# Patient Record
Sex: MALE | Race: WHITE | Employment: OTHER | URBAN - NONMETROPOLITAN AREA
[De-identification: names, ages, dates, MRNs, and addresses within clinical notes are randomized per-mention and may not be internally consistent; named-entity substitution may affect disease eponyms.]

---

## 2017-01-03 ENCOUNTER — OFFICE VISIT (OUTPATIENT)
Dept: OTHER | Age: 57
End: 2017-01-03

## 2017-01-03 VITALS — WEIGHT: 245.6 LBS | HEIGHT: 66 IN | BODY MASS INDEX: 39.47 KG/M2

## 2017-01-03 DIAGNOSIS — E78.2 MIXED HYPERLIPIDEMIA: ICD-10-CM

## 2017-01-03 DIAGNOSIS — E66.9 OBESITY (BMI 30-39.9): Primary | ICD-10-CM

## 2017-01-03 PROCEDURE — 97802 MEDICAL NUTRITION INDIV IN: CPT | Performed by: DIETITIAN, REGISTERED

## 2017-02-15 ENCOUNTER — TELEPHONE (OUTPATIENT)
Dept: OTHER | Age: 57
End: 2017-02-15

## 2017-02-28 ENCOUNTER — OFFICE VISIT (OUTPATIENT)
Dept: OTHER | Age: 57
End: 2017-02-28

## 2017-02-28 VITALS — HEIGHT: 66 IN | BODY MASS INDEX: 38.73 KG/M2 | WEIGHT: 241 LBS

## 2017-02-28 DIAGNOSIS — E66.9 OBESITY (BMI 30-39.9): Primary | ICD-10-CM

## 2017-02-28 DIAGNOSIS — E78.2 MIXED HYPERLIPIDEMIA: ICD-10-CM

## 2017-02-28 PROCEDURE — 97803 MED NUTRITION INDIV SUBSEQ: CPT | Performed by: DIETITIAN, REGISTERED

## 2017-03-07 ENCOUNTER — TELEPHONE (OUTPATIENT)
Dept: INTERNAL MEDICINE | Age: 57
End: 2017-03-07

## 2017-03-28 LAB
ALT SERPL-CCNC: 44 U/L
LDL CHOLESTEROL DIRECT: 121 MG/DL
TRIGL SERPL-MCNC: 202 MG/DL

## 2017-03-30 ENCOUNTER — OFFICE VISIT (OUTPATIENT)
Dept: INTERNAL MEDICINE | Age: 57
End: 2017-03-30

## 2017-03-30 VITALS
SYSTOLIC BLOOD PRESSURE: 156 MMHG | WEIGHT: 239.5 LBS | HEIGHT: 64 IN | BODY MASS INDEX: 40.89 KG/M2 | DIASTOLIC BLOOD PRESSURE: 80 MMHG | HEART RATE: 104 BPM

## 2017-03-30 DIAGNOSIS — E03.9 ACQUIRED HYPOTHYROIDISM: ICD-10-CM

## 2017-03-30 DIAGNOSIS — E78.5 HYPERLIPIDEMIA, UNSPECIFIED HYPERLIPIDEMIA TYPE: ICD-10-CM

## 2017-03-30 DIAGNOSIS — M79.604 RIGHT LEG PAIN: ICD-10-CM

## 2017-03-30 DIAGNOSIS — F32.9 REACTIVE DEPRESSION: ICD-10-CM

## 2017-03-30 DIAGNOSIS — G45.8 OTHER SPECIFIED TRANSIENT CEREBRAL ISCHEMIAS: ICD-10-CM

## 2017-03-30 DIAGNOSIS — I10 ESSENTIAL HYPERTENSION: Primary | ICD-10-CM

## 2017-03-30 PROCEDURE — 99214 OFFICE O/P EST MOD 30 MIN: CPT | Performed by: INTERNAL MEDICINE

## 2017-03-30 RX ORDER — FLUOXETINE HYDROCHLORIDE 20 MG/1
20 CAPSULE ORAL DAILY
Qty: 30 CAPSULE | Refills: 5 | Status: SHIPPED | OUTPATIENT
Start: 2017-03-30 | End: 2017-08-29 | Stop reason: SDUPTHER

## 2017-03-30 RX ORDER — LEVOTHYROXINE SODIUM 0.03 MG/1
TABLET ORAL
Qty: 30 TABLET | Refills: 5 | Status: ON HOLD | OUTPATIENT
Start: 2017-03-30 | End: 2017-07-17

## 2017-03-30 RX ORDER — LOSARTAN POTASSIUM 100 MG/1
100 TABLET ORAL DAILY
Qty: 30 TABLET | Refills: 5 | Status: SHIPPED | OUTPATIENT
Start: 2017-03-30 | End: 2017-08-29 | Stop reason: SDUPTHER

## 2017-03-30 RX ORDER — ASPIRIN 81 MG/1
81 TABLET, COATED ORAL DAILY
Qty: 90 TABLET | Refills: 1 | Status: SHIPPED | OUTPATIENT
Start: 2017-03-30 | End: 2017-08-29 | Stop reason: SDUPTHER

## 2017-03-30 RX ORDER — ATORVASTATIN CALCIUM 80 MG/1
80 TABLET, FILM COATED ORAL DAILY
Qty: 30 TABLET | Refills: 5 | Status: SHIPPED | OUTPATIENT
Start: 2017-03-30 | End: 2017-08-29 | Stop reason: SDUPTHER

## 2017-03-30 RX ORDER — METOPROLOL SUCCINATE 100 MG/1
100 TABLET, EXTENDED RELEASE ORAL DAILY
Qty: 30 TABLET | Refills: 5 | Status: SHIPPED | OUTPATIENT
Start: 2017-03-30 | End: 2017-08-29 | Stop reason: SDUPTHER

## 2017-03-30 RX ORDER — MELOXICAM 15 MG/1
15 TABLET ORAL DAILY
Qty: 30 TABLET | Refills: 5 | Status: ON HOLD | OUTPATIENT
Start: 2017-03-30 | End: 2017-07-17 | Stop reason: HOSPADM

## 2017-03-30 RX ORDER — METOPROLOL TARTRATE 50 MG/1
50 TABLET, FILM COATED ORAL 2 TIMES DAILY
Qty: 60 TABLET | Refills: 5 | Status: CANCELLED | OUTPATIENT
Start: 2017-03-30

## 2017-04-11 ENCOUNTER — OFFICE VISIT (OUTPATIENT)
Dept: ENDOCRINOLOGY | Age: 57
End: 2017-04-11

## 2017-04-11 VITALS
HEIGHT: 64 IN | WEIGHT: 242 LBS | HEART RATE: 74 BPM | SYSTOLIC BLOOD PRESSURE: 140 MMHG | BODY MASS INDEX: 41.32 KG/M2 | DIASTOLIC BLOOD PRESSURE: 78 MMHG | RESPIRATION RATE: 20 BRPM

## 2017-04-11 DIAGNOSIS — R61 DIAPHORESIS: Primary | ICD-10-CM

## 2017-04-11 PROCEDURE — 99213 OFFICE O/P EST LOW 20 MIN: CPT | Performed by: INTERNAL MEDICINE

## 2017-04-13 ENCOUNTER — TELEPHONE (OUTPATIENT)
Dept: ENDOCRINOLOGY | Age: 57
End: 2017-04-13

## 2017-04-27 ENCOUNTER — OFFICE VISIT (OUTPATIENT)
Dept: OTHER | Age: 57
End: 2017-04-27

## 2017-04-27 VITALS — BODY MASS INDEX: 38.83 KG/M2 | HEIGHT: 66 IN | WEIGHT: 241.6 LBS

## 2017-04-27 DIAGNOSIS — E66.9 OBESITY (BMI 30-39.9): Primary | ICD-10-CM

## 2017-04-27 DIAGNOSIS — E78.2 MIXED HYPERLIPIDEMIA: ICD-10-CM

## 2017-07-11 ENCOUNTER — OFFICE VISIT (OUTPATIENT)
Dept: INTERNAL MEDICINE | Age: 57
End: 2017-07-11

## 2017-07-11 VITALS
HEART RATE: 70 BPM | SYSTOLIC BLOOD PRESSURE: 136 MMHG | DIASTOLIC BLOOD PRESSURE: 90 MMHG | WEIGHT: 241 LBS | HEIGHT: 63 IN | BODY MASS INDEX: 42.7 KG/M2

## 2017-07-11 DIAGNOSIS — R14.0 ABDOMINAL BLOATING: ICD-10-CM

## 2017-07-11 DIAGNOSIS — E03.9 ACQUIRED HYPOTHYROIDISM: ICD-10-CM

## 2017-07-11 DIAGNOSIS — R10.11 RIGHT UPPER QUADRANT ABDOMINAL PAIN: ICD-10-CM

## 2017-07-11 DIAGNOSIS — K21.9 GASTROESOPHAGEAL REFLUX DISEASE, ESOPHAGITIS PRESENCE NOT SPECIFIED: ICD-10-CM

## 2017-07-11 DIAGNOSIS — R11.2 NON-INTRACTABLE VOMITING WITH NAUSEA, UNSPECIFIED VOMITING TYPE: Primary | ICD-10-CM

## 2017-07-11 PROCEDURE — 36415 COLL VENOUS BLD VENIPUNCTURE: CPT

## 2017-07-11 PROCEDURE — 80076 HEPATIC FUNCTION PANEL: CPT

## 2017-07-11 PROCEDURE — 84443 ASSAY THYROID STIM HORMONE: CPT

## 2017-07-11 PROCEDURE — 9990 CHARGE CONVERSION

## 2017-07-11 PROCEDURE — 99214 OFFICE O/P EST MOD 30 MIN: CPT | Performed by: INTERNAL MEDICINE

## 2017-07-11 PROCEDURE — 82150 ASSAY OF AMYLASE: CPT

## 2017-07-11 PROCEDURE — 83690 ASSAY OF LIPASE: CPT

## 2017-07-14 ENCOUNTER — HOSPITAL ENCOUNTER (INPATIENT)
Dept: PEDIATRICS UNIT | Age: 57
LOS: 3 days | Discharge: HOME | DRG: 469 | End: 2017-07-17
Attending: INTERNAL MEDICINE | Admitting: INTERNAL MEDICINE
Payer: MEDICAID

## 2017-07-14 DIAGNOSIS — E03.9 ACQUIRED HYPOTHYROIDISM: ICD-10-CM

## 2017-07-14 DIAGNOSIS — R10.9 ABDOMINAL PAIN, UNSPECIFIED LOCATION: Primary | ICD-10-CM

## 2017-07-14 DIAGNOSIS — N17.9 ACUTE RENAL FAILURE, UNSPECIFIED ACUTE RENAL FAILURE TYPE (HCC): ICD-10-CM

## 2017-07-14 DIAGNOSIS — R11.0 NAUSEA: ICD-10-CM

## 2017-07-14 LAB
ALBUMIN SERPL-MCNC: 3.6 G/DL (ref 3.5–5.1)
ALLEN TEST: POSITIVE
ALP BLD-CCNC: 94 U/L (ref 38–126)
ALT SERPL-CCNC: 28 U/L (ref 11–66)
AMMONIA: 26 UMOL/L (ref 11–60)
AMYLASE: 92 U/L (ref 20–104)
ANION GAP SERPL CALCULATED.3IONS-SCNC: 18 MEQ/L (ref 8–16)
AST SERPL-CCNC: 19 U/L (ref 5–40)
BASE EXCESS (CALCULATED): 5.5 MMOL/L (ref -2.5–2.5)
BASOPHILS # BLD: 0.2 %
BASOPHILS ABSOLUTE: 0 THOU/MM3 (ref 0–0.1)
BILIRUB SERPL-MCNC: 0.3 MG/DL (ref 0.3–1.2)
BILIRUBIN DIRECT: < 0.2 MG/DL (ref 0–0.3)
BILIRUBIN URINE: NEGATIVE
BLOOD, URINE: NEGATIVE
BUN BLDV-MCNC: 38 MG/DL (ref 7–22)
CALCIUM SERPL-MCNC: 10 MG/DL (ref 8.5–10.5)
CHARACTER, URINE: CLEAR
CHLORIDE BLD-SCNC: 100 MEQ/L (ref 98–111)
CO2: 25 MEQ/L (ref 23–33)
COLLECTED BY:: ABNORMAL
COLOR: YELLOW
CREAT SERPL-MCNC: 2 MG/DL (ref 0.4–1.2)
DEVICE: ABNORMAL
EKG ATRIAL RATE: 109 BPM
EKG P AXIS: 48 DEGREES
EKG P-R INTERVAL: 136 MS
EKG Q-T INTERVAL: 328 MS
EKG QRS DURATION: 66 MS
EKG QTC CALCULATION (BAZETT): 441 MS
EKG R AXIS: 19 DEGREES
EKG T AXIS: 65 DEGREES
EKG VENTRICULAR RATE: 109 BPM
EOSINOPHIL # BLD: 2.5 %
EOSINOPHILS ABSOLUTE: 0.2 THOU/MM3 (ref 0–0.4)
GFR SERPL CREATININE-BSD FRML MDRD: 35 ML/MIN/1.73M2
GLUCOSE BLD-MCNC: 154 MG/DL (ref 70–108)
GLUCOSE, URINE: NEGATIVE MG/DL
HCO3: 31 MMOL/L (ref 23–28)
HCT VFR BLD CALC: 36.6 % (ref 42–52)
HEMOGLOBIN: 12.6 GM/DL (ref 14–18)
INR BLD: 0.91 (ref 0.85–1.13)
KETONES, URINE: NEGATIVE
LACTIC ACID: 1.1 MMOL/L (ref 0.5–2.2)
LEUKOCYTE EST, POC: NEGATIVE
LIPASE: 31.7 U/L (ref 5.6–51.3)
LYMPHOCYTES # BLD: 20.7 %
LYMPHOCYTES ABSOLUTE: 1.4 THOU/MM3 (ref 1–4.8)
MCH RBC QN AUTO: 30.2 PG (ref 27–31)
MCHC RBC AUTO-ENTMCNC: 34.3 GM/DL (ref 33–37)
MCV RBC AUTO: 88.1 FL (ref 80–94)
MONOCYTES # BLD: 8.1 %
MONOCYTES ABSOLUTE: 0.6 THOU/MM3 (ref 0.4–1.3)
NITRITE, URINE: NEGATIVE
NUCLEATED RED BLOOD CELLS: 0 /100 WBC
O2 SATURATION: 94 %
OSMOLALITY CALCULATION: 297.1 MOSMOL/KG (ref 275–300)
PCO2: 48 MMHG (ref 35–45)
PDW BLD-RTO: 13.9 % (ref 11.5–14.5)
PH BLOOD GAS: 7.42 (ref 7.35–7.45)
PH UA: 5.5
PLATELET # BLD: 203 THOU/MM3 (ref 130–400)
PMV BLD AUTO: 10.5 MCM (ref 7.4–10.4)
PO2: 70 MMHG (ref 71–104)
POTASSIUM SERPL-SCNC: 4.6 MEQ/L (ref 3.5–5.2)
PROTEIN UA: NEGATIVE MG/DL
RBC # BLD: 4.16 MILL/MM3 (ref 4.7–6.1)
RBC # BLD: NORMAL 10*6/UL
SEG NEUTROPHILS: 68.5 %
SEGMENTED NEUTROPHILS ABSOLUTE COUNT: 4.8 THOU/MM3 (ref 1.8–7.7)
SODIUM BLD-SCNC: 143 MEQ/L (ref 135–145)
SOURCE, BLOOD GAS: ABNORMAL
SPECIFIC GRAVITY UA: 1.02 (ref 1–1.03)
TOTAL PROTEIN: 6.7 G/DL (ref 6.1–8)
TROPONIN T: < 0.01 NG/ML
UROBILINOGEN, URINE: 0.2 EU/DL
WBC # BLD: 7 THOU/MM3 (ref 4.8–10.8)

## 2017-07-14 PROCEDURE — 85018 HEMOGLOBIN: CPT

## 2017-07-14 PROCEDURE — 82040 ASSAY OF SERUM ALBUMIN: CPT

## 2017-07-14 PROCEDURE — 84075 ASSAY ALKALINE PHOSPHATASE: CPT

## 2017-07-14 PROCEDURE — 9990 CHARGE CONVERSION

## 2017-07-14 PROCEDURE — 84520 ASSAY OF UREA NITROGEN: CPT

## 2017-07-14 PROCEDURE — 96374 THER/PROPH/DIAG INJ IV PUSH: CPT

## 2017-07-14 PROCEDURE — 84450 TRANSFERASE (AST) (SGOT): CPT

## 2017-07-14 PROCEDURE — 96375 TX/PRO/DX INJ NEW DRUG ADDON: CPT | Performed by: INTERNAL MEDICINE

## 2017-07-14 PROCEDURE — 36600 WITHDRAWAL OF ARTERIAL BLOOD: CPT

## 2017-07-14 PROCEDURE — 82310 ASSAY OF CALCIUM: CPT

## 2017-07-14 PROCEDURE — 93005 ELECTROCARDIOGRAM TRACING: CPT

## 2017-07-14 PROCEDURE — 87086 URINE CULTURE/COLONY COUNT: CPT

## 2017-07-14 PROCEDURE — 83605 ASSAY OF LACTIC ACID: CPT

## 2017-07-14 PROCEDURE — 84484 ASSAY OF TROPONIN QUANT: CPT

## 2017-07-14 PROCEDURE — 82140 ASSAY OF AMMONIA: CPT

## 2017-07-14 PROCEDURE — 96376 TX/PRO/DX INJ SAME DRUG ADON: CPT

## 2017-07-14 PROCEDURE — 99285 EMERGENCY DEPT VISIT HI MDM: CPT

## 2017-07-14 PROCEDURE — 84295 ASSAY OF SERUM SODIUM: CPT

## 2017-07-14 PROCEDURE — 84155 ASSAY OF PROTEIN SERUM: CPT

## 2017-07-14 PROCEDURE — 71020 CHARGE CONVERSION: CPT

## 2017-07-14 PROCEDURE — 85014 HEMATOCRIT: CPT

## 2017-07-14 PROCEDURE — 82565 ASSAY OF CREATININE: CPT

## 2017-07-14 PROCEDURE — 83690 ASSAY OF LIPASE: CPT

## 2017-07-14 PROCEDURE — 82247 BILIRUBIN TOTAL: CPT

## 2017-07-14 PROCEDURE — 96375 TX/PRO/DX INJ NEW DRUG ADDON: CPT

## 2017-07-14 PROCEDURE — 82803 BLOOD GASES ANY COMBINATION: CPT

## 2017-07-14 PROCEDURE — 85049 AUTOMATED PLATELET COUNT: CPT

## 2017-07-14 PROCEDURE — 96374 THER/PROPH/DIAG INJ IV PUSH: CPT | Performed by: INTERNAL MEDICINE

## 2017-07-14 PROCEDURE — 82248 BILIRUBIN DIRECT: CPT

## 2017-07-14 PROCEDURE — 82435 ASSAY OF BLOOD CHLORIDE: CPT

## 2017-07-14 PROCEDURE — 82374 ASSAY BLOOD CARBON DIOXIDE: CPT

## 2017-07-14 PROCEDURE — G0378 HOSPITAL OBSERVATION PER HR: HCPCS | Performed by: INTERNAL MEDICINE

## 2017-07-14 PROCEDURE — 74176 CT ABD & PELVIS W/O CONTRAST: CPT

## 2017-07-14 PROCEDURE — 85048 AUTOMATED LEUKOCYTE COUNT: CPT

## 2017-07-14 PROCEDURE — 82150 ASSAY OF AMYLASE: CPT

## 2017-07-14 PROCEDURE — 96376 TX/PRO/DX INJ SAME DRUG ADON: CPT | Performed by: INTERNAL MEDICINE

## 2017-07-14 PROCEDURE — 84460 ALANINE AMINO (ALT) (SGPT): CPT

## 2017-07-14 PROCEDURE — 85041 AUTOMATED RBC COUNT: CPT

## 2017-07-14 PROCEDURE — 84132 ASSAY OF SERUM POTASSIUM: CPT

## 2017-07-14 PROCEDURE — 82947 ASSAY GLUCOSE BLOOD QUANT: CPT

## 2017-07-14 PROCEDURE — 96361 HYDRATE IV INFUSION ADD-ON: CPT

## 2017-07-14 RX ORDER — HYDROXYZINE HYDROCHLORIDE 10 MG/1
10 TABLET, FILM COATED ORAL EVERY 6 HOURS PRN
Status: DISCONTINUED | OUTPATIENT
Start: 2017-07-14 | End: 2017-07-17 | Stop reason: HOSPADM

## 2017-07-14 RX ORDER — POTASSIUM CHLORIDE 20 MEQ/1
40 TABLET, EXTENDED RELEASE ORAL PRN
Status: DISCONTINUED | OUTPATIENT
Start: 2017-07-14 | End: 2017-07-17 | Stop reason: HOSPADM

## 2017-07-14 RX ORDER — ACETAMINOPHEN 325 MG/1
650 TABLET ORAL EVERY 4 HOURS PRN
Status: DISCONTINUED | OUTPATIENT
Start: 2017-07-14 | End: 2017-07-17 | Stop reason: HOSPADM

## 2017-07-14 RX ORDER — PANTOPRAZOLE SODIUM 40 MG/1
40 TABLET, DELAYED RELEASE ORAL
Status: DISCONTINUED | OUTPATIENT
Start: 2017-07-15 | End: 2017-07-17 | Stop reason: HOSPADM

## 2017-07-14 RX ORDER — ONDANSETRON 2 MG/ML
4 INJECTION INTRAMUSCULAR; INTRAVENOUS EVERY 30 MIN PRN
Status: COMPLETED | OUTPATIENT
Start: 2017-07-14 | End: 2017-07-14

## 2017-07-14 RX ORDER — ONDANSETRON 4 MG/1
4 TABLET, FILM COATED ORAL EVERY 8 HOURS PRN
Qty: 20 TABLET | Refills: 0 | Status: SHIPPED | OUTPATIENT
Start: 2017-07-14 | End: 2017-07-17 | Stop reason: HOSPADM

## 2017-07-14 RX ORDER — MORPHINE SULFATE 4 MG/ML
4 INJECTION, SOLUTION INTRAMUSCULAR; INTRAVENOUS
Status: DISCONTINUED | OUTPATIENT
Start: 2017-07-14 | End: 2017-07-17 | Stop reason: HOSPADM

## 2017-07-14 RX ORDER — LEVOTHYROXINE SODIUM 0.03 MG/1
25 TABLET ORAL DAILY
Status: DISCONTINUED | OUTPATIENT
Start: 2017-07-15 | End: 2017-07-16

## 2017-07-14 RX ORDER — SODIUM CHLORIDE 0.9 % (FLUSH) 0.9 %
10 SYRINGE (ML) INJECTION EVERY 12 HOURS SCHEDULED
Status: DISCONTINUED | OUTPATIENT
Start: 2017-07-14 | End: 2017-07-17 | Stop reason: HOSPADM

## 2017-07-14 RX ORDER — POTASSIUM CHLORIDE 20MEQ/15ML
40 LIQUID (ML) ORAL PRN
Status: DISCONTINUED | OUTPATIENT
Start: 2017-07-14 | End: 2017-07-17 | Stop reason: HOSPADM

## 2017-07-14 RX ORDER — ASPIRIN 81 MG/1
81 TABLET ORAL DAILY
Status: DISCONTINUED | OUTPATIENT
Start: 2017-07-15 | End: 2017-07-15

## 2017-07-14 RX ORDER — MORPHINE SULFATE 2 MG/ML
2 INJECTION, SOLUTION INTRAMUSCULAR; INTRAVENOUS
Status: DISCONTINUED | OUTPATIENT
Start: 2017-07-14 | End: 2017-07-17 | Stop reason: HOSPADM

## 2017-07-14 RX ORDER — ATORVASTATIN CALCIUM 80 MG/1
80 TABLET, FILM COATED ORAL DAILY
Status: DISCONTINUED | OUTPATIENT
Start: 2017-07-15 | End: 2017-07-17 | Stop reason: HOSPADM

## 2017-07-14 RX ORDER — 0.9 % SODIUM CHLORIDE 0.9 %
1000 INTRAVENOUS SOLUTION INTRAVENOUS ONCE
Status: COMPLETED | OUTPATIENT
Start: 2017-07-14 | End: 2017-07-14

## 2017-07-14 RX ORDER — DICYCLOMINE HYDROCHLORIDE 10 MG/1
10 CAPSULE ORAL EVERY 6 HOURS PRN
Qty: 20 CAPSULE | Refills: 0 | Status: SHIPPED | OUTPATIENT
Start: 2017-07-14 | End: 2017-07-17 | Stop reason: HOSPADM

## 2017-07-14 RX ORDER — POTASSIUM CHLORIDE 7.45 MG/ML
10 INJECTION INTRAVENOUS PRN
Status: DISCONTINUED | OUTPATIENT
Start: 2017-07-14 | End: 2017-07-17 | Stop reason: HOSPADM

## 2017-07-14 RX ORDER — SODIUM CHLORIDE 0.9 % (FLUSH) 0.9 %
10 SYRINGE (ML) INJECTION PRN
Status: DISCONTINUED | OUTPATIENT
Start: 2017-07-14 | End: 2017-07-17 | Stop reason: HOSPADM

## 2017-07-14 RX ORDER — ALBUTEROL SULFATE 90 UG/1
1 AEROSOL, METERED RESPIRATORY (INHALATION) EVERY 6 HOURS PRN
Status: DISCONTINUED | OUTPATIENT
Start: 2017-07-14 | End: 2017-07-17 | Stop reason: HOSPADM

## 2017-07-14 RX ORDER — ONDANSETRON 2 MG/ML
4 INJECTION INTRAMUSCULAR; INTRAVENOUS EVERY 6 HOURS PRN
Status: DISCONTINUED | OUTPATIENT
Start: 2017-07-14 | End: 2017-07-17 | Stop reason: HOSPADM

## 2017-07-14 RX ORDER — SODIUM CHLORIDE 9 MG/ML
INJECTION, SOLUTION INTRAVENOUS CONTINUOUS
Status: DISCONTINUED | OUTPATIENT
Start: 2017-07-14 | End: 2017-07-17 | Stop reason: HOSPADM

## 2017-07-14 RX ORDER — ACETAMINOPHEN 325 MG/1
650 TABLET ORAL EVERY 6 HOURS PRN
Status: DISCONTINUED | OUTPATIENT
Start: 2017-07-14 | End: 2017-07-14 | Stop reason: SDUPTHER

## 2017-07-14 RX ORDER — FLUOXETINE HYDROCHLORIDE 20 MG/1
20 CAPSULE ORAL DAILY
Status: DISCONTINUED | OUTPATIENT
Start: 2017-07-15 | End: 2017-07-17 | Stop reason: HOSPADM

## 2017-07-14 RX ORDER — TRAZODONE HYDROCHLORIDE 50 MG/1
50 TABLET ORAL NIGHTLY PRN
Status: DISCONTINUED | OUTPATIENT
Start: 2017-07-15 | End: 2017-07-17 | Stop reason: HOSPADM

## 2017-07-14 RX ORDER — METOPROLOL SUCCINATE 100 MG/1
100 TABLET, EXTENDED RELEASE ORAL DAILY
Status: DISCONTINUED | OUTPATIENT
Start: 2017-07-15 | End: 2017-07-17 | Stop reason: HOSPADM

## 2017-07-14 RX ADMIN — Medication 1 MG: at 14:22

## 2017-07-14 RX ADMIN — MORPHINE SULFATE 4 MG: 4 INJECTION, SOLUTION INTRAMUSCULAR; INTRAVENOUS at 23:51

## 2017-07-14 RX ADMIN — SODIUM CHLORIDE: 9 INJECTION, SOLUTION INTRAVENOUS at 23:50

## 2017-07-14 RX ADMIN — ONDANSETRON 4 MG: 2 INJECTION INTRAMUSCULAR; INTRAVENOUS at 14:21

## 2017-07-14 RX ADMIN — Medication 1000 ML: at 14:22

## 2017-07-14 RX ADMIN — Medication 1 MG: at 20:56

## 2017-07-14 RX ADMIN — ONDANSETRON 4 MG: 2 INJECTION INTRAMUSCULAR; INTRAVENOUS at 20:56

## 2017-07-14 ASSESSMENT — ENCOUNTER SYMPTOMS
ABDOMINAL PAIN: 1
DIARRHEA: 0
CONSTIPATION: 0
RHINORRHEA: 0
NAUSEA: 1
EYE DISCHARGE: 0
WHEEZING: 0
BACK PAIN: 0
VOMITING: 1
COUGH: 0
EYE REDNESS: 0
ABDOMINAL DISTENTION: 1
SHORTNESS OF BREATH: 1
SORE THROAT: 0

## 2017-07-14 ASSESSMENT — PAIN DESCRIPTION - DESCRIPTORS
DESCRIPTORS: CONSTANT;SQUEEZING;TIGHTNESS
DESCRIPTORS: ACHING;CONSTANT;TIGHTNESS
DESCRIPTORS: TIGHTNESS

## 2017-07-14 ASSESSMENT — PAIN DESCRIPTION - PAIN TYPE
TYPE: ACUTE PAIN

## 2017-07-14 ASSESSMENT — PAIN DESCRIPTION - FREQUENCY
FREQUENCY: CONTINUOUS
FREQUENCY: CONTINUOUS
FREQUENCY: INTERMITTENT

## 2017-07-14 ASSESSMENT — PAIN SCALES - GENERAL
PAINLEVEL_OUTOF10: 10
PAINLEVEL_OUTOF10: 10
PAINLEVEL_OUTOF10: 9
PAINLEVEL_OUTOF10: 10
PAINLEVEL_OUTOF10: 10
PAINLEVEL_OUTOF10: 7

## 2017-07-14 ASSESSMENT — PAIN DESCRIPTION - ORIENTATION
ORIENTATION: RIGHT
ORIENTATION: RIGHT;LEFT;MID;LOWER

## 2017-07-14 ASSESSMENT — PAIN DESCRIPTION - LOCATION
LOCATION: ABDOMEN

## 2017-07-14 ASSESSMENT — PAIN DESCRIPTION - ONSET: ONSET: ON-GOING

## 2017-07-14 NOTE — PAYOR INFORMATION
Patient Ian Borne:  [de-identified]  Primary AUTH/CERT:    Primary Insurance Company Name:   [facility] [address]  Primary Insurance Plan Name:  [address] [place] [address]  Primary Insurance Group Number:    Primary Insurance Plan Type:   Primary Insurance Policy Number:  [id number]    Secondary AUTH/CERT:    [address] [place] [address] Name:   Zaida Duffy  Secondary Insurance Plan Name:  Zeyad  Secondary Insurance Group Number:    Secondary Insurance Plan Type:   Secondary Insurance Policy Number:  [id number]

## 2017-07-14 NOTE — ED PROVIDER NOTES
(restlessness)       ALLERGIES     has No Known Allergies. FAMILY HISTORY     indicated that his mother is . He indicated that his father is . He indicated that his sister is alive. He indicated that all of his three brothers are alive. family history includes COPD in his father; Dementia in his mother; High Blood Pressure in his father; High Cholesterol in his father. SOCIAL HISTORY      reports that he has quit smoking. His smoking use included Cigarettes. He smoked 0.00 packs per day for 40.00 years. He has never used smokeless tobacco. He reports that he does not drink alcohol or use illicit drugs. PHYSICAL EXAM     INITIAL VITALS:  oral temperature is 98.5 °F (36.9 °C). His blood pressure is 164/92 (abnormal) and his pulse is 94. His respiration is 22 and oxygen saturation is 94%. Physical Exam   Constitutional: He is oriented to person, place, and time. Patient is morbidly obese. HENT:   Head: Normocephalic and atraumatic. Right Ear: External ear normal.   Left Ear: External ear normal.   Eyes: Conjunctivae are normal. Right eye exhibits no discharge. Left eye exhibits no discharge. No scleral icterus. Neck: Normal range of motion. Neck supple. No JVD present. Cardiovascular: Normal rate, regular rhythm and normal heart sounds. Pulmonary/Chest: Effort normal and breath sounds normal. No stridor. No respiratory distress. Abdominal: Soft. He exhibits distension. Bowel sounds are absent. There is tenderness (diffuse). Musculoskeletal: Normal range of motion. He exhibits no edema. Neurological: He is alert and oriented to person, place, and time. He exhibits normal muscle tone. GCS eye subscore is 4. GCS verbal subscore is 5. GCS motor subscore is 6. Skin: Skin is warm and dry. He is not diaphoretic. No erythema. Psychiatric: He has a normal mood and affect. His behavior is normal.   Nursing note and vitals reviewed.     DIAGNOSTIC RESULTS     EKG: All EKG's are

## 2017-07-14 NOTE — IP AVS SNAPSHOT
Commonly known as:  COMBIVENT RESPIMAT   Inhale 1 puff into the lungs every 6 hours as needed for Wheezing                  Anytime you need it next                       ASPIRIN LOW DOSE 81 MG EC tablet   Generic drug:  aspirin   Take 1 tablet by mouth daily                81 mg on 7/17/2017  9:19 AM            7/18/17 at 9 ;00 am                   atorvastatin 80 MG tablet   Commonly known as:  LIPITOR   Take 1 tablet by mouth daily                80 mg on 7/16/2017  9:33 PM                    8pm 7/17/17           FLUoxetine 20 MG capsule   Commonly known as:  PROZAC   Take 1 capsule by mouth daily                20 mg on 7/17/2017  9:19 AM            9 am 7/17/17                   hydrOXYzine 10 MG tablet   Commonly known as:  ATARAX   Take 1 tablet by mouth every 6 hours as needed for Anxiety (restlessness and sleep)                                         losartan 100 MG tablet   Commonly known as:  COZAAR   Take 1 tablet by mouth daily                         7/18/17 9 am                   metoprolol succinate 100 MG extended release tablet   Commonly known as:  TOPROL XL   Take 1 tablet by mouth daily                100 mg on 7/17/2017  9:19 AM            7/18/17 9am                   nystatin 010454 UNIT/GM powder   Commonly known as:  MYCOSTATIN   Apply to groin bilaterally, apply 3 times daily.                                        8 pm 7/17/17           omeprazole 40 MG delayed release capsule   Commonly known as:  PRILOSEC   TAKE ONE CAPSULE BY MOUTH DAILY                         7/18/17 7am                   traZODone 50 MG tablet   Commonly known as:  DESYREL   Take 1 tablet by mouth nightly as needed for Sleep (restlessness)                                              These are the medications you have told us you were taking at home, STOP taking them after you leave the hospital     meloxicam 15 MG tablet   Commonly known as:  MOBIC            Where to Get Your Medications problems, pain that gets worse, and dizziness. These may be signs of a more serious problem. Your doctor may have recommended a follow-up visit in the next 8 to 12 hours. If you are not getting better, you may need more tests or treatment. The doctor has checked you carefully, but problems can develop later. If you notice any problems or new symptoms, get medical treatment right away. Follow-up care is a key part of your treatment and safety. Be sure to make and go to all appointments, and call your doctor if you are having problems. It's also a good idea to know your test results and keep a list of the medicines you take. How can you care for yourself at home? · Rest until you feel better. · To prevent dehydration, drink plenty of fluids, enough so that your urine is light yellow or clear like water. Choose water and other caffeine-free clear liquids until you feel better. If you have kidney, heart, or liver disease and have to limit fluids, talk with your doctor before you increase the amount of fluids you drink. · If your stomach is upset, eat mild foods, such as rice, dry toast or crackers, bananas, and applesauce. Try eating several small meals instead of two or three large ones. · Wait until 48 hours after all symptoms have gone away before you have spicy foods, alcohol, and drinks that contain caffeine. · Do not eat foods that are high in fat. · Avoid anti-inflammatory medicines such as aspirin, ibuprofen (Advil, Motrin), and naproxen (Aleve). These can cause stomach upset. Talk to your doctor if you take daily aspirin for another health problem. When should you call for help? Call 911 anytime you think you may need emergency care. For example, call if:  · You passed out (lost consciousness). · You pass maroon or very bloody stools. · You vomit blood or what looks like coffee grounds. · You have new, severe belly pain.   Call your doctor now or seek immediate medical care if: · Your pain gets worse, especially if it becomes focused in one area of your belly. · You have a new or higher fever. · Your stools are black and look like tar, or they have streaks of blood. · You have unexpected vaginal bleeding. · You have symptoms of a urinary tract infection. These may include:  ¨ Pain when you urinate. ¨ Urinating more often than usual.  ¨ Blood in your urine. · You are dizzy or lightheaded, or you feel like you may faint. Watch closely for changes in your health, and be sure to contact your doctor if:  · You are not getting better after 1 day (24 hours). Where can you learn more? Go to [email or web address] and sign in to your [employer] account. Enter S755 in the [employer] box to learn more about \"Abdominal Pain: Care Instructions. \"     If you do not have an account, please click on the \"Sign Up Now\" link. Current as of: May 27, 2016  Content Version: 11.2  © 6862-9502 [employer]. Care instructions adapted under license by [facility] ([facility]). If you have questions about a medical condition or this instruction, always ask your healthcare professional. [place] [address] any warranty or liability for your use of this information. Nausea and Vomiting: Care Instructions  Your Care Instructions    When you are nauseated, you may feel weak and sweaty and notice a lot of saliva in your mouth. Nausea often leads to vomiting. Most of the time you do not need to worry about nausea and vomiting, but they can be signs of other illnesses. Two common causes of nausea and vomiting are stomach flu and food poisoning. Nausea and vomiting from viral stomach flu will usually start to improve within 24 hours. Nausea and vomiting from food poisoning may last from 12 to 48 hours. The doctor has checked you carefully, but problems can develop later. If you notice any problems or new symptoms, get medical treatment right away. ? Review your current list of  medications, immunization, and allergies. ? Review your future test results online . ? Review your discharge instructions provided by your caregivers at discharge    Certain functionality such as prescription refills, scheduling appointments or sending messages to your provider are not activated if your provider does not use [facility] in his/her office    For questions regarding your MyChart account call [phone]. If you have a clinical question, please call your doctor's office. The information on all pages of the After Visit Summary has been reviewed with me, the patient and/or responsible adult, by my health care provider(s). I had the opportunity to ask questions regarding this information. I understand I should dispose of my armband safely at home to protect my health information. A complete copy of the After Visit Summary has been given to me, the patient and/or responsible adult. Patient Signature/Responsible Adult:____________________    Clinician Signature:_____________________    Date:_____________________    Time:_____________________        More Information           Learning About Diverticulosis and Diverticulitis  What are diverticulosis and diverticulitis? In diverticulosis and diverticulitis, pouches called diverticula form in the wall of the large intestine, or colon. · In diverticulosis, the pouches do not cause any pain or other symptoms. · In diverticulitis, the pouches get inflamed or infected and cause symptoms. Doctors aren't sure what causes these pouches in the colon. But they think that a low-fiber diet may play a role. Without fiber to add bulk to the stool, the colon has to work harder than normal to push the stool forward. The pressure from this may cause pouches to form in weak spots along the colon. Some people with diverticulosis get diverticulitis. But experts don't know why this happens. What are the symptoms?

## 2017-07-15 ENCOUNTER — APPOINTMENT (OUTPATIENT)
Dept: ULTRASOUND IMAGING | Age: 57
DRG: 469 | End: 2017-07-15
Payer: MEDICAID

## 2017-07-15 LAB
ANION GAP SERPL CALCULATED.3IONS-SCNC: 12 MEQ/L (ref 8–16)
BUN BLDV-MCNC: 36 MG/DL (ref 7–22)
C-REACTIVE PROTEIN: 1.38 MG/DL (ref 0–1)
CALCIUM SERPL-MCNC: 9 MG/DL (ref 8.5–10.5)
CHLORIDE BLD-SCNC: 103 MEQ/L (ref 98–111)
CO2: 28 MEQ/L (ref 23–33)
CREAT SERPL-MCNC: 2 MG/DL (ref 0.4–1.2)
GFR SERPL CREATININE-BSD FRML MDRD: 35 ML/MIN/1.73M2
GLUCOSE BLD-MCNC: 128 MG/DL (ref 70–108)
HCT VFR BLD CALC: 37.4 % (ref 42–52)
HEMOGLOBIN: 12.6 GM/DL (ref 14–18)
MCH RBC QN AUTO: 30.2 PG (ref 27–31)
MCHC RBC AUTO-ENTMCNC: 33.6 GM/DL (ref 33–37)
MCV RBC AUTO: 90.1 FL (ref 80–94)
ORGANISM: ABNORMAL
PDW BLD-RTO: 13.3 % (ref 11.5–14.5)
PLATELET # BLD: 200 THOU/MM3 (ref 130–400)
PMV BLD AUTO: 10.4 MCM (ref 7.4–10.4)
POTASSIUM SERPL-SCNC: 5.2 MEQ/L (ref 3.5–5.2)
RBC # BLD: 4.16 MILL/MM3 (ref 4.7–6.1)
SODIUM BLD-SCNC: 143 MEQ/L (ref 135–145)
URINE CULTURE, ROUTINE: ABNORMAL
WBC # BLD: 7.3 THOU/MM3 (ref 4.8–10.8)

## 2017-07-15 PROCEDURE — 1200000000 HC SEMI PRIVATE

## 2017-07-15 PROCEDURE — 6360000002 HC RX W HCPCS: Performed by: INTERNAL MEDICINE

## 2017-07-15 PROCEDURE — 96376 TX/PRO/DX INJ SAME DRUG ADON: CPT | Performed by: INTERNAL MEDICINE

## 2017-07-15 PROCEDURE — G0378 HOSPITAL OBSERVATION PER HR: HCPCS | Performed by: INTERNAL MEDICINE

## 2017-07-15 PROCEDURE — 2500000003 HC RX 250 WO HCPCS: Performed by: INTERNAL MEDICINE

## 2017-07-15 PROCEDURE — 80048 BASIC METABOLIC PNL TOTAL CA: CPT

## 2017-07-15 PROCEDURE — 76770 US EXAM ABDO BACK WALL COMP: CPT

## 2017-07-15 PROCEDURE — 36415 COLL VENOUS BLD VENIPUNCTURE: CPT

## 2017-07-15 PROCEDURE — 6370000000 HC RX 637 (ALT 250 FOR IP): Performed by: INTERNAL MEDICINE

## 2017-07-15 PROCEDURE — 86140 C-REACTIVE PROTEIN: CPT

## 2017-07-15 PROCEDURE — 85027 COMPLETE CBC AUTOMATED: CPT

## 2017-07-15 PROCEDURE — 94640 AIRWAY INHALATION TREATMENT: CPT

## 2017-07-15 PROCEDURE — 96372 THER/PROPH/DIAG INJ SC/IM: CPT | Performed by: INTERNAL MEDICINE

## 2017-07-15 PROCEDURE — 2580000003 HC RX 258: Performed by: INTERNAL MEDICINE

## 2017-07-15 PROCEDURE — A6257 TRANSPARENT FILM <= 16 SQ IN: HCPCS

## 2017-07-15 RX ORDER — DOCUSATE SODIUM 100 MG/1
100 CAPSULE, LIQUID FILLED ORAL 2 TIMES DAILY
Status: DISCONTINUED | OUTPATIENT
Start: 2017-07-15 | End: 2017-07-17 | Stop reason: HOSPADM

## 2017-07-15 RX ORDER — ASPIRIN 81 MG/1
81 TABLET ORAL DAILY
Status: DISCONTINUED | OUTPATIENT
Start: 2017-07-17 | End: 2017-07-17 | Stop reason: HOSPADM

## 2017-07-15 RX ORDER — POLYETHYLENE GLYCOL 3350 17 G/17G
17 POWDER, FOR SOLUTION ORAL DAILY
Status: DISCONTINUED | OUTPATIENT
Start: 2017-07-15 | End: 2017-07-17 | Stop reason: HOSPADM

## 2017-07-15 RX ORDER — POLYETHYLENE GLYCOL 3350 17 G/17G
17 POWDER, FOR SOLUTION ORAL
Status: DISCONTINUED | OUTPATIENT
Start: 2017-07-15 | End: 2017-07-16

## 2017-07-15 RX ADMIN — LEVOTHYROXINE SODIUM 25 MCG: 25 TABLET ORAL at 06:12

## 2017-07-15 RX ADMIN — ATORVASTATIN CALCIUM 80 MG: 80 TABLET, FILM COATED ORAL at 21:22

## 2017-07-15 RX ADMIN — DOCUSATE SODIUM 100 MG: 100 CAPSULE ORAL at 16:04

## 2017-07-15 RX ADMIN — SODIUM CHLORIDE: 9 INJECTION, SOLUTION INTRAVENOUS at 08:46

## 2017-07-15 RX ADMIN — ENOXAPARIN SODIUM 40 MG: 40 INJECTION SUBCUTANEOUS at 08:55

## 2017-07-15 RX ADMIN — ASPIRIN 81 MG: 81 TABLET, COATED ORAL at 08:51

## 2017-07-15 RX ADMIN — MICONAZOLE NITRATE: 2 POWDER TOPICAL at 21:24

## 2017-07-15 RX ADMIN — POLYETHYLENE GLYCOL 3350 17 G: 17 POWDER, FOR SOLUTION ORAL at 21:22

## 2017-07-15 RX ADMIN — ONDANSETRON 4 MG: 2 INJECTION INTRAMUSCULAR; INTRAVENOUS at 05:36

## 2017-07-15 RX ADMIN — METOPROLOL SUCCINATE 100 MG: 100 TABLET, FILM COATED, EXTENDED RELEASE ORAL at 08:51

## 2017-07-15 RX ADMIN — MORPHINE SULFATE 4 MG: 4 INJECTION, SOLUTION INTRAMUSCULAR; INTRAVENOUS at 05:36

## 2017-07-15 RX ADMIN — MICONAZOLE NITRATE: 2 POWDER TOPICAL at 08:52

## 2017-07-15 RX ADMIN — POLYETHYLENE GLYCOL 3350 17 G: 17 POWDER, FOR SOLUTION ORAL at 22:25

## 2017-07-15 RX ADMIN — SODIUM CHLORIDE: 9 INJECTION, SOLUTION INTRAVENOUS at 17:12

## 2017-07-15 RX ADMIN — PANTOPRAZOLE SODIUM 40 MG: 40 TABLET, DELAYED RELEASE ORAL at 06:12

## 2017-07-15 RX ADMIN — BISACODYL 10 MG: 5 TABLET, DELAYED RELEASE ORAL at 21:22

## 2017-07-15 RX ADMIN — POLYETHYLENE GLYCOL 3350 17 G: 17 POWDER, FOR SOLUTION ORAL at 16:04

## 2017-07-15 RX ADMIN — MORPHINE SULFATE 4 MG: 4 INJECTION, SOLUTION INTRAMUSCULAR; INTRAVENOUS at 01:59

## 2017-07-15 RX ADMIN — MICONAZOLE NITRATE: 2 POWDER TOPICAL at 01:00

## 2017-07-15 RX ADMIN — FLUOXETINE 20 MG: 20 CAPSULE ORAL at 08:52

## 2017-07-15 RX ADMIN — POLYETHYLENE GLYCOL 3350 17 G: 17 POWDER, FOR SOLUTION ORAL at 23:23

## 2017-07-15 RX ADMIN — ALBUTEROL SULFATE 1 PUFF: 90 AEROSOL, METERED RESPIRATORY (INHALATION) at 00:54

## 2017-07-15 RX ADMIN — MORPHINE SULFATE 4 MG: 4 INJECTION, SOLUTION INTRAMUSCULAR; INTRAVENOUS at 17:12

## 2017-07-15 ASSESSMENT — ACTIVITIES OF DAILY LIVING (ADL)
EFFECT OF PAIN ON DAILY ACTIVITIES: SOB
EFFECT OF PAIN ON DAILY ACTIVITIES: SOB

## 2017-07-15 ASSESSMENT — PAIN DESCRIPTION - DIRECTION
RADIATING_TOWARDS: BACK

## 2017-07-15 ASSESSMENT — PAIN DESCRIPTION - DESCRIPTORS
DESCRIPTORS: TIGHTNESS;SQUEEZING
DESCRIPTORS: PRESSURE;TIGHTNESS
DESCRIPTORS: TIGHTNESS

## 2017-07-15 ASSESSMENT — PAIN DESCRIPTION - FREQUENCY
FREQUENCY: CONTINUOUS

## 2017-07-15 ASSESSMENT — PAIN DESCRIPTION - PAIN TYPE
TYPE: ACUTE PAIN;CHRONIC PAIN
TYPE: ACUTE PAIN
TYPE: ACUTE PAIN;CHRONIC PAIN

## 2017-07-15 ASSESSMENT — PAIN SCALES - GENERAL
PAINLEVEL_OUTOF10: 9
PAINLEVEL_OUTOF10: 9
PAINLEVEL_OUTOF10: 10
PAINLEVEL_OUTOF10: 8
PAINLEVEL_OUTOF10: 10
PAINLEVEL_OUTOF10: 8
PAINLEVEL_OUTOF10: 10
PAINLEVEL_OUTOF10: 0
PAINLEVEL_OUTOF10: 0
PAINLEVEL_OUTOF10: 9
PAINLEVEL_OUTOF10: 8
PAINLEVEL_OUTOF10: 9

## 2017-07-15 ASSESSMENT — PAIN DESCRIPTION - ONSET
ONSET: ON-GOING

## 2017-07-15 ASSESSMENT — PAIN DESCRIPTION - ORIENTATION
ORIENTATION: RIGHT;LEFT;LOWER;UPPER;MID
ORIENTATION: RIGHT;LEFT;LOWER
ORIENTATION: RIGHT;LEFT;MID;LOWER
ORIENTATION: RIGHT;LEFT;UPPER;MID;LOWER
ORIENTATION: RIGHT;LEFT;LOWER;MID;UPPER
ORIENTATION: LEFT;RIGHT;LOWER;UPPER;MID

## 2017-07-15 ASSESSMENT — PAIN DESCRIPTION - LOCATION
LOCATION: ABDOMEN

## 2017-07-15 ASSESSMENT — PAIN DESCRIPTION - PROGRESSION
CLINICAL_PROGRESSION: NOT CHANGED

## 2017-07-15 NOTE — ED NOTES
Patient complains of nausea after eating. Medicated as per order. Will monitor.      Avinash Boateng RN  07/14/17 [id number]

## 2017-07-15 NOTE — PROGRESS NOTES
Patient arrived to 6E 56 via wheelchair. INT present in right hand. Admission vitals and assessment completed, as charted. VSS. Oriented to room and unit.    Electronically signed by Nadia Nick RN on 7/14/2017 at 11:40 PM

## 2017-07-15 NOTE — H&P
Internal Medicine  History and Physical    Patient:  Caitlin Tinajero  MRN: [id number]      History Obtained From:  patient  PCP: Dario Jon MD    CHIEF COMPLAINT:  abd pain, nausea, vomiting    HISTORY OF PRESENT ILLNESS:   The patient is a 61 y.o. male who presents with abdominal pain and distension. The patient states he began to experience these symptoms 1 to 2 months ago. The patient rates his pain at a 7/10 in severity. Patient states his stomach feels tight and he reports associated shortness of breath. Patient began with nausea and vomiting 3 days ago. He vomits after each meal. Patient denies constipation or diarrhea. Evaluated in the ED, CT abd done showed no acute process. Past Medical History:        Diagnosis Date    Chronic leg pain     GERD (gastroesophageal reflux disease)     Hyperlipidemia     Hypertension     Hypothyroidism     Sleep apnea     TIA (transient ischemic attack) 2016    Dr. Roula Polanco        Past Surgical History:        Procedure Laterality Date    ANKLE SURGERY Right 2000    KNEE ARTHROSCOPY Left 9-11-15    Torn cartliage    LEG SURGERY Right 2000       Medications Prior to Admission:    Prior to Admission medications    Medication Sig Start Date End Date Taking?  Authorizing Provider   ondansetron (ZOFRAN) 4 MG tablet Take 1 tablet by mouth every 8 hours as needed for Nausea 7/14/17 8/3/17 Yes Ruperto Almeida MD   dicyclomine (BENTYL) 10 MG capsule Take 1 capsule by mouth every 6 hours as needed (cramps) 7/14/17  Yes Ruperto Almeida MD   ASPIRIN LOW DOSE 81 MG EC tablet Take 1 tablet by mouth daily 3/30/17   Dario Jon MD   losartan (COZAAR) 100 MG tablet Take 1 tablet by mouth daily 3/30/17   Dario Jon MD   atorvastatin (LIPITOR) 80 MG tablet Take 1 tablet by mouth daily 3/30/17   Dario Jon MD   meloxicam [facility]) 15 MG tablet Take 1 tablet by mouth daily 3/30/17   Dario Jon MD   levothyroxine (SYNTHROID) 25 MCG tablet TAKE 1 TABLET BY MOUTH for  chest pain, palpitations, orthopnea, PND, edema  GASTROINTESTINAL:  positive for nausea, vomiting, abdominal pain and abdominal distention  negative for reflux, regurgitation, odynophagia and hematemesis  GENITOURINARY:  negative for frequency, dysuria and hematuria  MUSCULOSKELETAL:  negative for  myalgias and arthralgias  NEUROLOGICAL:  positive for weakness  negative for speech problems, visual disturbance, coordination problems, gait problems and dysphagia  BEHAVIOR/PSYCH:  negative for increased agitation and anxiety    Physical Exam:    Vitals: /83  Pulse 86  Temp 97.9 °F (36.6 °C) (Oral)   Resp 18  Ht 5' 3\" (1.6 m)  Wt 244 lb 3.2 oz (110.8 kg)  SpO2 94%  BMI 43.26 kg/m2  CONSTITUTIONAL:  awake, alert, cooperative, no apparent distress, and appears stated age  EYES:  extra-ocular muscles intact  ENT:  normocepalic, without obvious abnormality  NECK:  supple, symmetrical, trachea midline  LUNGS:  clear to auscultation  CARDIOVASCULAR:  normal S1 and S2 and no edema  ABDOMEN:  normal bowel sounds, firm, distended, non-tender and no hepatosplenomegally  MUSCULOSKELETAL:  there is no redness, warmth, or swelling of the joints  NEUROLOGIC:  Mental Status Exam:  Level of Alertness:   awake  Orientation:   person, place, time  Memory:   normal  Cranial Nerves:  cranial nerves II-XII are grossly intact  Motor Exam:  Motor exam is symmetrical 5 out of 5 all extremities bilaterally  SKIN:  normal skin color, texture, turgor      CBC:   Recent Labs      07/14/17   1437  07/15/17   0529   WBC  7.0  7.3   HGB  12.6*  12.6*   PLT  203  200     BMP:    Recent Labs      07/14/17   1401  07/15/17   0529   NA  143  143   K  4.6  5.2   CL  100  103   CO2  25  28   BUN  38*  36*   CREATININE  2.0*  2.0*   GLUCOSE  154*  128*     Hepatic:   Recent Labs      07/14/17   1401   AST  19   ALT  28   BILITOT  0.3   ALKPHOS  94     INR:   Recent Labs      07/14/17   1401   INR  0.91     CT abd/ pelvis:  Limited evaluation of the lung bases appears unremarkable. Noncontrast evaluation of the liver, gallbladder, spleen, pancreas and adrenal glands appear normal.    There is a small subcentimeter low-density lesion arising exophytically off of the posterior right kidney. This is too small to adequately characterize. No hydronephrosis or nephrolithiasis is seen. No hydroureter or ureterolithiasis is demonstrated. The appendix appears normal.    There is diverticulosis within the sigmoid colon without evidence of diverticulitis. There is mild partially calcified atherosclerotic disease within the abdominal aorta. No free fluid or free air seen. No lymphadenopathy is demonstrated. No acute osseous findings are demonstrated. There is a suspected duodenal diverticulum incidentally noted near the ampulla.      Impression:       1. No acute intra-abdominal or pelvic abnormalities are seen on this limited noncontrast examination.       -----------------------------------------------------------------  PA/lat CXR:   The heart, mediastinum and pulmonary vasculature appear stable. There are calcified hilar lymph nodes. No new or acute pulmonary process is demonstrated. There is a calcified granuloma in the posteromedial lung field. The osseous structures    appear stable       EKG:   Ventricular Rate 109 BPM        Atrial Rate 109 BPM      P-R Interval 136 ms      QRS Duration 66 ms      Q-T Interval 328 ms      QTc Calculation (Bazett) 441 ms      P Axis 48 degrees      R Axis 19 degrees      T Axis 65 degrees     Narrative:       Sinus tachycardia  Low voltage QRS, consider pulmonary disease, pericardial effusion, or normal variant, Borderline ECG         Assessment and Plan   1. Recurrent nausea, vomiting and abd pain  2. CRISTIAN  3. Dehydration  4. diverticulosis  5. HTN  6. Hypothyroidism    Rx with PPI, IV hydration. Antiemetics. Laxatives. Hold nephrotoxics. GI consult. Am labs.     Patient Active Problem List Diagnosis Code    Hyperlipidemia E78.5    Hypertension I10    Right leg pain M79.604    Right ankle pain M25.571    Elevated LFTs R79.89    GERD (gastroesophageal reflux disease) K21.9    Fatigue R53.83    Intermittent explosive disorder F63.81    Witnessed apneic spells R06.81    Snoring R06.83    Sleep disturbance G47.9    Sleep difficulties G47.9    Nocturnal leg movements R25.8    Restless sleeper G47.9    Non-restorative sleep G47.8    Obesity (BMI 30-39. 9) E66.9    Daytime somnolence R40.0    Shifting sleep-work schedule G47.26    Acquired hypothyroidism E03.9    Anxiety F41.9    Diaphoresis R61    Reactive depression F32.9       Julieta Payne MD  Admitting Internist

## 2017-07-16 LAB
ANION GAP SERPL CALCULATED.3IONS-SCNC: 15 MEQ/L (ref 8–16)
BUN BLDV-MCNC: 25 MG/DL (ref 7–22)
CALCIUM SERPL-MCNC: 8.9 MG/DL (ref 8.5–10.5)
CHLORIDE BLD-SCNC: 101 MEQ/L (ref 98–111)
CO2: 25 MEQ/L (ref 23–33)
CREAT SERPL-MCNC: 1.7 MG/DL (ref 0.4–1.2)
GFR SERPL CREATININE-BSD FRML MDRD: 42 ML/MIN/1.73M2
GLUCOSE BLD-MCNC: 129 MG/DL (ref 70–108)
POTASSIUM SERPL-SCNC: 4.5 MEQ/L (ref 3.5–5.2)
SODIUM BLD-SCNC: 141 MEQ/L (ref 135–145)
TSH SERPL DL<=0.05 MIU/L-ACNC: 6.02 UIU/ML (ref 0.4–4.2)

## 2017-07-16 PROCEDURE — 2580000003 HC RX 258: Performed by: INTERNAL MEDICINE

## 2017-07-16 PROCEDURE — 7100000000 HC PACU RECOVERY - FIRST 15 MIN: Performed by: INTERNAL MEDICINE

## 2017-07-16 PROCEDURE — 7100000001 HC PACU RECOVERY - ADDTL 15 MIN: Performed by: INTERNAL MEDICINE

## 2017-07-16 PROCEDURE — 6360000002 HC RX W HCPCS: Performed by: INTERNAL MEDICINE

## 2017-07-16 PROCEDURE — 6370000000 HC RX 637 (ALT 250 FOR IP)

## 2017-07-16 PROCEDURE — 99153 MOD SED SAME PHYS/QHP EA: CPT | Performed by: INTERNAL MEDICINE

## 2017-07-16 PROCEDURE — 80048 BASIC METABOLIC PNL TOTAL CA: CPT

## 2017-07-16 PROCEDURE — 3609012800 HC EGD DIAGNOSTIC ONLY: Performed by: INTERNAL MEDICINE

## 2017-07-16 PROCEDURE — 0DBP8ZX EXCISION OF RECTUM, VIA NATURAL OR ARTIFICIAL OPENING ENDOSCOPIC, DIAGNOSTIC: ICD-10-PCS | Performed by: INTERNAL MEDICINE

## 2017-07-16 PROCEDURE — 84443 ASSAY THYROID STIM HORMONE: CPT

## 2017-07-16 PROCEDURE — 0DJ08ZZ INSPECTION OF UPPER INTESTINAL TRACT, VIA NATURAL OR ARTIFICIAL OPENING ENDOSCOPIC: ICD-10-PCS | Performed by: INTERNAL MEDICINE

## 2017-07-16 PROCEDURE — 6370000000 HC RX 637 (ALT 250 FOR IP): Performed by: INTERNAL MEDICINE

## 2017-07-16 PROCEDURE — 99152 MOD SED SAME PHYS/QHP 5/>YRS: CPT | Performed by: INTERNAL MEDICINE

## 2017-07-16 PROCEDURE — 6360000002 HC RX W HCPCS

## 2017-07-16 PROCEDURE — 1200000000 HC SEMI PRIVATE

## 2017-07-16 PROCEDURE — 36415 COLL VENOUS BLD VENIPUNCTURE: CPT

## 2017-07-16 PROCEDURE — 88305 TISSUE EXAM BY PATHOLOGIST: CPT

## 2017-07-16 PROCEDURE — 3609010600 HC COLONOSCOPY POLYPECTOMY SNARE/COLD BIOPSY: Performed by: INTERNAL MEDICINE

## 2017-07-16 PROCEDURE — G0378 HOSPITAL OBSERVATION PER HR: HCPCS | Performed by: INTERNAL MEDICINE

## 2017-07-16 RX ORDER — FENTANYL CITRATE 50 UG/ML
INJECTION, SOLUTION INTRAMUSCULAR; INTRAVENOUS PRN
Status: DISCONTINUED | OUTPATIENT
Start: 2017-07-16 | End: 2017-07-16 | Stop reason: HOSPADM

## 2017-07-16 RX ORDER — MIDAZOLAM HYDROCHLORIDE 1 MG/ML
INJECTION INTRAMUSCULAR; INTRAVENOUS PRN
Status: DISCONTINUED | OUTPATIENT
Start: 2017-07-16 | End: 2017-07-16 | Stop reason: HOSPADM

## 2017-07-16 RX ORDER — POLYETHYLENE GLYCOL 3350 17 G/17G
17 POWDER, FOR SOLUTION ORAL
Status: DISCONTINUED | OUTPATIENT
Start: 2017-07-16 | End: 2017-07-16

## 2017-07-16 RX ORDER — LEVOTHYROXINE SODIUM 0.05 MG/1
50 TABLET ORAL DAILY
Status: DISCONTINUED | OUTPATIENT
Start: 2017-07-17 | End: 2017-07-17 | Stop reason: HOSPADM

## 2017-07-16 RX ADMIN — POLYETHYLENE GLYCOL 3350 17 G: 17 POWDER, FOR SOLUTION ORAL at 01:32

## 2017-07-16 RX ADMIN — POLYETHYLENE GLYCOL 3350 17 G: 17 POWDER, FOR SOLUTION ORAL at 05:54

## 2017-07-16 RX ADMIN — POLYETHYLENE GLYCOL 3350 17 G: 17 POWDER, FOR SOLUTION ORAL at 08:01

## 2017-07-16 RX ADMIN — ATORVASTATIN CALCIUM 80 MG: 80 TABLET, FILM COATED ORAL at 21:33

## 2017-07-16 RX ADMIN — POLYETHYLENE GLYCOL 3350 17 G: 17 POWDER, FOR SOLUTION ORAL at 06:29

## 2017-07-16 RX ADMIN — POLYETHYLENE GLYCOL 3350 17 G: 17 POWDER, FOR SOLUTION ORAL at 07:26

## 2017-07-16 RX ADMIN — FLUOXETINE 20 MG: 20 CAPSULE ORAL at 11:15

## 2017-07-16 RX ADMIN — MICONAZOLE NITRATE: 2 POWDER TOPICAL at 11:14

## 2017-07-16 RX ADMIN — POLYETHYLENE GLYCOL 3350 17 G: 17 POWDER, FOR SOLUTION ORAL at 04:15

## 2017-07-16 RX ADMIN — POLYETHYLENE GLYCOL 3350 17 G: 17 POWDER, FOR SOLUTION ORAL at 03:28

## 2017-07-16 RX ADMIN — LEVOTHYROXINE SODIUM 25 MCG: 25 TABLET ORAL at 11:14

## 2017-07-16 RX ADMIN — DOCUSATE SODIUM 100 MG: 100 CAPSULE ORAL at 21:33

## 2017-07-16 RX ADMIN — POLYETHYLENE GLYCOL 3350 17 G: 17 POWDER, FOR SOLUTION ORAL at 02:11

## 2017-07-16 RX ADMIN — SODIUM CHLORIDE: 9 INJECTION, SOLUTION INTRAVENOUS at 01:35

## 2017-07-16 RX ADMIN — Medication 10 ML: at 21:34

## 2017-07-16 RX ADMIN — POLYETHYLENE GLYCOL 3350 17 G: 17 POWDER, FOR SOLUTION ORAL at 05:19

## 2017-07-16 RX ADMIN — POLYETHYLENE GLYCOL 3350 17 G: 17 POWDER, FOR SOLUTION ORAL at 04:42

## 2017-07-16 RX ADMIN — POLYETHYLENE GLYCOL 3350 17 G: 17 POWDER, FOR SOLUTION ORAL at 07:25

## 2017-07-16 RX ADMIN — MICONAZOLE NITRATE: 2 POWDER TOPICAL at 21:33

## 2017-07-16 RX ADMIN — POLYETHYLENE GLYCOL 3350 17 G: 17 POWDER, FOR SOLUTION ORAL at 00:29

## 2017-07-16 RX ADMIN — POLYETHYLENE GLYCOL 3350 17 G: 17 POWDER, FOR SOLUTION ORAL at 03:55

## 2017-07-16 RX ADMIN — POLYETHYLENE GLYCOL 3350 17 G: 17 POWDER, FOR SOLUTION ORAL at 07:39

## 2017-07-16 RX ADMIN — DOCUSATE SODIUM 100 MG: 100 CAPSULE ORAL at 11:15

## 2017-07-16 RX ADMIN — PANTOPRAZOLE SODIUM 40 MG: 40 TABLET, DELAYED RELEASE ORAL at 11:14

## 2017-07-16 RX ADMIN — METOPROLOL SUCCINATE 100 MG: 100 TABLET, FILM COATED, EXTENDED RELEASE ORAL at 11:15

## 2017-07-16 RX ADMIN — POLYETHYLENE GLYCOL 3350 17 G: 17 POWDER, FOR SOLUTION ORAL at 02:44

## 2017-07-16 ASSESSMENT — PAIN DESCRIPTION - FREQUENCY
FREQUENCY: CONTINUOUS

## 2017-07-16 ASSESSMENT — PAIN DESCRIPTION - ONSET
ONSET: ON-GOING

## 2017-07-16 ASSESSMENT — PAIN DESCRIPTION - LOCATION
LOCATION: ABDOMEN

## 2017-07-16 ASSESSMENT — PAIN DESCRIPTION - DIRECTION
RADIATING_TOWARDS: BACK

## 2017-07-16 ASSESSMENT — PAIN DESCRIPTION - ORIENTATION
ORIENTATION: RIGHT;LEFT;LOWER
ORIENTATION: RIGHT;LEFT;LOWER
ORIENTATION: RIGHT;LEFT;UPPER;LOWER
ORIENTATION: RIGHT;LEFT;LOWER;UPPER

## 2017-07-16 ASSESSMENT — PAIN DESCRIPTION - DESCRIPTORS
DESCRIPTORS: TIGHTNESS;PRESSURE
DESCRIPTORS: TIGHTNESS
DESCRIPTORS: PRESSURE;SQUEEZING;TIGHTNESS

## 2017-07-16 ASSESSMENT — PAIN DESCRIPTION - PAIN TYPE
TYPE: ACUTE PAIN

## 2017-07-16 ASSESSMENT — PAIN SCALES - GENERAL
PAINLEVEL_OUTOF10: 10
PAINLEVEL_OUTOF10: 0
PAINLEVEL_OUTOF10: 7
PAINLEVEL_OUTOF10: 7
PAINLEVEL_OUTOF10: 9
PAINLEVEL_OUTOF10: 7

## 2017-07-16 NOTE — CONSULTS
[facility]    PATIENT NAME: Litzy Espino              :            58  MED REC NO:   [id number]                 ROOM:           [id number]  ACCOUNT NO:   [de-identified]                   ADMISSION DATE: 17  PHYSICIAN:    VANE Wen:  07/15/2017    REASON FOR CONSULTATION:  For further evaluation of abdominal pain,  nausea, vomiting, change in bowel habits. HISTORY OF PRESENT ILLNESS:  The patient is a 61years old pleasant  male who has past medical history significant for chronic leg pain,  reflux disease, hyperlipidemia, hypertension, hypothyroidism, sleep  apnea, transient ischemic attack, who presented to the emergency room  with abdominal pain and abdominal distension, nausea, vomiting. The  patient started the symptoms two months ago, started having abdominal  pain, epigastric and right lower quadrant abdominal pain, sometimes  pain radiating to back, pain went up to 10 on a pain scale of 0-10. Pain with eating before, now the pain without eating and continuous  pain. Pain associated with nausea, vomiting, vomiting bilious fluid. One time, he saw specks of blood in the vomitus, and the patient has  change in bowel habits. The patient has sometimes hard bowel  movements, alternating with soft bowel movements. Denied any blood in  the stool. Denied any fever, chills, or night sweats. Denied any  recent travel. Because of worsening of symptoms, he presented to the  emergency room. The patient had blood workup, which showed sodium  143, potassium 5.2, chloride 103, CO2 28, blood glucose 128, BUN 36,  creatinine 2.0, WBC 7.3, hemoglobin 12.6, hematocrit 37.4, MCV 90.1,  platelet count 350. CT scan of the abdomen and pelvis, which was  reported by the radiologist has no acute findings. Subsequently, the  patient was admitted by Dr. Yanique Amaya, and we were consulted for further  evaluation.     PAST MEDICAL HISTORY:  Chronic leg pain, erythema of oropharynx. Neck:  Supple. No JVD. No thyromegaly. Chest:  No axillary or supraclavicular adenopathy. Lungs:  Equal to expansion, on inspection adequate air entry  bilaterally on percussion and auscultation. No added sounds. Heart:  Regular. Normal S1 and S2. No S3 or murmurs. Abdomen:  Soft, distended, mild epigastric tenderness on deep  palpation. No rebound or guarding. No palpable masses. No  appreciable hernias. Abdomen is very protuberant. Rectal  Examination:  Deferred. Extremities:  No clubbing, cyanosis, or edema. Skin:  No skin rash. Neurologic:  The patient is alert. No gross neurologic deficits. DIAGNOSTIC DATA:  AST 19, ALT 28, total bilirubin 0.3, alkaline  phosphatase 94. Rest of the diagnostic data is in HPI. IMPRESSION:  3  A 61years old pleasant male who has epigastric abdominal pain and  right-sided abdominal pain, nausea, vomiting, bloating, rule out  peptic ulcer disease, rule out malabsorption syndrome, rule out  Crohn's disease. 2.  Change in bowel habits, which could be multifactorial, rule out  Crohn's disease, less likely of neoplastic process. The patient had  very slightly low hemoglobin. 3.  Hypertension. 4.  Hypothyroidism. RECOMMENDATION:  My recommendations at this time is to place the  patient on bowel prep with the Dulcolax and MiraLAX for EGD and  colonoscopy tomorrow morning. I have discussed with the patient  regarding the EGD and colonoscopy, its indications and complications  including, but not limited to perforation, bleeding, infection,  adverse reaction to medicine, very slight chance of missing  significant lesions. The patient expressed his understanding and  agreed to proceed with the EGD and colonoscopy in the morning. If  that is negative, then consider a HIDA scan with CCK to rule out  biliary dyskinesia. If that is negative, then consider small bowel  evaluation.   Check the C-reactive protein and check stool for  calprotectin. Further plan is pending the above test results and  clinical response. Thank you, Dr. Emmy Mcfarlane, for letting me see the patient. Do not hesitate  to call me if you have any questions. My recommendations are above.         Joshua Lazo M.D.    D:07/15/2017 19:11:19               T: 07/16/2017 1:28:46      [email or web address]  Job#: [id number]     Doc#: [id number]    CC:  Adam Sharp MD Dr. _____

## 2017-07-17 ENCOUNTER — TELEPHONE (OUTPATIENT)
Dept: INTERNAL MEDICINE | Age: 57
End: 2017-07-17

## 2017-07-17 VITALS
SYSTOLIC BLOOD PRESSURE: 140 MMHG | TEMPERATURE: 97.8 F | DIASTOLIC BLOOD PRESSURE: 77 MMHG | OXYGEN SATURATION: 95 % | BODY MASS INDEX: 43.27 KG/M2 | WEIGHT: 244.2 LBS | HEART RATE: 92 BPM | HEIGHT: 63 IN | RESPIRATION RATE: 24 BRPM

## 2017-07-17 LAB
ANION GAP SERPL CALCULATED.3IONS-SCNC: 14 MEQ/L (ref 8–16)
BUN BLDV-MCNC: 15 MG/DL (ref 7–22)
CALCIUM SERPL-MCNC: 8.9 MG/DL (ref 8.5–10.5)
CHLORIDE BLD-SCNC: 99 MEQ/L (ref 98–111)
CO2: 28 MEQ/L (ref 23–33)
CREAT SERPL-MCNC: 1.3 MG/DL (ref 0.4–1.2)
GFR SERPL CREATININE-BSD FRML MDRD: 57 ML/MIN/1.73M2
GLUCOSE BLD-MCNC: 119 MG/DL (ref 70–108)
POTASSIUM SERPL-SCNC: 4.4 MEQ/L (ref 3.5–5.2)
SODIUM BLD-SCNC: 141 MEQ/L (ref 135–145)

## 2017-07-17 PROCEDURE — 6370000000 HC RX 637 (ALT 250 FOR IP): Performed by: INTERNAL MEDICINE

## 2017-07-17 PROCEDURE — 96372 THER/PROPH/DIAG INJ SC/IM: CPT | Performed by: INTERNAL MEDICINE

## 2017-07-17 PROCEDURE — 36415 COLL VENOUS BLD VENIPUNCTURE: CPT

## 2017-07-17 PROCEDURE — 80048 BASIC METABOLIC PNL TOTAL CA: CPT

## 2017-07-17 PROCEDURE — G0378 HOSPITAL OBSERVATION PER HR: HCPCS | Performed by: INTERNAL MEDICINE

## 2017-07-17 PROCEDURE — 6360000002 HC RX W HCPCS: Performed by: INTERNAL MEDICINE

## 2017-07-17 PROCEDURE — 2580000003 HC RX 258: Performed by: INTERNAL MEDICINE

## 2017-07-17 RX ORDER — LEVOTHYROXINE SODIUM 0.05 MG/1
50 TABLET ORAL DAILY
Qty: 30 TABLET | Refills: 1 | Status: SHIPPED | OUTPATIENT
Start: 2017-07-17 | End: 2017-09-26 | Stop reason: SDUPTHER

## 2017-07-17 RX ADMIN — POLYETHYLENE GLYCOL 3350 17 G: 17 POWDER, FOR SOLUTION ORAL at 09:18

## 2017-07-17 RX ADMIN — LEVOTHYROXINE SODIUM 50 MCG: 50 TABLET ORAL at 06:31

## 2017-07-17 RX ADMIN — FLUOXETINE 20 MG: 20 CAPSULE ORAL at 09:19

## 2017-07-17 RX ADMIN — MICONAZOLE NITRATE: 2 POWDER TOPICAL at 09:19

## 2017-07-17 RX ADMIN — METOPROLOL SUCCINATE 100 MG: 100 TABLET, FILM COATED, EXTENDED RELEASE ORAL at 09:19

## 2017-07-17 RX ADMIN — SODIUM CHLORIDE: 9 INJECTION, SOLUTION INTRAVENOUS at 05:51

## 2017-07-17 RX ADMIN — DOCUSATE SODIUM 100 MG: 100 CAPSULE ORAL at 09:19

## 2017-07-17 RX ADMIN — PANTOPRAZOLE SODIUM 40 MG: 40 TABLET, DELAYED RELEASE ORAL at 06:31

## 2017-07-17 RX ADMIN — ASPIRIN 81 MG: 81 TABLET, COATED ORAL at 09:19

## 2017-07-17 RX ADMIN — ACETAMINOPHEN 650 MG: 325 TABLET ORAL at 00:36

## 2017-07-17 RX ADMIN — ENOXAPARIN SODIUM 40 MG: 40 INJECTION SUBCUTANEOUS at 11:05

## 2017-07-17 ASSESSMENT — PAIN DESCRIPTION - ORIENTATION
ORIENTATION: RIGHT;LEFT;LOWER;MID
ORIENTATION: RIGHT;LEFT;LOWER;MID

## 2017-07-17 ASSESSMENT — PAIN DESCRIPTION - DESCRIPTORS
DESCRIPTORS: CRAMPING;DISCOMFORT
DESCRIPTORS: CRAMPING;DISCOMFORT;TIGHTNESS

## 2017-07-17 ASSESSMENT — ACTIVITIES OF DAILY LIVING (ADL)
EFFECT OF PAIN ON DAILY ACTIVITIES: SOB
EFFECT OF PAIN ON DAILY ACTIVITIES: SOB

## 2017-07-17 ASSESSMENT — PAIN DESCRIPTION - LOCATION
LOCATION: ABDOMEN
LOCATION: ABDOMEN

## 2017-07-17 ASSESSMENT — PAIN DESCRIPTION - ONSET
ONSET: ON-GOING
ONSET: ON-GOING

## 2017-07-17 ASSESSMENT — PAIN DESCRIPTION - PAIN TYPE
TYPE: ACUTE PAIN
TYPE: ACUTE PAIN

## 2017-07-17 ASSESSMENT — PAIN DESCRIPTION - FREQUENCY
FREQUENCY: INTERMITTENT
FREQUENCY: INTERMITTENT

## 2017-07-17 ASSESSMENT — PAIN SCALES - GENERAL
PAINLEVEL_OUTOF10: 3
PAINLEVEL_OUTOF10: 5
PAINLEVEL_OUTOF10: 6
PAINLEVEL_OUTOF10: 3

## 2017-07-17 ASSESSMENT — PAIN DESCRIPTION - DIRECTION: RADIATING_TOWARDS: BACK

## 2017-07-17 ASSESSMENT — PAIN DESCRIPTION - PROGRESSION: CLINICAL_PROGRESSION: GRADUALLY IMPROVING

## 2017-07-17 NOTE — DISCHARGE SUMMARY
Discharge Summary    Benito Joiner  :  1960  MRN:  [id number]    Admit date:  2017  Discharge date:      Admitting Physician:  Jenelle Pérez MD    Discharge Diagnoses:      1. Recurrent nausea, vomiting and abd pain  2. CRISTIAN  3. Dehydration  4. diverticulosis  5. HTN  6. Hypothyroidism       Admission Condition:  serious  Discharged Condition:  good    Hospital Course:   waqar was hydrated with IVF, had egd / colonoscopy-see results below. Discharge Medications:       Te Xie   Home Medication Instructions ALAI:[id number]    Printed on:17 [id number]   Medication Information                      acetaminophen (TYLENOL) 500 MG tablet  Take 1,000 mg by mouth every 6 hours as needed for Pain             albuterol-ipratropium (COMBIVENT RESPIMAT)  MCG/ACT AERS inhaler  Inhale 1 puff into the lungs every 6 hours as needed for Wheezing             ASPIRIN LOW DOSE 81 MG EC tablet  Take 1 tablet by mouth daily             atorvastatin (LIPITOR) 80 MG tablet  Take 1 tablet by mouth daily             dicyclomine (BENTYL) 10 MG capsule  Take 1 capsule by mouth every 6 hours as needed (cramps)             FLUoxetine (PROZAC) 20 MG capsule  Take 1 capsule by mouth daily             hydrOXYzine (ATARAX) 10 MG tablet  Take 1 tablet by mouth every 6 hours as needed for Anxiety (restlessness and sleep)             levothyroxine (SYNTHROID) 50 MCG tablet  Take 1 tablet by mouth daily TAKE 1 TABLET BY MOUTH EVERY DAY             losartan (COZAAR) 100 MG tablet  Take 1 tablet by mouth daily             metoprolol succinate (TOPROL XL) 100 MG extended release tablet  Take 1 tablet by mouth daily             nystatin (MYCOSTATIN) 269043 UNIT/GM powder  Apply to groin bilaterally, apply 3 times daily.              omeprazole (PRILOSEC) 40 MG delayed release capsule  TAKE ONE CAPSULE BY MOUTH DAILY             ondansetron (ZOFRAN) 4 MG tablet  Take 1 tablet by mouth every 8 hours as needed for Nausea

## 2017-07-17 NOTE — OP NOTE
[facility]    PATIENT NAME: Catia Mart              :            58  MED REC NO:   [id number]                 ROOM:           3613  ACCOUNT NO:   [de-identified]                 ADMISSION DATE: 17  PHYSICIAN:    Elliot Carranza M.D. PROCEDURE:  Esophagogastroduodenoscopy and colonoscopy. INDICATION:  The patient is a 59-year-old pleasant male who presented  to the hospital with abdominal pain, nausea, and change in bowel  habits. He is undergoing further evaluation. PHYSICAL EXAMINATION:  Vital Signs:  Temperature 98, pulse 91, respiratory rate 16, blood  pressure 129/79. Heart:  Regular, normal S1 and S2. No S3.  Lungs:  Equal to expansion on inspection. Abdomen:  Soft, normoactive bowel sounds, nontender, and not  distended. ASA CLASSIFICATION:  As per Anesthesia. Please see Anesthesia note  for details. INSTRUMENT:  PCF-H190L pediatric colonoscope. PHOTOGRAPHS:  Yes. BIOPSIES:  Yes. DETAILS OF PROCEDURE:  Procedure indications and complications  including but not limited to perforation, bleeding, infection, adverse  reaction to medicine, very slight chance of missing significant  lesions discussed with the patient and the patient expressed his  understanding and a written consent was obtained. ESOPHAGOGASTRODUODENOSCOPY REPORT:  Oropharynx was sprayed with  Cetacaine spray and bite-block was placed between maxilla and  mandible. After the conscious sedation of 100 mcg of fentanyl and 4  mg of Versed was given in incremental fashion to induce and sustain  conscious sedation, the PCF-H190AL pediatric colonoscope was inserted  into the oropharynx and the esophagus was intubated. Under direct  vision, the scope was advanced down through the stomach into second  portion of duodenum. On careful inspection and on withdrawal of the  scope, the duodenum looks normal as shown in picture #2 and #3.    Antrum of the stomach looks normal as shown in picture #4. Lesser and  greater curvature and body of the stomach looks normal as shown in  picture #5. On retroflex, fundus looks normal as shown in picture #6. Distal and proximal esophagus looks normal as shown in picture #1. Air was withdrawn as the scope was removed. The patient tolerated the  procedure well without any immediate complications. Vitals including  blood pressure, heart rate, and pulse oximetry were stable. COLONOSCOPY REPORT:  The patient's position was changed. After the  rectal examination, continued on total intravenous anesthesia. The  PCF-H190L pediatric colonoscope was inserted into the rectum and  advanced up to the cecum without any difficulty, and terminal ileum  was intubated. On careful inspection and on withdrawal of the scope,  the terminal ileum looks normal as shown in picture #3. Appendiceal  orifice and cecum looks normal as shown in picture #2. Ascending  colon looks normal as shown in picture #4 and #5. Transverse colon  looks normal as shown in picture #6 and #7. Descending colon looks  normal as shown in picture #7. In the sigmoid colon, mild  diverticulosis noted as shown in picture #1 and #8. In the rectum,  the patient has one 4-mm polyp as shown in picture #9. The polyp was  removed by cold biopsy. On retroflex, small internal hemorrhoids  noted. Air was withdrawn as the scope was removed. The patient  tolerated the procedure well without any immediate complications. Vitals including blood pressure, heart rate, and pulse oximetry were  stable during the procedure and after the procedure. The patient was  transferred to the recovery room in stable condition. IMPRESSION:  Esophagogastroduodenoscopy to the second portion of  duodenum. Normal esophagogastroduodenoscopy. No abnormalities noted. Colonoscopy to distal ileum. Left-sided mild diverticulosis and one  4-mm polyp in the rectum removed by cold biopsy.     MY RECOMMENDATIONS: Antireflux instructions. Advance diet to  diverticular diet. Follow the biopsy results. Follow up colonoscopy  in 5 years. Check the HIDA scan with CCK to rule out biliary  dyskinesia. Thank you Dr. Wu Lofton and Dr. Sarah Quarles for letting me to do procedure on  the patient. Do not hesitate to call me, if you have any questions. My recommendations are above. Kris Moreno M.D.    D:07/16/2017 10:18:00               T: 07/16/2017 22:51:05      [email or web address]  Job#: [id number]     Doc#: [id number]      CC:  Dr. Tamir Jones.        Shy Gonzales M.D.

## 2017-07-17 NOTE — DISCHARGE INSTR - DIET

## 2017-07-17 NOTE — PROGRESS NOTES
INTERNAL MEDICINE Progress Note  7/17/2017 1:46 PM  Subjective:   Admit Date: 7/14/2017  PCP: Veronica Geiger MD  Interval History:   No new c/o, tolerating orally. Had egd/colonoscopy     Objective:   Vitals: BP (!) 140/77  Pulse 92  Temp 97.8 °F (36.6 °C) (Oral)   Resp 24  Ht 5' 3\" (1.6 m)  Wt 244 lb 3.2 oz (110.8 kg)  SpO2 95%  BMI 43.26 kg/m2  General appearance: alert and cooperative with exam  HEENT:  atraumatic  Neck: no jvd  Lungs: clear to auscultation bilaterally  Heart: S1, S2 normal  Abdomen: normal findings: bowel sounds normal, no organomegaly, soft, non-tender and symmetric and abnormal findings:  distended  Extremities:   Neurologic: Alert, oriented, thought content appropriate      Medications:   Scheduled Meds:   levothyroxine  50 mcg Oral Daily    polyethylene glycol  17 g Oral Daily    docusate sodium  100 mg Oral BID    aspirin  81 mg Oral Daily    enoxaparin  40 mg Subcutaneous Daily    atorvastatin  80 mg Oral Daily    FLUoxetine  20 mg Oral Daily    metoprolol succinate  100 mg Oral Daily    miconazole   Topical BID    pantoprazole  40 mg Oral QAM AC    sodium chloride flush  10 mL Intravenous 2 times per day     Continuous Infusions:   sodium chloride 50 mL/hr at 07/17/17 0551       Lab Results:   CBC:   Recent Labs      07/14/17   1437  07/15/17   0529   WBC  7.0  7.3   HGB  12.6*  12.6*   PLT  203  200     BMP:    Recent Labs      07/15/17   0529  07/16/17   0525  07/17/17   0553   NA  143  141  141   K  5.2  4.5  4.4   CL  103  101  99   CO2  28  25  28   BUN  36*  25*  15   CREATININE  2.0*  1.7*  1.3*   GLUCOSE  128*  129*  119*     Hepatic:   Recent Labs      07/14/17   1401   AST  19   ALT  28   BILITOT  0.3   ALKPHOS  94     INR:   Recent Labs      07/14/17   1401   INR  0.91     TSH:    Lab Results   Component Value Date    TSH 6.020 07/16/2017       Assessment and Plan:   1.  Recurrent nausea, vomiting and abd pain  2. CRISTIAN  3. Dehydration  4. diverticulosis  5. HTN  6. Hypothyroidism    cont synthroid  improved renal function, dc IVF. Cont meds   Stable for dc home.       Scott Bailey MD

## 2017-07-18 ENCOUNTER — OFFICE VISIT (OUTPATIENT)
Dept: INTERNAL MEDICINE CLINIC | Age: 57
End: 2017-07-18
Payer: MEDICAID

## 2017-07-18 ENCOUNTER — HOSPITAL ENCOUNTER (OUTPATIENT)
Dept: ULTRASOUND IMAGING | Age: 57
Discharge: HOME | End: 2017-07-18
Payer: MEDICAID

## 2017-07-18 VITALS
WEIGHT: 240.9 LBS | HEART RATE: 80 BPM | DIASTOLIC BLOOD PRESSURE: 84 MMHG | BODY MASS INDEX: 42.68 KG/M2 | HEIGHT: 63 IN | SYSTOLIC BLOOD PRESSURE: 128 MMHG

## 2017-07-18 DIAGNOSIS — R10.11 RIGHT UPPER QUADRANT ABDOMINAL PAIN: ICD-10-CM

## 2017-07-18 DIAGNOSIS — R11.2 NON-INTRACTABLE VOMITING WITH NAUSEA, UNSPECIFIED VOMITING TYPE: Primary | ICD-10-CM

## 2017-07-18 DIAGNOSIS — R10.9 ABDOMINAL CRAMPING: ICD-10-CM

## 2017-07-18 PROCEDURE — 76705 ECHO EXAM OF ABDOMEN: CPT

## 2017-07-18 PROCEDURE — 99495 TRANSJ CARE MGMT MOD F2F 14D: CPT | Performed by: INTERNAL MEDICINE

## 2017-07-18 RX ORDER — ONDANSETRON 4 MG/1
4 TABLET, FILM COATED ORAL EVERY 8 HOURS PRN
Qty: 60 TABLET | Refills: 0 | Status: SHIPPED | OUTPATIENT
Start: 2017-07-18 | End: 2017-10-30 | Stop reason: DRUGHIGH

## 2017-07-18 RX ORDER — DICYCLOMINE HYDROCHLORIDE 10 MG/1
10 CAPSULE ORAL 4 TIMES DAILY
Qty: 120 CAPSULE | Refills: 3 | Status: SHIPPED | OUTPATIENT
Start: 2017-07-18 | End: 2017-10-30 | Stop reason: SDUPTHER

## 2017-07-24 ENCOUNTER — TELEPHONE (OUTPATIENT)
Dept: INTERNAL MEDICINE CLINIC | Age: 57
End: 2017-07-24

## 2017-08-01 ENCOUNTER — HOSPITAL ENCOUNTER (OUTPATIENT)
Age: 57
Discharge: HOME | End: 2017-08-01
Payer: MEDICAID

## 2017-08-01 ENCOUNTER — OFFICE VISIT (OUTPATIENT)
Dept: INTERNAL MEDICINE CLINIC | Age: 57
End: 2017-08-01
Payer: MEDICAID

## 2017-08-01 VITALS
WEIGHT: 242 LBS | HEART RATE: 100 BPM | HEIGHT: 63 IN | SYSTOLIC BLOOD PRESSURE: 150 MMHG | DIASTOLIC BLOOD PRESSURE: 90 MMHG | BODY MASS INDEX: 42.88 KG/M2

## 2017-08-01 DIAGNOSIS — E03.9 ACQUIRED HYPOTHYROIDISM: ICD-10-CM

## 2017-08-01 DIAGNOSIS — R11.2 NAUSEA AND VOMITING, INTRACTABILITY OF VOMITING NOT SPECIFIED, UNSPECIFIED VOMITING TYPE: Primary | ICD-10-CM

## 2017-08-01 DIAGNOSIS — R11.2 NAUSEA AND VOMITING, INTRACTABILITY OF VOMITING NOT SPECIFIED, UNSPECIFIED VOMITING TYPE: ICD-10-CM

## 2017-08-01 LAB
ANION GAP SERPL CALCULATED.3IONS-SCNC: 14 MEQ/L (ref 8–16)
BUN BLDV-MCNC: 15 MG/DL (ref 7–22)
CALCIUM SERPL-MCNC: 9.4 MG/DL (ref 8.5–10.5)
CHLORIDE BLD-SCNC: 98 MEQ/L (ref 98–111)
CO2: 29 MEQ/L (ref 23–33)
CREAT SERPL-MCNC: 0.9 MG/DL (ref 0.4–1.2)
GFR SERPL CREATININE-BSD FRML MDRD: 87 ML/MIN/1.73M2
GLUCOSE BLD-MCNC: 112 MG/DL (ref 70–108)
POTASSIUM SERPL-SCNC: 4.1 MEQ/L (ref 3.5–5.2)
SODIUM BLD-SCNC: 141 MEQ/L (ref 135–145)

## 2017-08-01 PROCEDURE — 1111F DSCHRG MED/CURRENT MED MERGE: CPT | Performed by: INTERNAL MEDICINE

## 2017-08-01 PROCEDURE — 3017F COLORECTAL CA SCREEN DOC REV: CPT | Performed by: INTERNAL MEDICINE

## 2017-08-01 PROCEDURE — 99213 OFFICE O/P EST LOW 20 MIN: CPT | Performed by: INTERNAL MEDICINE

## 2017-08-01 PROCEDURE — G8417 CALC BMI ABV UP PARAM F/U: HCPCS | Performed by: INTERNAL MEDICINE

## 2017-08-01 PROCEDURE — 80048 BASIC METABOLIC PNL TOTAL CA: CPT

## 2017-08-01 PROCEDURE — 1036F TOBACCO NON-USER: CPT | Performed by: INTERNAL MEDICINE

## 2017-08-01 PROCEDURE — 36415 COLL VENOUS BLD VENIPUNCTURE: CPT

## 2017-08-01 PROCEDURE — G8427 DOCREV CUR MEDS BY ELIG CLIN: HCPCS | Performed by: INTERNAL MEDICINE

## 2017-08-01 RX ORDER — MELOXICAM 15 MG/1
15 TABLET ORAL DAILY
COMMUNITY
End: 2017-08-01

## 2017-08-01 NOTE — MR AVS SNAPSHOT
After Visit Summary             Keyona Middleton   2017 9:30 AM   Office Visit    Description:  Male : 1960   Provider:  Alesia Horvath MD   Department:  [address] [place] [address] and Future Appointments         Below is a list of your follow-up and future appointments. This may not be a complete list as you may have made appointments directly with providers that we are not aware of or your providers may have made some for you. Please call your providers to confirm appointments. It is important to keep your appointments. Please bring your current insurance card, photo ID, co-pay, and all medication bottles to your appointment. If self-pay, payment is expected at the time of service. Your To-Do List     Future Appointments Provider Department Dept Phone    2017 9:30 AM Alesia Horvath, [address] [phone]    Please arrive 15 minutes prior to appointment, bring photo ID and insurance card. Please arrive 15 minutes prior to appointment, bring photo ID and insurance card. 10/31/2017 2:00 PM Crissy Mora RD, [place] [facility] [phone]    Future Orders Complete By Expires    Basic Metabolic Panel [MSE30 Custom]  2017    Follow-Up    Return in about 4 weeks (around 2017). Information from Your Visit        Department     Name Address Phone Fax    [address] [facility] [address]  [address] [place] [address] O      You Were Seen for:         Comments    Nausea and vomiting, intractability of vomiting not specified, unspecified vomiting type   [[id number]]         Vital Signs     Blood Pressure Pulse Height Weight Body Mass Index Smoking Status    150/90 100 5' 2.99\" (1.6 m) 242 lb (109.8 kg) 42.88 kg/m2 Former Smoker      Additional Information about your Body Mass Index (BMI)           Your BMI as listed above is considered obese (30 or more).  BMI is an

## 2017-08-01 NOTE — PROGRESS NOTES
1960    Chief Complaint   Patient presents with    Other     2 week f/u for abd pain/nausea       Pt is a 61 y.o. male who presents for follow up visit. This is a 2 week follow up for nausea/vomiting/bloating. Patient was admitted to [facility] from 7/14/17 to 7/17/17 for recurrent nausea/vomiting and abdominal pain, CRISTIAN - Cr 2.0->1.3, dehydration, diverticulosis, HTN, hypothyroidism.     Inpatient course: Discharge summary reviewed- see chart.     He was hydrated with IVF, and Cr improved from 2.0 ->1.3. Renal ultrasound with cyst.  He had CT without contrast - unremarkable but limited as without contrast.  He had EGD and Colonoscopy with bx of polyp - tubular adenoma per path report. No follow with Dr. Danita Marie scheduled. He is still having nausea, and abdominal pain - not worsening. Added Zofran and Bentyl at last visit. Since last visit the gallbladder ultrasound negative. He took Zofran, Bentyl and better for a day then vomited the next day. He stopped all medication except levothyroxine, Zofran, Bentyl, and omeprazole and BP is very elevated today. He is continuing to vomiting 1-3x a day. Or he has a GERD sensation and not actually vomit. It can happen at any time - even with water. Question if this is severe GERD vs outlet obstruction vs dysmotility/gastroparesis. Would like to increase Omeprazole to BID, and refer to GI - to see Alverda Sicard Aug 11. He had appt today but cancelled as had appt with Dr. Trista Mane. Question if this could be due to endocrine tumor with sweating and then N/V. It occurs with increased abdominal pressure. He is trying peptobismal and helping some. His Synthroid was increased in hospital to 50 mcg - will need TSH in 6 weeks. Will give order for more Synthroid and TSH today. BP elevated as not taking medication. Gave in office now and will see Dr. Trista Mane at 1:30 today for recheck.       Discussed at previous visit:  Hyperlipidemia - Dr. Dulce Maria Kennedy stopped gemfibrizol. He is taking Lipitor every day. LDL at goal and triglycerides acceptable 3/2017. Depression - positive screen. Dr. Sabina Hyatt added Trazodone and Hydroxyzine and suggested increasing Prozac to 40 mg when he was consulted while patient was at [facility]. He is taking Trazadone, and Hydroxyzine inconsistently, but is taking Prozac 20 daily. Still waiting to see if he sees psychiatry - reportedly he was referred to [facility] 10/2016. To my knowledge he is not seeing [facility]. He is only taking Prozac intermittently. Suggested he take it daily. Rarely takes Trazodone and Hyrdroxyzine. Spent extensive time today reviewing his medication and how he takes it. See patient instructions to change the way he is taking medication. Since last visit saw Dr. Haritha Hernandez regarding symptoms and he did blood work. Asked patient if he has been taking Lipitor 80 mg, and Lopid every day for the last couple months - his LDL and triglycerides are still very high. He states he has been taking medication faithfully. He eats out a lot and poor choices. Mireille Tinsley was relatively controlled in 2014 on just Lipitor 40 mg, then high trig and LDL in 6/2016 - so increased Lipitor to 80 mg and added Lopid in 10/2016. Suggested he eat healthier. Suggested he see dietician. Eating out (everything he eats is fried) and has candy/cookies at home. Suggest dietician make suggestions very simple for the patient to understand. He is not wearing CPAP as not helping sweating episodes. Again suggested that he get back to using this. B12 high - see if [facility] ordered B12 binding protein. I don't believe it was ordered or able to be ordered. Suspected TIA - Head CT and MRI negative for acute bleed or CVA. Incidentally noted forehead lump is a lipoma. He had carotid doppler which was unremarkable. He is to follow up with Dr. Tiffanie Antony. Face still with intermittent numbness.     Had normal outpatient stress test and ECHO - normal - to ask Dr. Niko Velásquez if need to continue isosorbide. MRI of adrenals and MIBG test negative for pheo. He is still having sweating, flushing, SOB, right eye twitching (looks more closed), right eye puffy, whole face has a weird feeling and pale, speech is slurred?, episodic, multiple times a day. When he wipes the sweat off, the face is painful. Will discuss with Endo on direction of work-up - refer to Endo but long wait to get in locally - offered specialist outside area and he would not give me an answer where he wanted to go. He is also complaining of difficulty concentrating, fatigue. He is extremely frustrated. Will increase metoprolol to 50 mg BID. Will treat mood with Prozac. Nodule soft mobile non-tender just above left eye brow. Nodule on left abdomen, soft and dermal layer appearance. Refer to dermatology as he is concerned - I suspect benign. Left knee is painful - had knee scope done. I gave Mobic but told him to see ortho if still painful. He is to look on pill bottle to get name and number. Patient Active Problem List   Diagnosis    Hyperlipidemia    Hypertension    Right leg pain    Right ankle pain    Elevated LFTs    GERD (gastroesophageal reflux disease)    Fatigue    Intermittent explosive disorder    Witnessed apneic spells    Snoring    Sleep disturbance    Sleep difficulties    Nocturnal leg movements    Restless sleeper    Non-restorative sleep    Obesity (BMI 30-39. 9)    Daytime somnolence    Shifting sleep-work schedule    Acquired hypothyroidism    Anxiety    Diaphoresis    Reactive depression    Reactive airway disease     Past Medical History:   Diagnosis Date    Chronic leg pain     GERD (gastroesophageal reflux disease)     Hyperlipidemia     Hypertension     Hypothyroidism     Sleep apnea     TIA (transient ischemic attack) 2016    Dr. Anni Palm        Past Surgical History:   Procedure Laterality Date    ANKLE SURGERY Right 2000    KNEE ARTHROSCOPY Left 9-11-15    Torn cartliage    LEG SURGERY Right 2000    WA COLSC FLX W/RMVL OF TUMOR POLYP LESION SNARE TQ  7/16/2017    COLONOSCOPY POLYPECTOMY SNARE/COLD BIOPSY performed by Calixto Gagnon MD at [facility]    WA EGD TRANSORAL BIOPSY SINGLE/MULTIPLE N/A 9/27/2017    EGD BIOPSY performed by Alexandra Bailey MD at [facility]  7/16/2017    EGD DIAGNOSTIC ONLY performed by Calixto Gagnon MD at [facility]       Current Outpatient Prescriptions   Medication Sig Dispense Refill    ondansetron (ZOFRAN) 4 MG tablet Take 1 tablet by mouth every 8 hours as needed for Nausea or Vomiting 60 tablet 0    dicyclomine (BENTYL) 10 MG capsule Take 1 capsule by mouth 4 times daily Use as needed for cramping 120 capsule 3    nystatin (MYCOSTATIN) 751091 UNIT/GM powder Apply to groin bilaterally, apply 3 times daily.  1 Bottle 2    levothyroxine (SYNTHROID) 50 MCG tablet Take 1 tablet by mouth daily TAKE 1 TABLET BY MOUTH EVERY DAY 30 tablet 5    traZODone (DESYREL) 50 MG tablet Take 1 tablet by mouth nightly as needed for Sleep (restlessness) 90 tablet 1    albuterol-ipratropium (COMBIVENT RESPIMAT)  MCG/ACT AERS inhaler Inhale 1 puff into the lungs every 6 hours as needed for Wheezing 1 Inhaler 5    omeprazole (PRILOSEC) 40 MG delayed release capsule TAKE ONE CAPSULE BY MOUTH DAILY 90 capsule 1    metoprolol succinate (TOPROL XL) 100 MG extended release tablet Take 1 tablet by mouth daily 30 tablet 5    FLUoxetine (PROZAC) 20 MG capsule Take 1 capsule by mouth daily 30 capsule 5    atorvastatin (LIPITOR) 80 MG tablet Take 1 tablet by mouth daily 30 tablet 5    losartan (COZAAR) 100 MG tablet Take 1 tablet by mouth daily 30 tablet 5    ASPIRIN LOW DOSE 81 MG EC tablet Take 1 tablet by mouth daily 90 tablet 3    hydrOXYzine (ATARAX) 10 MG tablet Take 1 tablet by mouth every 6 hours as needed for Anxiety (restlessness and sleep) 180 tablet 1    acetaminophen (TYLENOL) 500 MG tablet Take 1,000 mg by mouth every 6 hours as needed for Pain       No current facility-administered medications for this visit. No Known Allergies    Social History     Social History    Marital status:      Spouse name: N/A    Number of children: N/A    Years of education: N/A     Occupational History    Not on file. Social History Main Topics    Smoking status: Former Smoker     Packs/day: 0.00     Years: 40.00     Types: Cigarettes    Smokeless tobacco: Never Used    Alcohol use No    Drug use: No    Sexual activity: No     Other Topics Concern    Not on file     Social History Narrative    No narrative on file       Family History   Problem Relation Age of Onset    Dementia Mother     High Blood Pressure Mother     COPD Father     High Cholesterol Father     High Blood Pressure Father        Review of Systems - General ROS: negative for - chills or fever, positive fatigue, sweats - happens at rest, activity, public and at home. Psychological ROS: negative for - anxiety and depression - stress with family issues   Hematological and Lymphatic ROS: No history of blood clots or bleeding disorder. Respiratory ROS: no shortness of breath, or wheezing - cough much better with decreased smoking - no tobacco now, dry cough with dry mouth  Cardiovascular ROS: no chest pain, no dyspnea on exertion - stress test was negative 3/2015.   Gastrointestinal ROS: positive abdominal pain, positive nausea and vomiting - no change in bowel habits, or black or bloody stools  Genito-Urinary ROS: no dysuria, trouble voiding, or hematuria  Musculoskeletal ROS: negative for - joint pain, muscle pain or muscular weakness - except for chronic right leg/ankle pain, bilateral knee pain, using brace on left knee - better that not working, positive proximal weakness to get out of chair  Neurological ROS: negative for - seizures, positive tingling of shoulders and face, with episodes  Dermatological ROS: negative for - rash or skin lesion changes    Blood pressure (!) 150/90, pulse 100, height 5' 2.99\" (1.6 m), weight 242 lb (109.8 kg). But not taking metoprolol    Physical Examination: General appearance -alert, well appearing, and in no distress  Mental status - alert, oriented to person, place, and time  Neck - supple, no significant adenopathy  Chest - clear to auscultation, no wheezes, rales or rhonchi, symmetric air entry  Heart - normal rate, regular rhythm, normal S1, S2, no murmurs, rubs, clicks or gallops  Abdomen -soft, mild tenderness in RUQ, nondistended  Neurological - alert, oriented  Extremities - peripheral pulses normal, trace ankle edema, no clubbing or cyanosis   Skin - warm and dry, soft mobile lesion above left eyebrow - no redness, warmth, tenderness, soft non-mobile lesion on abdomen mid left side, feel like in dermal layer of skin. No redness, warmth, tenderness. Diagnostic data:  Results for orders placed or performed during the hospital encounter of 07/14/17   Urine culture   Result Value Ref Range    Urine Culture, Routine (A)      Growth of Contaminants. The mixture of organisms present  are not a common cause of urinary tract infections and  probably represent skin zac or distal urethral zac.       Organism Growth of Contaminants (A)    CBC auto differential   Result Value Ref Range    WBC 7.0 4.8 - 10.8 thou/mm3    RBC 4.16 (L) 4.70 - 6.10 mill/mm3    Hemoglobin 12.6 (L) 14.0 - 18.0 gm/dl    Hematocrit 36.6 (L) 42.0 - 52.0 %    MCV 88.1 80.0 - 94.0 fL    MCH 30.2 27.0 - 31.0 pg    MCHC 34.3 33.0 - 37.0 gm/dl    RDW 13.9 11.5 - 14.5 %    Platelets 289 646 - 573 thou/mm3    MPV 10.5 (H) 7.4 - 10.4 mcm    RBC Morphology NORMAL     Seg Neutrophils 68.5 %    Lymphocytes 20.7 %    Monocytes 8.1 %    Eosinophils 2.5 %    Basophils 0.2 %    nRBC 0 /100 wbc    Segs Absolute 4.8 1.8 - 7.7 thou/mm3

## 2017-08-02 ENCOUNTER — TELEPHONE (OUTPATIENT)
Dept: INTERNAL MEDICINE CLINIC | Age: 57
End: 2017-08-02

## 2017-08-29 ENCOUNTER — HOSPITAL ENCOUNTER (OUTPATIENT)
Age: 57
Discharge: HOME | End: 2017-08-29
Payer: MEDICAID

## 2017-08-29 ENCOUNTER — OFFICE VISIT (OUTPATIENT)
Dept: INTERNAL MEDICINE CLINIC | Age: 57
End: 2017-08-29
Payer: MEDICAID

## 2017-08-29 VITALS
SYSTOLIC BLOOD PRESSURE: 148 MMHG | DIASTOLIC BLOOD PRESSURE: 88 MMHG | WEIGHT: 247 LBS | BODY MASS INDEX: 43.77 KG/M2 | HEIGHT: 63 IN | HEART RATE: 76 BPM

## 2017-08-29 DIAGNOSIS — E03.9 ACQUIRED HYPOTHYROIDISM: ICD-10-CM

## 2017-08-29 DIAGNOSIS — F32.9 REACTIVE DEPRESSION: ICD-10-CM

## 2017-08-29 DIAGNOSIS — G45.8 OTHER SPECIFIED TRANSIENT CEREBRAL ISCHEMIAS: ICD-10-CM

## 2017-08-29 DIAGNOSIS — F41.9 ANXIETY: ICD-10-CM

## 2017-08-29 DIAGNOSIS — I10 ESSENTIAL HYPERTENSION: ICD-10-CM

## 2017-08-29 DIAGNOSIS — E03.9 ACQUIRED HYPOTHYROIDISM: Primary | ICD-10-CM

## 2017-08-29 DIAGNOSIS — J45.20 REACTIVE AIRWAY DISEASE, MILD INTERMITTENT, UNCOMPLICATED: ICD-10-CM

## 2017-08-29 DIAGNOSIS — E78.5 HYPERLIPIDEMIA, UNSPECIFIED HYPERLIPIDEMIA TYPE: ICD-10-CM

## 2017-08-29 DIAGNOSIS — K21.9 GASTROESOPHAGEAL REFLUX DISEASE, ESOPHAGITIS PRESENCE NOT SPECIFIED: ICD-10-CM

## 2017-08-29 LAB — TSH SERPL DL<=0.05 MIU/L-ACNC: 2.79 UIU/ML (ref 0.4–4.2)

## 2017-08-29 PROCEDURE — 84443 ASSAY THYROID STIM HORMONE: CPT

## 2017-08-29 PROCEDURE — 36415 COLL VENOUS BLD VENIPUNCTURE: CPT

## 2017-08-29 PROCEDURE — 99214 OFFICE O/P EST MOD 30 MIN: CPT | Performed by: INTERNAL MEDICINE

## 2017-08-29 RX ORDER — ASPIRIN 81 MG/1
81 TABLET, COATED ORAL DAILY
Qty: 90 TABLET | Refills: 3 | Status: SHIPPED | OUTPATIENT
Start: 2017-08-29 | End: 2018-08-30 | Stop reason: SDUPTHER

## 2017-08-29 RX ORDER — TRAZODONE HYDROCHLORIDE 50 MG/1
50 TABLET ORAL NIGHTLY PRN
Qty: 90 TABLET | Refills: 1 | Status: SHIPPED | OUTPATIENT
Start: 2017-08-29 | End: 2017-10-30 | Stop reason: SDUPTHER

## 2017-08-29 RX ORDER — METOPROLOL SUCCINATE 100 MG/1
100 TABLET, EXTENDED RELEASE ORAL DAILY
Qty: 30 TABLET | Refills: 5 | Status: SHIPPED | OUTPATIENT
Start: 2017-08-29 | End: 2017-10-30 | Stop reason: SDUPTHER

## 2017-08-29 RX ORDER — ATORVASTATIN CALCIUM 80 MG/1
80 TABLET, FILM COATED ORAL DAILY
Qty: 30 TABLET | Refills: 5 | Status: SHIPPED | OUTPATIENT
Start: 2017-08-29 | End: 2017-10-30 | Stop reason: SDUPTHER

## 2017-08-29 RX ORDER — FLUOXETINE HYDROCHLORIDE 20 MG/1
20 CAPSULE ORAL DAILY
Qty: 30 CAPSULE | Refills: 5 | Status: SHIPPED | OUTPATIENT
Start: 2017-08-29 | End: 2017-10-30 | Stop reason: SDUPTHER

## 2017-08-29 RX ORDER — LOSARTAN POTASSIUM 100 MG/1
100 TABLET ORAL DAILY
Qty: 30 TABLET | Refills: 5 | Status: SHIPPED | OUTPATIENT
Start: 2017-08-29 | End: 2017-10-30 | Stop reason: SDUPTHER

## 2017-08-29 RX ORDER — OMEPRAZOLE 40 MG/1
CAPSULE, DELAYED RELEASE ORAL
Qty: 90 CAPSULE | Refills: 1 | Status: SHIPPED | OUTPATIENT
Start: 2017-08-29 | End: 2017-10-30 | Stop reason: SDUPTHER

## 2017-09-05 ENCOUNTER — TELEPHONE (OUTPATIENT)
Dept: INTERNAL MEDICINE CLINIC | Age: 57
End: 2017-09-05

## 2017-09-09 PROBLEM — J45.909 REACTIVE AIRWAY DISEASE: Status: ACTIVE | Noted: 2017-09-09

## 2017-09-26 DIAGNOSIS — E03.9 ACQUIRED HYPOTHYROIDISM: ICD-10-CM

## 2017-09-26 RX ORDER — LEVOTHYROXINE SODIUM 0.05 MG/1
50 TABLET ORAL DAILY
Qty: 30 TABLET | Refills: 5 | Status: SHIPPED | OUTPATIENT
Start: 2017-09-26 | End: 2018-01-30 | Stop reason: SDUPTHER

## 2017-09-27 ENCOUNTER — ANESTHESIA EVENT (OUTPATIENT)
Dept: ENDOSCOPY | Age: 57
End: 2017-09-27
Payer: MEDICAID

## 2017-09-27 ENCOUNTER — ANESTHESIA (OUTPATIENT)
Dept: ENDOSCOPY | Age: 57
End: 2017-09-27
Payer: MEDICAID

## 2017-09-27 ENCOUNTER — HOSPITAL ENCOUNTER (OUTPATIENT)
Age: 57
Setting detail: OUTPATIENT SURGERY
Discharge: HOME | End: 2017-09-27
Attending: INTERNAL MEDICINE | Admitting: INTERNAL MEDICINE
Payer: MEDICAID

## 2017-09-27 VITALS
WEIGHT: 240.8 LBS | TEMPERATURE: 97.9 F | RESPIRATION RATE: 16 BRPM | DIASTOLIC BLOOD PRESSURE: 95 MMHG | HEART RATE: 89 BPM | OXYGEN SATURATION: 93 % | HEIGHT: 63 IN | BODY MASS INDEX: 42.66 KG/M2 | SYSTOLIC BLOOD PRESSURE: 159 MMHG

## 2017-09-27 VITALS
OXYGEN SATURATION: 95 % | RESPIRATION RATE: 17 BRPM | SYSTOLIC BLOOD PRESSURE: 152 MMHG | DIASTOLIC BLOOD PRESSURE: 83 MMHG

## 2017-09-27 DIAGNOSIS — R11.2 INTRACTABLE VOMITING WITH NAUSEA, UNSPECIFIED VOMITING TYPE: Primary | ICD-10-CM

## 2017-09-27 DIAGNOSIS — K21.9 GASTROESOPHAGEAL REFLUX DISEASE, ESOPHAGITIS PRESENCE NOT SPECIFIED: ICD-10-CM

## 2017-09-27 PROCEDURE — 88305 TISSUE EXAM BY PATHOLOGIST: CPT

## 2017-09-27 PROCEDURE — 3609012400 HC EGD TRANSORAL BIOPSY SINGLE/MULTIPLE: Performed by: INTERNAL MEDICINE

## 2017-09-27 PROCEDURE — 6360000002 HC RX W HCPCS: Performed by: NURSE ANESTHETIST, CERTIFIED REGISTERED

## 2017-09-27 PROCEDURE — 3700000000 HC ANESTHESIA ATTENDED CARE: Performed by: INTERNAL MEDICINE

## 2017-09-27 PROCEDURE — 3700000001 HC ADD 15 MINUTES (ANESTHESIA): Performed by: INTERNAL MEDICINE

## 2017-09-27 PROCEDURE — 7100000000 HC PACU RECOVERY - FIRST 15 MIN: Performed by: INTERNAL MEDICINE

## 2017-09-27 PROCEDURE — 2580000003 HC RX 258: Performed by: INTERNAL MEDICINE

## 2017-09-27 PROCEDURE — 2500000003 HC RX 250 WO HCPCS: Performed by: NURSE ANESTHETIST, CERTIFIED REGISTERED

## 2017-09-27 RX ORDER — PROPOFOL 10 MG/ML
INJECTION, EMULSION INTRAVENOUS PRN
Status: DISCONTINUED | OUTPATIENT
Start: 2017-09-27 | End: 2017-09-27 | Stop reason: SDUPTHER

## 2017-09-27 RX ORDER — LIDOCAINE HYDROCHLORIDE 20 MG/ML
INJECTION, SOLUTION INFILTRATION; PERINEURAL PRN
Status: DISCONTINUED | OUTPATIENT
Start: 2017-09-27 | End: 2017-09-27 | Stop reason: SDUPTHER

## 2017-09-27 RX ORDER — SODIUM CHLORIDE 450 MG/100ML
INJECTION, SOLUTION INTRAVENOUS CONTINUOUS
Status: DISCONTINUED | OUTPATIENT
Start: 2017-09-27 | End: 2017-09-27 | Stop reason: HOSPADM

## 2017-09-27 RX ADMIN — PROPOFOL 50 MG: 10 INJECTION, EMULSION INTRAVENOUS at 10:58

## 2017-09-27 RX ADMIN — PROPOFOL 100 MG: 10 INJECTION, EMULSION INTRAVENOUS at 10:56

## 2017-09-27 RX ADMIN — LIDOCAINE HYDROCHLORIDE 100 MG: 20 INJECTION, SOLUTION INFILTRATION; PERINEURAL at 10:55

## 2017-09-27 RX ADMIN — SODIUM CHLORIDE: 4.5 INJECTION, SOLUTION INTRAVENOUS at 09:32

## 2017-09-27 ASSESSMENT — PAIN SCALES - GENERAL: PAINLEVEL_OUTOF10: 0

## 2017-10-23 ENCOUNTER — HOSPITAL ENCOUNTER (OUTPATIENT)
Dept: ULTRASOUND IMAGING | Age: 57
Discharge: HOME | End: 2017-10-23
Payer: MEDICAID

## 2017-10-23 DIAGNOSIS — K21.9 GASTROESOPHAGEAL REFLUX DISEASE, ESOPHAGITIS PRESENCE NOT SPECIFIED: ICD-10-CM

## 2017-10-23 PROCEDURE — 76705 ECHO EXAM OF ABDOMEN: CPT

## 2017-10-30 ENCOUNTER — OFFICE VISIT (OUTPATIENT)
Dept: INTERNAL MEDICINE CLINIC | Age: 57
End: 2017-10-30
Payer: MEDICAID

## 2017-10-30 VITALS
SYSTOLIC BLOOD PRESSURE: 134 MMHG | HEIGHT: 63 IN | WEIGHT: 247 LBS | HEART RATE: 76 BPM | BODY MASS INDEX: 43.77 KG/M2 | DIASTOLIC BLOOD PRESSURE: 80 MMHG

## 2017-10-30 DIAGNOSIS — R79.89 HIGH SERUM VITAMIN B12: ICD-10-CM

## 2017-10-30 DIAGNOSIS — F32.9 REACTIVE DEPRESSION: ICD-10-CM

## 2017-10-30 DIAGNOSIS — Z23 NEED FOR PROPHYLACTIC VACCINATION AGAINST STREPTOCOCCUS PNEUMONIAE (PNEUMOCOCCUS): ICD-10-CM

## 2017-10-30 DIAGNOSIS — E78.5 HYPERLIPIDEMIA, UNSPECIFIED HYPERLIPIDEMIA TYPE: ICD-10-CM

## 2017-10-30 DIAGNOSIS — D64.9 ANEMIA, UNSPECIFIED TYPE: ICD-10-CM

## 2017-10-30 DIAGNOSIS — R10.9 ABDOMINAL CRAMPING: ICD-10-CM

## 2017-10-30 DIAGNOSIS — K21.9 GASTROESOPHAGEAL REFLUX DISEASE, ESOPHAGITIS PRESENCE NOT SPECIFIED: ICD-10-CM

## 2017-10-30 DIAGNOSIS — Z23 NEED FOR IMMUNIZATION AGAINST INFLUENZA: ICD-10-CM

## 2017-10-30 DIAGNOSIS — E03.9 ACQUIRED HYPOTHYROIDISM: ICD-10-CM

## 2017-10-30 DIAGNOSIS — F41.9 ANXIETY: ICD-10-CM

## 2017-10-30 DIAGNOSIS — I10 ESSENTIAL HYPERTENSION: Primary | ICD-10-CM

## 2017-10-30 PROCEDURE — G8427 DOCREV CUR MEDS BY ELIG CLIN: HCPCS | Performed by: INTERNAL MEDICINE

## 2017-10-30 PROCEDURE — G8484 FLU IMMUNIZE NO ADMIN: HCPCS | Performed by: INTERNAL MEDICINE

## 2017-10-30 PROCEDURE — G8417 CALC BMI ABV UP PARAM F/U: HCPCS | Performed by: INTERNAL MEDICINE

## 2017-10-30 PROCEDURE — 90472 IMMUNIZATION ADMIN EACH ADD: CPT | Performed by: INTERNAL MEDICINE

## 2017-10-30 PROCEDURE — 90732 PPSV23 VACC 2 YRS+ SUBQ/IM: CPT | Performed by: INTERNAL MEDICINE

## 2017-10-30 PROCEDURE — 90630 INFLUENZA, QUADV, 18-64 YRS, ID, PF, MICRO INJ, 0.1ML (FLUZONE QUADV, PF): CPT | Performed by: INTERNAL MEDICINE

## 2017-10-30 PROCEDURE — 1036F TOBACCO NON-USER: CPT | Performed by: INTERNAL MEDICINE

## 2017-10-30 PROCEDURE — 99214 OFFICE O/P EST MOD 30 MIN: CPT | Performed by: INTERNAL MEDICINE

## 2017-10-30 PROCEDURE — 3017F COLORECTAL CA SCREEN DOC REV: CPT | Performed by: INTERNAL MEDICINE

## 2017-10-30 PROCEDURE — 90471 IMMUNIZATION ADMIN: CPT | Performed by: INTERNAL MEDICINE

## 2017-10-30 RX ORDER — OMEPRAZOLE 40 MG/1
CAPSULE, DELAYED RELEASE ORAL
Qty: 90 CAPSULE | Refills: 1 | Status: SHIPPED | OUTPATIENT
Start: 2017-10-30 | End: 2018-01-30 | Stop reason: SDUPTHER

## 2017-10-30 RX ORDER — DICYCLOMINE HYDROCHLORIDE 10 MG/1
10 CAPSULE ORAL 4 TIMES DAILY
Qty: 120 CAPSULE | Refills: 3 | Status: SHIPPED | OUTPATIENT
Start: 2017-10-30 | End: 2018-01-30 | Stop reason: SDUPTHER

## 2017-10-30 RX ORDER — LOSARTAN POTASSIUM 100 MG/1
100 TABLET ORAL DAILY
Qty: 30 TABLET | Refills: 5 | Status: SHIPPED | OUTPATIENT
Start: 2017-10-30 | End: 2018-01-30 | Stop reason: SDUPTHER

## 2017-10-30 RX ORDER — METOPROLOL SUCCINATE 100 MG/1
100 TABLET, EXTENDED RELEASE ORAL DAILY
Qty: 30 TABLET | Refills: 5 | Status: SHIPPED | OUTPATIENT
Start: 2017-10-30 | End: 2018-01-30 | Stop reason: SDUPTHER

## 2017-10-30 RX ORDER — ONDANSETRON HYDROCHLORIDE 8 MG/1
TABLET, FILM COATED ORAL
Refills: 0 | COMMUNITY
Start: 2017-10-19 | End: 2018-04-16

## 2017-10-30 RX ORDER — TRAZODONE HYDROCHLORIDE 50 MG/1
50 TABLET ORAL NIGHTLY PRN
Qty: 90 TABLET | Refills: 1 | Status: SHIPPED | OUTPATIENT
Start: 2017-10-30 | End: 2018-01-30 | Stop reason: SDUPTHER

## 2017-10-30 RX ORDER — ATORVASTATIN CALCIUM 80 MG/1
80 TABLET, FILM COATED ORAL DAILY
Qty: 30 TABLET | Refills: 5 | Status: SHIPPED | OUTPATIENT
Start: 2017-10-30 | End: 2018-01-30 | Stop reason: SDUPTHER

## 2017-10-30 RX ORDER — FLUOXETINE HYDROCHLORIDE 20 MG/1
20 CAPSULE ORAL DAILY
Qty: 30 CAPSULE | Refills: 5 | Status: SHIPPED | OUTPATIENT
Start: 2017-10-30 | End: 2018-01-30 | Stop reason: SDUPTHER

## 2017-10-30 NOTE — PROGRESS NOTES
After obtaining consent, and per orders of Dr. Andrey Lindsey, injection of Fluzone given in Right deltoid by Arcadio Barrera. Patient instructed to remain in clinic for 20 minutes afterwards, and to report any adverse reaction to me immediately.

## 2017-10-30 NOTE — PROGRESS NOTES
After obtaining consent, and per orders of Dr. Dario Corley, injection of Pneumovax 23 given in Left deltoid by Izaiah Camejo. Patient instructed to remain in clinic for 20 minutes afterwards, and to report any adverse reaction to me immediately.

## 2017-10-30 NOTE — PROGRESS NOTES
1960    Chief Complaint   Patient presents with    Hypertension    Hypothyroidism    Nausea & Vomiting     still happening 2-3 times per week    Depression       Pt is a 61 y.o. male who presents for follow up visit. This is a 2 month follow up for nausea/vomiting/bloating (GI work-up) - follow up of hypertension, and review TSH after dose of levothyroxine increased in hospital.     Since last visit he had EGD with Dr. Duran Mattson - gastritis noted. They want PPI BID. He is continuing to have vomiting 3x week. It can happen anytime of day, before or after eating. He states it usually starts with a feeling of getting hot and then vomits. May be just water and pills. No blood in emeses. GI ordered another RUQ ultrasound and gallbladder normal.  GI gave Zofran 8 mg tabs but nausea/vomiting comes on so quickly he doesn't have time to take it. He is also having crampy pain and bloating - he is not using Bentyl much - suggested taking 2-4 x a day on a scheduled basis. He is to see GI again in 12/2107. Hypertension - He is taking his medication as directed now. So BP is controlled on metoprolol and losartan. Hypothyroidism - They increased thyroid med in hospital to 50 mcg need to repeat lab at end of 8/2017. TSH 2.8 8/29/17, will continue on this dose. Patient has limited skills - unable to read. Difficulty expressing symptoms, easily confused -will try to get help with developmental disability board. Need to see if he qualifies because if something happens to his elderly friend I don't think he will survive on his own. Issues discussed at previous visit:  Patient was admitted to [address] [place] [address] from 7/14/17 to 7/17/17 for recurrent nausea/vomiting and abdominal pain, CRISTIAN - Cr 2.0->1.3, dehydration, diverticulosis, HTN, hypothyroidism.     He was hydrated with IVF, and Cr improved from 2.0 ->1.3.   Renal ultrasound with cyst.  He had CT without contrast - unremarkable but limited as without contrast.  To have gallbladder ultrasound as outpatient as he was still very tender in RUQ. He had EGD and Colonoscopy with bx of polyp - tubular adenoma per path report. No follow with Dr. Melanie Simpson scheduled. He is still having nausea, and abdominal pain - not worsening. Added Zofran and Bentyl. His Synthroid was increased in hospital to 50 mcg - will need TSH in 6 weeks. Will need to give order for more Synthroid and TSH today. The gallbladder ultrasound negative. He took Zofran, Bentyl and better for a day then vomited the next day. He stopped all medication except levothyroxine, Zofran, Bentyl, and omeprazole and BP is very elevated today. He is continuing to vomiting 1-3x a day. Or he has a GERD sensation and not actually vomiting. It can happen at any time - even with water. Question if this is severe GERD vs outlet obstruction vs dysmotility/gastroparesis. Would like to increase Omeprazole to BID, and refer to GI - to see Sydney Patterson Aug 11. He had appt today but cancelled as had appt with Dr. Adela Alarcon. Question if this could be due to endocrine tumor with sweating and then N/V. It occurs with increased abdominal pressure. He is trying peptobismal and helping some. Since last visit, he did see Sydney Patterson and has been scheduled for EGD 9/2017. He said the nausea and vomiting is better - last vomiting episode was yesterday at [employer] when he last vomited after eating in parking lot. He did just eat sausage and gravy. He is taking Omeprazole BID and Zofran 1-2x a day and Bentyl prn bloating. Hyperlipidemia - Dr. Adela Alarcon stopped gemfibrizol. He is taking Lipitor every day. LDL at goal and triglycerides acceptable 3/2017. Hepatic panel 7/2017. Depression - positive screen. Dr. Ayala Marino added Trazodone and Hydroxyzine and suggested increasing Prozac to 40 mg when he was consulted while patient was at [facility].   He is taking Trazadone, and Hydroxyzine inconsistently, but is taking Prozac 20 daily. Still waiting to see if he sees psychiatry - reportedly he was referred to [facility] 10/2016. To my knowledge he is not seeing [facility]. He is taking Prozac daily. Rarely takes Trazodone and Hyrdroxyzine. He does verbalized anger toward sister and self but no plan to harm self or others. I will order counseling again and hope they will agree with psychiatry referral at [facility]. Spent extensive time today reviewing his medication and how he takes it. See patient instructions to change the way he is taking medication. Since last visit saw Dr. Michelle Delaney regarding symptoms and he did blood work. Asked patient if he has been taking Lipitor 80 mg, and Lopid every day for the last couple months - his LDL and triglycerides are still very high. He states he has been taking medication faithfully. He eats out a lot and poor choices. Biju Lewis was relatively controlled in 2014 on just Lipitor 40 mg, then high trig and LDL in 6/2016 - so increased Lipitor to 80 mg and added Lopid in 10/2016. Suggested he eat healthier. Suggested he see dietician. Eating out (everything he eats is fried) and has candy/cookies at home. Suggest dietician make suggestions very simple for the patient to understand. He is not wearing CPAP as not helping sweating episodes. Again suggested that he get back to using this. B12 high - see if [facility] ordered B12 binding protein. I don't believe it was ordered or able to be ordered. Suspected TIA - Head CT and MRI negative for acute bleed or CVA. Incidentally noted forehead lump is a lipoma. He had carotid doppler which was unremarkable. He is to follow up with Dr. Lakhwinder Hinton. Face still with intermittent numbness. Had normal outpatient stress test and ECHO - normal - to ask Dr. Clifford Chambers if need to continue isosorbide. MRI of adrenals and MIBG test negative for pheo.   He is still having sweating, flushing, SOB, right eye appearance -alert, well appearing, and in no distress  Mental status - alert, oriented to person, place, and time  Neck - supple, no significant adenopathy  Chest - clear to auscultation, no wheezes, rales or rhonchi, symmetric air entry  Heart - normal rate, regular rhythm, normal S1, S2, no murmurs, rubs, clicks or gallops  Abdomen -soft, nontender, nondistended  Neurological - alert, oriented  Extremities - peripheral pulses normal, trace ankle edema, no clubbing or cyanosis   Skin - warm and dry, soft mobile lesion above left eyebrow - no redness, warmth, tenderness, soft non-mobile lesion on abdomen mid left side, feel like in dermal layer of skin. No redness, warmth, tenderness. Diagnostic data:  Results for orders placed or performed during the hospital encounter of 08/29/17   TSH with Reflex   Result Value Ref Range    TSH 2.790 0.400 - 4.20 uIU/mL     Assessment and Plan:     1. Essential hypertension  losartan (COZAAR) 100 MG tablet    metoprolol succinate (TOPROL XL) 100 MG extended release tablet    Comprehensive Metabolic Panel   2. Acquired hypothyroidism  TSH with Reflex   3. Abdominal cramping  dicyclomine (BENTYL) 10 MG capsule   4. Gastroesophageal reflux disease, esophagitis presence not specified  omeprazole (PRILOSEC) 40 MG delayed release capsule   5. Hyperlipidemia, unspecified hyperlipidemia type  atorvastatin (LIPITOR) 80 MG tablet    Comprehensive Metabolic Panel    Lipid Panel   6. Reactive depression  FLUoxetine (PROZAC) 20 MG capsule   7. Anxiety  traZODone (DESYREL) 50 MG tablet   8. High serum vitamin B12  Vitamin B12   9. Anemia, unspecified type  CBC Auto Differential   10.  Need for prophylactic vaccination against Streptococcus pneumoniae (pneumococcus)  Pneumococcal polysaccharide vaccine 23-valent PPSV23    ME ADMIN PNEUMOCOCCAL VACCINE   11. Need for immunization against influenza  INFLUENZA, QUADV, 18-64 YRS, ID, PF, MICRO INJ, 0.1ML (FLUZONE QUADV, PF)    ME ADMIN INFLUENZA VIRUS VAC     Hypertension/elevated catecholamines/diaphoresis/flushing - suspect pheo but negative imaging x2. Defer to Dr. Altagracia Alcala. BP is controlled, continue metoprolol and losartan. Hypothyroid -he is taking levothyroxine first thing in am on empty stomach without any other medication. TSH normal 8/2017. Abdominal cramping/GERD/nasuea/vomiting - continue PPI BID, Zofran prn, take Bentyl more regularly and follow up with GI testing - continue to follow with Ezekiel Amaro. All other issues stable - refilled meds and wrote for labs. Hyperlipidemia - LDL and triglycerides/ALT acceptable on Lipitor 80 mg daily. Anxiety/Reactive depression - likely due to this chronic illness, he is frustrated at not getting answers, unable to work and worried about finances. He has an explosive personality and is getting more agitated. Will continue Prozac, and encourage him to see [facility]. Elevated B12 - check level to see if still extremely elevated but not on any supplements. CVA - reminded him to continue Aspirin. Right leg pain - continue Mobic but only once a day prn. If pain worsens - recommend he go back to ortho. Obesity - continues to gain weight BMI 43.77. Even with nausea and vomiting - no significant weight loss. Skin lesions - nodule above left eyebrow (lipoma) and nodule on abdomen -has not seen Dermatology. Follow up in 12 weeks for chronic issues. Labs due in 3 months. Allergies: Review of patient's allergies indicates no known allergies. Dr. Marcie Garza    [facility] [address]  [place], [address]    Phone number: [phone]  Fax number [phone]

## 2017-10-31 ENCOUNTER — HOSPITAL ENCOUNTER (OUTPATIENT)
Dept: PHARMACY | Age: 57
Setting detail: THERAPIES SERIES
Discharge: HOME | End: 2017-10-31
Payer: MEDICAID

## 2017-10-31 VITALS — BODY MASS INDEX: 39.21 KG/M2 | WEIGHT: 244 LBS | HEIGHT: 66 IN

## 2017-10-31 PROCEDURE — 97803 MED NUTRITION INDIV SUBSEQ: CPT | Performed by: DIETITIAN, REGISTERED

## 2017-10-31 NOTE — PROGRESS NOTES
support for implementing changes as evidenced by Previous goals unmet/expected outcomes not achieved. Intervention:  -Impression: Patient with very little changes to eating habits since last session with RD in April. Patient states he still has poor appetite and eating very little throughout the day. Food choices are still high in fat/sugar per food recall. Minimal fruit and vegetable intake. -Instructed the patient on: Plate Method and Healthy Snacks.  -Handouts given for: plate method.    -Patient Instructions  1.) Avoid fried foods includingfrench fries, fried chicken, fried fish. Choose grilled or baked options. 2.) Include non-starchy vegetables into your meals. 3.) Continue to walk your dog daily. 4.) Use plate method to help plan balanced meals. 5.) Try to keep healthy snacks on hand that you can eat at breakfast and lunch. Healthy snack ideas- piece of fruit, celery with PB, carrots, PB with crackers, crackers with slice of cheese, yogurt. -General Diet Recommendations: low fat, low cholesterol, minimize processed foods, minimize simple sugars and plate method.  -Nutrition prescription: 1700 calories/day, 190g carbs/day. Comprehension verified using teachback method. Monitoring/Evaluation:   -Followup visit: PRN with dietitian.   -Receptiveness to education/goals: Agreeable.  -Evaluation of education: Needs reinforcement.  -Readiness to change: contemplation - ambivalent about change planning balanced meals, avoid fried foods.  -Expected compliance: fair. Thank you for your referral of this patient. Total time involved in direct patient education: 30 minutes for follow-up MNT visit.

## 2017-10-31 NOTE — LETTER
[address] [facility]  [address] [facility]  [place]  Phone: [phone]  Fax: [phone]    Sree Cooley RD, LD        October 31, 2017     Xiomara Nino [facility], [address] [place], [address]    Patient: Adilene Elise   MR Number: [id number]   YOB: 1960   Date of Visit: 10/31/2017       Dear Dr. Bindu Baum: Thank you for referring Karon Mahan to me for evaluation. Below are the relevant portions of my assessment and plan of care. Assessment:     Vitals from current and previous visits:  Current visit:  Ht: 5' 6\"  Wt: 244 lbs    Previous visit:  Ht: 5' 6\"  Wt: 241 lbs  -Body mass index is 39.38 kg/m². 35-39.9 - Obesity Grade II.   - Patient gained 3 lbs over the past 6 months.  -Weight goal: lose weight. Nutrition Diagnosis:   Not ready for diet/lifestyle change related to Lack of social support for implementing changes as evidenced by Previous goals unmet/expected outcomes not achieved. Plan:     Intervention:  -Impression: Patient with very little changes to eating habits since last session with RD in April. Patient states he still has poor appetite and eating very little throughout the day. Food choices are still high in fat/sugar per food recall. Minimal fruit and vegetable intake. -Instructed the patient on: Plate Method and Healthy Snacks.  -Handouts given for: plate method.    -Patient Instructions  1.) Avoid fried foods includingfrench fries, fried chicken, fried fish. Choose grilled or baked options. 2.) Include non-starchy vegetables into your meals. 3.) Continue to walk your dog daily. 4.) Use plate method to help plan balanced meals. 5.) Try to keep healthy snacks on hand that you can eat at breakfast and lunch. Healthy snack ideas- piece of fruit, celery with PB, carrots, PB with crackers, crackers with slice of cheese, yogurt.

## 2017-10-31 NOTE — COMMUNICATION BODY
Assessment:     Vitals from current and previous visits:  Current visit:  Ht: 5' 6\"  Wt: 244 lbs    Previous visit:  Ht: 5' 6\"  Wt: 241 lbs  -Body mass index is 39.38 kg/m². 35-39.9 - Obesity Grade II.   - Patient gained 3 lbs over the past 6 months.  -Weight goal: lose weight. Nutrition Diagnosis:   Not ready for diet/lifestyle change related to Lack of social support for implementing changes as evidenced by Previous goals unmet/expected outcomes not achieved. Plan:     Intervention:  -Impression: Patient with very little changes to eating habits since last session with RD in April. Patient states he still has poor appetite and eating very little throughout the day. Food choices are still high in fat/sugar per food recall. Minimal fruit and vegetable intake. -Instructed the patient on: Plate Method and Healthy Snacks.  -Handouts given for: plate method.    -Patient Instructions  1.) Avoid fried foods includingfrench fries, fried chicken, fried fish. Choose grilled or baked options. 2.) Include non-starchy vegetables into your meals. 3.) Continue to walk your dog daily. 4.) Use plate method to help plan balanced meals. 5.) Try to keep healthy snacks on hand that you can eat at breakfast and lunch. Healthy snack ideas- piece of fruit, celery with PB, carrots, PB with crackers, crackers with slice of cheese, yogurt. -General Diet Recommendations: low fat, low cholesterol, minimize processed foods, minimize simple sugars and plate method.  -Nutrition prescription: 1700 calories/day, 190g carbs/day. Comprehension verified using teachback method. Monitoring/Evaluation:   -Followup visit: PRN with dietitian.   -Receptiveness to education/goals: Agreeable.  -Evaluation of education: Needs reinforcement.  -Readiness to change: contemplation - ambivalent about change planning balanced meals, avoid fried foods.  -Expected compliance: fair. Thank you for your referral of this patient.

## 2017-11-08 ENCOUNTER — CARE COORDINATION (OUTPATIENT)
Dept: CARE COORDINATION | Age: 57
End: 2017-11-08

## 2017-11-08 NOTE — CARE COORDINATION
11/7: received call from PCP office requesting assistance with aligning resources for pt.    11/8: spoke with Duane today. Duane currently does not have any services in place. He states that he lives in a trailer with a female elderly friend, Danny Jenkins who helps him. He currently does not work and reports that he has no income whatsoever. He currently lives off of his friends income. This has been a financial burden on Danny Jenkins. Pt has difficult time understanding and comprehending questions I am asking him. He reports to me that he went to [address] [place] [address] as a child, but did not graduate. Unable to recall what grade he quit going. Reviewed notes and upon review of Dr. Husam Grande note from 11/2016 it appears that he reported to him that he dropped out around 9-10th grade. Pt does admit that he has very limited ability to read and write. He states that he struggled in school and was in \"special classes. \" He states that Danny Jenkins helps him a lot when it comes to filling out paperwork or reading instructions. He reports that he does receive $31 in food stamps each mth. It appears from problem list that pt does have some mental health issues as well. I asked pt if he currently follows with psychiatrist or psychologist.  Pt states \"I don't know what those are. I'm pretty sure I don't though. \"  Pt asked Danny Jenkins while on the phone if he sees anyone like that or if he is being seen at [facility]. She states no. Pt reports that he is  and has children, but has nothing to do with them. He denies having any contact with his family at all. 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 Pt states \"I can't even fill out an application. \"  Pt does drive and is able to provide his own transportation to [employer], etc.  Pt is agreeable to having me look into connecting him with resources in community. Pt requests that I speak with Danny Jenkins for further information. Spoke with Danny Jenkins.   She states that she is concerned that if something happens to her he will not be able to take of himself. She states that since he has not income coming in it has been a  Huge financial struggle. She states that they live off of her income which is very limited. They do utilize food bank for assistance with groceries when able. She states that she does sit down everyday and help Duane with his medications. She reports that he is good about taking his meds. She states that he has been living with her for quite sometime. She would like to see him get lined up with resources as well. Contacted [facility]. Spoke with [employer]. Spoke with her about seeing if pt would qualify for resources through them. Informed that if pt agreeable. appt would be arranged for face to face mtg with her to begin intake process. If pt meets qualifications for services then they will begin process of aligning pt with multiple resources to help him. He will have a  that will work directly with him. Also spoke with Andrew Palencia, Care Coordination SW. Informed her of pt. If pt does not qualify for services through [facility] [facility] she will assist in aligning pt with other resources. Spoke with Duane. Discussed contact with [employer]. He is agreeable in arranging date and time to meet face to face with Jordan Garcia to start intake process. Pt requesting that Jordan Garcia reach out to him. Duane states \"I don't like to call people. \"  I informed him that I would reach out to Jordan Garcia and provide her with his contact info so that she can get appt set up. Verbalizes understanding. Info also relayed to Farooq Polanco per his request.  She verbalizes understanding as well. Attempted to reach [employer]. No answer. Message left to return call. Received call back from [employer] at [facility]. Informed her that pt would like her to reach out to him. Contact info given. States that she will attempt to reach out to him.

## 2017-11-16 ENCOUNTER — CARE COORDINATION (OUTPATIENT)
Dept: CARE COORDINATION | Age: 57
End: 2017-11-16

## 2017-11-16 NOTE — CARE COORDINATION
Spoke with Mel Patel and Danny Jenkins on phone today. They both state that no one has been in contact with them from [facility]. Attempted to reach Michaela Lopez with [facility].  states she is out of office until 11/28. Message left   Attempted to reach Duane to update him. No answer. Message left for him to return call. Received call back from Mel Patel. Informed him that Michaela Lopez is out of the office until 11/28 so he will not hear from her until that date or after. Verbalizes understanding.

## 2017-12-04 ENCOUNTER — CARE COORDINATION (OUTPATIENT)
Dept: CARE COORDINATION | Age: 57
End: 2017-12-04

## 2017-12-05 ENCOUNTER — CARE COORDINATION (OUTPATIENT)
Dept: CARE COORDINATION | Age: 57
End: 2017-12-05

## 2017-12-12 ENCOUNTER — CARE COORDINATION (OUTPATIENT)
Dept: CARE COORDINATION | Age: 57
End: 2017-12-12

## 2017-12-12 ENCOUNTER — TELEPHONE (OUTPATIENT)
Dept: INTERNAL MEDICINE CLINIC | Age: 57
End: 2017-12-12

## 2017-12-12 NOTE — TELEPHONE ENCOUNTER
Spoke with Dr. Marisa Dean and this has been an on going problem with patient . Dr. Marisa Dean has wanted patient to be seen for psychiatry at [facility] but patient needs assistance with getting the paperwork done and getting the process going to be seen. If patient has no plan for the suicidal thoughts okay to give number for [facility] and try to get established with Sully Dove.

## 2017-12-12 NOTE — TELEPHONE ENCOUNTER
from [address] called stating that she met with Duane today and she is concerned that he has suicidal thoughts , depressed and anxious . Patient told her that he was being treated for the depression and anxiety but he is not taking the medication as it does not help any way .  offered to take him to the hospital but patient does not want to go as he is dog sitting and can not leave the dog for any length of time.  is at a lose as how to proceed with Duane . Please advise.

## 2017-12-13 NOTE — TELEPHONE ENCOUNTER
Spoke with SUSANNA Romero Jorge A [employer] to f/u. States that Aurora Pereiraimmer is open to having her help him get connected to behavioral health. She will work with getting him connected as she feels this is an immediate need. She states that she is calling a few places today: [facility], [facility], and possibly [facility] to see about getting him an appt and she will accompany pt to help him through the paperwork process. Pt was agreeable to this.  She will also continue working on eligibility regarding board of DD program.

## 2018-01-03 ENCOUNTER — CARE COORDINATION (OUTPATIENT)
Dept: CARE COORDINATION | Age: 58
End: 2018-01-03

## 2018-01-03 NOTE — CARE COORDINATION
Attempted to reach Duane to f/u regarding getting established with behavioral health. No answer. Message left to return call.

## 2018-01-11 ENCOUNTER — CARE COORDINATION (OUTPATIENT)
Dept: CARE COORDINATION | Age: 58
End: 2018-01-11

## 2018-01-11 NOTE — CARE COORDINATION
Received call from SUSANNA Mancia with [employer]. She called to give  update on Duane. Informed that she accompanied Duane to appt today at [facility] to go through the enrollment process. She was able to assist him with filling out his paperwork. Pt expressed to  about his emotional and anxiety problems. She states that he was very open today in expressing his thoughts. She states that he did express having some suicidal thoughts which was a concern so the  talked with him about having [facility] contact him daily to check on him. Duane was agreeable to this. Pt does have the [facility] number as well and has contacted them before to talk. Mellisa Rubio states that she and  encouraged him to call [facility] anytime. Next step is diagnostic assessment where they will be determine treatment plan. Mellisa Rubio states that will be a walk in appt and she will be contacting Duane to determine day to go and have that completed. Ideally Mellisa Rubio would like to get him back there in the next week. Mellisa Rubio states that she will be attending that appt with him as well. Mellisa Rubio states that pt reported to  today that he has ongoing right facial pain/numbness as well as pain behind his eye. She states that he has poor memory recall. Mellisa Rubio states that pt couldn't even remember his middle name today and had to pull his ID out to get it. She is wondering if pt has ever had any neurological workup. Pt did tell her he was admitted to [facility] in 2016, but could not recall if he had any testing done. Appears that pt did have neurological workup in Oct of 2016 at [facility].

## 2018-01-17 ENCOUNTER — CARE COORDINATION (OUTPATIENT)
Dept: CARE COORDINATION | Age: 58
End: 2018-01-17

## 2018-01-21 ENCOUNTER — HOSPITAL ENCOUNTER (EMERGENCY)
Age: 58
Discharge: HOME | End: 2018-01-21
Payer: MEDICAID

## 2018-01-21 VITALS
SYSTOLIC BLOOD PRESSURE: 183 MMHG | DIASTOLIC BLOOD PRESSURE: 96 MMHG | RESPIRATION RATE: 17 BRPM | WEIGHT: 240 LBS | HEART RATE: 92 BPM | TEMPERATURE: 99.2 F | OXYGEN SATURATION: 97 % | BODY MASS INDEX: 38.74 KG/M2

## 2018-01-21 DIAGNOSIS — F32.A DEPRESSION, UNSPECIFIED DEPRESSION TYPE: Primary | ICD-10-CM

## 2018-01-21 DIAGNOSIS — I10 ESSENTIAL HYPERTENSION: ICD-10-CM

## 2018-01-21 LAB
ACETAMINOPHEN LEVEL: < 5 UG/ML (ref 0–20)
ALBUMIN SERPL-MCNC: 4.3 G/DL (ref 3.5–5.1)
ALP BLD-CCNC: 97 U/L (ref 38–126)
ALT SERPL-CCNC: 58 U/L (ref 11–66)
AMPHETAMINE+METHAMPHETAMINE URINE SCREEN: NEGATIVE
ANION GAP SERPL CALCULATED.3IONS-SCNC: 21 MEQ/L (ref 8–16)
AST SERPL-CCNC: 39 U/L (ref 5–40)
BACTERIA: NORMAL
BARBITURATE QUANTITATIVE URINE: NEGATIVE
BASOPHILS # BLD: 0.4 %
BASOPHILS ABSOLUTE: 0 THOU/MM3 (ref 0–0.1)
BENZODIAZEPINE QUANTITATIVE URINE: NEGATIVE
BILIRUB SERPL-MCNC: 0.6 MG/DL (ref 0.3–1.2)
BILIRUBIN URINE: NEGATIVE
BLOOD, URINE: NEGATIVE
BUN BLDV-MCNC: 14 MG/DL (ref 7–22)
CALCIUM SERPL-MCNC: 9.6 MG/DL (ref 8.5–10.5)
CANNABINOID QUANTITATIVE URINE: NEGATIVE
CASTS: NORMAL /LPF
CASTS: NORMAL /LPF
CHARACTER, URINE: CLEAR
CHLORIDE BLD-SCNC: 95 MEQ/L (ref 98–111)
CO2: 22 MEQ/L (ref 23–33)
COCAINE METABOLITE QUANTITATIVE URINE: NEGATIVE
COLOR: YELLOW
CREAT SERPL-MCNC: 1 MG/DL (ref 0.4–1.2)
CRYSTALS: NORMAL
EOSINOPHIL # BLD: 0.6 %
EOSINOPHILS ABSOLUTE: 0.1 THOU/MM3 (ref 0–0.4)
EPITHELIAL CELLS, UA: NORMAL /HPF
ETHYL ALCOHOL, SERUM: < 0.01 %
GFR SERPL CREATININE-BSD FRML MDRD: 77 ML/MIN/1.73M2
GLUCOSE BLD-MCNC: 187 MG/DL (ref 70–108)
GLUCOSE, URINE: NEGATIVE MG/DL
HCT VFR BLD CALC: 41.3 % (ref 42–52)
HEMOGLOBIN: 13.8 GM/DL (ref 14–18)
KETONES, URINE: NEGATIVE
LEUKOCYTE ESTERASE, URINE: NEGATIVE
LYMPHOCYTES # BLD: 17.2 %
LYMPHOCYTES ABSOLUTE: 2.1 THOU/MM3 (ref 1–4.8)
MCH RBC QN AUTO: 30.2 PG (ref 27–31)
MCHC RBC AUTO-ENTMCNC: 33.5 GM/DL (ref 33–37)
MCV RBC AUTO: 90.2 FL (ref 80–94)
MISCELLANEOUS LAB TEST RESULT: NORMAL
MONOCYTES # BLD: 6.9 %
MONOCYTES ABSOLUTE: 0.9 THOU/MM3 (ref 0.4–1.3)
NITRITE, URINE: NEGATIVE
NUCLEATED RED BLOOD CELLS: 0 /100 WBC
OPIATES, URINE: NEGATIVE
OSMOLALITY CALCULATION: 281.1 MOSMOL/KG (ref 275–300)
OXYCODONE: NEGATIVE
PDW BLD-RTO: 14 % (ref 11.5–14.5)
PH UA: 6
PHENCYCLIDINE QUANTITATIVE URINE: NEGATIVE
PLATELET # BLD: 224 THOU/MM3 (ref 130–400)
PMV BLD AUTO: 10.7 MCM (ref 7.4–10.4)
POTASSIUM SERPL-SCNC: 4 MEQ/L (ref 3.5–5.2)
PROTEIN UA: NEGATIVE MG/DL
RBC # BLD: 4.58 MILL/MM3 (ref 4.7–6.1)
RBC URINE: NORMAL /HPF
RENAL EPITHELIAL, UA: NORMAL
SALICYLATE, SERUM: < 0.3 MG/DL (ref 2–10)
SEG NEUTROPHILS: 74.9 %
SEGMENTED NEUTROPHILS ABSOLUTE COUNT: 9.3 THOU/MM3 (ref 1.8–7.7)
SODIUM BLD-SCNC: 138 MEQ/L (ref 135–145)
SPECIFIC GRAVITY UA: 1.01 (ref 1–1.03)
TOTAL PROTEIN: 6.9 G/DL (ref 6.1–8)
TSH SERPL DL<=0.05 MIU/L-ACNC: 4.92 UIU/ML (ref 0.4–4.2)
UROBILINOGEN, URINE: 0.2 EU/DL
WBC # BLD: 12.4 THOU/MM3 (ref 4.8–10.8)
WBC UA: NORMAL /HPF
YEAST: NORMAL

## 2018-01-21 PROCEDURE — G0480 DRUG TEST DEF 1-7 CLASSES: HCPCS

## 2018-01-21 PROCEDURE — 80307 DRUG TEST PRSMV CHEM ANLYZR: CPT

## 2018-01-21 PROCEDURE — 84443 ASSAY THYROID STIM HORMONE: CPT

## 2018-01-21 PROCEDURE — 81001 URINALYSIS AUTO W/SCOPE: CPT

## 2018-01-21 PROCEDURE — 99284 EMERGENCY DEPT VISIT MOD MDM: CPT

## 2018-01-21 PROCEDURE — 36415 COLL VENOUS BLD VENIPUNCTURE: CPT

## 2018-01-21 PROCEDURE — 85025 COMPLETE CBC W/AUTO DIFF WBC: CPT

## 2018-01-21 PROCEDURE — 80053 COMPREHEN METABOLIC PANEL: CPT

## 2018-01-21 PROCEDURE — 6370000000 HC RX 637 (ALT 250 FOR IP)

## 2018-01-21 RX ORDER — ACETAMINOPHEN 325 MG/1
650 TABLET ORAL ONCE
Status: COMPLETED | OUTPATIENT
Start: 2018-01-21 | End: 2018-01-21

## 2018-01-21 RX ORDER — ACETAMINOPHEN 325 MG/1
TABLET ORAL
Status: COMPLETED
Start: 2018-01-21 | End: 2018-01-21

## 2018-01-21 RX ADMIN — ACETAMINOPHEN 650 MG: 325 TABLET ORAL at 08:00

## 2018-01-21 ASSESSMENT — ENCOUNTER SYMPTOMS
EYE REDNESS: 0
SHORTNESS OF BREATH: 0
WHEEZING: 0
COUGH: 0
VOMITING: 0
DIARRHEA: 0
ABDOMINAL PAIN: 0
NAUSEA: 0
BACK PAIN: 0
RHINORRHEA: 0
SORE THROAT: 0
EYE DISCHARGE: 0

## 2018-01-21 ASSESSMENT — SLEEP AND FATIGUE QUESTIONNAIRES
AVERAGE NUMBER OF SLEEP HOURS: 4
DO YOU USE A SLEEP AID: NO
DO YOU HAVE DIFFICULTY SLEEPING: NO

## 2018-01-21 ASSESSMENT — PAIN SCALES - GENERAL: PAINLEVEL_OUTOF10: 7

## 2018-01-21 NOTE — ED NOTES
Pt resting in bed waiting on an up date campus police continue to monitor     Albin Saenz RN  01/21/18 0700

## 2018-01-21 NOTE — PROGRESS NOTES
crisis center about paperwork having the wrong date on it. They will be sending paperwork with the proper dates on it.       Insurance Precertification Authorization:

## 2018-01-24 ENCOUNTER — CARE COORDINATION (OUTPATIENT)
Dept: CARE COORDINATION | Age: 58
End: 2018-01-24

## 2018-01-29 ENCOUNTER — HOSPITAL ENCOUNTER (OUTPATIENT)
Age: 58
Discharge: HOME | End: 2018-01-29
Payer: MEDICAID

## 2018-01-29 DIAGNOSIS — D64.9 ANEMIA, UNSPECIFIED TYPE: ICD-10-CM

## 2018-01-29 DIAGNOSIS — I10 ESSENTIAL HYPERTENSION: ICD-10-CM

## 2018-01-29 DIAGNOSIS — E78.5 HYPERLIPIDEMIA, UNSPECIFIED HYPERLIPIDEMIA TYPE: ICD-10-CM

## 2018-01-29 DIAGNOSIS — R79.89 HIGH SERUM VITAMIN B12: ICD-10-CM

## 2018-01-29 DIAGNOSIS — E03.9 ACQUIRED HYPOTHYROIDISM: ICD-10-CM

## 2018-01-29 LAB
ALBUMIN SERPL-MCNC: 4.3 G/DL (ref 3.5–5.1)
ALP BLD-CCNC: 84 U/L (ref 38–126)
ALT SERPL-CCNC: 50 U/L (ref 11–66)
ANION GAP SERPL CALCULATED.3IONS-SCNC: 16 MEQ/L (ref 8–16)
AST SERPL-CCNC: 33 U/L (ref 5–40)
BASOPHILS # BLD: 0.7 %
BASOPHILS ABSOLUTE: 0 THOU/MM3 (ref 0–0.1)
BILIRUB SERPL-MCNC: 0.5 MG/DL (ref 0.3–1.2)
BUN BLDV-MCNC: 14 MG/DL (ref 7–22)
CALCIUM SERPL-MCNC: 9.2 MG/DL (ref 8.5–10.5)
CHLORIDE BLD-SCNC: 100 MEQ/L (ref 98–111)
CHOLESTEROL, TOTAL: 265 MG/DL (ref 100–199)
CO2: 26 MEQ/L (ref 23–33)
CREAT SERPL-MCNC: 0.9 MG/DL (ref 0.4–1.2)
EOSINOPHIL # BLD: 1.6 %
EOSINOPHILS ABSOLUTE: 0.1 THOU/MM3 (ref 0–0.4)
GFR SERPL CREATININE-BSD FRML MDRD: 87 ML/MIN/1.73M2
GLUCOSE BLD-MCNC: 140 MG/DL (ref 70–108)
HCT VFR BLD CALC: 41.1 % (ref 42–52)
HDLC SERPL-MCNC: 41 MG/DL
HEMOGLOBIN: 14 GM/DL (ref 14–18)
LDL CHOLESTEROL CALCULATED: 159 MG/DL
LYMPHOCYTES # BLD: 29.6 %
LYMPHOCYTES ABSOLUTE: 1.8 THOU/MM3 (ref 1–4.8)
MCH RBC QN AUTO: 30.5 PG (ref 27–31)
MCHC RBC AUTO-ENTMCNC: 34.1 GM/DL (ref 33–37)
MCV RBC AUTO: 89.5 FL (ref 80–94)
MONOCYTES # BLD: 8.1 %
MONOCYTES ABSOLUTE: 0.5 THOU/MM3 (ref 0.4–1.3)
NUCLEATED RED BLOOD CELLS: 0 /100 WBC
PDW BLD-RTO: 13.6 % (ref 11.5–14.5)
PLATELET # BLD: 253 THOU/MM3 (ref 130–400)
PMV BLD AUTO: 10.6 MCM (ref 7.4–10.4)
POTASSIUM SERPL-SCNC: 4 MEQ/L (ref 3.5–5.2)
RBC # BLD: 4.59 MILL/MM3 (ref 4.7–6.1)
SEG NEUTROPHILS: 60 %
SEGMENTED NEUTROPHILS ABSOLUTE COUNT: 3.7 THOU/MM3 (ref 1.8–7.7)
SODIUM BLD-SCNC: 142 MEQ/L (ref 135–145)
TOTAL PROTEIN: 6.9 G/DL (ref 6.1–8)
TRIGL SERPL-MCNC: 324 MG/DL (ref 0–199)
TSH SERPL DL<=0.05 MIU/L-ACNC: 2.77 UIU/ML (ref 0.4–4.2)
VITAMIN B-12: > 2000 PG/ML (ref 211–911)
WBC # BLD: 6.1 THOU/MM3 (ref 4.8–10.8)

## 2018-01-29 PROCEDURE — 36415 COLL VENOUS BLD VENIPUNCTURE: CPT

## 2018-01-29 PROCEDURE — 85025 COMPLETE CBC W/AUTO DIFF WBC: CPT

## 2018-01-29 PROCEDURE — 82607 VITAMIN B-12: CPT

## 2018-01-29 PROCEDURE — 84443 ASSAY THYROID STIM HORMONE: CPT

## 2018-01-29 PROCEDURE — 80053 COMPREHEN METABOLIC PANEL: CPT

## 2018-01-29 PROCEDURE — 80061 LIPID PANEL: CPT

## 2018-01-30 ENCOUNTER — OFFICE VISIT (OUTPATIENT)
Dept: INTERNAL MEDICINE CLINIC | Age: 58
End: 2018-01-30
Payer: MEDICAID

## 2018-01-30 VITALS
HEART RATE: 88 BPM | BODY MASS INDEX: 38.41 KG/M2 | HEIGHT: 66 IN | WEIGHT: 239 LBS | DIASTOLIC BLOOD PRESSURE: 88 MMHG | SYSTOLIC BLOOD PRESSURE: 138 MMHG

## 2018-01-30 DIAGNOSIS — R41.3 MEMORY DEFICIT: Primary | ICD-10-CM

## 2018-01-30 DIAGNOSIS — R20.0 FACIAL NUMBNESS: ICD-10-CM

## 2018-01-30 DIAGNOSIS — E78.5 HYPERLIPIDEMIA, UNSPECIFIED HYPERLIPIDEMIA TYPE: ICD-10-CM

## 2018-01-30 DIAGNOSIS — E03.9 ACQUIRED HYPOTHYROIDISM: ICD-10-CM

## 2018-01-30 DIAGNOSIS — K21.9 GASTROESOPHAGEAL REFLUX DISEASE, ESOPHAGITIS PRESENCE NOT SPECIFIED: ICD-10-CM

## 2018-01-30 DIAGNOSIS — F32.9 REACTIVE DEPRESSION: ICD-10-CM

## 2018-01-30 DIAGNOSIS — I10 ESSENTIAL HYPERTENSION: ICD-10-CM

## 2018-01-30 DIAGNOSIS — R10.9 ABDOMINAL CRAMPING: ICD-10-CM

## 2018-01-30 DIAGNOSIS — F41.9 ANXIETY: ICD-10-CM

## 2018-01-30 PROCEDURE — 3017F COLORECTAL CA SCREEN DOC REV: CPT | Performed by: INTERNAL MEDICINE

## 2018-01-30 PROCEDURE — G8417 CALC BMI ABV UP PARAM F/U: HCPCS | Performed by: INTERNAL MEDICINE

## 2018-01-30 PROCEDURE — 1036F TOBACCO NON-USER: CPT | Performed by: INTERNAL MEDICINE

## 2018-01-30 PROCEDURE — 99214 OFFICE O/P EST MOD 30 MIN: CPT | Performed by: INTERNAL MEDICINE

## 2018-01-30 PROCEDURE — G8484 FLU IMMUNIZE NO ADMIN: HCPCS | Performed by: INTERNAL MEDICINE

## 2018-01-30 PROCEDURE — G8427 DOCREV CUR MEDS BY ELIG CLIN: HCPCS | Performed by: INTERNAL MEDICINE

## 2018-01-30 RX ORDER — TRAZODONE HYDROCHLORIDE 50 MG/1
50 TABLET ORAL NIGHTLY PRN
Qty: 30 TABLET | Refills: 1 | Status: SHIPPED | OUTPATIENT
Start: 2018-01-30 | End: 2018-04-16

## 2018-01-30 RX ORDER — FLUOXETINE HYDROCHLORIDE 20 MG/1
20 CAPSULE ORAL DAILY
Qty: 30 CAPSULE | Refills: 2 | Status: SHIPPED | OUTPATIENT
Start: 2018-01-30 | End: 2018-04-16 | Stop reason: ALTCHOICE

## 2018-01-30 RX ORDER — EZETIMIBE 10 MG/1
10 TABLET ORAL DAILY
Qty: 30 TABLET | Refills: 3 | Status: SHIPPED | OUTPATIENT
Start: 2018-01-30 | End: 2018-08-30 | Stop reason: SDUPTHER

## 2018-01-30 RX ORDER — LOSARTAN POTASSIUM 100 MG/1
100 TABLET ORAL DAILY
Qty: 30 TABLET | Refills: 5 | Status: SHIPPED | OUTPATIENT
Start: 2018-01-30 | End: 2018-05-08 | Stop reason: SDUPTHER

## 2018-01-30 RX ORDER — METOPROLOL SUCCINATE 100 MG/1
100 TABLET, EXTENDED RELEASE ORAL DAILY
Qty: 30 TABLET | Refills: 5 | Status: SHIPPED | OUTPATIENT
Start: 2018-01-30 | End: 2018-05-08 | Stop reason: SDUPTHER

## 2018-01-30 RX ORDER — OMEPRAZOLE 40 MG/1
CAPSULE, DELAYED RELEASE ORAL
Qty: 30 CAPSULE | Refills: 5 | Status: SHIPPED | OUTPATIENT
Start: 2018-01-30 | End: 2018-05-08 | Stop reason: SDUPTHER

## 2018-01-30 RX ORDER — MELOXICAM 15 MG/1
15 TABLET ORAL DAILY
COMMUNITY
End: 2018-04-16

## 2018-01-30 RX ORDER — ATORVASTATIN CALCIUM 80 MG/1
80 TABLET, FILM COATED ORAL DAILY
Qty: 30 TABLET | Refills: 5 | Status: SHIPPED | OUTPATIENT
Start: 2018-01-30 | End: 2018-05-08 | Stop reason: SDUPTHER

## 2018-01-30 RX ORDER — LEVOTHYROXINE SODIUM 0.05 MG/1
50 TABLET ORAL DAILY
Qty: 30 TABLET | Refills: 5 | Status: SHIPPED | OUTPATIENT
Start: 2018-01-30 | End: 2018-05-08 | Stop reason: SDUPTHER

## 2018-01-30 RX ORDER — DICYCLOMINE HYDROCHLORIDE 10 MG/1
10 CAPSULE ORAL 4 TIMES DAILY
Qty: 120 CAPSULE | Refills: 3 | Status: SHIPPED | OUTPATIENT
Start: 2018-01-30 | End: 2018-05-08 | Stop reason: SDUPTHER

## 2018-01-30 ASSESSMENT — PATIENT HEALTH QUESTIONNAIRE - PHQ9
2. FEELING DOWN, DEPRESSED OR HOPELESS: 1
SUM OF ALL RESPONSES TO PHQ QUESTIONS 1-9: 2
SUM OF ALL RESPONSES TO PHQ9 QUESTIONS 1 & 2: 2
1. LITTLE INTEREST OR PLEASURE IN DOING THINGS: 1

## 2018-01-30 NOTE — PROGRESS NOTES
directed now. So BP is controlled on metoprolol and losartan. Hypothyroidism - They increased thyroid med in hospital to 50 mcg need to repeat lab at end of 8/2017. TSH 2.8 8/29/17, will continue on this dose. Patient was admitted to [address] [place] [address] from 7/14/17 to 7/17/17 for recurrent nausea/vomiting and abdominal pain, CRISTIAN - Cr 2.0->1.3, dehydration, diverticulosis, HTN, hypothyroidism.     He was hydrated with IVF, and Cr improved from 2.0 ->1.3. Renal ultrasound with cyst.  He had CT without contrast - unremarkable but limited as without contrast.  To have gallbladder ultrasound as outpatient as he was still very tender in RUQ. He had EGD and Colonoscopy with bx of polyp - tubular adenoma per path report. No follow with Dr. Alexia Brown scheduled. He is still having nausea, and abdominal pain - not worsening. Added Zofran and Bentyl. His Synthroid was increased in hospital to 50 mcg - will need TSH in 6 weeks. Will need to give order for more Synthroid and TSH today. The gallbladder ultrasound negative. He took Zofran, Bentyl and better for a day then vomited the next day. He stopped all medication except levothyroxine, Zofran, Bentyl, and omeprazole and BP is very elevated today. He is continuing to vomiting 1-3x a day. Or he has a GERD sensation and not actually vomiting. It can happen at any time - even with water. Question if this is severe GERD vs outlet obstruction vs dysmotility/gastroparesis. Would like to increase Omeprazole to BID, and refer to GI - to see Jonathan Alcantara Aug 11. He had appt today but cancelled as had appt with Dr. Arin Woodall. Question if this could be due to endocrine tumor with sweating and then N/V. It occurs with increased abdominal pressure. He is trying peptobismal and helping some. Since last visit, he did see Jonathan Alcantara and has been scheduled for EGD 9/2017.   He said the nausea and vomiting is better - last vomiting episode was yesterday at [facility] when he last vomited after eating in parking lot. He did just eat sausage and gravy. He is taking Omeprazole BID and Zofran 1-2x a day and Bentyl prn bloating. Depression - positive screen. Dr. Dl Sexton added Trazodone and Hydroxyzine and suggested increasing Prozac to 40 mg when he was consulted while patient was at [facility]. He is taking Trazadone, and Hydroxyzine inconsistently, but is taking Prozac 20 daily. Still waiting to see if he sees psychiatry - reportedly he was referred to [facility] 10/2016. To my knowledge he is not seeing [facility]. He is taking Prozac daily. Rarely takes Trazodone and Hyrdroxyzine. He does verbalized anger toward sister and self but no plan to harm self or others. I will order counseling again and hope they will agree with psychiatry referral at [facility]. Since last visit saw Dr. Narayan Tan regarding symptoms and he did blood work. Asked patient if he has been taking Lipitor 80 mg, and Lopid every day for the last couple months - his LDL and triglycerides are still very high. He states he has been taking medication faithfully. He eats out a lot and poor choices. Danielle Rosales was relatively controlled in 2014 on just Lipitor 40 mg, then high trig and LDL in 6/2016 - so increased Lipitor to 80 mg and added Lopid in 10/2016. Suggested he eat healthier. Suggested he see dietician. Eating out (everything he eats is fried) and has candy/cookies at home. Suggest dietician make suggestions very simple for the patient to understand. He is not wearing CPAP as not helping sweating episodes. Again suggested that he get back to using this. B12 high - see if [facility] ordered B12 binding protein. I don't believe it was ordered or able to be ordered. Suspected TIA - Head CT and MRI negative for acute bleed or CVA. Incidentally noted forehead lump is a lipoma. He had carotid doppler which was unremarkable. He is to follow up with Dr. Gio Camejo.   Face still muscle pain or muscular weakness - except for chronic right leg/ankle pain, bilateral knee pain, using brace on left knee - better that not working, positive proximal weakness to get out of chair  Neurological ROS: negative for - seizures, positive tingling of shoulders and face, with episodes  Dermatological ROS: negative for - rash or skin lesion changes    Blood pressure 138/88, pulse 88, height 5' 5.98\" (1.676 m), weight 239 lb (108.4 kg). Physical Examination: General appearance -alert, well appearing, and in no distress  Mental status - alert, oriented to person, place, and time  Neck - supple, no significant adenopathy  Chest - clear to auscultation, no wheezes, rales or rhonchi, symmetric air entry  Heart - normal rate, regular rhythm, normal S1, S2, no murmurs, rubs, clicks or gallops  Abdomen -soft, nontender, nondistended  Neurological - alert, oriented  Extremities - peripheral pulses normal, trace ankle edema, no clubbing or cyanosis   Skin - warm and dry, soft mobile lesion above left eyebrow - no redness, warmth, tenderness, soft non-mobile lesion on abdomen mid left side, feel like in dermal layer of skin. No redness, warmth, tenderness.     Diagnostic data:  Results for orders placed or performed during the hospital encounter of 01/29/18   Vitamin B12   Result Value Ref Range    Vitamin B-12 > 2000 (H) 211 - 911 pg/mL   CBC Auto Differential   Result Value Ref Range    WBC 6.1 4.8 - 10.8 thou/mm3    RBC 4.59 (L) 4.70 - 6.10 mill/mm3    Hemoglobin 14.0 14.0 - 18.0 gm/dl    Hematocrit 41.1 (L) 42.0 - 52.0 %    MCV 89.5 80.0 - 94.0 fL    MCH 30.5 27.0 - 31.0 pg    MCHC 34.1 33.0 - 37.0 gm/dl    RDW 13.6 11.5 - 14.5 %    Platelets 570 503 - 131 thou/mm3    MPV 10.6 (H) 7.4 - 10.4 mcm    Seg Neutrophils 60.0 %    Lymphocytes 29.6 %    Monocytes 8.1 %    Eosinophils 1.6 %    Basophils 0.7 %    nRBC 0 /100 wbc    Segs Absolute 3.7 1.8 - 7.7 thou/mm3    Lymphocytes # 1.8 1.0 - 4.8 thou/mm3    Monocytes # 0.5 0.4 - 1.3 thou/mm3    Eosinophils # 0.1 0.0 - 0.4 thou/mm3    Basophils # 0.0 0.0 - 0.1 thou/mm3   Comprehensive Metabolic Panel   Result Value Ref Range    Glucose 140 (H) 70 - 108 mg/dL    CREATININE 0.9 0.4 - 1.2 mg/dL    BUN 14 7 - 22 mg/dL    Sodium 142 135 - 145 meq/L    Potassium 4.0 3.5 - 5.2 meq/L    Chloride 100 98 - 111 meq/L    CO2 26 23 - 33 meq/L    Calcium 9.2 8.5 - 10.5 mg/dL    AST 33 5 - 40 U/L    Alkaline Phosphatase 84 38 - 126 U/L    Total Protein 6.9 6.1 - 8.0 g/dL    Alb 4.3 3.5 - 5.1 g/dL    Total Bilirubin 0.5 0.3 - 1.2 mg/dL    ALT 50 11 - 66 U/L   TSH with Reflex   Result Value Ref Range    TSH 2.770 0.400 - 4.20 uIU/mL   Lipid Panel   Result Value Ref Range    Cholesterol, Total 265 (H) 100 - 199 mg/dL    Triglycerides 324 (H) 0 - 199 mg/dL    HDL 41 mg/dL    LDL Calculated 159 mg/dL   Anion Gap   Result Value Ref Range    Anion Gap 16.0 8.0 - 16.0 meq/L   Glomerular Filtration Rate, Estimated   Result Value Ref Range    Est, Glom Filt Rate 87 (A) ml/min/1.73m2     Assessment and Plan:     1. Memory deficit  MRI BRAIN WO CONTRAST   2. Facial numbness  MRI BRAIN WO CONTRAST   3. Hyperlipidemia, unspecified hyperlipidemia type  atorvastatin (LIPITOR) 80 MG tablet    ezetimibe (ZETIA) 10 MG tablet    LDL Cholesterol, Direct    Triglyceride    ALT   4. Reactive depression  FLUoxetine (PROZAC) 20 MG capsule   5. Anxiety  traZODone (DESYREL) 50 MG tablet   6. Acquired hypothyroidism  levothyroxine (SYNTHROID) 50 MCG tablet   7. Gastroesophageal reflux disease, esophagitis presence not specified  omeprazole (PRILOSEC) 40 MG delayed release capsule   8. Abdominal cramping  dicyclomine (BENTYL) 10 MG capsule   9. Essential hypertension  metoprolol succinate (TOPROL XL) 100 MG extended release tablet    losartan (COZAAR) 100 MG tablet     Memory deficit/facial numbness - intermittent - check MRI brain. Hyperlipidemia - LDL elevated - add Zetia to Lipitor 80 mg daily.   Triglycerides/ALT acceptable on Lipitor 80 mg daily. Reactive depression/anxiety - likely due to this chronic illness, he is frustrated at not getting answers, unable to work and worried about finances. He has an explosive personality and is getting more agitated. Loss of support system worsening issues of self worth. Will continue Prozac, and now getting set up with [facility]. Hypothyroid -he is taking levothyroxine first thing in am on empty stomach without any other medication. TSH normal 1/2018. Abdominal cramping/GERD/nasuea - improving, continue PPI BID, Zofran prn, take Bentyl more regularly and follow up with GI testing - continue to follow with Eimlee Franks. Hypertension/elevated catecholamines/diaphoresis/flushing - suspect pheo but negative imaging x2. Defer to Dr. Rebecca Odonnell - he referred to Dermatology. BP is controlled, continue metoprolol and losartan. Elevated B12 - still extremely elevated but not on any supplements. CVA - reminded him to continue Aspirin. No CVA evidence on CT - check MRI. Right leg pain - continue Mobic but only once a day prn. If pain worsens - recommend he go back to ortho. Obesity - BMI 38.59, continue to monitor. May be losing weight as Arlet not home and may not be eating well. Skin lesions - nodule above left eyebrow (lipoma) and nodule on abdomen -has not seen Dermatology. Follow up in 12 weeks for follow up of brain MRI, labs for Zetia addition, monitor weight loss. Allergies: Review of patient's allergies indicates no known allergies. Dr. Omer Ta    [facility] [address]  [facility] HUFFMAN II.[place]ARG[place], [address]    Phone number: [phone]  Fax number [phone]

## 2018-02-08 ENCOUNTER — CARE COORDINATION (OUTPATIENT)
Dept: CARE COORDINATION | Age: 58
End: 2018-02-08

## 2018-02-09 ENCOUNTER — HOSPITAL ENCOUNTER (OUTPATIENT)
Dept: MRI IMAGING | Age: 58
Discharge: HOME | End: 2018-02-09
Payer: MEDICAID

## 2018-02-09 DIAGNOSIS — R20.0 FACIAL NUMBNESS: ICD-10-CM

## 2018-02-09 DIAGNOSIS — R41.3 MEMORY DEFICIT: ICD-10-CM

## 2018-02-09 PROCEDURE — 70551 MRI BRAIN STEM W/O DYE: CPT

## 2018-02-09 RX ORDER — RISPERIDONE 0.25 MG/1
0.25 TABLET, FILM COATED ORAL 2 TIMES DAILY
COMMUNITY
End: 2020-03-17

## 2018-02-09 NOTE — CARE COORDINATION
Received call back from Kelby Thompson with [employer]. She states that pt was seen by NP at [facility] and was prescribed 2 medications. She states that one medication was risperdal, but unsure what the second med was. Contacted Bianca this AM.  She states that Celina Cui is now seeing a counselor. He had first counseling session on 1/31 with Rose Aguilera. Rose Aguilera is an interim counselor that Celina Cui will see until they can get him in to a long term counselor. This is taking place b/c [facility] wanted him to start seeing a counselor ASAP and long term counselor did not have any immediate openings. She states that he is scheduled for another session on 2/22 which will be with interim counselor and following that session he will then be aligned with a long term counselor. Donna states that she did receive a call from Celina Cui yesterday. She states that he was very frustrated regarding one of the medications that had been prescribed b/c he was not able to get it from pharmacy. She states that he told her that he was supposed to check with his physician and he was confused on who he should call. Informed Bianca that I would contact pharmacy to see what the issue is. Spoke with [facility]. Informed that Depakote ER was ordered and insurance will not cover the ER form, but will cover DR. States that if NP wants ER they will have to then get a PA. States that correspondence was faxed back to [facility] regarding this, but they have not heard anything back. She states that she will f/u with [facility] today to try and get it resolved. Contacted Bianca and updated her regarding depakote. She will update Duane. Donna also states that Duane's roommate is now at [facility] so he has been spending a lot of time with her there which is most likely why I have not been able to reach him.   Cell number provided by Donna.

## 2018-02-12 ENCOUNTER — CARE COORDINATION (OUTPATIENT)
Dept: CARE COORDINATION | Age: 58
End: 2018-02-12

## 2018-02-13 ENCOUNTER — TELEPHONE (OUTPATIENT)
Dept: INTERNAL MEDICINE CLINIC | Age: 58
End: 2018-02-13

## 2018-02-26 ENCOUNTER — TELEPHONE (OUTPATIENT)
Dept: INTERNAL MEDICINE CLINIC | Age: 58
End: 2018-02-26

## 2018-03-01 ENCOUNTER — TELEPHONE (OUTPATIENT)
Dept: INTERNAL MEDICINE CLINIC | Age: 58
End: 2018-03-01

## 2018-03-01 ENCOUNTER — CARE COORDINATION (OUTPATIENT)
Dept: CARE COORDINATION | Age: 58
End: 2018-03-01

## 2018-03-01 NOTE — TELEPHONE ENCOUNTER
Spoke to pt and he is wondering if it would be better for his condition to go to a NH. He states he can't think, he has too much to deal with. He states he does not feel he is capable of taking care of himself. Petrona, nurse care coordinator, will contact his  and see if there is anything she can do for him.

## 2018-03-01 NOTE — CARE COORDINATION
Verbalizes understanding. Pt states that he gets frustrated easily and sometimes feels like no one is helping. Reminded pt that he has a lot of services in place that were not previously. Encouraged him to continue f/u with [employer] and continue working with [employer] understanding. Denies thoughts of harming self. Stressed importance of contacting [facility] if having any thoughts of harming self or others. States \"well they would just want to committ me. \"  Pt has called them in the past and talked through his thoughts. Reminded him of this. Verbalizes understanding.

## 2018-03-23 ENCOUNTER — CARE COORDINATION (OUTPATIENT)
Dept: CARE COORDINATION | Age: 58
End: 2018-03-23

## 2018-04-04 DIAGNOSIS — R41.3 MEMORY DEFICIT: Primary | ICD-10-CM

## 2018-04-09 ENCOUNTER — CARE COORDINATION (OUTPATIENT)
Dept: CARE COORDINATION | Age: 58
End: 2018-04-09

## 2018-04-11 ENCOUNTER — CARE COORDINATION (OUTPATIENT)
Dept: CARE COORDINATION | Age: 58
End: 2018-04-11

## 2018-04-11 RX ORDER — DIVALPROEX SODIUM 250 MG/1
TABLET, DELAYED RELEASE ORAL
Refills: 1 | COMMUNITY
Start: 2018-03-29 | End: 2020-03-17

## 2018-04-11 RX ORDER — VORTIOXETINE 10 MG/1
TABLET, FILM COATED ORAL
Refills: 1 | COMMUNITY
Start: 2018-04-02 | End: 2019-05-13 | Stop reason: SDUPTHER

## 2018-04-26 ENCOUNTER — CARE COORDINATION (OUTPATIENT)
Dept: CARE COORDINATION | Age: 58
End: 2018-04-26

## 2018-04-30 ENCOUNTER — CARE COORDINATION (OUTPATIENT)
Dept: CARE COORDINATION | Age: 58
End: 2018-04-30

## 2018-05-08 ENCOUNTER — OFFICE VISIT (OUTPATIENT)
Dept: INTERNAL MEDICINE CLINIC | Age: 58
End: 2018-05-08
Payer: MEDICAID

## 2018-05-08 ENCOUNTER — CARE COORDINATION (OUTPATIENT)
Dept: CARE COORDINATION | Age: 58
End: 2018-05-08

## 2018-05-08 ENCOUNTER — HOSPITAL ENCOUNTER (OUTPATIENT)
Age: 58
Discharge: HOME | End: 2018-05-08
Payer: MEDICAID

## 2018-05-08 VITALS
HEIGHT: 66 IN | BODY MASS INDEX: 38.41 KG/M2 | WEIGHT: 239 LBS | DIASTOLIC BLOOD PRESSURE: 90 MMHG | HEART RATE: 84 BPM | SYSTOLIC BLOOD PRESSURE: 136 MMHG

## 2018-05-08 DIAGNOSIS — E03.9 ACQUIRED HYPOTHYROIDISM: ICD-10-CM

## 2018-05-08 DIAGNOSIS — R10.9 ABDOMINAL CRAMPING: ICD-10-CM

## 2018-05-08 DIAGNOSIS — R73.9 HYPERGLYCEMIA: ICD-10-CM

## 2018-05-08 DIAGNOSIS — E78.5 HYPERLIPIDEMIA, UNSPECIFIED HYPERLIPIDEMIA TYPE: ICD-10-CM

## 2018-05-08 DIAGNOSIS — F32.9 REACTIVE DEPRESSION: ICD-10-CM

## 2018-05-08 DIAGNOSIS — R41.3 MEMORY DEFICIT: Primary | ICD-10-CM

## 2018-05-08 DIAGNOSIS — I10 ESSENTIAL HYPERTENSION: ICD-10-CM

## 2018-05-08 DIAGNOSIS — K21.9 GASTROESOPHAGEAL REFLUX DISEASE, ESOPHAGITIS PRESENCE NOT SPECIFIED: ICD-10-CM

## 2018-05-08 LAB
ALT SERPL-CCNC: 21 U/L (ref 11–66)
AVERAGE GLUCOSE: 147 MG/DL (ref 70–126)
HBA1C MFR BLD: 6.9 % (ref 4.4–6.4)
LDL CHOLESTEROL DIRECT: 53.38 MG/DL
TRIGL SERPL-MCNC: 164 MG/DL (ref 0–199)

## 2018-05-08 PROCEDURE — 1036F TOBACCO NON-USER: CPT | Performed by: INTERNAL MEDICINE

## 2018-05-08 PROCEDURE — 84478 ASSAY OF TRIGLYCERIDES: CPT

## 2018-05-08 PROCEDURE — 99214 OFFICE O/P EST MOD 30 MIN: CPT | Performed by: INTERNAL MEDICINE

## 2018-05-08 PROCEDURE — G8427 DOCREV CUR MEDS BY ELIG CLIN: HCPCS | Performed by: INTERNAL MEDICINE

## 2018-05-08 PROCEDURE — 36415 COLL VENOUS BLD VENIPUNCTURE: CPT

## 2018-05-08 PROCEDURE — 83036 HEMOGLOBIN GLYCOSYLATED A1C: CPT

## 2018-05-08 PROCEDURE — 3017F COLORECTAL CA SCREEN DOC REV: CPT | Performed by: INTERNAL MEDICINE

## 2018-05-08 PROCEDURE — 84460 ALANINE AMINO (ALT) (SGPT): CPT

## 2018-05-08 PROCEDURE — 83721 ASSAY OF BLOOD LIPOPROTEIN: CPT

## 2018-05-08 PROCEDURE — G8417 CALC BMI ABV UP PARAM F/U: HCPCS | Performed by: INTERNAL MEDICINE

## 2018-05-08 RX ORDER — ATORVASTATIN CALCIUM 80 MG/1
80 TABLET, FILM COATED ORAL DAILY
Qty: 30 TABLET | Refills: 5 | Status: SHIPPED | OUTPATIENT
Start: 2018-05-08 | End: 2018-09-27 | Stop reason: SDUPTHER

## 2018-05-08 RX ORDER — LEVOTHYROXINE SODIUM 0.05 MG/1
50 TABLET ORAL DAILY
Qty: 30 TABLET | Refills: 5 | Status: SHIPPED | OUTPATIENT
Start: 2018-05-08 | End: 2018-09-27 | Stop reason: SDUPTHER

## 2018-05-08 RX ORDER — FLUOXETINE HYDROCHLORIDE 20 MG/1
20 CAPSULE ORAL DAILY
COMMUNITY
End: 2019-01-23 | Stop reason: ALTCHOICE

## 2018-05-08 RX ORDER — DICYCLOMINE HYDROCHLORIDE 10 MG/1
10 CAPSULE ORAL 4 TIMES DAILY
Qty: 120 CAPSULE | Refills: 3 | Status: SHIPPED | OUTPATIENT
Start: 2018-05-08 | End: 2019-05-02

## 2018-05-08 RX ORDER — METOPROLOL SUCCINATE 100 MG/1
100 TABLET, EXTENDED RELEASE ORAL DAILY
Qty: 30 TABLET | Refills: 5 | Status: SHIPPED | OUTPATIENT
Start: 2018-05-08 | End: 2018-09-27 | Stop reason: SDUPTHER

## 2018-05-08 RX ORDER — MELOXICAM 15 MG/1
15 TABLET ORAL DAILY
COMMUNITY
End: 2018-08-30 | Stop reason: SDUPTHER

## 2018-05-08 RX ORDER — LOSARTAN POTASSIUM 100 MG/1
100 TABLET ORAL DAILY
Qty: 30 TABLET | Refills: 5 | Status: SHIPPED | OUTPATIENT
Start: 2018-05-08 | End: 2018-09-27 | Stop reason: SDUPTHER

## 2018-05-08 RX ORDER — OMEPRAZOLE 40 MG/1
CAPSULE, DELAYED RELEASE ORAL
Qty: 30 CAPSULE | Refills: 5 | Status: SHIPPED | OUTPATIENT
Start: 2018-05-08 | End: 2019-02-26 | Stop reason: SDUPTHER

## 2018-05-08 ASSESSMENT — PATIENT HEALTH QUESTIONNAIRE - PHQ9: SUM OF ALL RESPONSES TO PHQ QUESTIONS 1-9: 2

## 2018-05-08 NOTE — PROGRESS NOTES
chest.  Monitor. Depression - seeing [facility]. Defer medication to them. In past he has voiced comments like he wants to end it all but no plan. Frustrated and alone. Hope that with help from [facility] and Neurology can get additional help. N/V better - not vomiting - having reflux. Continue medication and following with ANNETTE. Discussed at previous visit:  He is more confused intermittently. He reports that recently his sister came over to his trailer and he argued with her. She wanted him to sign a form and he refused. He went to son's grave site to think - helps him usually. Then he wanted to go see his mom as she always comforted him. He drove to the town his mother lived in but instead of going to her grave site - he automatically drove to her old house. He walked in to the house and luckily the people who live there talked to him nicely. He realized then that his parents were dead. He went to [facility] and was assessed. He is trying to get someone to talk to. He is currently in process of psychiatrist and psychologist through [facility]. With his memory issues - suggested MRI of brain. Since last visit he had EGD with Dr. Dee Monroe - gastritis noted. They want PPI BID. He is continuing to have vomiting 3x week. It can happen anytime of day, before or after eating. He states it usually starts with a feeling of getting hot and then vomits. May be just water and pills. No blood in emeses. GI ordered another RUQ ultrasound and gallbladder normal.  GI gave Zofran 8 mg tabs but nausea/vomiting comes on so quickly he doesn't have time to take it. He is also having crampy pain and bloating - he is not using Bentyl much - suggested taking 2-4 x a day on a scheduled basis. He is to see GI again in 12/2107. Hypertension - He is taking his medication as directed now. So BP is controlled on metoprolol and losartan.       Hypothyroidism - They increased thyroid med in hospital to 50 mcg need to repeat lab at end of 8/2017. TSH 2.8 8/29/17, and 1/29/2018 will continue on this dose. Patient was admitted to [address] [place] [address] from 7/14/17 to 7/17/17 for recurrent nausea/vomiting and abdominal pain, CRISTIAN - Cr 2.0->1.3, dehydration, diverticulosis, HTN, hypothyroidism.     He was hydrated with IVF, and Cr improved from 2.0 ->1.3. Renal ultrasound with cyst.  He had CT without contrast - unremarkable but limited as without contrast.  To have gallbladder ultrasound as outpatient as he was still very tender in RUQ. He had EGD and Colonoscopy with bx of polyp - tubular adenoma per path report. No follow with Dr. Darlene Harris scheduled. He is still having nausea, and abdominal pain - not worsening. Added Zofran and Bentyl. His Synthroid was increased in hospital to 50 mcg - will need TSH in 6 weeks. Will need to give order for more Synthroid and TSH today. The gallbladder ultrasound negative. He took Zofran, Bentyl and better for a day then vomited the next day. He stopped all medication except levothyroxine, Zofran, Bentyl, and omeprazole and BP is very elevated today. He is continuing to vomiting 1-3x a day. Or he has a GERD sensation and not actually vomiting. It can happen at any time - even with water. Question if this is severe GERD vs outlet obstruction vs dysmotility/gastroparesis. Would like to increase Omeprazole to BID, and refer to GI - to see Donavon Denis Aug 11. He had appt today but cancelled as had appt with Dr. Abhishek Garcia. Question if this could be due to endocrine tumor with sweating and then N/V. It occurs with increased abdominal pressure. He is trying peptobismal and helping some. Since last visit, he did see Donavon Denis and has been scheduled for EGD 9/2017. He said the nausea and vomiting is better - last vomiting episode was yesterday at [facility] when he last vomited after eating in parking lot. He did just eat sausage and gravy.   He

## 2018-05-11 ENCOUNTER — TELEPHONE (OUTPATIENT)
Dept: INTERNAL MEDICINE CLINIC | Age: 58
End: 2018-05-11

## 2018-05-15 ENCOUNTER — TELEPHONE (OUTPATIENT)
Dept: INTERNAL MEDICINE CLINIC | Age: 58
End: 2018-05-15

## 2018-05-15 DIAGNOSIS — E11.9 NEW ONSET TYPE 2 DIABETES MELLITUS (HCC): Primary | ICD-10-CM

## 2018-05-16 ENCOUNTER — CARE COORDINATION (OUTPATIENT)
Dept: CARE COORDINATION | Age: 58
End: 2018-05-16

## 2018-05-18 ENCOUNTER — INITIAL CONSULT (OUTPATIENT)
Dept: NEUROLOGY | Age: 58
End: 2018-05-18
Payer: MEDICAID

## 2018-05-18 VITALS
HEIGHT: 66 IN | DIASTOLIC BLOOD PRESSURE: 78 MMHG | HEART RATE: 90 BPM | BODY MASS INDEX: 38.41 KG/M2 | SYSTOLIC BLOOD PRESSURE: 130 MMHG | WEIGHT: 238.98 LBS

## 2018-05-18 DIAGNOSIS — R41.3 MEMORY PROBLEM: Primary | ICD-10-CM

## 2018-05-18 PROCEDURE — G8417 CALC BMI ABV UP PARAM F/U: HCPCS | Performed by: PSYCHIATRY & NEUROLOGY

## 2018-05-18 PROCEDURE — 99205 OFFICE O/P NEW HI 60 MIN: CPT | Performed by: PSYCHIATRY & NEUROLOGY

## 2018-05-18 PROCEDURE — G8427 DOCREV CUR MEDS BY ELIG CLIN: HCPCS | Performed by: PSYCHIATRY & NEUROLOGY

## 2018-05-18 PROCEDURE — 3017F COLORECTAL CA SCREEN DOC REV: CPT | Performed by: PSYCHIATRY & NEUROLOGY

## 2018-05-18 PROCEDURE — 1036F TOBACCO NON-USER: CPT | Performed by: PSYCHIATRY & NEUROLOGY

## 2018-05-18 RX ORDER — DONEPEZIL HYDROCHLORIDE 5 MG/1
5 TABLET, FILM COATED ORAL NIGHTLY
Qty: 30 TABLET | Refills: 3 | Status: SHIPPED | OUTPATIENT
Start: 2018-05-18 | End: 2018-10-11 | Stop reason: SDUPTHER

## 2018-05-23 ENCOUNTER — TELEPHONE (OUTPATIENT)
Dept: INTERNAL MEDICINE CLINIC | Age: 58
End: 2018-05-23

## 2018-06-01 ENCOUNTER — CARE COORDINATION (OUTPATIENT)
Dept: CARE COORDINATION | Age: 58
End: 2018-06-01

## 2018-06-01 ASSESSMENT — ENCOUNTER SYMPTOMS: DYSPNEA ASSOCIATED WITH: EXERTION

## 2018-06-05 ENCOUNTER — OFFICE VISIT (OUTPATIENT)
Dept: INTERNAL MEDICINE CLINIC | Age: 58
End: 2018-06-05
Payer: MEDICAID

## 2018-06-05 VITALS
HEART RATE: 68 BPM | BODY MASS INDEX: 38.57 KG/M2 | SYSTOLIC BLOOD PRESSURE: 124 MMHG | DIASTOLIC BLOOD PRESSURE: 72 MMHG | WEIGHT: 240 LBS | HEIGHT: 66 IN

## 2018-06-05 DIAGNOSIS — I10 ESSENTIAL HYPERTENSION: ICD-10-CM

## 2018-06-05 DIAGNOSIS — E11.9 TYPE 2 DIABETES MELLITUS WITHOUT COMPLICATION, WITHOUT LONG-TERM CURRENT USE OF INSULIN (HCC): ICD-10-CM

## 2018-06-05 DIAGNOSIS — R07.89 CHEST TIGHTNESS OR PRESSURE: ICD-10-CM

## 2018-06-05 DIAGNOSIS — R07.89 ATYPICAL CHEST PAIN: Primary | ICD-10-CM

## 2018-06-05 DIAGNOSIS — E03.9 ACQUIRED HYPOTHYROIDISM: ICD-10-CM

## 2018-06-05 DIAGNOSIS — E78.2 MIXED HYPERLIPIDEMIA: ICD-10-CM

## 2018-06-05 PROCEDURE — 99214 OFFICE O/P EST MOD 30 MIN: CPT | Performed by: INTERNAL MEDICINE

## 2018-06-05 PROCEDURE — 1036F TOBACCO NON-USER: CPT | Performed by: INTERNAL MEDICINE

## 2018-06-05 PROCEDURE — G8417 CALC BMI ABV UP PARAM F/U: HCPCS | Performed by: INTERNAL MEDICINE

## 2018-06-05 PROCEDURE — 3044F HG A1C LEVEL LT 7.0%: CPT | Performed by: INTERNAL MEDICINE

## 2018-06-05 PROCEDURE — G8427 DOCREV CUR MEDS BY ELIG CLIN: HCPCS | Performed by: INTERNAL MEDICINE

## 2018-06-05 PROCEDURE — 3017F COLORECTAL CA SCREEN DOC REV: CPT | Performed by: INTERNAL MEDICINE

## 2018-06-05 PROCEDURE — 2022F DILAT RTA XM EVC RTNOPTHY: CPT | Performed by: INTERNAL MEDICINE

## 2018-06-05 PROCEDURE — 93000 ELECTROCARDIOGRAM COMPLETE: CPT | Performed by: INTERNAL MEDICINE

## 2018-06-05 RX ORDER — PREDNISONE 10 MG/1
TABLET ORAL
Qty: 12 TABLET | Refills: 0 | Status: SHIPPED | OUTPATIENT
Start: 2018-06-05 | End: 2018-09-27 | Stop reason: ALTCHOICE

## 2018-06-06 ENCOUNTER — CARE COORDINATION (OUTPATIENT)
Dept: CARE COORDINATION | Age: 58
End: 2018-06-06

## 2018-06-12 ENCOUNTER — CARE COORDINATION (OUTPATIENT)
Dept: CARE COORDINATION | Age: 58
End: 2018-06-12

## 2018-06-13 ENCOUNTER — OFFICE VISIT (OUTPATIENT)
Dept: PSYCHOLOGY | Age: 58
End: 2018-06-13
Payer: MEDICAID

## 2018-06-13 DIAGNOSIS — F33.1 MAJOR DEPRESSIVE DISORDER, RECURRENT EPISODE, MODERATE (HCC): Primary | ICD-10-CM

## 2018-06-13 PROCEDURE — 90791 PSYCH DIAGNOSTIC EVALUATION: CPT | Performed by: PSYCHOLOGIST

## 2018-06-17 PROBLEM — E11.9 TYPE 2 DIABETES MELLITUS WITHOUT COMPLICATION, WITHOUT LONG-TERM CURRENT USE OF INSULIN (HCC): Status: ACTIVE | Noted: 2018-06-17

## 2018-06-20 ENCOUNTER — CARE COORDINATION (OUTPATIENT)
Dept: CARE COORDINATION | Age: 58
End: 2018-06-20

## 2018-06-28 ENCOUNTER — CARE COORDINATION (OUTPATIENT)
Dept: CARE COORDINATION | Age: 58
End: 2018-06-28

## 2018-06-29 ENCOUNTER — CARE COORDINATION (OUTPATIENT)
Dept: CARE COORDINATION | Age: 58
End: 2018-06-29

## 2018-07-12 ENCOUNTER — OFFICE VISIT (OUTPATIENT)
Dept: INTERNAL MEDICINE CLINIC | Age: 58
End: 2018-07-12
Payer: MEDICAID

## 2018-07-12 ENCOUNTER — CARE COORDINATION (OUTPATIENT)
Dept: CARE COORDINATION | Age: 58
End: 2018-07-12

## 2018-07-12 VITALS — HEIGHT: 66 IN | WEIGHT: 235 LBS | BODY MASS INDEX: 37.77 KG/M2

## 2018-07-12 DIAGNOSIS — E11.9 TYPE 2 DIABETES MELLITUS WITHOUT COMPLICATION, WITHOUT LONG-TERM CURRENT USE OF INSULIN (HCC): ICD-10-CM

## 2018-07-12 PROCEDURE — 99999 PR OFFICE/OUTPT VISIT,PROCEDURE ONLY: CPT | Performed by: DIETITIAN, REGISTERED

## 2018-07-12 PROCEDURE — 97802 MEDICAL NUTRITION INDIV IN: CPT | Performed by: DIETITIAN, REGISTERED

## 2018-07-12 NOTE — CARE COORDINATION
Received call from [facility] asking where his appt is today at 2pm.  Informed him his appt is with dietician and her office is same office as Dr. Syeda Sanders. Pt does have transportation arranged for appt. Denies further needs.

## 2018-07-12 NOTE — PROGRESS NOTES
[address] [place] [address]. [address] [place] [address] James., [facility], [address]  [phone] (phone)  [phone] (fax)    Patient Name: Donnie Santiago. Date of Birth: [id number]. MRN: [id number]      Assessment: Patient is a 61 y.o. 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     -Nutritionally relevant labs:   Lab Results   Component Value Date/Time    LABA1C 6.9 (H) 05/08/2018 02:05 PM    GLUCOSE 140 (H) 01/29/2018 09:52 AM    GLUCOSE 187 (H) 01/21/2018 04:10 AM    GLUCOSE 164 (H) 10/25/2016 11:40 AM    GLUCOSE 121 (H) 10/06/2016 06:33 PM    CHOL 265 (H) 01/29/2018 09:52 AM    HDL 41 01/29/2018 09:52 AM    LDLCALC 159 01/29/2018 09:52 AM    TRIG 164 05/08/2018 02:05 PM     [facility] provides medication set up each week. Pt here with  from Bernardo Olson recall:  Glass of milk or glass orange juice, banana, piece of cheese. -Main Beverages: water, unsweetened tea, milk, juice    -Impression of Dietary Intake: in general, an \"unhealthy\" diet.     Current Outpatient Prescriptions on File Prior to Visit   Medication Sig Dispense Refill    predniSONE (DELTASONE) 10 MG tablet Take 3 tabs po daily x 2 days, then 2 tabs daily x 2 days, then 1 tab daily x 2 days 12 tablet 0    metFORMIN (GLUCOPHAGE) 500 MG tablet Take 1 tablet by mouth daily (with breakfast) 90 tablet 1    donepezil (ARICEPT) 5 MG tablet Take 1 tablet by mouth nightly 30 tablet 3    meloxicam (MOBIC) 15 MG tablet Take 15 mg by mouth daily      FLUoxetine (PROZAC) 20 MG capsule Take 20 mg by mouth daily      losartan (COZAAR) 100 MG tablet Take 1 tablet by mouth daily 30 tablet 5    metoprolol succinate (TOPROL XL) 100 MG extended release tablet Take 1 tablet by mouth daily 30 tablet 5    dicyclomine (BENTYL) 10 MG capsule Take 1 capsule by mouth 4 times daily Use as needed for cramping 120 capsule 3    atorvastatin (LIPITOR) 80 MG tablet Take 1 tablet by mouth daily 30 tablet 5   

## 2018-07-26 ENCOUNTER — CARE COORDINATION (OUTPATIENT)
Dept: CARE COORDINATION | Age: 58
End: 2018-07-26

## 2018-08-03 ENCOUNTER — CARE COORDINATION (OUTPATIENT)
Dept: CARE COORDINATION | Age: 58
End: 2018-08-03

## 2018-08-03 NOTE — CARE COORDINATION
Writer met pt at his home on 8/2/18 to discuss concerns. Pt stated he has been approved for SS but needs payee and payee is \"sick. \"  He reported he doesn't eat breakfast and his neighbor Erica Barahona looks out for him. Writer discussed with pt the importance of eating breakfast and taking his medication now that he is diabetic. Writer provided pt with a case of ensure and told him to drink one a day at breakfast with his medication. Pt just restated that he doesn't eat much. I asked if he had food and he said, \"not really. \" Pt however answered the door with orange crumbs around his mouth and chewing something. He stated that he has a  [facility] a [facility]. Pt also mentioned Seymour Cowart in conversation. Writer called Seymour Cowart after visit. Ms. Avi Price informed Munir Soni that pt has been approved for SS but the payee \"has not reached out to pt and he is frustrated with that, and rightfully so. \"  Writer asked Marion Monroe if there is any other needs pt has that writer could help with and she stated, \"I believe at this time he has all he needs and it will just take time getting things in place. \"  Pt also has a decent looking truck in driveway that doesn't work and he is not sure why. As soon as his SS comes through he can get it looked at. Marion Monroe stated that his food stamps have been cut because he is approved so money is really tight now but  at [facility] is helping pt with that. Writer asked Marion Monroe to please call Loki Ashley or myself if there is anything we can help with. . Pt and Bianca appreciative of visit.

## 2018-08-06 ENCOUNTER — CARE COORDINATION (OUTPATIENT)
Dept: CARE COORDINATION | Age: 58
End: 2018-08-06

## 2018-08-24 ENCOUNTER — CARE COORDINATION (OUTPATIENT)
Dept: CARE COORDINATION | Age: 58
End: 2018-08-24

## 2018-08-24 ASSESSMENT — ENCOUNTER SYMPTOMS: DYSPNEA ASSOCIATED WITH: EXERTION

## 2018-08-24 NOTE — CARE COORDINATION
Ambulatory Care Coordination Note  8/24/2018  SANDOR Risk Score: 6  Arvind Mortality Risk Score:      ACC: Brown Brito RN    Summary Note: spoke with Duane today. Duane states that he is feeling ok. Discussed if HH was still following him. Pt states that he is unsure as he has not seen anyone from [facility] for a few wks. Discussed if he was taking his medications. Pt reports that he is out of several of his medications and hasn't taken them for awhile. Unsure of what medications he is out of. Unsure of when he last f/u with Nick[facility] Head. States that he has noticed some money going in to his account. Pt short on phone. Not answering many questions accept with an \"I don't know\" answer. Discussed diet. Pt reports that he has been living on water and cheese. Asked pt if something happened recently that has gotten him upset or down. Pt then proceeded to tell me that he went to see Robert Moses (his lady friend) at [facility] and she wanted to go with him to  some Capt D's. Daughter showed up and refused to let him take her which angered Duane. Duane states that she is always starting stuff and doesn't even like to go down to nursing home b/c of it. States \"I don't care about anything anymore. I don't care if my lot rent gets pd. I don't care if I take my medications, I don't care about anything anymore. \"  Pt denies thoughts of harming himself. States \"if I had any I would keep it to myself anyway. \"  Reports that breathing is at baseline. Has been walking neighbors big dogs lately and having muscle pain in upper chest d/t that. Reminded pt that he needs to avoid that if possible as to not aggravate muscles. Denies cough or congestion. Spoke with William Friend SANDOR. She is not at computer to find out if pt has missed any recent [facility] appts. She states that pt didn't show up for his neurology appt that was scheduled for 8/20 and he has not returned any of her calls in the last couple of wks.   She states July 1st  Anahy states that all were filled for 30 day supply. Will see if pt agreeable to get meds transferred to [facility] and meds will be done in a medi set for pt. Attempted to reach Trista Mujica to update. No answer. Unable to leave VM message as mailbox is full. Pt was no show to neurology appt on 8/20. appt rescheduled. First available is 11/28 at 1245pm.    Unable to arrange transportation for neurology appt at this time as appts can only be made no earlier than 1mth in advance. Transportation arranged for 9/20 dietician appt and 9/27 PCP appt. [facility] will provide transport to both appts. Pt will need to be ready 1 hr prior to appt. Pt and or staff will need to call once appt complete for .

## 2018-08-24 NOTE — CARE COORDINATION
Writer visited pt in his home today after receiving a call from  [address] [place] [address] voicing her concerns of pt., In regard to having food to eat. Pt only talked to me through the screen door and was very frustrated with \"everything. \"  He was mad about \"lady friends family accusing me of possibly hurting her if I took her to get corn at [facility] [employer] allowed pt to share thoughts and feelings. Pt mad because he has not heard from his POA and pt showed me a letter from DONNA EASON [facility] that $750.00 should have been deposited in his account today. Pt called [employer] while I was there and he used the automated service and was told he only had $3.03 Pt stated he got an eviction notice in July and his neighbor took care of his rent and he got another one in Aug.  Writer asked pt for [facility]'s number and pt provided it. Writer called the number and it is a payee service, msg was left to return call. Writer also called Ivanna Villalobos at [facility], vm was full was unable to leave msg. Will continue to contact.

## 2018-08-28 ENCOUNTER — CARE COORDINATION (OUTPATIENT)
Dept: CARE COORDINATION | Age: 58
End: 2018-08-28

## 2018-08-28 NOTE — CARE COORDINATION
Writer visited pt in his home this am.  Pt stated he had still not heard from Sinai Garza or from his payee Karel. Writer called [employer] service while with pt and writer was told by female that Alfreda Mckeon is not in office and serves 3 counties. She was however able to give me information for pt. She reported that his rent has been paid for Aug, and Sept.  He has $100.00 in his checking acct. And $50. will be deposited weekly. If pt needs additional money he needs to call payee and leave msg. She also stated that pt's neighbor Carmine Goodwin) needs to write payee a letter stating how much Pt borrowed from neighbor and have pt sign it and mail it in and that bill will be paid. Writer asked about pt vehicle that he is concerned about because the last time he drove it \"something broke. \"  She informed us that pt needs to find out what is wrong with truck, get a price and let payee know. Pt stated his neighbor will look at the truck and then he will call [employer]. Pt concerned about cable bill and electric. Pt had not said anything about those bills while writer was speaking with Payee source so writer encouraged pt to take care of those by calling payee and leaving him a vm. Pt in agreement. Writer discussed medication with pt and he stated he has none. Writer asked if they were ready for  and pt didn't know. Writer had pt call [facility] and see. Pt called [facility] and used voice activated prompts successfully. No Rx is ready for pt. Writer called [address] [place] [address] and she is checking to see what needs to be done for pt to get pkgd meds from [facility].

## 2018-08-28 NOTE — CARE COORDINATION
Attempted to reach Duane. No answer on home number listed. Message left to return call. Attempted to reach Duane on mobile number listed. Recording stating all circuits are busy.

## 2018-08-29 ENCOUNTER — CARE COORDINATION (OUTPATIENT)
Dept: CARE COORDINATION | Age: 58
End: 2018-08-29

## 2018-08-29 NOTE — CARE COORDINATION
No return call from [address] [address] yet re: update on medications. Contacted [address] with [facility]. She states that she contacted her boss on Friday re: getting his meds switched over and was told that they can typically get it done quickly. Forrest Callahan states that she has not contacted [employer] yet and was unsure if pt had to do it or if she could. Requested to have her get me a direct number to pharmacy for me to work on getting medications transferred. Josh Manuel. Spoke with  and was tranferred to pharmacy. Spoke with Krystal Alcaraz at [facility] [employer]. Informed her that pt is out of all of his medications and requesting to have medications transferred and filled via [facility] [place] [facility] FOR CANCER AND ALLIED DISEASES set. Medication list given. She will contact [facility]'s pharmacy on [address] [facility] and begin working on this right away. States that they will fill medi set to begin tomorrow. Medi set will be set up for 1 wk at a time. Informed her that CM stated that she would be able to pick medications up and deliver them to pt. Verbalizes understanding. Offered to provide her with CM's number. Instead she requests to have CM check in later today to see if all meds have been transferred and filled. Direct pharmacy line : [phone]. Attempted to reach Forrest Callahan, [address] No answer. Message left to return call. Attempted to reach [address] with [facility]. Informed her that [facility] is getting medications tranferred from [facility] today and plan is to get Medi Set ready so pt can start pills tomorrow. Direct number to pharmacy provided. Attempted to reach Duane. No answer. Message left to return call. Attempted to reach [address] with [facility]. No answer. Message left to return call. [facility] [phone]. Spoke with Krystal Alcaraz.

## 2018-08-30 DIAGNOSIS — G45.8 OTHER SPECIFIED TRANSIENT CEREBRAL ISCHEMIAS: ICD-10-CM

## 2018-08-30 DIAGNOSIS — E78.5 HYPERLIPIDEMIA, UNSPECIFIED HYPERLIPIDEMIA TYPE: ICD-10-CM

## 2018-08-30 DIAGNOSIS — E11.9 TYPE 2 DIABETES MELLITUS WITHOUT COMPLICATION, WITHOUT LONG-TERM CURRENT USE OF INSULIN (HCC): ICD-10-CM

## 2018-08-30 RX ORDER — MELOXICAM 15 MG/1
15 TABLET ORAL DAILY
Qty: 30 TABLET | Refills: 3 | Status: SHIPPED | OUTPATIENT
Start: 2018-08-30 | End: 2018-09-27 | Stop reason: SDUPTHER

## 2018-08-30 RX ORDER — ASPIRIN 81 MG/1
81 TABLET ORAL DAILY
Qty: 90 TABLET | Refills: 3 | Status: SHIPPED | OUTPATIENT
Start: 2018-08-30 | End: 2019-05-02 | Stop reason: SDUPTHER

## 2018-08-30 RX ORDER — EZETIMIBE 10 MG/1
10 TABLET ORAL DAILY
Qty: 30 TABLET | Refills: 3 | Status: SHIPPED | OUTPATIENT
Start: 2018-08-30 | End: 2018-09-27 | Stop reason: SDUPTHER

## 2018-08-31 ENCOUNTER — CARE COORDINATION (OUTPATIENT)
Dept: CARE COORDINATION | Age: 58
End: 2018-08-31

## 2018-09-04 ENCOUNTER — CARE COORDINATION (OUTPATIENT)
Dept: CARE COORDINATION | Age: 58
End: 2018-09-04

## 2018-09-04 NOTE — CARE COORDINATION
Attempted to reach Leander Keith, [address] with [facility] again. No answer. [address] [place] re: [address] set. Informed medi set has been ready for  since last week. Only medication they have not been able to get a new script on is prozac. Informed them again that provider at [facility] must be prescribing physician as PCP is not. States that they have not been able to get in touch with provider, but will be try again today. Informed that they have tried to contact  Leander Keith as well, but she has not returned their call. Discussed alternative to getting pt his medications. Transferred to [place]. Informed that she can make sure pt gets meds delivered today to him and they can start mailing his medications to him to make sure he gets them in timely manner. Will mail 2wks at a time. If pt having problems managing 2wks at a time then meds can be mailed out weekly. Attempted to reach pt. No answer. Message left requesting pt to return call to update him on medications.

## 2018-09-05 ENCOUNTER — CARE COORDINATION (OUTPATIENT)
Dept: CARE COORDINATION | Age: 58
End: 2018-09-05

## 2018-09-05 NOTE — CARE COORDINATION
Writer visited pt in his home just at  from [place]) was leaving. Pt was given instructions from Karen Collins to call [employer], [employer] and [employer] to get the bills sent to his Payee. Pt was very agitated and stated he doesn't understand what he needs to do and his phone doesn't even work. Writer assisted him in making these calls on my phone. When I asked if he has any other concerns pt stated, \"Yea, I'm not gonna take my meds, I am gonna throw them in the trash cause I'm ready to go off. My mom didn't take medicine and neither did my dad and I feel just like them, so I'm not gonna take them. Writer informed pt he needs to talk to his Dr. Sergei Pereaors prescribed them to him.
> 16 inches

## 2018-09-14 ENCOUNTER — CARE COORDINATION (OUTPATIENT)
Dept: CARE COORDINATION | Age: 58
End: 2018-09-14

## 2018-09-14 NOTE — CARE COORDINATION
Writer visited pt in his home today. He was irritated because he \"got a ticket for no seat belt\" pt stated he had one on but knew the officer was going to ask him for his wallet and since it was in his back pocket pt took off seatbelt. Pt stated he called his Payee Karel and payee told pt that he could either send the check to the court or send it to pt to pay. Pt has not received anymore bills in the mail this week. Pt still wants his truck fixed and I told him to talk to payee about that also. Pt has food and money in account. I did not ask if he was taking medication.

## 2018-09-20 ENCOUNTER — CARE COORDINATION (OUTPATIENT)
Dept: CARE COORDINATION | Age: 58
End: 2018-09-20

## 2018-09-20 ENCOUNTER — OFFICE VISIT (OUTPATIENT)
Dept: INTERNAL MEDICINE CLINIC | Age: 58
End: 2018-09-20
Payer: MEDICAID

## 2018-09-20 ENCOUNTER — TELEPHONE (OUTPATIENT)
Dept: INTERNAL MEDICINE CLINIC | Age: 58
End: 2018-09-20

## 2018-09-20 VITALS — BODY MASS INDEX: 39.21 KG/M2 | HEIGHT: 66 IN | WEIGHT: 244 LBS

## 2018-09-20 DIAGNOSIS — E11.9 TYPE 2 DIABETES MELLITUS WITHOUT COMPLICATION, WITHOUT LONG-TERM CURRENT USE OF INSULIN (HCC): ICD-10-CM

## 2018-09-20 PROCEDURE — 99999 PR OFFICE/OUTPT VISIT,PROCEDURE ONLY: CPT | Performed by: DIETITIAN, REGISTERED

## 2018-09-20 PROCEDURE — 97803 MED NUTRITION INDIV SUBSEQ: CPT | Performed by: DIETITIAN, REGISTERED

## 2018-09-20 NOTE — PROGRESS NOTES
FLUoxetine (PROZAC) 20 MG capsule Take 20 mg by mouth daily      losartan (COZAAR) 100 MG tablet Take 1 tablet by mouth daily 30 tablet 5    metoprolol succinate (TOPROL XL) 100 MG extended release tablet Take 1 tablet by mouth daily 30 tablet 5    dicyclomine (BENTYL) 10 MG capsule Take 1 capsule by mouth 4 times daily Use as needed for cramping 120 capsule 3    atorvastatin (LIPITOR) 80 MG tablet Take 1 tablet by mouth daily 30 tablet 5    omeprazole (PRILOSEC) 40 MG delayed release capsule TAKE ONE CAPSULE BY MOUTH DAILY 30 capsule 5    levothyroxine (SYNTHROID) 50 MCG tablet Take 1 tablet by mouth daily TAKE 1 TABLET BY MOUTH EVERY DAY 30 tablet 5    divalproex (DEPAKOTE) 250 MG DR tablet TK 1 T PO QHS  1    TRINTELLIX 10 MG TABS tablet TK 1 T PO Q MORNING  1    risperiDONE (RISPERDAL) 0.25 MG tablet Take 0.25 mg by mouth 2 times daily      albuterol-ipratropium (COMBIVENT RESPIMAT)  MCG/ACT AERS inhaler Inhale 1 puff into the lungs every 6 hours as needed for Wheezing 1 Inhaler 5    acetaminophen (TYLENOL) 500 MG tablet Take 1,000 mg by mouth every 6 hours as needed for Pain      nystatin (MYCOSTATIN) 880860 UNIT/GM powder Apply to groin bilaterally, apply 3 times daily. 1 Bottle 2     No current facility-administered medications on file prior to visit. Vitals from current and previous visits:  Ht 5' 6\" (1.676 m)   Wt 244 lb (110.7 kg)   BMI 39.38 kg/m²     -Body mass index is 39.38 kg/m². 35-39.9 - Obesity Grade II.   - Patient gained and 9 pounds over the last 8 weeks. .  -Weight goal: lose weight. Nutrition Diagnosis:   Limited adherence to nutrition-related recommendations related to Learning disability/cognitive limitations as evidenced by Conditions associated with a diagnosis or treatment: Type II DB. Intervention:  -Impression: Pt here with Hoa Bradley  Barry Amador. Pt does not know what I mean by balanced meal planning.   Pt limited on cooking

## 2018-09-25 ENCOUNTER — CARE COORDINATION (OUTPATIENT)
Dept: CARE COORDINATION | Age: 58
End: 2018-09-25

## 2018-09-25 NOTE — CARE COORDINATION
CC Nurse Gaby Rojas was unable to contact pt per phone so German Baez went to his home to inform him of his 2:00 appt on Thurs. With Dr. Maria Victoria Lozano. Transportation had already been set up by Gaby Rojas and pt was thankful. Pt reported to writer that he had a [employer] bill that he wanted writer to call and have them mail future bills to Payee. I provided this service for pt.

## 2018-09-26 ENCOUNTER — HOSPITAL ENCOUNTER (OUTPATIENT)
Age: 58
Discharge: HOME | End: 2018-09-26
Payer: MEDICAID

## 2018-09-26 LAB
ALT SERPL-CCNC: 23 U/L (ref 11–66)
ANION GAP SERPL CALCULATED.3IONS-SCNC: 13 MEQ/L (ref 8–16)
AVERAGE GLUCOSE: 123 MG/DL (ref 70–126)
BUN BLDV-MCNC: 22 MG/DL (ref 7–22)
CALCIUM SERPL-MCNC: 9.7 MG/DL (ref 8.5–10.5)
CHLORIDE BLD-SCNC: 100 MEQ/L (ref 98–111)
CO2: 27 MEQ/L (ref 23–33)
CREAT SERPL-MCNC: 0.8 MG/DL (ref 0.4–1.2)
CREATININE, URINE: 155.8 MG/DL
GFR SERPL CREATININE-BSD FRML MDRD: > 90 ML/MIN/1.73M2
GLUCOSE BLD-MCNC: 112 MG/DL (ref 70–108)
HBA1C MFR BLD: 6.1 % (ref 4.4–6.4)
MICROALBUMIN UR-MCNC: 1.94 MG/DL
MICROALBUMIN/CREAT UR-RTO: 12 MG/G (ref 0–30)
POTASSIUM SERPL-SCNC: 4.7 MEQ/L (ref 3.5–5.2)
SODIUM BLD-SCNC: 140 MEQ/L (ref 135–145)
TRIGL SERPL-MCNC: 200 MG/DL (ref 0–199)
TSH SERPL DL<=0.05 MIU/L-ACNC: 2.99 UIU/ML (ref 0.4–4.2)

## 2018-09-26 PROCEDURE — 84478 ASSAY OF TRIGLYCERIDES: CPT

## 2018-09-26 PROCEDURE — 84443 ASSAY THYROID STIM HORMONE: CPT

## 2018-09-26 PROCEDURE — 83036 HEMOGLOBIN GLYCOSYLATED A1C: CPT

## 2018-09-26 PROCEDURE — 82043 UR ALBUMIN QUANTITATIVE: CPT

## 2018-09-26 PROCEDURE — 36415 COLL VENOUS BLD VENIPUNCTURE: CPT

## 2018-09-26 PROCEDURE — 80048 BASIC METABOLIC PNL TOTAL CA: CPT

## 2018-09-26 PROCEDURE — 84460 ALANINE AMINO (ALT) (SGPT): CPT

## 2018-09-27 ENCOUNTER — OFFICE VISIT (OUTPATIENT)
Dept: INTERNAL MEDICINE CLINIC | Age: 58
End: 2018-09-27
Payer: MEDICAID

## 2018-09-27 ENCOUNTER — CARE COORDINATION (OUTPATIENT)
Dept: CARE COORDINATION | Age: 58
End: 2018-09-27

## 2018-09-27 VITALS
HEIGHT: 63 IN | WEIGHT: 243.5 LBS | HEART RATE: 80 BPM | DIASTOLIC BLOOD PRESSURE: 74 MMHG | BODY MASS INDEX: 43.14 KG/M2 | SYSTOLIC BLOOD PRESSURE: 130 MMHG

## 2018-09-27 DIAGNOSIS — E03.9 ACQUIRED HYPOTHYROIDISM: ICD-10-CM

## 2018-09-27 DIAGNOSIS — G89.29 CHRONIC PAIN OF RIGHT ANKLE: ICD-10-CM

## 2018-09-27 DIAGNOSIS — E11.9 TYPE 2 DIABETES MELLITUS WITHOUT COMPLICATION, WITHOUT LONG-TERM CURRENT USE OF INSULIN (HCC): ICD-10-CM

## 2018-09-27 DIAGNOSIS — E78.5 HYPERLIPIDEMIA, UNSPECIFIED HYPERLIPIDEMIA TYPE: ICD-10-CM

## 2018-09-27 DIAGNOSIS — M25.571 CHRONIC PAIN OF RIGHT ANKLE: ICD-10-CM

## 2018-09-27 DIAGNOSIS — I10 ESSENTIAL HYPERTENSION: Primary | ICD-10-CM

## 2018-09-27 DIAGNOSIS — R35.0 URINARY FREQUENCY: ICD-10-CM

## 2018-09-27 LAB
BACTERIA: ABNORMAL /HPF
BILIRUBIN URINE: ABNORMAL
BLOOD, URINE: NEGATIVE
CASTS 2: ABNORMAL /LPF
CASTS UA: ABNORMAL /LPF
CHARACTER, URINE: ABNORMAL
COLOR: ABNORMAL
CRYSTALS, UA: ABNORMAL
EPITHELIAL CELLS, UA: ABNORMAL /HPF
GLUCOSE URINE: NEGATIVE MG/DL
ICTOTEST: NEGATIVE
KETONES, URINE: 15
LEUKOCYTE ESTERASE, URINE: ABNORMAL
MISCELLANEOUS 2: ABNORMAL
MUCUS: ABNORMAL
NITRITE, URINE: NEGATIVE
PH UA: 5
PROTEIN UA: 30
RBC URINE: ABNORMAL /HPF
RENAL EPITHELIAL, UA: ABNORMAL
SPECIFIC GRAVITY, URINE: 1.03 (ref 1–1.03)
UROBILINOGEN, URINE: 1 EU/DL
WBC UA: ABNORMAL /HPF
YEAST: ABNORMAL

## 2018-09-27 PROCEDURE — 2022F DILAT RTA XM EVC RTNOPTHY: CPT | Performed by: INTERNAL MEDICINE

## 2018-09-27 PROCEDURE — G8417 CALC BMI ABV UP PARAM F/U: HCPCS | Performed by: INTERNAL MEDICINE

## 2018-09-27 PROCEDURE — 3044F HG A1C LEVEL LT 7.0%: CPT | Performed by: INTERNAL MEDICINE

## 2018-09-27 PROCEDURE — 1036F TOBACCO NON-USER: CPT | Performed by: INTERNAL MEDICINE

## 2018-09-27 PROCEDURE — G8427 DOCREV CUR MEDS BY ELIG CLIN: HCPCS | Performed by: INTERNAL MEDICINE

## 2018-09-27 PROCEDURE — 3017F COLORECTAL CA SCREEN DOC REV: CPT | Performed by: INTERNAL MEDICINE

## 2018-09-27 PROCEDURE — 99214 OFFICE O/P EST MOD 30 MIN: CPT | Performed by: INTERNAL MEDICINE

## 2018-09-27 RX ORDER — EZETIMIBE 10 MG/1
10 TABLET ORAL DAILY
Qty: 30 TABLET | Refills: 5 | Status: SHIPPED | OUTPATIENT
Start: 2018-09-27 | End: 2019-05-02 | Stop reason: SDUPTHER

## 2018-09-27 RX ORDER — LOSARTAN POTASSIUM 100 MG/1
100 TABLET ORAL DAILY
Qty: 30 TABLET | Refills: 5 | Status: SHIPPED | OUTPATIENT
Start: 2018-09-27 | End: 2019-05-02 | Stop reason: SDUPTHER

## 2018-09-27 RX ORDER — METOPROLOL SUCCINATE 100 MG/1
100 TABLET, EXTENDED RELEASE ORAL DAILY
Qty: 30 TABLET | Refills: 5 | Status: SHIPPED | OUTPATIENT
Start: 2018-09-27 | End: 2019-05-02 | Stop reason: SDUPTHER

## 2018-09-27 RX ORDER — MELOXICAM 15 MG/1
15 TABLET ORAL DAILY
Qty: 30 TABLET | Refills: 5 | Status: SHIPPED | OUTPATIENT
Start: 2018-09-27 | End: 2019-05-02 | Stop reason: SDUPTHER

## 2018-09-27 RX ORDER — ATORVASTATIN CALCIUM 80 MG/1
80 TABLET, FILM COATED ORAL DAILY
Qty: 30 TABLET | Refills: 5 | Status: SHIPPED | OUTPATIENT
Start: 2018-09-27 | End: 2019-05-02 | Stop reason: SDUPTHER

## 2018-09-27 RX ORDER — LEVOTHYROXINE SODIUM 0.05 MG/1
50 TABLET ORAL DAILY
Qty: 30 TABLET | Refills: 5 | Status: SHIPPED | OUTPATIENT
Start: 2018-09-27 | End: 2019-05-02 | Stop reason: SDUPTHER

## 2018-09-27 NOTE — PROGRESS NOTES
pictures (can't read a clock face). HgA1c is controlled at 6.1 and microalbumin normal 9/2018. Continue to have [place]/social work assess what can be done about living situation/food. Please see note from 1/30/18 for his complex social situation. He has limited ability to care for self independently. He has now gotten help from  at [facility], he now has a  at [facility]. See previous note for explanation of current changes in social situation.     Hyperlipidemia - , trig in 300s. He is on Lipitor 80 mg and states he is taking every day. Added Zetia 1/2018 and LDL 53.38, LVVA 948, normal ALT 5/2018, normal ALT 9/2018. Hypertension - He is taking his medication as directed now. So BP is controlled on metoprolol and losartan.       Hypothyroidism - They increased thyroid med in hospital to 50 mcg need to repeat lab at end of 8/2017. TSH 2.8 8/29/17,1/29/2018, and 2.99 9/2018, will continue on this dose. GERD - stable most of the time. Continue Prilosec 40 mg daily. Still with occasional vomiting/due to reflux - will not change dose. Continue medication and following with ANNETTE. Urinary frequency - check UA today to rule out infection. Depression - seeing [facility]. Defer medication to them. In past he has voiced comments like he wants to end it all but no plan. Frustrated and alone. Hope that with help from [facility] and Neurology can get additional help. No SI on exam today.     Discussed at previous visit:  He is more confused intermittently. He reports that recently his sister came over to his trailer and he argued with her. She wanted him to sign a form and he refused. He went to son's grave site to think - helps him usually. Then he wanted to go see his mom as she always comforted him. He drove to the town his mother lived in but instead of going to her grave site - he automatically drove to her old house.   He walked in to the house and luckily the people who live there talked to him nicely. He realized then that his parents were dead. He went to [facility] and was assessed. He is trying to get someone to talk to. He is currently in process of psychiatrist and psychologist through [facility]. With his memory issues - suggested MRI of brain. Since last visit he had EGD with Dr. Mattie Burgos - gastritis noted. They want PPI BID. He is continuing to have vomiting 3x week. It can happen anytime of day, before or after eating. He states it usually starts with a feeling of getting hot and then vomits. May be just water and pills. No blood in emeses. GI ordered another RUQ ultrasound and gallbladder normal.  GI gave Zofran 8 mg tabs but nausea/vomiting comes on so quickly he doesn't have time to take it. He is also having crampy pain and bloating - he is not using Bentyl much - suggested taking 2-4 x a day on a scheduled basis. He is to see GI again in 12/2107.        Patient was admitted to [facility] from 7/14/17 to 7/17/17 for recurrent nausea/vomiting and abdominal pain, CRISTIAN - Cr 2.0->1.3, dehydration, diverticulosis, HTN, hypothyroidism.     He was hydrated with IVF, and Cr improved from 2.0 ->1. 3.  Renal ultrasound with cyst.  He had CT without contrast - unremarkable but limited as without contrast.  To have gallbladder ultrasound as outpatient as he was still very tender in RUQ.  He had EGD and Colonoscopy with bx of polyp - tubular adenoma per path report.  No follow with Dr. Birgit Luke scheduled. [facility] is still having nausea, and abdominal pain - not worsening.  Added Zofran and Bentyl.       The gallbladder ultrasound negative.       He took Zofran, Bentyl and better for a day then vomited the next day. He stopped all medication except levothyroxine, Zofran, Bentyl, and omeprazole and BP is very elevated today. He is continuing to vomiting 1-3x a day. Or he has a GERD sensation and not actually vomiting. Face still with intermittent numbness.     Had normal outpatient stress test and ECHO - normal - to ask Dr. Mary Patel if need to continue isosorbide.       MRI of adrenals and MIBG test negative for pheo. He is still having sweating, flushing, SOB, right eye twitching (looks more closed), right eye puffy, whole face has a weird feeling and pale, speech is slurred?, episodic, multiple times a day. When he wipes the sweat off, the face is painful. Will discuss with Endo on direction of work-up - refer to Endo but long wait to get in locally - offered specialist outside area and he would not give me an answer where he wanted to go. He is also complaining of difficulty concentrating, fatigue. He is extremely frustrated. Will increase metoprolol to 50 mg BID. Will treat mood with Prozac.      Nodule soft mobile non-tender just above left eye brow. Nodule on left abdomen, soft and dermal layer appearance. Refer to dermatology as he is concerned - I suspect benign. He never followed up.     Left knee is painful - had knee scope done. I gave Mobic but told him to see ortho if still painful. He is to look on pill bottle to get name and number. Patient Active Problem List   Diagnosis    Hyperlipidemia    Hypertension    Right leg pain    Right ankle pain    Elevated LFTs    GERD (gastroesophageal reflux disease)    Fatigue    Intermittent explosive disorder    Witnessed apneic spells    Snoring    Sleep disturbance    Sleep difficulties    Nocturnal leg movements    Restless sleeper    Non-restorative sleep    Obesity (BMI 30-39. 9)    Daytime somnolence    Shifting sleep-work schedule    Acquired hypothyroidism    Anxiety    Diaphoresis    Reactive depression    Type 2 diabetes mellitus without complication, without long-term current use of insulin ([facility])     Past Medical History:   Diagnosis Date    Chronic leg pain     Diabetes ([facility] [address] 05/2018    GERD (gastroesophageal reflux disease) mg by mouth 2 times daily      albuterol-ipratropium (COMBIVENT RESPIMAT)  MCG/ACT AERS inhaler Inhale 1 puff into the lungs every 6 hours as needed for Wheezing 1 Inhaler 5    acetaminophen (TYLENOL) 500 MG tablet Take 1,000 mg by mouth every 6 hours as needed for Pain      nystatin (MYCOSTATIN) 660916 UNIT/GM powder Apply to groin bilaterally, apply 3 times daily. 1 Bottle 2    donepezil (ARICEPT) 5 MG tablet TAKE 1 TABLET BY MOUTH AT BEDTIME 30 tablet 3     No current facility-administered medications for this visit. No Known Allergies    Social History     Social History    Marital status:      Spouse name: N/A    Number of children: N/A    Years of education: N/A     Occupational History    Not on file. Social History Main Topics    Smoking status: Former Smoker     Packs/day: 0.00     Years: 40.00     Types: Cigarettes     Quit date: 1/1/2014    Smokeless tobacco: Never Used    Alcohol use No    Drug use: No    Sexual activity: No     Other Topics Concern    Not on file     Social History Narrative    No narrative on file       Family History   Problem Relation Age of Onset    Dementia Mother     High Blood Pressure Mother     COPD Father     High Cholesterol Father     High Blood Pressure Father        Review of Systems - General ROS: negative for - chills or fever, positive fatigue, sweats - happens at rest, activity, public and at home. Psychological ROS: positive for - anxiety and depression - stress with issues - seeing [facility]  Hematological and Lymphatic ROS: No history of blood clots or bleeding disorder.    Respiratory ROS: no cough, shortness of breath, or wheezing  Cardiovascular ROS: no chest pain, no dyspnea on exertion - cath neg 1/2017  Gastrointestinal ROS: no right lower abdominal pain, no change in bowel habits, or black or bloody stools - increased gas, positive GERD  Genito-Urinary ROS: no dysuria, trouble voiding, or hematuria - positive Urine NEGATIVE NEGATIVE    pH, UA 5.0 5.0 - 9.0    Protein, UA 30 (A) NEGATIVE    Urobilinogen, Urine 1.0 0.0 - 1.0 eu/dl    Nitrite, Urine NEGATIVE NEGATIVE    Leukocyte Esterase, Urine TRACE (A) NEGATIVE    Color, UA DK YELLOW (A) STRAW-YELL    Character, Urine CLOUDY (A) CLEAR-SL C    RBC, UA 0-2 0-2/hpf /hpf    WBC, UA 2-4 0-4/hpf /hpf    Epi Cells 0-2 3-5/hpf /hpf    Mucus, UA THREADS NONE SEEN/    Bacteria, UA NONE FEW/NONE S /hpf    Casts UA >15 HYALINE NONE SEEN /lpf    Crystals NONE SEEN NONE SEEN    Renal Epithelial, Urine NONE SEEN NONE SEEN    Yeast, UA NONE SEEN NONE SEEN    CASTS 2 NONE SEEN NONE SEEN /lpf    MISCELLANEOUS 2 NONE SEEN    Bile Acids, Total   Result Value Ref Range    Ictotest NEGATIVE NEGATIVE     Assessment and Plan:      Diagnosis Orders   1. Essential hypertension  losartan (COZAAR) 100 MG tablet    metoprolol succinate (TOPROL XL) 100 MG extended release tablet   2. Type 2 diabetes mellitus without complication, without long-term current use of insulin ([facility] [address]     3. Hyperlipidemia, unspecified hyperlipidemia type  ezetimibe (ZETIA) 10 MG tablet    atorvastatin (LIPITOR) 80 MG tablet   4. Acquired hypothyroidism  levothyroxine (SYNTHROID) 50 MCG tablet   5. Urinary frequency  Urinalysis Reflex to Culture    Urine Rt Reflex To Culture   6. Chronic pain of right ankle  meloxicam (MOBIC) 15 MG tablet     Hypertension - BP controlled, continue metoprolol and losartan. DM - at goal now on Metformin and diet changes - ordered DM dietician visit to help with his poor knowledge of nutrition. Limited ability to retain knowledge or follow through with recommendations. Hyperlipidemia - at goal on Lipitor and Zetia. Hypothyroidism - at goal on levothyroxine 50 mcg daily. Urinary frequency - check UA with reflux culture. Chronic right ankle pain - continue Mobic. Follow up in 12 weeks for follow up of diabetes, nutrition. UA due now.     Allergies: Patient has no known allergies. Dr. Chris Haddad    [address]  [place], [address]    Phone number: [phone]  Fax number [phone]

## 2018-09-27 NOTE — CARE COORDINATION
Sandra Phelps of [facility] working with pt.  )  Specialty Services Referral:  In Process (Comment: currently going through process to determine eligibility for Board of DD services. has appt with physician for evaluation 4/23)  Transportation Support:  Completed  Other Services or Interventions:  has SANDOR Meza through [address]     None          Prior to Admission medications    Medication Sig Start Date End Date Taking?  Authorizing Provider   ezetimibe (ZETIA) 10 MG tablet Take 1 tablet by mouth daily 9/27/18   Alex Adhikari MD   meloxicam [facility]) 15 MG tablet Take 1 tablet by mouth daily 9/27/18   Alex Adhikari MD   losartan (COZAAR) 100 MG tablet Take 1 tablet by mouth daily 9/27/18   Alex Adhikari MD   metoprolol succinate (TOPROL XL) 100 MG extended release tablet Take 1 tablet by mouth daily 9/27/18   Alex Adhikari MD   atorvastatin (LIPITOR) 80 MG tablet Take 1 tablet by mouth daily 9/27/18   Alex Adhikari MD   levothyroxine (SYNTHROID) 50 MCG tablet Take 1 tablet by mouth daily TAKE 1 TABLET BY MOUTH EVERY DAY 9/27/18   Alex Adhikari MD   aspirin (ASPIRIN LOW DOSE) 81 MG EC tablet Take 1 tablet by mouth daily 8/30/18   Alex Adhikari MD   metFORMIN (GLUCOPHAGE) 500 MG tablet Take 1 tablet by mouth daily (with breakfast) 8/30/18   Alex Adhikari MD   donepezil (ARICEPT) 5 MG tablet Take 1 tablet by mouth nightly 5/18/18   Audrey Ernst MD   FLUoxetine (PROZAC) 20 MG capsule Take 20 mg by mouth daily    Historical Provider, MD   dicyclomine (BENTYL) 10 MG capsule Take 1 capsule by mouth 4 times daily Use as needed for cramping 5/8/18   Alex Adhikari MD   omeprazole (PRILOSEC) 40 MG delayed release capsule TAKE ONE CAPSULE BY MOUTH DAILY 5/8/18   Alex Adhikari MD   divalproex (DEPAKOTE) 250 MG DR tablet TK 1 T PO QHS 3/29/18   Historical Provider, MD   TRINTELLIX 10 MG TABS tablet TK 1 T PO Q MORNING 4/2/18   Historical Provider, MD

## 2018-09-28 ENCOUNTER — TELEPHONE (OUTPATIENT)
Dept: INTERNAL MEDICINE CLINIC | Age: 58
End: 2018-09-28

## 2018-09-28 LAB
ORGANISM: ABNORMAL
URINE CULTURE REFLEX: ABNORMAL

## 2018-10-04 ENCOUNTER — CARE COORDINATION (OUTPATIENT)
Dept: CARE COORDINATION | Age: 58
End: 2018-10-04

## 2018-10-05 ENCOUNTER — CARE COORDINATION (OUTPATIENT)
Dept: CARE COORDINATION | Age: 58
End: 2018-10-05

## 2018-10-05 NOTE — CARE COORDINATION
Spoke with PCP yesterday. She states that she and dietician both feel pt would benefit in being in a group home setting. States that she talked to [facility] about this at last PCP visit and he told her he would think about it. Will discuss with SW to check in to further. Received message from SUSANNA stating that Duane only has 1 days worth of medications left. Informed her I would contact pharmacy and last contact with plan was for them to mail meds to pt vs relying on CM to come and pick meds up weekly. [address]. Spoke with Vasile Marks. Informed that they are waiting for script for depakote and Trintellix. Pharmacist is going to talk to nurse to see if medications can be authorized. Spoke with Laila Balderrama, [address] [place], [place] [address]zeferino Ramirez with [facility]. Updated her re: PCP visit. She states that she did get approval from her boss to take pt 1-2x to grocery store to assist him in picking healthy choices. States that she talked to [facility] about employment and he is not interested now that he has income coming in. Discussed with her concern re: housing and discussion from PCP that she talked with Duane about possible Group home. She feels pt would be reluctant with this, but will discuss housing situation with him further. Discussed with her re: pt having an appt with provider at [facility]. She is unaware when or if he has an appt scheduled. She states that she can schedule one if he doesn't have one. Received call back from [address] [place] [address] in pharmacy. She states that nurse cannot get meds authorized. Pt does have an appt at 3pm with provider at [facility] today and scripts can be written at that time. Pharmacy states that they were told CM will be transporting him to appt. They will get medi set ready and add medications following his appt. Pt will need to go to pharmacy following appt to  [address] set. Contacted Susana nguyen.   She states that she was unaware of his appt today and unaware that she was supposed to be

## 2018-10-11 DIAGNOSIS — R41.3 MEMORY PROBLEM: ICD-10-CM

## 2018-10-11 RX ORDER — DONEPEZIL HYDROCHLORIDE 5 MG/1
TABLET, FILM COATED ORAL
Qty: 30 TABLET | Refills: 3 | Status: SHIPPED | OUTPATIENT
Start: 2018-10-11 | End: 2019-02-26 | Stop reason: SDUPTHER

## 2018-10-23 ENCOUNTER — CARE COORDINATION (OUTPATIENT)
Dept: CARE COORDINATION | Age: 58
End: 2018-10-23

## 2018-11-15 ENCOUNTER — CARE COORDINATION (OUTPATIENT)
Dept: CARE COORDINATION | Age: 58
End: 2018-11-15

## 2018-12-04 ENCOUNTER — CARE COORDINATION (OUTPATIENT)
Dept: CARE COORDINATION | Age: 58
End: 2018-12-04

## 2018-12-04 NOTE — CARE COORDINATION
from Primary Care Provider:  Yes  Suggested Interventions and [address]:  Completed (Comment: follows with DELBERT. sees counselor Jasmin Ann as well as physician.  has CM with DELBERT)  [facility]  Completed  Marketplace Navigator:  Declined  Meals on Wheels:  Not Started  Medi Set or Pill Pack:  Completed (Comment: [address] [place] [address] sets meds up in [employer] .  mailed out to pt weekly. )  Registered Dietician:  Completed (Comment: seeing Elza Sites)  Social Work:  Completed (Comment: SUSANNA Red working with pt )  Specialty Services Referral:  Completed (Comment: did not meet DD criteria to qualify for services. )  Transportation Support:  Completed  Zone Management Tools:  Completed  Other Services or Interventions:  CM with Alexandar [phone]. has payee that pays his bills. Goals Addressed     None          Prior to Admission medications    Medication Sig Start Date End Date Taking?  Authorizing Provider   donepezil (ARICEPT) 5 MG tablet TAKE 1 TABLET BY MOUTH AT BEDTIME 10/11/18   Kevon Heller MD   ezetimibe (ZETIA) 10 MG tablet Take 1 tablet by mouth daily 9/27/18   Sergey Iniguez MD   meloxicam VERONICA TUCKER [facility]) 15 MG tablet Take 1 tablet by mouth daily 9/27/18   Sergey Iniguez MD   losartan (COZAAR) 100 MG tablet Take 1 tablet by mouth daily 9/27/18   Sergey Iniguez MD   metoprolol succinate (TOPROL XL) 100 MG extended release tablet Take 1 tablet by mouth daily 9/27/18   Sergey Iniguez MD   atorvastatin (LIPITOR) 80 MG tablet Take 1 tablet by mouth daily 9/27/18   Sergey Iniguez MD   levothyroxine (SYNTHROID) 50 MCG tablet Take 1 tablet by mouth daily TAKE 1 TABLET BY MOUTH EVERY DAY 9/27/18   Sergey Iniguez MD   aspirin (ASPIRIN LOW DOSE) 81 MG EC tablet Take 1 tablet by mouth daily 8/30/18   Sergey Iniguez MD   metFORMIN (GLUCOPHAGE) 500 MG tablet Take 1 tablet by mouth daily (with breakfast) 8/30/18   Sergey Iniguez MD

## 2018-12-14 ENCOUNTER — CARE COORDINATION (OUTPATIENT)
Dept: CARE COORDINATION | Age: 58
End: 2018-12-14

## 2018-12-14 NOTE — CARE COORDINATION
Spoke with Viviana to set up tranport for 1/7 appt. Unable to arrange until 1/1 d/t new yr. Contacted Francisca Ascencio, [address] with [employer]. Informed her about pt's dietician and PCP appt on 1/7 and appt with Neuro on 1/23. Francisca Ascencio states that she will provide pt's transportation to those appts. Denies concerns at this time.

## 2019-01-21 ENCOUNTER — CARE COORDINATION (OUTPATIENT)
Dept: CARE COORDINATION | Age: 59
End: 2019-01-21

## 2019-01-23 ENCOUNTER — CARE COORDINATION (OUTPATIENT)
Dept: CARE COORDINATION | Age: 59
End: 2019-01-23

## 2019-02-26 DIAGNOSIS — R41.3 MEMORY PROBLEM: ICD-10-CM

## 2019-02-26 RX ORDER — DONEPEZIL HYDROCHLORIDE 5 MG/1
TABLET, FILM COATED ORAL
Qty: 28 TABLET | Refills: 3 | Status: SHIPPED | OUTPATIENT
Start: 2019-02-26 | End: 2019-07-03 | Stop reason: SDUPTHER

## 2019-03-05 ENCOUNTER — CARE COORDINATION (OUTPATIENT)
Dept: CARE COORDINATION | Age: 59
End: 2019-03-05

## 2019-03-26 ENCOUNTER — TELEPHONE (OUTPATIENT)
Dept: INTERNAL MEDICINE CLINIC | Age: 59
End: 2019-03-26

## 2019-03-27 ENCOUNTER — TELEPHONE (OUTPATIENT)
Dept: INTERNAL MEDICINE CLINIC | Age: 59
End: 2019-03-27

## 2019-03-28 ENCOUNTER — CARE COORDINATION (OUTPATIENT)
Dept: CARE COORDINATION | Age: 59
End: 2019-03-28

## 2019-03-29 ENCOUNTER — CARE COORDINATION (OUTPATIENT)
Dept: CARE COORDINATION | Age: 59
End: 2019-03-29

## 2019-03-29 NOTE — CARE COORDINATION
Ambulatory Care Coordination Note  3/29/2019  CM Risk Score: 5  Arvind Mortality Risk Score:      ACC: Paris Cuellar RN    Summary Note: Covering RNCC received call from patient. Patient stated he was returning [facility] messages as he had been out of town. Patient reported he has been doing \"ok\" but he has noticed some intermittent chest pain. Patient reported pain remains in the same area and he denied any c/o pressure, SOB, or radiation of pain. Patient denied any change/increase in pain with rest or activity. Patient stated pain has been intermittent and ongoing for the last 1-2 months. RNCC reviewed symptoms to call and report and instructed patient to seek medical attention if symptoms continue or worsen and he verbalized understanding. Patient continues to not monitor BS. Patient reported eating habits remain the same, as he typically only eats 1-2 meals a day. Diabetes education was briefly reviewed. Patient was encouraged to continue with walking for exercise as this helps keep diabetes controlled. Patient denied any other questions, concerns, or needs and he was encouraged to call with any that may develop. Actions:   - Briefly reviewed diabetes education   - Educated on chest pain symptoms  - Encouraged patient to f/u with [address] of Care:   - Updated patient's ACC so she can f/u with patient in the future      Care Coordination Interventions    Program Enrollment:  Complex Care  Referral from Primary Care Provider:  Yes  Suggested Interventions and [facility] [address]:  Completed (Comment: follows with Bhumika Hui.   sees counselor Gabriel Alatorre as well as physician.  has CM with Bhumika Hui)  [facility]  Completed  Marketplace Navigator:  Declined  Meals on Wheels:  Not Started  Medi Set or Pill Pack:  Completed (Comment: [address] sets meds up in [employer] .  mailed out to pt weekly. )  Registered Dietician:  Completed (Comment: seeing Jesus Manuel Carty)  Social Work:  Completed (Comment: SUSANNA Jamil working with pt )  Specialty Services Referral:  Completed (Comment: did not meet DD criteria to qualify for services. )  Transportation Support:  Completed  Zone Management Tools:  Completed  Other Services or Interventions:  CM with Alexandra [phone]. has payee that pays his bills. Goals Addressed                 This Visit's Progress       Care Coordination     Behavioral Health   No change     I will work towards the following Behavioral Health goals: I will continue to follow up with my psychologist /counselor and/or psychiatrist. and I will take my medications daily as prescribed. Barriers: impairment:  cognitive, lack of support and overwhelmed by complexity of regimen  Plan for overcoming my barriers: support from Care CoordinatorSUSANNA through [employer] of [employer], PCP  Confidence: 6/10  Anticipated Goal Completion Date: ongoing       Conditions and Symptoms   No change     I will schedule office visits, as directed by my provider. I will keep my appointment or reschedule if I have to cancel. I will notify my provider of any barriers to my plan of care. I will follow my Zone Management tool to seek urgent or emergent care. I will notify my provider of any symptoms that indicate a worsening of my condition. Barriers: impairment:  cognitive and mental health: reactive depression, lack of support and overwhelmed by complexity of regimen  Plan for overcoming my barriers: support from SAUMUR, [place], Care Coordinator, SUSANNA, PCP  Confidence: 6/10  Anticipated Goal Completion Date: ongoing         Medication Management   No change     I will take my medication as directed. I will notify my provider of any problems with medications, like adverse effects or side effects. I will notify my provider/Care Coordinator if I am unable to afford my medications. I will notify my provider for advice before I stop taking any of my medication.     Barriers: impairment:  cognitive, lack of support and overwhelmed by complexity of regimen  Plan for overcoming my barriers: support from Care Coordinator, PCP, Kelby Amezquita SW with [employer]  Confidence: 6/10  Anticipated Goal Completion Date: ongoing            Prior to Admission medications    Medication Sig Start Date End Date Taking?  Authorizing Provider   omeprazole (PRILOSEC) 40 MG delayed release capsule TAKE 1 CAPSULE BY MOUTH DAILY 3/1/19   Rachel Fragoso MD   metFORMIN (GLUCOPHAGE) 500 MG tablet TAKE 1 TABLET BY MOUTH DAILY (WITH BREAKFAST) 3/1/19   Rachel Fragoso MD   donepezil (ARICEPT) 5 MG tablet TAKE 1 TABLET BY MOUTH AT BEDTIME 2/26/19   Trista Miller MD   ezetimibe (ZETIA) 10 MG tablet Take 1 tablet by mouth daily 9/27/18   Rachel Fragoso MD   meloxicam VERONICA TUCKER [facility]) 15 MG tablet Take 1 tablet by mouth daily 9/27/18   Rachel Fragoso MD   losartan (COZAAR) 100 MG tablet Take 1 tablet by mouth daily 9/27/18   Rachel Fragoso MD   metoprolol succinate (TOPROL XL) 100 MG extended release tablet Take 1 tablet by mouth daily 9/27/18   Rachel Fragoso MD   atorvastatin (LIPITOR) 80 MG tablet Take 1 tablet by mouth daily 9/27/18   Rachel Fragoso MD   levothyroxine (SYNTHROID) 50 MCG tablet Take 1 tablet by mouth daily TAKE 1 TABLET BY MOUTH EVERY DAY 9/27/18   Rachel Fragoso MD   aspirin (ASPIRIN LOW DOSE) 81 MG EC tablet Take 1 tablet by mouth daily 8/30/18   Rachel Fragoso MD   dicyclomine (BENTYL) 10 MG capsule Take 1 capsule by mouth 4 times daily Use as needed for cramping 5/8/18   Rachel Fragoso MD   divalproex (DEPAKOTE) 250 MG DR tablet TK 1 T PO QHS 3/29/18   Historical Provider, MD   TRINTELLIX 10 MG TABS tablet TK 1 T PO Q MORNING 4/2/18   Historical Provider, MD   risperiDONE (RISPERDAL) 0.25 MG tablet Take 0.25 mg by mouth 2 times daily    Historical Provider, MD   albuterol-ipratropium (COMBIVENT RESPIMAT)  MCG/ACT AERS inhaler Inhale 1 puff into the lungs every 6 hours as needed for Wheezing 8/29/17   Ginger Hanson MD   acetaminophen (TYLENOL) 500 MG tablet Take 1,000 mg by mouth every 6 hours as needed for Pain    Historical Provider, MD   nystatin (MYCOSTATIN) 490663 UNIT/GM powder Apply to groin bilaterally, apply 3 times daily. 6/23/16   Ginger Hanson MD       Future Appointments   Date Time Provider Ifrah Lopez   5/10/2019  2:30 PM Natalie Parker MD [address] [facility] [place]   6/5/2019  1:00 PM Jaqui Dalal RD, LD [facility] Physic [address]   6/6/2019  2:00 PM Ginger Hanson MD [address] [facility]     ,   Diabetes Assessment    Medic Alert ID:  No  Meal Planning:  Avoidance of concentrated sweets   How often do you test your blood sugar?:  No Testing   Do you have barriers with adherence to non-pharmacologic self-management interventions?  (Nutrition/Exercise/Self-Monitoring):  No   Have you ever had to go to the ED for symptoms of low blood sugar?:  No       No patient-reported symptoms   Do you have hyperglycemia symptoms?:  No   Do you have hypoglycemia symptoms?:  No   Blood Sugar Monitoring Regimen:  Not Testing   Blood Sugar Trends:  No Change      ,   General Assessment    Do you have any symptoms that are causing concern?:  Yes  Progression since Onset:  Unchanged, Intermittent - Waxing/Waning  Reported Symptoms:  Chest Pain      and Care Coordination Episodes    Type: Amb Care Coordination  Episode: complex care  Noted: 4/11/2018  Comments: PHP as of 4/11

## 2019-04-18 ENCOUNTER — CARE COORDINATION (OUTPATIENT)
Dept: CARE COORDINATION | Age: 59
End: 2019-04-18

## 2019-04-18 NOTE — CARE COORDINATION
neighbor has been taking him to the grocery store and helping him make better food choices. Duane states that Mars's brother is a diabetic and has been telling Duane what he should and should not be eating. Duane states that he has been eating grapes, clementines, bananas, carrots, green beans. Low fat popcorn for snack. Eating carbmaster yogurt for bfast daily and for a snack. Rk Suarez has been taking Duane to [employer] and encouraging him to get more fruits and veggies. Trying to eat more chicken vs red meats. Rk Suarez present at time of call. He states that he has been trying to teach Duane importance of diet and has been working on trying to get him to lose wt. Rk Suarez states that he and Duane are going to start eating meals together to help with cost and to help Duane with improving his diet. Rk Suarez states \"I'm trying to get him to lose some wt b/c I know that will help him. Rk Suarez has also been working on getting Duane to exercise more. Taking walks together daily. Informed Duane that I am pleased to see him taking an active role in his health re: his diet and working on getting more exercise. Attempted to reach Duran Damian, [address] with [facility]. Message left requesting return call. [address]. Informed that pt does have new scripts and or available refills on all meds except his psych meds. They have not filled medi set since Feb.  Have been trying to reach , but have not been successful. Last time pt was seen by psych was in Oct.  Unable to get scripts from [facility] until pt is seen again. Pharmacy was concerned that if all meds were not in medi set pt would not take them. Attempted to reach [facility] scheduling dept to get pt an appt with psych as soon as possible. Discussed with clinical staff about need for pt to be seen by PCP as soon as possible as his next appt is not until June. PCP out next week. Clinical staff arranged appt for pt to be seen on 5/2.   Will work on arranging transportation. Pt no longer active with [employer] (previous ins carrier that provided transportation). Contacted [employer]. Informed that pt is going to need to fill out transportation application. Applications are processed typically within 24hrs. D/t pt's limited reading/writing ability will reach out to Mikael Daigle for assistance in helping pt complete transportation application. Message left with SUSANNA Anderson re: assisting pt with transport. Application completion. Updated pt. Received call back from [facility]. Pt can be seen by Gallo Mackay CNP at 930am tomorrow. appt arranged. Attempted to reach Duane re: appt for tomorrow and PCP appt. No answer. Message left to return call. Attempted to reach Duane again. No answer. Left another message. Received call from Duran Damian, [facility] with [facility]. Informed her of concerns re: pt missing last psych appt as well as missing last PCP appt. States that she has had difficult time making consistent contact with pt as well and most recently has not been able to get a hold of him or make a home visit. I informed her of concerns that pt has not been getting or taking his meds for a couple of mths now. Informed her that [facility] states that they have tried to reach her several times with no success. Duran Damian states that she has not heard from them until today. She is aware pt has an appt at [facility] tomorrow and needs to go over to pharmacy following appt to get meds. Informed her that I have not been able to get in touch with pt re: his appt time. She will try to reach pt as well. Also updated her that since pt is no longer with [employer] he is not set up with transport and our SW is working on trying to get in touch with him to assist with completion of transport application. Encouraged to call with any questions or concerns. Updated her of PCP appt scheduled on 5/2.   At 2pm.  Plan of Care:  Arrange appt with psych-have message in to [facility] and pt's [place] to arrange appt. appt arranged 4/19 at 930 am at [facility]. F/u wit PCP 5/2-arrange transport once application completed or arrange with CM Duran Damian if able to reach  SW to assist pt in completing transportation application through [employer]-for assistance with transport to appts. Keep f/u appts with PCP and specialists. Get back on medications! Continue working with [employer]. Continue working on diet and increasing activity. Walk daily for exercise. Grocery shop with Rk Suarez for help in reading labels and help in making healthy food choices. Plan meals together to help with cost.  Monitor s/s. If increased SOB that is not relieved by rest call PCP. If CP persists seek medical attn. Don't let dogs jerk you when walking them as this was cause of pain in chest previously. Diabetes Assessment    Medic Alert ID:  No  Meal Planning:  Avoidance of concentrated sweets   How often do you test your blood sugar?:  No Testing   Do you have barriers with adherence to non-pharmacologic self-management interventions? (Nutrition/Exercise/Self-Monitoring):  No   Have you ever had to go to the ED for symptoms of low blood sugar?:  No       Do you have hyperglycemia symptoms?:  No   Do you have hypoglycemia symptoms?:  No   Blood Sugar Monitoring Regimen:  Not Testing      ,   Congestive Heart Failure Assessment           Symptoms:          and   General Assessment    Do you have any symptoms that are causing concern?:  Yes  Progression since Onset:  Unchanged  Reported Symptoms:  Other (Comment: SOB and pain in chest area. occurs occasionally with exertion. subsides with rest. )               Care Coordination Interventions    Program Enrollment:  Complex Care  Referral from Primary Care Provider:  Yes  Suggested Interventions and [address] [place] [address]:  Completed (Comment: follows with DELBERT.   sees counselor Collette Fountain as well as physician.  has CM with DELBERT)  Home Health Services:  Completed  Marketplace Navigator:  Declined  Meals on Wheels:  Not Started  Medi Set or Pill Pack:  Completed (Comment: [address] sets meds up in [employer] .  mailed out to pt weekly. )  Registered Dietician:  Completed (Comment: seeing Blessing Flores)  Social Work:  Completed (Comment: SUSANNA Anderson working with pt )  Specialty Services Referral:  Completed (Comment: did not meet DD criteria to qualify for services. )  Transportation Support:  Completed  Zone Management Tools:  Completed  Other Services or Interventions:  CM with Alexandra [phone]. has payee that pays his bills. Goals Addressed                 This Visit's Progress     Behavioral Health   Worsening     I will work towards the following [facility] goals: I will continue to follow up with my psychologist /counselor and/or psychiatrist. and I will take my medications daily as prescribed. Barriers: impairment:  cognitive, lack of support and overwhelmed by complexity of regimen  Plan for overcoming my barriers: support from Care CoordinatorSUSANNA through [employer], PCP  Confidence: 6/10  Anticipated Goal Completion Date: 5/18/19       Conditions and Symptoms   Worsening     I will schedule office visits, as directed by my provider. I will keep my appointment or reschedule if I have to cancel. I will notify my provider of any barriers to my plan of care. I will follow my Zone Management tool to seek urgent or emergent care. I will notify my provider of any symptoms that indicate a worsening of my condition. Barriers: impairment:  cognitive and mental health: reactive depression, lack of support and overwhelmed by complexity of regimen  Plan for overcoming my barriers: [facility], Care Coordinator, SUSANNA, PCP  Confidence: 6/10  Anticipated Goal Completion Date: 5/18/19         Medication Management   Worsening     I will take my medication as directed.   I will notify my provider of

## 2019-04-19 ENCOUNTER — CARE COORDINATION (OUTPATIENT)
Dept: CARE COORDINATION | Age: 59
End: 2019-04-19
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
## 2019-04-22 ENCOUNTER — CARE COORDINATION (OUTPATIENT)
Dept: CARE COORDINATION | Age: 59
End: 2019-04-22

## 2019-04-22 NOTE — CARE COORDINATION
Pt called this am concerned with medications. Pt reports that \"there are pill that he doesn't recognize in the pkgs. \"  I informed pt that sometimes the manufacturers change the color/shape of pill especially if it is generic. I I encouraged pt to take them back to [facility] and have them explain to him what they are prescribed for. Pt stated he would do so and that he has not been taking the pills that he doesn't recognize.

## 2019-04-23 ENCOUNTER — CARE COORDINATION (OUTPATIENT)
Dept: CARE COORDINATION | Age: 59
End: 2019-04-23

## 2019-04-23 NOTE — CARE COORDINATION
Seems in good spirits today. Denies any other needs. Diabetes Assessment    Medic Alert ID:  No  Meal Planning:  Avoidance of concentrated sweets   How often do you test your blood sugar?:  No Testing   Do you have barriers with adherence to non-pharmacologic self-management interventions? (Nutrition/Exercise/Self-Monitoring):  No   Have you ever had to go to the ED for symptoms of low blood sugar?:  No       No patient-reported symptoms       and   General Assessment    Do you have any symptoms that are causing concern?:  No                 Care Coordination Interventions    Program Enrollment:  Complex Care  Referral from Primary Care Provider:  Yes  Suggested Interventions and Community Resources  [address] [facility] [address]:  Completed (Comment: follows with DELBERT. sees counselor Arianna Puckett as well as physician.  has CM with [facility])  [facility]  Completed  Marketplace Navigator:  Declined  Meals on Wheels:  Not Started  Medi Set or Pill Pack:  Completed (Comment: [address] sets meds up in [employer] .  mailed out to pt weekly. )  Registered Dietician:  Completed (Comment: seeing Teresa Tracy)  Social Work:  Completed (Comment: Tommie Mohs, SW working with pt )  Specialty Services Referral:  Completed (Comment: did not meet DD criteria to qualify for services. )  Transportation Support:  Completed  Zone Management Tools:  Completed  Other Services or Interventions:  CM with Alexandra [phone]. has payee that pays his bills. Goals Addressed     None          Prior to Admission medications    Medication Sig Start Date End Date Taking?  Authorizing Provider   omeprazole (PRILOSEC) 40 MG delayed release capsule TAKE 1 CAPSULE BY MOUTH DAILY 3/1/19  Yes Maria Guadalupe Wong MD   metFORMIN (GLUCOPHAGE) 500 MG tablet TAKE 1 TABLET BY MOUTH DAILY (WITH BREAKFAST) 3/1/19  Yes Maria Guadalupe Wong MD   ezetimibe (ZETIA) 10 MG tablet Take 1 tablet by mouth daily 9/27/18  Yes Maria Guadalupe Wong MD meloxicam (MOBIC) 15 MG tablet Take 1 tablet by mouth daily 9/27/18  Yes Dania Ramos MD   losartan (COZAAR) 100 MG tablet Take 1 tablet by mouth daily 9/27/18  Yes Dania Ramos MD   metoprolol succinate (TOPROL XL) 100 MG extended release tablet Take 1 tablet by mouth daily 9/27/18  Yes Dania Ramos MD   atorvastatin (LIPITOR) 80 MG tablet Take 1 tablet by mouth daily 9/27/18  Yes Dania Ramos MD   levothyroxine (SYNTHROID) 50 MCG tablet Take 1 tablet by mouth daily TAKE 1 TABLET BY MOUTH EVERY DAY 9/27/18  Yes Dania Ramos MD   aspirin (ASPIRIN LOW DOSE) 81 MG EC tablet Take 1 tablet by mouth daily 8/30/18  Yes Dania Ramos MD   TRINTELLIX 10 MG TABS tablet TK 1 T PO Q MORNING. restarted at 5mg 4/2/18  Yes Historical Provider, MD   risperiDONE (RISPERDAL) 0.25 MG tablet Take 0.25 mg by mouth 2 times daily Indications: 1 tab every AM and 2 tabs at bedtime. restarted 4/19 at 0.25mg in AM only    Yes Historical Provider, MD   donepezil (ARICEPT) 5 MG tablet TAKE 1 TABLET BY MOUTH AT BEDTIME 2/26/19   Jass Mcdowell MD   dicyclomine (BENTYL) 10 MG capsule Take 1 capsule by mouth 4 times daily Use as needed for cramping 5/8/18   Dania Ramos MD   divalproex (DEPAKOTE) 250 MG DR tablet TK 1 T PO QHS 3/29/18   Historical Provider, MD   albuterol-ipratropium (COMBIVENT RESPIMAT)  MCG/ACT AERS inhaler Inhale 1 puff into the lungs every 6 hours as needed for Wheezing 8/29/17   Dania Ramos MD   acetaminophen (TYLENOL) 500 MG tablet Take 1,000 mg by mouth every 6 hours as needed for Pain    Historical Provider, MD   nystatin (MYCOSTATIN) 920626 UNIT/GM powder Apply to groin bilaterally, apply 3 times daily.  6/23/16   Dania Ramos MD       Future Appointments   Date Time Provider Ifrah Lopez   5/2/2019  2:00 PM Dania Ramos MD [facility] [address]   5/10/2019  2:30 PM Jass Mcdowell MD [address] [facility]   6/5/2019  1:00 PM Naif Garcia, RD, LD [facility]   6/6/2019  2:00 PM Baltazar Joyner MD [address]

## 2019-04-24 ENCOUNTER — CARE COORDINATION (OUTPATIENT)
Dept: CARE COORDINATION | Age: 59
End: 2019-04-24

## 2019-04-24 DIAGNOSIS — J45.20 REACTIVE AIRWAY DISEASE, MILD INTERMITTENT, UNCOMPLICATED: ICD-10-CM

## 2019-04-24 NOTE — TELEPHONE ENCOUNTER
Received call from [facility] today. States that he had SOB episode yesterday and went to use his inhaler, but it was out. Pt requesting new script for combivent inhaler. Please send to [address]. Medication pended. Please advise.

## 2019-04-24 NOTE — CARE COORDINATION
Ambulatory Care Coordination Note  4/24/2019  CM Risk Score: 5  Arvind Mortality Risk Score:      ACC: Danne Cheadle, RN    Summary Note: received call from [facility] today stating that yesterday he was mowing the lawn. Started having knee pain so he decided to rest.  Started back mowing and then developed some SOB. Pt states that instead of stopping he continued mowing as he just wanted to get it done. After finishing mowing pt sat down and attempted to use his combivent inhaler. Pt states inhaler was empty so he did not get any relief. States that he did develop some pains in his chest during the episode. He continued to rest for a couple of hours and symptoms subsided. Late yesterday evening he went to [address] [facility] with neighbor and walked their dogs. Denied getting SOB or having pains in chest at that time. Pt states that he feels good today and has not had anymore episodes. Advised pt that if he develops CP that persists to seek medical treatment for evaluation immediately. Educated pt on importance of taking rest breaks when feeling SOB and to do deep breathing exercises. Encouraged pt to take slow deep breaths in through the nose and out through the mouth to help. Pt verbalized understanding. Informed pt med request will be sent to provider for Combivent. Pt states that he will be able to  medication today. Requesting medication go to [place]. Offered same day appt with NP in office today as PCP is out of town this week and pt  does not have scheduled appt with PCP until 5/2. Pt declines. Again states that he feels good today. Encouraged pt to monitor symptoms closely and if symptoms reoccur and do not improve with use of combivent inhaler to seek treatment. Pt reports that he is back to taking all of his medication as prescribed. Denies further needs at this time.          Care Coordination Interventions    Program Enrollment:  Complex Care  Referral from Primary Care Provider:  Yes  Suggested Interventions and Community Resources  [facility]  Completed (Comment: follows with James Del Toro. sees counselor Bryanta Handler as well as physician.  has CM with James Del Toro)  [address]:  Completed  Marketplace Navigator:  Declined  Meals on Wheels:  Not Started  Medi Set or Pill Pack:  Completed (Comment: [address] sets meds up in [employer] .  mailed out to pt weekly. )  Registered Dietician:  Completed (Comment: seeing Janie Quiroz)  Social Work:  Completed (Comment: SUSANNA Pedersen working with pt )  Specialty Services Referral:  Completed (Comment: did not meet DD criteria to qualify for services. )  Transportation Support:  Completed  Zone Management Tools:  Completed  Other Services or Interventions:  CM with Alexandra [phone]. has payee that pays his bills. Goals Addressed     None          Prior to Admission medications    Medication Sig Start Date End Date Taking?  Authorizing Provider   omeprazole (PRILOSEC) 40 MG delayed release capsule TAKE 1 CAPSULE BY MOUTH DAILY 3/1/19   Rolly Mccoy MD   metFORMIN (GLUCOPHAGE) 500 MG tablet TAKE 1 TABLET BY MOUTH DAILY (WITH BREAKFAST) 3/1/19   Rolly Mcocy MD   donepezil (ARICEPT) 5 MG tablet TAKE 1 TABLET BY MOUTH AT BEDTIME 2/26/19   Bessie Keane MD   ezetimibe (ZETIA) 10 MG tablet Take 1 tablet by mouth daily 9/27/18   Rolly Mccoy MD   meloxicam [facility]) 15 MG tablet Take 1 tablet by mouth daily 9/27/18   Rolly Mccoy MD   losartan (COZAAR) 100 MG tablet Take 1 tablet by mouth daily 9/27/18   Rolly Mccoy MD   metoprolol succinate (TOPROL XL) 100 MG extended release tablet Take 1 tablet by mouth daily 9/27/18   Rolly Mccoy MD   atorvastatin (LIPITOR) 80 MG tablet Take 1 tablet by mouth daily 9/27/18   Rolly Mccoy MD   levothyroxine (SYNTHROID) 50 MCG tablet Take 1 tablet by mouth daily TAKE 1 TABLET BY MOUTH EVERY DAY 9/27/18   Rolly Mccoy MD   aspirin

## 2019-04-29 ENCOUNTER — TELEPHONE (OUTPATIENT)
Dept: INTERNAL MEDICINE CLINIC | Age: 59
End: 2019-04-29

## 2019-04-29 ENCOUNTER — CARE COORDINATION (OUTPATIENT)
Dept: CARE COORDINATION | Age: 59
End: 2019-04-29

## 2019-04-29 NOTE — CARE COORDINATION
Pt called asking about SW. Informed him that SW is out of office for next couple of days, but that I have a message in to her for her to reach out to him later this week. Pt denies any urgent needs. Reminded pt of appt on 5/2 with PCP. Pt states that he thinks CM with [facility] will be bringing him to that appt, but not completely sure. Pt also asking about a calendar to write appt dates down. Calendar mailed to pt. Attempted to reach [address] with [facility]. No answer. Message left requesting return call. Attempted to reschedule pt's dietician appt in June so pt could see PCP and dietician on same day, but no openings available.

## 2019-04-29 NOTE — TELEPHONE ENCOUNTER
Petrona called and requested to see if pt's appt with Kim Diaz. Could be r/s to 06 06 19 since he appt with Dr. Alton Holter on same day. No appt open for Bianca on  06 06 19  Petrona understood.

## 2019-05-02 ENCOUNTER — CARE COORDINATION (OUTPATIENT)
Dept: CARE COORDINATION | Age: 59
End: 2019-05-02

## 2019-05-02 ENCOUNTER — NURSE ONLY (OUTPATIENT)
Dept: LAB | Age: 59
End: 2019-05-02

## 2019-05-02 ENCOUNTER — OFFICE VISIT (OUTPATIENT)
Dept: INTERNAL MEDICINE CLINIC | Age: 59
End: 2019-05-02
Payer: MEDICARE

## 2019-05-02 VITALS
SYSTOLIC BLOOD PRESSURE: 135 MMHG | WEIGHT: 235 LBS | HEIGHT: 63 IN | BODY MASS INDEX: 41.64 KG/M2 | DIASTOLIC BLOOD PRESSURE: 68 MMHG | HEART RATE: 81 BPM

## 2019-05-02 DIAGNOSIS — I10 ESSENTIAL HYPERTENSION: Primary | ICD-10-CM

## 2019-05-02 DIAGNOSIS — M25.571 CHRONIC PAIN OF RIGHT ANKLE: ICD-10-CM

## 2019-05-02 DIAGNOSIS — K21.9 GASTROESOPHAGEAL REFLUX DISEASE, ESOPHAGITIS PRESENCE NOT SPECIFIED: ICD-10-CM

## 2019-05-02 DIAGNOSIS — E03.9 ACQUIRED HYPOTHYROIDISM: ICD-10-CM

## 2019-05-02 DIAGNOSIS — G89.29 CHRONIC PAIN OF RIGHT ANKLE: ICD-10-CM

## 2019-05-02 DIAGNOSIS — E11.9 TYPE 2 DIABETES MELLITUS WITHOUT COMPLICATION, WITHOUT LONG-TERM CURRENT USE OF INSULIN (HCC): ICD-10-CM

## 2019-05-02 DIAGNOSIS — G45.8 OTHER SPECIFIED TRANSIENT CEREBRAL ISCHEMIAS: ICD-10-CM

## 2019-05-02 DIAGNOSIS — E78.5 HYPERLIPIDEMIA, UNSPECIFIED HYPERLIPIDEMIA TYPE: ICD-10-CM

## 2019-05-02 LAB
ANION GAP SERPL CALCULATED.3IONS-SCNC: 11 MEQ/L (ref 8–16)
BASOPHILS # BLD: 0.4 %
BASOPHILS ABSOLUTE: 0 THOU/MM3 (ref 0–0.1)
BUN BLDV-MCNC: 19 MG/DL (ref 7–22)
CALCIUM SERPL-MCNC: 9.2 MG/DL (ref 8.5–10.5)
CHLORIDE BLD-SCNC: 100 MEQ/L (ref 98–111)
CO2: 27 MEQ/L (ref 23–33)
CREAT SERPL-MCNC: 1 MG/DL (ref 0.4–1.2)
EOSINOPHIL # BLD: 1 %
EOSINOPHILS ABSOLUTE: 0.1 THOU/MM3 (ref 0–0.4)
ERYTHROCYTE [DISTWIDTH] IN BLOOD BY AUTOMATED COUNT: 14.5 % (ref 11.5–14.5)
ERYTHROCYTE [DISTWIDTH] IN BLOOD BY AUTOMATED COUNT: 46.4 FL (ref 35–45)
GFR SERPL CREATININE-BSD FRML MDRD: 76 ML/MIN/1.73M2
GLUCOSE BLD-MCNC: 90 MG/DL (ref 70–108)
HBA1C MFR BLD: 6.3 % (ref 4.3–5.7)
HCT VFR BLD CALC: 41.2 % (ref 42–52)
HEMOGLOBIN: 13.3 GM/DL (ref 14–18)
IMMATURE GRANS (ABS): 0.03 THOU/MM3 (ref 0–0.07)
IMMATURE GRANULOCYTES: 0.3 %
LYMPHOCYTES # BLD: 17.9 %
LYMPHOCYTES ABSOLUTE: 1.6 THOU/MM3 (ref 1–4.8)
MCH RBC QN AUTO: 28.7 PG (ref 26–33)
MCHC RBC AUTO-ENTMCNC: 32.3 GM/DL (ref 32.2–35.5)
MCV RBC AUTO: 89 FL (ref 80–94)
MONOCYTES # BLD: 8.2 %
MONOCYTES ABSOLUTE: 0.7 THOU/MM3 (ref 0.4–1.3)
NUCLEATED RED BLOOD CELLS: 0 /100 WBC
PLATELET # BLD: 221 THOU/MM3 (ref 130–400)
PMV BLD AUTO: 12.7 FL (ref 9.4–12.4)
POTASSIUM SERPL-SCNC: 4.6 MEQ/L (ref 3.5–5.2)
RBC # BLD: 4.63 MILL/MM3 (ref 4.7–6.1)
SEG NEUTROPHILS: 72.2 %
SEGMENTED NEUTROPHILS ABSOLUTE COUNT: 6.6 THOU/MM3 (ref 1.8–7.7)
SODIUM BLD-SCNC: 138 MEQ/L (ref 135–145)
TSH SERPL DL<=0.05 MIU/L-ACNC: 2.78 UIU/ML (ref 0.4–4.2)
WBC # BLD: 9.1 THOU/MM3 (ref 4.8–10.8)

## 2019-05-02 PROCEDURE — 99214 OFFICE O/P EST MOD 30 MIN: CPT | Performed by: INTERNAL MEDICINE

## 2019-05-02 PROCEDURE — 83036 HEMOGLOBIN GLYCOSYLATED A1C: CPT | Performed by: INTERNAL MEDICINE

## 2019-05-02 RX ORDER — LEVOTHYROXINE SODIUM 0.05 MG/1
50 TABLET ORAL DAILY
Qty: 30 TABLET | Refills: 5 | Status: SHIPPED | OUTPATIENT
Start: 2019-05-02 | End: 2019-10-25 | Stop reason: SDUPTHER

## 2019-05-02 RX ORDER — OMEPRAZOLE 40 MG/1
CAPSULE, DELAYED RELEASE ORAL
Qty: 30 CAPSULE | Refills: 5 | Status: SHIPPED | OUTPATIENT
Start: 2019-05-02 | End: 2019-10-25 | Stop reason: SDUPTHER

## 2019-05-02 RX ORDER — ATORVASTATIN CALCIUM 80 MG/1
80 TABLET, FILM COATED ORAL DAILY
Qty: 30 TABLET | Refills: 5 | Status: SHIPPED | OUTPATIENT
Start: 2019-05-02 | End: 2019-10-25 | Stop reason: SDUPTHER

## 2019-05-02 RX ORDER — EZETIMIBE 10 MG/1
10 TABLET ORAL DAILY
Qty: 30 TABLET | Refills: 5 | Status: SHIPPED | OUTPATIENT
Start: 2019-05-02 | End: 2019-10-25 | Stop reason: SDUPTHER

## 2019-05-02 RX ORDER — ASPIRIN 81 MG/1
81 TABLET ORAL DAILY
Qty: 90 TABLET | Refills: 3 | Status: SHIPPED | OUTPATIENT
Start: 2019-05-02 | End: 2020-03-17 | Stop reason: SDUPTHER

## 2019-05-02 RX ORDER — LOSARTAN POTASSIUM 100 MG/1
100 TABLET ORAL DAILY
Qty: 30 TABLET | Refills: 5 | Status: SHIPPED | OUTPATIENT
Start: 2019-05-02 | End: 2019-10-25 | Stop reason: SDUPTHER

## 2019-05-02 RX ORDER — MELOXICAM 15 MG/1
15 TABLET ORAL DAILY
Qty: 30 TABLET | Refills: 5 | Status: SHIPPED | OUTPATIENT
Start: 2019-05-02 | End: 2019-10-25 | Stop reason: SDUPTHER

## 2019-05-02 RX ORDER — METOPROLOL SUCCINATE 100 MG/1
100 TABLET, EXTENDED RELEASE ORAL DAILY
Qty: 30 TABLET | Refills: 5 | Status: SHIPPED | OUTPATIENT
Start: 2019-05-02 | End: 2019-10-25 | Stop reason: SDUPTHER

## 2019-05-02 NOTE — PROGRESS NOTES
1960    Chief Complaint   Patient presents with    Hypertension     patient was not taking medication but has resumed 4/19 after Nikko Lucas appt.  Diabetes    Hypothyroidism       Pt is a 60 y.o. male who presents for 8 month follow up visit - lost to follow up - was to be seen for 3 month follow up. He stopped medication for a period of time but recently restarted 4/19/19 Anderson Larios fills a pill pack for him to make it easier to him to take and helps with cost of psych medications I believe. I discussed with him, he states it wasn't sent in the mail. I reminded him that if that happens again he should call Nikko Lucas and let them know but he refuses to do that. Will check BMP, CBC and TSH now but realize if TSH is off it may be from the lack of medication. Will repeat other test ie microalbumin, lipid at next visit in 4 months. Reactive airway disease - it looks like Marc ROBLES started Combivent - took 6 days to get to him - was having SOB. Improving now that he has it. He states SOB worse with mowing lawn - ? Asthma exacerbated by grass/pollen. Lungs clear on exam today. Left muscle chest pain - due to pulling muscles when big dog pulled leash hard. No diaphoresis, or SOB. Will still have occasional vomiting but not always assoiciated with chest pain. He states pain is better with pressure on chest - worse with walking the dogs. He is helping neighbor by letting these dogs out and they are big dogs. Tried to get him to take a break and let muscle heal.  He is already on Mobic daily. At last visit tried a medrol dose pack to help with inflammation. EKG normal, he recently had a complete cardiac work-up 1/2017. Today on re-evaluation - pain mild. Difficulty focusing, memory, lack of life skills - Discussed results of MRI done 2/2018 - noted mild chronic microvascular ischemia angiopathy, no acute changes. He had head CT 10/2016 - mild ischemia due to small vessel ischemic disease. He has a family history of Alzheimer's. TSH, CMP, CBC normal.  B12 high - it is chronically high. He denies being on supplement. He did see neurology - Psy evaluation and labs ordered and started Aricept. New dx diabetes - HgA1c 6.9 5/2018, no neuropathy, nephropathy. Normal microalbumin. Eye exam done 6/2018 with Dr. Denice Geiger - no DM retinopathy. Foot exam 6/2018. No autonomic dysfunction. Started metformin 6/2018. Ordered DM dietician visit. He has seen dietician but limited ability to make meals and understand - suggested group home. Won't qualify for meals on wheels. He can't read how much time to microwave frozen meals need, even with pictures (can't read a clock face). HgA1c is controlled at 6.1 and microalbumin normal 9/2018. Continue to have Petrona/social work assess what can be done about living situation/food. HgA1c 6.3 5/2019. He is eating with Rk Mast (now retired) and doing better with food. Please see note from 1/30/18 for his complex social situation. He has limited ability to care for self independently. He has now gotten help from  at [facility], he now has a  at [facility]. See previous note for explanation of current changes in social situation. Now having some help with meals with neighbor. He is teaching him how to make things.     Hyperlipidemia - , trig in 300s. He is on Lipitor 80 mg and states he is taking every day. Added Zetia 1/2018 and LDL 53.38, DBJA 078, normal ALT 5/2018, normal ALT 9/2018. Repeat labs at next visit - not on meds long enough today. Hypertension - He is taking his medication as directed now. So BP is controlled on metoprolol and losartan.       Hypothyroidism - They increased thyroid med in hospital to 50 mcg need to repeat lab at end of 8/2017. TSH 2.8 8/29/17,1/29/2018, and 2.99 9/2018, will continue on this dose. Check TSH now. GERD - stable most of the time.   Continue Prilosec 40 mg daily.  Still with occasional vomiting/due to reflux - will not change dose. Continue medication and following with ANNETTE. Depression - seeing [facility]. Defer medication to them. In past he has voiced comments like he wants to end it all but no plan. Frustrated and alone. Hope that with help from [facility] and Neurology can get additional help. No SI on exam today.     Discussed at previous visit:  He is more confused intermittently. He reports that recently his sister came over to his trailer and he argued with her. She wanted him to sign a form and he refused. He went to son's grave site to think - helps him usually. Then he wanted to go see his mom as she always comforted him. He drove to the town his mother lived in but instead of going to her grave site - he automatically drove to her old house. He walked in to the house and luckily the people who live there talked to him nicely. He realized then that his parents were dead. He went to [facility] and [facility] and was assessed. He is trying to get someone to talk to. He is currently in process of psychiatrist and psychologist through [facility]. With his memory issues - suggested MRI of brain. Since last visit he had EGD with Dr. Joseph Hong - gastritis noted. They want PPI BID. He is continuing to have vomiting 3x week. It can happen anytime of day, before or after eating. He states it usually starts with a feeling of getting hot and then vomits. May be just water and pills. No blood in emeses. GI ordered another RUQ ultrasound and gallbladder normal.  GI gave Zofran 8 mg tabs but nausea/vomiting comes on so quickly he doesn't have time to take it. He is also having crampy pain and bloating - he is not using Bentyl much - suggested taking 2-4 x a day on a scheduled basis.   He is to see GI again in 12/2107.        Patient was admitted to [address] [place] [address] from 7/14/17 to 7/17/17 for recurrent nausea/vomiting and abdominal pain, CRISTIAN - Cr 2. 0->1.3, dehydration, diverticulosis, HTN, hypothyroidism.     He was hydrated with IVF, and Cr improved from 2.0 ->1. 3.  Renal ultrasound with cyst.  He had CT without contrast - unremarkable but limited as without contrast.  To have gallbladder ultrasound as outpatient as he was still very tender in RUQ.  He had EGD and Colonoscopy with bx of polyp - tubular adenoma per path report.  No follow with Dr. Delon Chin scheduled. [facility] is still having nausea, and abdominal pain - not worsening.  Added Zofran and Bentyl.       The gallbladder ultrasound negative.       He took Zofran, Bentyl and better for a day then vomited the next day. He stopped all medication except levothyroxine, Zofran, Bentyl, and omeprazole and BP is very elevated today. He is continuing to vomiting 1-3x a day. Or he has a GERD sensation and not actually vomiting. It can happen at any time - even with water. Question if this is severe GERD vs outlet obstruction vs dysmotility/gastroparesis.     Would like to increase Omeprazole to BID, and refer to GI - to see Hiren Zaidi Aug 11. He had appt today but cancelled as had appt with Dr. Dov Carrillo. Question if this could be due to endocrine tumor with sweating and then N/V. It occurs with increased abdominal pressure. He is trying peptobismal and helping some.        Since last visit, he did see Hiren Zaidi and has been scheduled for EGD 9/2017. He said the nausea and vomiting is better - last vomiting episode was yesterday at [facility] when he last vomited after eating in parking lot. He did just eat sausage and gravy. He is taking Omeprazole BID and Zofran 1-2x a day and Bentyl prn bloating.       Depression - positive screen. Dr. Jose Coulter added Trazodone and Hydroxyzine and suggested increasing Prozac to 40 mg when he was consulted while patient was at [facility]. He is taking Trazadone, and Hydroxyzine inconsistently, but is taking Prozac 20 daily.   Still waiting to see if he sees psychiatry - cream and if not improved - will refer to Tony valerio  Neurological ROS: negative for - seizures, positive tingling of shoulders and face, with episodes - none in last 5 months. Dermatological ROS: negative for - rash or skin lesion changes    Blood pressure 135/68, pulse 81, height 5' 3\" (1.6 m), weight 235 lb (106.6 kg). Physical Examination: General appearance -alert, well appearing, and in no distress  Mental status - alert, oriented to person, place, and time  Neck - supple, no significant adenopathy  Chest - clear to auscultation, no wheezes, rales or rhonchi, symmetric air entry  Heart - normal rate, regular rhythm, normal S1, S2, no murmurs, rubs, clicks or gallops  Abdomen -soft, nontender, nondistended  Neurological - alert, oriented  Extremities - peripheral pulses normal, trace ankle edema, no clubbing or cyanosis   Skin - warm and dry, soft mobile lesion above left eyebrow - no redness, warmth, tenderness, soft non-mobile lesion on abdomen mid left side, feel like in dermal layer of skin. No redness, warmth, tenderness. Foot exam 5/2/19: no foot lesions, normal sensation with monofilament testing, +2 PT and DP pulses bilaterally    Diagnostic data:  Results for orders placed or performed in visit on 05/02/19   POCT glycosylated hemoglobin (Hb A1C)   Result Value Ref Range    Hemoglobin A1C 6.3 (H) 4.3 - 5.7 %     Assessment and Plan:      Diagnosis Orders   1. Essential hypertension  metoprolol succinate (TOPROL XL) 100 MG extended release tablet    losartan (COZAAR) 100 MG tablet   2. Type 2 diabetes mellitus without complication, without long-term current use of insulin ([facility])  POCT glycosylated hemoglobin (Hb A1C)    [address]81 - Collection Capillary Blood Specimen    Basic Metabolic Panel    CBC Auto Differential    metFORMIN (GLUCOPHAGE) 500 MG tablet     DIABETES FOOT EXAM   3.  Hyperlipidemia, unspecified hyperlipidemia type  ezetimibe (ZETIA) 10 MG tablet    atorvastatin (LIPITOR) 80 MG tablet   4. Acquired hypothyroidism  TSH with Reflex    levothyroxine (SYNTHROID) 50 MCG tablet   5. Other specified transient cerebral ischemias  aspirin (ASPIRIN LOW DOSE) 81 MG EC tablet   6. Chronic pain of right ankle  meloxicam (MOBIC) 15 MG tablet   7. Gastroesophageal reflux disease, esophagitis presence not specified  omeprazole (PRILOSEC) 40 MG delayed release capsule     Hypertension - BP controlled, continue metoprolol and losartan. DM - at goal now on Metformin and diet changes - ordered DM dietician visit to help with his poor knowledge of nutrition. Limited ability to retain knowledge or follow through with recommendations. Foot exam done today, eye exam due 6/2019. Hyperlipidemia - at goal on Lipitor and Zetia. Will wait 4 months to check lipid with next set of labs as has been off medication for unknown period of time. Hypothyroidism - at goal on levothyroxine 50 mcg daily. Check TSH now. History of TIA - stable. Chronic right ankle pain - continue Mobic. Try Lidocaine patches on knee or Killian emu cream.  If that is not effective- will refer back to [address] [place] [address]. GERD - stable. Follow up in 5 months for follow up of chronic issues. Lab due now. Allergies: Patient has no known allergies. Dr. Bowen Trevino    [address]  MARCELLA HUFFMAN II.[place], [address]    Phone number: [phone]  Fax number [phone]

## 2019-05-02 NOTE — CARE COORDINATION
Attempted to reach Susana-CM with [facility]. No answer. Message left to return call. Contacted Duane. Pt remembered that he has an appt at office today at 2pm. Pt states that he received call from  this AM telling him that she could not bring his to his appt today. Pt states that his neighbor Kerry Wilson had an appt in [place] this morning, but is hoping he will be back in time for him to use his truck to come to his appt. Pt states \"I will get there some way. \"  Denies needs.

## 2019-05-03 ENCOUNTER — CARE COORDINATION (OUTPATIENT)
Dept: CARE COORDINATION | Age: 59
End: 2019-05-03

## 2019-05-03 NOTE — CARE COORDINATION
Met pt in his home after I was informed by [address] [place] [address] that pt had called her with mail from [facility] and not sure what it was about. I assisted pt in filling out Medicaid renewal application as pt cannot read. Pt shared his interests. He has a pet dove that used to belong to his daughter 25 yrs ago and he really enjoys this bird. Pt is also \"practicing my banjo that I used to play. \" Pt reports doing better daily, snacking on fruits and almonds. Pt home appeared very clean. Pt was proud and showed me the home improvements he has completed in the last 5 yrs. Provided emotional support and education.

## 2019-05-03 NOTE — CARE COORDINATION
Received call from [facility]. He states that he had a good appointment with PCP yesterday. Continues to take his medications. Has new inhaler and is using it. Reports that it does help improve his breathing. Using as needed. Duane reports that he received paperwork from [facility] and isn't sure what he needs to do with it. Had neighbor Joselo Gone look it over, but states that he wasn't sure what he needed to it with it either. Asking if SW can review it. Informed him that I would reach out to her and have her get in contact with him. Spoke with [address] [facility]. She states that she will reach out to [facility] today.

## 2019-05-06 ENCOUNTER — TELEPHONE (OUTPATIENT)
Dept: INTERNAL MEDICINE CLINIC | Age: 59
End: 2019-05-06

## 2019-05-06 NOTE — TELEPHONE ENCOUNTER
Left message per HIPAA form that Dr. Karsten Santacruz reviewed his lab results and the labs were normal . Call the office if any questions .

## 2019-05-06 NOTE — TELEPHONE ENCOUNTER
----- Message from Michelle Luo MD sent at 5/2/2019  9:23 PM EDT -----  Let him know labs were normal.

## 2019-05-09 ENCOUNTER — CARE COORDINATION (OUTPATIENT)
Dept: CARE COORDINATION | Age: 59
End: 2019-05-09

## 2019-05-09 NOTE — CARE COORDINATION
Pt called asking Kimberlee Ganser is Dr. Isbell Shaper? I have an appt to see him tomorrow and need to cancel. \" I told pt that I would speak to Petrona to find out what this appt is for. Pt would not tell me why he needs to cancel. I called and left a vm at Dr. Jesenia Bowser to cancel and asked if they would please call pt to reschedule. I left vm on pt phone to inform him of this.

## 2019-05-09 NOTE — CARE COORDINATION
Received message from Duane re: neurology appt. Tomorrow. Attempted to reach Duane. No answer. Message left to return call.

## 2019-05-13 ENCOUNTER — CARE COORDINATION (OUTPATIENT)
Dept: CARE COORDINATION | Age: 59
End: 2019-05-13

## 2019-05-13 NOTE — CARE COORDINATION
Pt called and left vm wondering what to do about his medications that he was suppose to get from [facility] and has not received. He reported he Madie Estrella has 4 days left. \" Pt did not answer phone when I returned his call an vm was full. I spoke with [address] [place] [address] regarding this and she has taken care of this matter.

## 2019-05-22 ENCOUNTER — CARE COORDINATION (OUTPATIENT)
Dept: CARE COORDINATION | Age: 59
End: 2019-05-22

## 2019-05-22 NOTE — CARE COORDINATION
Ambulatory Care Coordination Note  5/22/2019  CM Risk Score: 5  Arvind Mortality Risk Score:      ACC: Cheyenne Chávez, HOLLY    Summary Note:          Prior to Admission medications    Medication Sig Start Date End Date Taking? Authorizing Provider   VORTIoxetine (TRINTELLIX) 5 MG tablet Take 10 mg by mouth daily    Historical Provider, MD   metoprolol succinate (TOPROL XL) 100 MG extended release tablet Take 1 tablet by mouth daily 5/2/19   Bhavani Kyle MD   aspirin (ASPIRIN LOW DOSE) 81 MG EC tablet Take 1 tablet by mouth daily 5/2/19   Bhavani Kyle MD   meloxicam [facility]ONICA TUCK[facility]) 15 MG tablet Take 1 tablet by mouth daily 5/2/19   Bhavani Kyle MD   losartan (COZAAR) 100 MG tablet Take 1 tablet by mouth daily 5/2/19   Bhavani Kyle MD   ezetimibe (ZETIA) 10 MG tablet Take 1 tablet by mouth daily 5/2/19   Bhavani Kyle MD   omeprazole (PRILOSEC) 40 MG delayed release capsule TAKE 1 CAPSULE BY MOUTH DAILY 5/2/19   Bhavani Kyle MD   metFORMIN (GLUCOPHAGE) 500 MG tablet Take 1 tablet by mouth daily (with breakfast) 5/2/19   Bhavani Kyle MD   levothyroxine (SYNTHROID) 50 MCG tablet Take 1 tablet by mouth daily TAKE 1 TABLET BY MOUTH EVERY DAY 5/2/19   Bhavani Kyle MD   atorvastatin (LIPITOR) 80 MG tablet Take 1 tablet by mouth daily 5/2/19   Bhavani Kyle MD   albuterol-ipratropium (COMBIVENT RESPIMAT)  MCG/ACT AERS inhaler Inhale 1 puff into the lungs every 6 hours as needed for Wheezing 4/24/19   Negar Roberts, APRN - CNP   donepezil (ARICEPT) 5 MG tablet TAKE 1 TABLET BY MOUTH AT BEDTIME 2/26/19   Jinny Win MD   divalproex (DEPAKOTE) 250 MG DR tablet TK 1 T PO QHS 3/29/18   Historical Provider, MD   risperiDONE (RISPERDAL) 0.25 MG tablet Take 0.25 mg by mouth 2 times daily Indications: 1 tab every AM and 2 tabs at bedtime.   restarted 4/19 at 0.25mg in AM only     Historical Provider, MD   acetaminophen (TYLENOL) 500 MG tablet Take 1,000 mg by mouth every 6 hours as needed for Pain    Historical Provider, MD       Future Appointments   Date Time Provider Ifrah Linaresi   5/24/2019 11:30 AM Rafael Krause MD [facility] [address]   6/5/2019  1:00 PM Luis A Paul RD, LD [facility]   9/3/2019  1:30 PM Apolonia Kay MD [facility] [place] Fuentes presented to office today stating that he has a medication problem. Shares that he just got back from [address] [place] [address] with his neighbor and accidentally left 2wk supply of meds [facility] [place]. Has no way to get meds shipped back to him as home they were left at is unoccupied. Pt will not be going back to [facility] until next mth sometime. Pt states that he has enough meds to get him through Sat or Sun of this wk. Will contact pharmacy to see if medi set can be filled earlier than normal fill d/t this. Pt very talkative today. Excited as his neighbor Dickson Soria has offered for him to move to [facility] with him. Pt states that Dickson Soria will be moving here soon, but he plans on staying here until his friend Terrie Reynoso passes. Pt states \"I know that could be a yr or longer, but I'm not going to leave her. \"  Pt sta  rustam that he is working on getting in touch with his CM to inform  Her of this as well. Has decided that he wants Dickson Soria to become his payee that way it will all be set up prior to him moving. Pt states that Dickson Soria has a huge home in [address] [place] [address] with an apt built on and that is where he will be staying. Duane seems very excited about this as he has been very worried about what he would do when the day comes that Terrie Reynoso passes. Informed Duane that he will need to find PCP and specialists in the area he will be moving to as well as behavioral health care. Rhett Reeder has family there. I encouraged him to talk with them about recommendations on provider in that area. Mauricio Estrella Spoke with [place] at [employer]. Informed her of situation.  She reviewed his list and states that they can fill mediset and have it ready for him tomorrow. Pt will need to pick this set up as if placed in mail pt will not receive in time. Contacted pt. No answer. Left detailed message on machine re: medications. Advised to contact myself today or SW tomorrow  with any issues re: medication pickup. DM: pt continues to monitor diet. Has improved on making healthier choices. Venecia Santiago has been a positive influence and is helping him when going to grocery store. Has started having meals together to help save money and in turn this has helped Duane from eating out less. Walking for exercise. Plan of care:  Pt now taking medications. Encouraged to continue. Will contact office/pharmacy in future if having medication trouble. Completed Medicaid renewal form with assistance from SW. Pt states that he received confirmation that [facility] did receive it. Pt getting medi set from pharmacy and meds being mailed to pt. Pt has payee helping him to manage his bills. Continue with this. Talk with CM about switching payee. Contact office when moving when that time comes. Pt has met goals. Going to office visits regularly. Has access to transportation. Eating better and exercising. Following with behavioral health now. Has payee to help manage his money. Has CM with Miguel Reyes. Pt has been connected with resources to assist him in continuing to live independently. Will graduate from care coordination upon PCP approval.          Care Coordination Interventions    Program Enrollment:  Complex Care  Referral from Primary Care Provider:  Yes  Suggested Interventions and [facility]:  Completed (Comment: follows with Miguel Reyes.   sees counselor Mervin Diggs as well as physician.  has CM with Miguel Reyes)  [address]:  Completed  [employer] Navigator:  Declined  Meals on Wheels:  Not Started  Medi Set or Pill Pack:  Completed (Comment: [address] sets meds up in [employer] .  mailed out to tablet TK 1 T PO QHS 3/29/18   Historical Provider, MD   risperiDONE (RISPERDAL) 0.25 MG tablet Take 0.25 mg by mouth 2 times daily Indications: 1 tab every AM and 2 tabs at bedtime. restarted 4/19 at 0.25mg in AM only     Historical Provider, MD   acetaminophen (TYLENOL) 500 MG tablet Take 1,000 mg by mouth every 6 hours as needed for Pain    Historical Provider, MD       Future Appointments   Date Time Provider Ifrah Lopez   5/24/2019 11:30 AM Yann Jackson MD [facility] [place]   6/5/2019  1:00 PM Rush Carmona RD, LD [facility]   9/3/2019  1:30 PM Russ Mayers MD [facility] Jossie presented to office today stating that he has a medication problem. Shares that he just got back from [address] [place] [address] with his neighbor and accidentally left 2wk supply of meds in [facility]. Has no way to get meds shipped back to him as home they were left at is unoccupied. Pt will not be going back to [facility] until next mth sometime. Pt states that he has enough meds to get him through Sat or Sun of this wk. Will contact pharmacy to see if medi set can be filled earlier than normal fill d/t this. Pt very talkative today. Excited as his neighbor Yifan House has offered for him to move to [facility] with him. Pt states that Yifan House will be moving here soon, but he plans on staying here until his friend [address] [place] [address] passes. Pt states \"I know that could be a yr or longer, but I'm not going to leave her. \"  Pt sta  rustam that he is working on getting in touch with his CM to inform  Her of this as well. Has decided that he wants Yifan House to become his payee that way it will all be set up prior to him moving. Pt states that Yifan House has a huge home in [address] [place] [address] with an apt built on and that is where he will be staying. Duane seems very excited about this as he has been very worried about what he would do when the day comes that [address] passes.   Informed Duane that he will need to find PCP and specialists in the area he will be moving to as well as behavioral health care. Indira Esqueda has family there. I encouraged him to talk with them about recommendations on provider in that area. Chanda Stephens Spoke with [place] at [employer]. Informed her of situation. She reviewed his list and states that they can fill mediset and have it ready for him tomorrow. Pt will need to pick this set up as if placed in mail pt will not receive in time. Contacted pt. No answer. Left detailed message on machine re: medications. Advised to contact myself today or SW tomorrow  with any issues re: medication pickup.

## 2019-05-23 ENCOUNTER — CARE COORDINATION (OUTPATIENT)
Dept: CARE COORDINATION | Age: 59
End: 2019-05-23

## 2019-05-23 NOTE — CARE COORDINATION
I picked up pt meds at [facility] in [facility] as pt requested and delivered them to his home. I put them between the chair cushions as he asked. He wanted me to read a letter he received in the mail and then phone him as to what the letter was about. It was a \"renewal letter\" for his Medicaid coverage. This is something I helped him fill out on 5/3. I called the Medicaid office to see if they have received it yet and am waiting for a response back. I will inform pt of this.

## 2019-05-24 ENCOUNTER — OFFICE VISIT (OUTPATIENT)
Dept: NEUROLOGY | Age: 59
End: 2019-05-24
Payer: MEDICARE

## 2019-05-24 VITALS
WEIGHT: 235 LBS | SYSTOLIC BLOOD PRESSURE: 124 MMHG | DIASTOLIC BLOOD PRESSURE: 72 MMHG | BODY MASS INDEX: 41.64 KG/M2 | HEART RATE: 68 BPM | HEIGHT: 63 IN

## 2019-05-24 DIAGNOSIS — R41.3 MEMORY PROBLEM: Primary | ICD-10-CM

## 2019-05-24 PROCEDURE — 99213 OFFICE O/P EST LOW 20 MIN: CPT | Performed by: NURSE PRACTITIONER

## 2019-05-24 NOTE — PROGRESS NOTES
mg by mouth 2 times daily Indications: 1 tab every AM and 2 tabs at bedtime. restarted 4/19 at 0.25mg in AM only , Disp: , Rfl:     acetaminophen (TYLENOL) 500 MG tablet, Take 1,000 mg by mouth every 6 hours as needed for Pain, Disp: , Rfl:       PE:   Vitals:    05/24/19 1151   BP: 124/72   Site: Left Upper Arm   Position: Sitting   Cuff Size: Medium Adult   Pulse: 68   Weight: 235 lb (106.6 kg)   Height: 5' 3\" (1.6 m)     General Appearance:  awake, alert, oriented, in no acute distress  Gen: NAD, Language is Intact. He has difficulty focusing. His memory is poor, there is no anomia, no apraxia noted on exam.  His calculation is poor  Head: no rash, no icterus  Neck: There is no carotid bruits. The Neck is supple. Neuro: CN 2-12 grossly intact with no focal deficits. Power 5/5 Throughout symmetric, Reflexes are decreased and symmetric. Long tracts are intact. Cerebellar exam is Intact. Sensory exam is intact to light touch. Gait is intact. Musculoskeletal:  Has no hand arthritis, no limitation of ROM in any of the four extremities.   Lower extremities no edema        DATA:  Results for orders placed or performed in visit on 05/02/19   CBC Auto Differential   Result Value Ref Range    WBC 9.1 4.8 - 10.8 thou/mm3    RBC 4.63 (L) 4.70 - 6.10 mill/mm3    Hemoglobin 13.3 (L) 14.0 - 18.0 gm/dl    Hematocrit 41.2 (L) 42.0 - 52.0 %    MCV 89.0 80.0 - 94.0 fL    MCH 28.7 26.0 - 33.0 pg    MCHC 32.3 32.2 - 35.5 gm/dl    RDW-CV 14.5 11.5 - 14.5 %    RDW-SD 46.4 (H) 35.0 - 45.0 fL    Platelets 150 105 - 374 thou/mm3    MPV 12.7 (H) 9.4 - 12.4 fL    Seg Neutrophils 72.2 %    Lymphocytes 17.9 %    Monocytes 8.2 %    Eosinophils 1.0 %    Basophils 0.4 %    Immature Granulocytes 0.3 %    Segs Absolute 6.6 1.8 - 7.7 thou/mm3    Lymphocytes # 1.6 1.0 - 4.8 thou/mm3    Monocytes # 0.7 0.4 - 1.3 thou/mm3    Eosinophils # 0.1 0.0 - 0.4 thou/mm3    Basophils # 0.0 0.0 - 0.1 thou/mm3    Immature Grans (Abs) 0.03 0.00 - 0.07 thou/mm3    nRBC 0 /100 wbc   TSH with Reflex   Result Value Ref Range    TSH 2.780 0.400 - 4.20 uIU/mL   Basic Metabolic Panel   Result Value Ref Range    Sodium 138 135 - 145 meq/L    Potassium 4.6 3.5 - 5.2 meq/L    Chloride 100 98 - 111 meq/L    CO2 27 23 - 33 meq/L    Glucose 90 70 - 108 mg/dL    BUN 19 7 - 22 mg/dL    CREATININE 1.0 0.4 - 1.2 mg/dL    Calcium 9.2 8.5 - 10.5 mg/dL   Anion Gap   Result Value Ref Range    Anion Gap 11.0 8.0 - 16.0 meq/L   Glomerular Filtration Rate, Estimated   Result Value Ref Range    Est, Glom Filt Rate 76 (A) ml/min/1.73m2          Results for orders placed during the hospital encounter of 02/09/18   MRI BRAIN WO CONTRAST    Narrative PROCEDURE: MRI BRAIN WO CONTRAST    CLINICAL INFORMATION: Memory deficit, Facial numbness. COMPARISON: None available. TECHNIQUE: Multiplanar and multiple spin echo MRI images were obtained of the brain without contrast.    FINDINGS:    There is prominence of the sulcal spaces and ventricular system secondary to generalized, age-related parenchymal volume loss. There is a mild amount of patchy hyperintense T2 signal throughout the deep cerebral white matter which likely corresponds to   sequela of chronic microvascular ischemic change. Similar, patchy hyperintense T2 signal is noted within the kaden. No restricted diffusion or abnormal susceptibility is present. The ventricles are midline without evidence of hydrocephalus. No mass, mass   effect or extra-axial fluid collection is identified. The basal cisterns and visualized vascular flow voids are patent. The pituitary, brain stem and cervical medullary junction are within normal limits. The visualized orbits are unremarkable. There is partial fluid opacification of the mastoid air cells on the right and mucosal thickening in the bilateral ethmoid and maxillary sinuses. Impression  Senescent changes are present including mild sequela of chronic microvascular ischemic angiopathy.  No acute intracranial abnormality is identified. **This report has been created using voice recognition software. It may contain minor errors which are inherent in voice recognition technology. **    Final report electronically signed by Dr. Julia Perez on 2018 11:20 AM       Results for orders placed in visit on 10/06/16   CT Head WO Contrast    Narrative Ordering Provider: Sammie Harding 1153        Radiology Department Patient:  Gustavo Ku. :  1960   Sex: [place], [address] [facility] Location:  [place] [address]  Unit #:   [id number]     Acct #:  [de-identified]     Ordering Phys: Sammie Price MD         Exam Date: 10/06/16    Accession #:  [id number]    Exam:  CT   CT Head Without Contrast [id number]    Result:             STUDY:  CT BRAIN WITHOUT CONTRAST          REASON FOR EXAM:   Male, 61years old. Right-sided numbness and weakness          RADIATION DOSAGE (If Supplied By Facility):  CTDIvol = (     ) mGy, DLP =     (     ) mGycm          TECHNIQUE:   Transaxial CT imaging of the brain was performed without     administration of intravenous contrast material.          Individualized dose optimization techniques were used for this CT.          COMPARISON:   None.     ___________________________________          FINDINGS:     Normal soft tissue structures. Normal calvarium. Normal size ventricles and extra-axial spaces for the patient's age. Mild     nonspecific periventricular white matter disease. Normal basal ganglia     and thalami. Normal brainstem. Normal cerebellum. There is no intracranial hemorrhage. There are no findings of an acute     ischemic infarction. Normal visualized paranasal sinuses. ___________________________________          IMPRESSION:     Mild nonspecific white matter disease likely ischemia due to small vessel     disease in patient of this age. No evidence for acute bleed.  If concern for acute infarct MRI recommended. Electronically Signed:     Joelle Welch MD     2016/10/06 at 19:35 EDT     Tel [phone], Service support [phone], Fax [phone]                      cc: Sammie Price MD; Jyoti Jones MD            Dictated by:  Hetal Tang MD on 10/06/16 1914    Technologist:  Barber COLVIN (R)    Transcribed by:  Hetal Tang on 10/06/16 1935        Report Signed by:  Zbigniew Irwin on 10/06/16 1935                  Psychology evaluation 6/13/18:  A:  Pt was administered the MoCA as a screen for cognitive decline. His score of 13/30 suggests some cognitive impairment. All this suggests is further evaluation is needed. For more comprehensive information, referral to neuropsychologist is recommended. Ability for short-term memory recall after 5 minutes was intact (he got 4 out of 5 items correct). It appears pt's longstanding history of developmental issues, lack of education, and long history of depression are more attributable to his memory issues at this time. Though pt was unable to complete PHQ-9 today to assess level of depression, it's believed to be at least moderate.       Diagnosis:     Major depressive disorder; recurrent, moderate and without psychotic features  History of Developmental Disorder          Assessment:     Diagnosis Orders   1. Memory problem  External Referral To Neuropsychology        He is here for follow up for memory trouble. His symptoms are the same since last evaluation. He comes in by himself. He dose not recall much when he is questioned. He saw psychiatry and was recommended to undergo formal evaluation with neuropsychologist for further evaluation. After detailed discussion with patient we agreed on the following plan. Plan:  1. Neuropsychology evaluation  2. Continue with Aricept. 3. Follow up after neuropsychology evaluation  4. Call if any questions or concerns.       Please call if any questions.      Chad Seals CNP

## 2019-05-29 ENCOUNTER — CARE COORDINATION (OUTPATIENT)
Dept: CARE COORDINATION | Age: 59
End: 2019-05-29

## 2019-05-29 NOTE — CARE COORDINATION
Pt called me stating that he has a vm he cannot understand. He said, \"it is something about my Medicaid. \" I asked pt if he was going to be home today but he said he was going to, \"Arlet's place to play [employer] for the afternoon. \" I asked if I could stop in am and he said that would be ok. I asked him to please not erase the vm so I can listen to it and see what needs done. Pt voiced understanding.

## 2019-05-30 ENCOUNTER — CARE COORDINATION (OUTPATIENT)
Dept: CARE COORDINATION | Age: 59
End: 2019-05-30

## 2019-05-30 NOTE — CARE COORDINATION
I visited pt today to listen to vm he informed me he could \"not understand. \"  VM was from [facility] on 5/29. I called the number provided while with pt to see what they needed. The automated msg stated that pt Medicaid had been approved as of 5/29/2019. I assured pt that [address] [place] [address] had received his Medicaid renewal letter as he showed me that they verified this information via mail. Pt had difficulty understanding why they called if it was approved. I assured him that everything is all right and he did the right thing by calling me. Pt seems to be doing really well. He stated, \"I am getting back into my old ways, like working on clocks. \" He showed me on the kitchen table he had a small clock taken apart and was trying to fix the second hand. Pt stated he used to like to work on clocks. I discussed with pt that it seems he is in a much better mood since taking his meds regularly. Pt agreeed. Pt shared he will be moving to [facility]. [employer] dies. \" He stated, \"I weighed myself and I am actually losing wt. Dickson Soria is helping me, and now I eat what he eats. \"  Pt asked me if I new what a few of his med were. I looked them up online and informed pt of what the meds are for. Education, and encouragement along with active listening were provided. Pt appreciative of visit.

## 2019-06-03 ENCOUNTER — CARE COORDINATION (OUTPATIENT)
Dept: CARE COORDINATION | Age: 59
End: 2019-06-03

## 2019-06-03 ENCOUNTER — OFFICE VISIT (OUTPATIENT)
Dept: INTERNAL MEDICINE CLINIC | Age: 59
End: 2019-06-03
Payer: MEDICARE

## 2019-06-03 VITALS
DIASTOLIC BLOOD PRESSURE: 73 MMHG | HEART RATE: 76 BPM | SYSTOLIC BLOOD PRESSURE: 133 MMHG | WEIGHT: 222.5 LBS | HEIGHT: 63 IN | BODY MASS INDEX: 39.42 KG/M2

## 2019-06-03 DIAGNOSIS — H53.8 BLURRING, LEFT EYE: ICD-10-CM

## 2019-06-03 DIAGNOSIS — L72.0 EPIDERMOID CYST OF FACE: Primary | ICD-10-CM

## 2019-06-03 DIAGNOSIS — I10 ESSENTIAL HYPERTENSION: ICD-10-CM

## 2019-06-03 PROCEDURE — 99213 OFFICE O/P EST LOW 20 MIN: CPT | Performed by: NURSE PRACTITIONER

## 2019-06-03 NOTE — PROGRESS NOTES
[facility] PROFESSIONAL SERVS  PHYSICIANS [place] [address] [address] Marylou Fink, [address]oss [facility]  Dept: [phone]  Dept Fax: [phone]      Candis Moffett  60 y.o.  [id number]  6/3/19    Chief Complaint   Patient presents with    Other     lump on forehead above left eye-left eye hurts. says it is getting bigger         Current concerns - Candis Moffett presents for lump on his forehead that has been there since 2014. This patient is followed regularly by Dr. Bernardo Church, last seen 5/2/19. He reports he forgot to mention this at his last appointment and symptoms were present at that time. He states this year this has been getting larger and has become painful over the past 2 months. This is pressure like sensation 10/10 pain, waxes and wanes to a 5/10, and his vision in the left eye is somewhat more blurry when he has his bifocals on. Closing his eyes helps this pain. He has not tried anything for this otherwise. No openings in the skin or drainage noted. No redness, fever, chills.      Past Medical History:   Diagnosis Date    Chronic leg pain     Diabetes ([address] 05/2018    GERD (gastroesophageal reflux disease)     Hyperlipidemia     Hypertension     Hypothyroidism     Sleep apnea     TIA (transient ischemic attack) 2016    Dr. Luciana Vences         Past Surgical History:   Procedure Laterality Date    ANKLE SURGERY Right 2000    KNEE ARTHROSCOPY Left 9-11-15    Torn cartliage    LEG SURGERY Right 2000    [place] COLSC FLX W/RMVL OF TUMOR POLYP LESION SNARE TQ  7/16/2017    COLONOSCOPY POLYPECTOMY SNARE/COLD BIOPSY performed by Aliza Dickerson MD at [facility]    KS EGD TRANSORAL BIOPSY SINGLE/MULTIPLE N/A 9/27/2017    EGD BIOPSY performed by Xena Ghosh [facility] at [facility] ENDOSCOPY  7/16/2017    EGD DIAGNOSTIC ONLY performed by Aliza Dickerson MD at [facility]        Family History   Problem Relation Age of Onset    Dementia Mother     High Blood Pressure Mother    Calos Flax COPD Father     High Cholesterol Father     High Blood Pressure Father         Social History     Socioeconomic History    Marital status:      Spouse name: Not on file    Number of children: Not on file    Years of education: Not on file    Highest education level: Not on file   Occupational History    Not on file   Social Needs    Financial resource strain: Not on file    Food insecurity:     Worry: Not on file     Inability: Not on file    Transportation needs:     Medical: Not on file     Non-medical: Not on file   Tobacco Use    Smoking status: Former Smoker     Packs/day: 0.00     Years: 40.00     Pack years: 0.00     Types: Cigarettes     Last attempt to quit: 2014     Years since quittin.4    Smokeless tobacco: Never Used   Substance and Sexual Activity    Alcohol use: No     Alcohol/week: 0.0 oz    Drug use: No    Sexual activity: Never   Lifestyle    Physical activity:     Days per week: Not on file     Minutes per session: Not on file    Stress: Not on file   Relationships    Social connections:     Talks on phone: Not on file     Gets together: Not on file     Attends [employer] service: Not on file     Active member of club or organization: Not on file     Attends meetings of clubs or organizations: Not on file     Relationship status: Not on file    Intimate partner violence:     Fear of current or ex partner: Not on file     Emotionally abused: Not on file     Physically abused: Not on file     Forced sexual activity: Not on file   Other Topics Concern    Not on file   Social History Narrative    Not on file       Prior to Admission medications    Medication Sig Start Date End Date Taking?  Authorizing Provider   VORTIoxetine (TRINTELLIX) 5 MG tablet Take 10 mg by mouth daily   Yes Historical Provider, MD   metoprolol succinate (TOPROL XL) 100 MG extended release tablet Take 1 tablet by mouth daily 19  Yes Jeannine Clark MD   aspirin (ASPIRIN LOW DOSE) 81 MG or hematuria  Musculoskeletal ROS: negative for - joint pain, joint swelling, muscle pain or muscular weakness  Neurological ROS: positive for - dizziness, headaches and visual changes  negative for - gait disturbance, impaired coordination/balance or weakness  Dermatological ROS: negative for - rash or wounds    Blood pressure 133/73, pulse 76, height 5' 2.99\" (1.6 m), weight 222 lb 8 oz (100.9 kg). Physical Examination:   Constitutional - Alert, cooperative, well groomed, and in no distress. Vital signs stable   Vitals:    06/03/19 0824   BP: 133/73   Pulse: 76     Mental status - Alert and oriented to person, place, and time. Good insight, appropriate responses, mood appropriate. Head - Atraumatic, normocephalic. Left forehead lump, very tender to palpation, soft, no skin surface openings, not red or warm. No temporal pulsatility noted. Eyes - Pupils equal and reactive to light, extraocular movements intact, accomodates. Ears - External ears are normal, hearing is grossly normal to speech, otoscopic exam pearly gray TM's with good light reflex. Mouth - Oropharynx clear, mucous membranes pink and moist, dentition intact. Neck - supple, no significant adenopathy, no JVD. Chest - No distress. No increased work of breathing. Clear to auscultation in all fields, no wheezes, rales or rhonchi, symmetric air entry. Heart - normal rate, regular rhythm, normal S1, S2, no murmurs, rubs  or gallops  Abdomen -soft, nontender, nondistended  Neurological - alert, oriented, cranial nerves II-XII intact without noted deficits, motor and sensation are grossly intact bilateral upper and lower extremities.   Extremities - peripheral pulses normal, no pedal edema, no clubbing or cyanosis  Skin - warm and dry, no rashes, lesions, or wounds evident on exposed skin        Diagnostic Data:  I have reviewed recent diagnostic testing including labs    Lab Results   Component Value Date     05/02/2019    K 4.6 05/02/2019     05/02/2019    CO2 27 05/02/2019    BUN 19 05/02/2019    CREATININE 1.0 05/02/2019    GLUCOSE 90 05/02/2019    GLUCOSE 164 10/25/2016    CALCIUM 9.2 05/02/2019      Lab Results   Component Value Date    LABA1C 6.3 (H) 05/02/2019     No results found for: EAG  Lab Results   Component Value Date    CHOL 265 (H) 01/29/2018    CHOL 195 01/26/2017    CHOL 265 (H) 06/01/2016     Lab Results   Component Value Date    TRIG 200 (H) 09/26/2018    TRIG 164 05/08/2018    TRIG 324 (H) 01/29/2018     Lab Results   Component Value Date    HDL 41 01/29/2018    HDL 43 01/26/2017    HDL 46 06/01/2016     Lab Results   Component Value Date    LDLCALC 159 01/29/2018    LDLCALC 160 06/01/2016     Lab Results   Component Value Date    VLDL 39 01/26/2017     No results found for: [facility]  Lab Results   Component Value Date    WBC 9.1 05/02/2019    HGB 13.3 (L) 05/02/2019    HCT 41.2 (L) 05/02/2019    MCV 89.0 05/02/2019     05/02/2019         Assessment/Plan:   Diagnosis Orders   1. Epidermoid cyst of face  Winnie Sheppard MD, Dermatology, [facility]   2. Blurring, left eye     3. Essential hypertension         Orders Placed This Encounter   Procedures   Sharon Marti MD, Dermatology, [facility] [place]     Referral Priority:   Routine     Referral Type:   Eval and Treat     Referral Reason:   Specialty Services Required     Referred to Provider:   Dio Ko MD     Requested Specialty:   Dermatology     Number of Visits Requested:   1     1) Epidermoid cyst, left forehead  Does not appear overtly infected  See dermatology tomorrow at 9 am to have left forehead cyst evaluated for possible I&D    2) Left eye blurred vision  No temporal pulsatility  PERRLA. See your opthamologist Dr Chika Moffett concerning blurriness left eye, believe due to the pressure from this cyst.     3) HTN  BP stable.       Will schedule follow up appointment in 2 weeks    Electronically signed by SITA Ching CNP 06/03/19 1:25 PM [place], [address]     Phone number: [phone]  Fax number: [phone]

## 2019-06-03 NOTE — CARE COORDINATION
Pt stopped by office today asking for assistance in completing paperwork for dermatology office appt tomorrow d/t his limited reading and writing. Assisted in completing paperwork.

## 2019-06-03 NOTE — PATIENT INSTRUCTIONS
See your opthamologist Dr Bryson Gonzalez concerning blurriness left eye     See dermatology tomorrow at 9 am to have left forehead cyst evaluated.

## 2019-06-05 ENCOUNTER — HOSPITAL ENCOUNTER (EMERGENCY)
Age: 59
Discharge: HOME | End: 2019-06-06
Payer: MEDICARE

## 2019-06-05 ENCOUNTER — APPOINTMENT (OUTPATIENT)
Dept: CT IMAGING | Age: 59
End: 2019-06-05
Payer: MEDICARE

## 2019-06-05 VITALS
HEIGHT: 63 IN | OXYGEN SATURATION: 98 % | BODY MASS INDEX: 39.51 KG/M2 | WEIGHT: 223 LBS | RESPIRATION RATE: 18 BRPM | HEART RATE: 76 BPM | TEMPERATURE: 98.4 F | DIASTOLIC BLOOD PRESSURE: 84 MMHG | SYSTOLIC BLOOD PRESSURE: 141 MMHG

## 2019-06-05 DIAGNOSIS — L72.0 EPIDERMOID CYST: Primary | ICD-10-CM

## 2019-06-05 PROCEDURE — 70486 CT MAXILLOFACIAL W/O DYE: CPT

## 2019-06-05 PROCEDURE — 96372 THER/PROPH/DIAG INJ SC/IM: CPT

## 2019-06-05 PROCEDURE — 6360000002 HC RX W HCPCS: Performed by: NURSE PRACTITIONER

## 2019-06-05 PROCEDURE — 99283 EMERGENCY DEPT VISIT LOW MDM: CPT

## 2019-06-05 RX ORDER — KETOROLAC TROMETHAMINE 30 MG/ML
30 INJECTION, SOLUTION INTRAMUSCULAR; INTRAVENOUS ONCE
Status: COMPLETED | OUTPATIENT
Start: 2019-06-06 | End: 2019-06-05

## 2019-06-05 RX ADMIN — KETOROLAC TROMETHAMINE 30 MG: 30 INJECTION, SOLUTION INTRAMUSCULAR at 23:42

## 2019-06-05 ASSESSMENT — ENCOUNTER SYMPTOMS
RHINORRHEA: 0
COUGH: 0
CHEST TIGHTNESS: 0
TROUBLE SWALLOWING: 0
WHEEZING: 0
SHORTNESS OF BREATH: 0
EYE PAIN: 1
SORE THROAT: 0

## 2019-06-05 ASSESSMENT — PAIN DESCRIPTION - PAIN TYPE: TYPE: ACUTE PAIN

## 2019-06-05 ASSESSMENT — PAIN SCALES - GENERAL: PAINLEVEL_OUTOF10: 7

## 2019-06-05 ASSESSMENT — PAIN DESCRIPTION - LOCATION: LOCATION: HEAD

## 2019-06-06 ENCOUNTER — TELEPHONE (OUTPATIENT)
Dept: INTERNAL MEDICINE CLINIC | Age: 59
End: 2019-06-06

## 2019-06-06 NOTE — ED PROVIDER NOTES
[address]       Chief Complaint   Patient presents with    Headache    Other     bump on head       Nurses Notes reviewed and I agree except as noted in the HPI. HISTORY OF PRESENT ILLNESS    Ortiz Elmore is a 60 y.o. male who presents for a painful lump on the forehead. Patient states that this is been present for approximately 5 years however he states that over the past month or 2 it is becoming larger and more painful. Patient states that he was seen by his PCP 2 days ago and diagnosed with a epidermoid cyst.  He states that they were going to have him follow up with another provider that he does not remember who that was. A record review indicates that the patient was instructed to follow-up with dermatology, Dr. Rudy Guidry yesterday. Patient has not been taking anything for the pain. He currently rates his pain 7 out of 10. REVIEW OFSYSTEMS     Review of Systems   Constitutional: Negative for fever. HENT: Negative for congestion, rhinorrhea, sore throat and trouble swallowing. Eyes: Positive for pain and visual disturbance (blurred vision at times). Respiratory: Negative for cough, chest tightness, shortness of breath and wheezing. Cardiovascular: Negative for chest pain and palpitations. Musculoskeletal: Negative for arthralgias and myalgias. Neurological: Positive for headaches. Hematological: Does not bruise/bleed easily. PAST MEDICAL HISTORY    has a past medical history of Chronic leg pain, Diabetes ([address] GERD (gastroesophageal reflux disease), Hyperlipidemia, Hypertension, Hypothyroidism, Sleep apnea, and TIA (transient ischemic attack). SURGICAL HISTORY      has a past surgical history that includes Leg Surgery (Right, 2000); Ankle surgery (Right, 2000); Knee arthroscopy (Left, 9-11-15); pr colsc flx w/rmvl of tumor polyp lesion snare tq (7/16/2017);  Upper gastrointestinal endoscopy (7/16/2017); and pr egd transoral biopsy single/multiple (N/A, 9/27/2017). [address]       Discharge Medication List as of 6/6/2019 12:20 AM      CONTINUE these medications which have NOT CHANGED    Details   VORTIoxetine (TRINTELLIX) 5 MG tablet Take 10 mg by mouth dailyHistorical Med      metoprolol succinate (TOPROL XL) 100 MG extended release tablet Take 1 tablet by mouth daily, Disp-30 tablet, R-5Normal      aspirin (ASPIRIN LOW DOSE) 81 MG EC tablet Take 1 tablet by mouth daily, Disp-90 tablet, R-3Normal      meloxicam (MOBIC) 15 MG tablet Take 1 tablet by mouth daily, Disp-30 tablet, R-5Normal      losartan (COZAAR) 100 MG tablet Take 1 tablet by mouth daily, Disp-30 tablet, R-5Normal      ezetimibe (ZETIA) 10 MG tablet Take 1 tablet by mouth daily, Disp-30 tablet, R-5Normal      omeprazole (PRILOSEC) 40 MG delayed release capsule TAKE 1 CAPSULE BY MOUTH DAILY, Disp-30 capsule, R-5Maximum Refills ReachedNormal      metFORMIN (GLUCOPHAGE) 500 MG tablet Take 1 tablet by mouth daily (with breakfast), Disp-30 tablet, R-5Maximum Refills ReachedNormal      levothyroxine (SYNTHROID) 50 MCG tablet Take 1 tablet by mouth daily TAKE 1 TABLET BY MOUTH EVERY DAY, Disp-30 tablet, R-5Normal      atorvastatin (LIPITOR) 80 MG tablet Take 1 tablet by mouth daily, Disp-30 tablet, R-5Normal      albuterol-ipratropium (COMBIVENT RESPIMAT)  MCG/ACT AERS inhaler Inhale 1 puff into the lungs every 6 hours as needed for Wheezing, Disp-1 Inhaler, R-5Normal      donepezil (ARICEPT) 5 MG tablet TAKE 1 TABLET BY MOUTH AT BEDTIME, Disp-28 tablet, R-3Maximum Refills ReachedNormal      divalproex (DEPAKOTE) 250 MG DR tablet TK 1 T PO QHS, R-1Historical Med      risperiDONE (RISPERDAL) 0.25 MG tablet Take 0.25 mg by mouth 2 times daily Indications: 1 tab every AM and 2 tabs at bedtime.   restarted 4/19 at 0.25mg in AM only Historical Med      acetaminophen (TYLENOL) 500 MG tablet Take 1,000 mg by mouth every 6 hours as needed for Pain             ALLERGIES has No Known Allergies. FAMILY HISTORY     indicated that his mother is . He indicated that his father is . He indicated that his sister is alive. He indicated that all of his three brothers are alive. family history includes COPD in his father; Dementia in his mother; High Blood Pressure in his father and mother; High Cholesterol in his father. SOCIAL HISTORY      reports that he quit smoking about 5 years ago. His smoking use included cigarettes. He smoked 0.00 packs per day for 40.00 years. He has never used smokeless tobacco. He reports that he does not drink alcohol or use drugs. PHYSICAL EXAM     INITIAL VITALS:  height is 5' 3\" (1.6 m) and weight is 223 lb (101.2 kg). His oral temperature is 98.4 °F (36.9 °C). His blood pressure is 141/84 (abnormal) and his pulse is 76. His respiration is 18 and oxygen saturation is 98%. Physical Exam   Constitutional: He is oriented to person, place, and time. He appears well-developed and well-nourished. No distress. HENT:   Head: Normocephalic and atraumatic. Eyes: Conjunctivae and EOM are normal.   Neck: Normal range of motion. Neck supple. Cardiovascular: Normal rate, regular rhythm and normal heart sounds. Pulmonary/Chest: Effort normal and breath sounds normal. No respiratory distress. Musculoskeletal: Normal range of motion. Neurological: He is alert and oriented to person, place, and time. Skin: Skin is warm and dry. Psychiatric: He has a normal mood and affect. His behavior is normal. Judgment and thought content normal.   Nursing note and vitals reviewed. DIFFERENTIAL DIAGNOSIS:   Including but not limited to epidermoid cyst    DIAGNOSTIC RESULTS       RADIOLOGY: non-plain film images(s) such as CT, Ultrasound and MRI are read by the radiologist.  Plain radiographic images are visualized and preliminarily interpreted by the emergency physician unless otherwisestated below.   CT FACIAL BONES WO CONTRAST Final Result   1. Left frontal scalp lesion correlate with lipoma or other cystic process. 2. Mild sclerosis of the right maxillary sinus walls and mucosal thickening. Correlate with chronic sinusitis changes. **This report has been created using voice recognition software. It may contain minor errors which are inherent in voice recognition technology. **      Final report electronically signed by Dr. Krystyna Rosado on 6/6/2019 12:06 AM            LABS:   Labs Reviewed - No data to display      2 David Alanizza:   Vitals:    Vitals:    06/05/19 2322   BP: (!) 141/84   Pulse: 76   Resp: 18   Temp: 98.4 °F (36.9 °C)   TempSrc: Oral   SpO2: 98%   Weight: 223 lb (101.2 kg)   Height: 5' 3\" (1.6 m)         Pertinent Labs & Imaging studies reviewed. (See chart for details)    The patient was seen and evaluated within the ED today with a lump on his forehead. Within the department, I observed the patient's vital signs to be within acceptable range. On exam, I appreciated findings as documented in the physical exam.  Radiological studies within the department revealed no acute finding but did identify his known cyst.  Laboratory work was not indicated. Within the department, the patient was treated with toradol for pain. I observed the patient's condition to remain stable during the duration of the stay and I explained my proposed course of treatment to the patient, who was amenable to my decision. They were discharged home in stable condition with instructions to follow up with dermatology, and the patient will return to the ED if the symptoms become more severe in nature or otherwise change    I have given the patient strict writtenand verbal instructions about care at home, follow-up, and signs and symptoms of worsening of condition and they did verbalize understanding.     Medications   ketorolac (TORADOL) injection 30 mg (30 mg Intramuscular Given 6/5/19 4147)

## 2019-06-06 NOTE — ED TRIAGE NOTES
Pt arrives to the ED with a chief complaint of head pain and a bump above his left eye brow. Pt states the bump has been there for 5 years. However over the past month the bump has been causing him increased pain. Provider to see patient.

## 2019-06-07 ENCOUNTER — TELEPHONE (OUTPATIENT)
Dept: INTERNAL MEDICINE CLINIC | Age: 59
End: 2019-06-07

## 2019-06-07 NOTE — TELEPHONE ENCOUNTER
Pt left a voice mail asking who we are going to refer him to for the knot on his head, someone on his insurance. I left him a voice mail that I will call him back on Monday once I find out from our . She is not in today.

## 2019-06-10 ENCOUNTER — CARE COORDINATION (OUTPATIENT)
Dept: CARE COORDINATION | Age: 59
End: 2019-06-10

## 2019-06-10 NOTE — CARE COORDINATION
Pt called me this am to see if I could assist him with \"some mail from [facility] ([facility]), but I don't know what it's about. \" I visited pt in his home. It was a letter stating he has a Dr. Ta Euceda with Dr. Florinda Wright on 7/12 for his cyst on forehead. I wrote this appt on pt calendar and informed him of the address on [facility]. Pt voiced understanding. Pt also had questions about carbohydrate intake. Educated pt on reading labels and reducing carbs. Pt actively trying to lose weight. Praised client for effort. Active listening and encouragement provided.

## 2019-06-10 NOTE — CARE COORDINATION
I received a call from pt at 11:40 stating he received a call from dermatologist office and they had a cancellation at 1 today. Pt wanted to know where they are located. I reminded pt that I wrote it on his calendar this am and the address was on [facility], next to [facility]. Pt looked at his calendar and then remembered our previous conversation. I called [facility] and spoke with Preston Bowser re:pt needing a medicaid card. She will call pt with needed information.

## 2019-06-11 NOTE — TELEPHONE ENCOUNTER
Left message for [facility] dermatology to call me back if there is any way they can get pt in sooner than 7/12/19 as we have not heard back.

## 2019-06-11 NOTE — TELEPHONE ENCOUNTER
Bernie Curiel from Dermatology called back saying that they got pt in yesterday. They will forward note when completed. Major Lee. Was notified.

## 2019-06-13 ENCOUNTER — CARE COORDINATION (OUTPATIENT)
Dept: CARE COORDINATION | Age: 59
End: 2019-06-13

## 2019-06-14 ENCOUNTER — CARE COORDINATION (OUTPATIENT)
Dept: CARE COORDINATION | Age: 59
End: 2019-06-14

## 2019-06-14 NOTE — CARE COORDINATION
Received call from [facility] today. Asking about next appt date and time as he accidentally erased message. Reminded pt that his next appt is on 6/17 at 10am with Roe Palma CNP at PCP office. States that he should be able to make appt without problem.

## 2019-06-17 ENCOUNTER — OFFICE VISIT (OUTPATIENT)
Dept: INTERNAL MEDICINE CLINIC | Age: 59
End: 2019-06-17
Payer: MEDICARE

## 2019-06-17 VITALS
DIASTOLIC BLOOD PRESSURE: 76 MMHG | SYSTOLIC BLOOD PRESSURE: 138 MMHG | RESPIRATION RATE: 16 BRPM | WEIGHT: 222.2 LBS | OXYGEN SATURATION: 94 % | HEART RATE: 74 BPM | HEIGHT: 63 IN | BODY MASS INDEX: 39.37 KG/M2

## 2019-06-17 DIAGNOSIS — H53.8 BLURRING, LEFT EYE: ICD-10-CM

## 2019-06-17 DIAGNOSIS — S29.011A MUSCLE STRAIN OF CHEST WALL, INITIAL ENCOUNTER: ICD-10-CM

## 2019-06-17 DIAGNOSIS — L72.0 EPIDERMOID CYST OF FACE: ICD-10-CM

## 2019-06-17 DIAGNOSIS — J45.20 REACTIVE AIRWAY DISEASE, MILD INTERMITTENT, UNCOMPLICATED: Primary | ICD-10-CM

## 2019-06-17 DIAGNOSIS — R05.9 COUGH: ICD-10-CM

## 2019-06-17 PROCEDURE — 99214 OFFICE O/P EST MOD 30 MIN: CPT | Performed by: NURSE PRACTITIONER

## 2019-06-17 RX ORDER — TRAZODONE HYDROCHLORIDE 50 MG/1
50 TABLET ORAL NIGHTLY
COMMUNITY
End: 2020-03-17

## 2019-06-17 RX ORDER — FLUTICASONE PROPIONATE 50 MCG
1 SPRAY, SUSPENSION (ML) NASAL DAILY
Qty: 1 BOTTLE | Refills: 0 | Status: SHIPPED | OUTPATIENT
Start: 2019-06-17 | End: 2020-03-17

## 2019-06-17 ASSESSMENT — ENCOUNTER SYMPTOMS
ABDOMINAL PAIN: 0
VOICE CHANGE: 0
SORE THROAT: 0
COUGH: 1
BLOOD IN STOOL: 0
TROUBLE SWALLOWING: 0
APNEA: 0
BACK PAIN: 0
CHOKING: 0
NAUSEA: 0
SHORTNESS OF BREATH: 0
DIARRHEA: 0
VOMITING: 0

## 2019-06-17 NOTE — PROGRESS NOTES
[facility]  [address] [address]  [address]  [address]  Dept: [phone]  Dept Fax: [phone]  Loc: [phone]     Visit Date:  6/17/2019    Patient:  Janae Grewal  YOB: 1960    HPI:     Chief Complaint   Patient presents with    Other     cyst on face    Other     blurring left eye    Hypertension       Had cyst removal on Friday at [facility] dermatology. They sent for biopsy. Has bilateral ecchymosis to eyes, continues with visual blurring of the left eye. Will send to opthamology. Reports SOB, using his Combivent, inhaler three times daily, states he had pain with cough, and chest felt tight. This has resolved now, after using his inhaler. Denies orthopnea. He is able to take his dog walking around the [employer] [place] 2-3 times daily without difficulty. HTN - BP stable. /76. During exam, pt left lateral chest wall is tender to compression, pt states he was picking up 95# cement blocks and lifting them into his truck a couple of days ago and has been sore since.      Medications    Current Outpatient Medications:     traZODone (DESYREL) 50 MG tablet, Take 50 mg by mouth nightly, Disp: , Rfl:     fluticasone (FLONASE) 50 MCG/ACT nasal spray, 1 spray by Each Nostril route daily, Disp: 1 Bottle, Rfl: 0    VORTIoxetine (TRINTELLIX) 5 MG tablet, Take 10 mg by mouth daily, Disp: , Rfl:     metoprolol succinate (TOPROL XL) 100 MG extended release tablet, Take 1 tablet by mouth daily, Disp: 30 tablet, Rfl: 5    aspirin (ASPIRIN LOW DOSE) 81 MG EC tablet, Take 1 tablet by mouth daily, Disp: 90 tablet, Rfl: 3    meloxicam (MOBIC) 15 MG tablet, Take 1 tablet by mouth daily, Disp: 30 tablet, Rfl: 5    losartan (COZAAR) 100 MG tablet, Take 1 tablet by mouth daily, Disp: 30 tablet, Rfl: 5    ezetimibe (ZETIA) 10 MG tablet, Take 1 tablet by mouth daily, Disp: 30 tablet, Rfl: 5    omeprazole (PRILOSEC) 40 MG delayed release capsule, TAKE 1 CAPSULE BY MOUTH DAILY, Disp: 30 capsule, Rfl: 5    metFORMIN (GLUCOPHAGE) 500 MG tablet, Take 1 tablet by mouth daily (with breakfast), Disp: 30 tablet, Rfl: 5    levothyroxine (SYNTHROID) 50 MCG tablet, Take 1 tablet by mouth daily TAKE 1 TABLET BY MOUTH EVERY DAY, Disp: 30 tablet, Rfl: 5    atorvastatin (LIPITOR) 80 MG tablet, Take 1 tablet by mouth daily, Disp: 30 tablet, Rfl: 5    albuterol-ipratropium (COMBIVENT RESPIMAT)  MCG/ACT AERS inhaler, Inhale 1 puff into the lungs every 6 hours as needed for Wheezing, Disp: 1 Inhaler, Rfl: 5    donepezil (ARICEPT) 5 MG tablet, TAKE 1 TABLET BY MOUTH AT BEDTIME, Disp: 28 tablet, Rfl: 3    divalproex (DEPAKOTE) 250 MG DR tablet, TK 1 T PO QHS, Disp: , Rfl: 1    risperiDONE (RISPERDAL) 0.25 MG tablet, Take 0.25 mg by mouth 2 times daily Indications: 1 tab every AM and 2 tabs at bedtime. restarted 4/19 at 0.25mg in AM only , Disp: , Rfl:     acetaminophen (TYLENOL) 500 MG tablet, Take 1,000 mg by mouth every 6 hours as needed for Pain, Disp: , Rfl:     The patient has No Known Allergies. Past Medical History  Duane  has a past medical history of Chronic leg pain, Diabetes ([address] GERD (gastroesophageal reflux disease), Hyperlipidemia, Hypertension, Hypothyroidism, Sleep apnea, and TIA (transient ischemic attack). Past Surgical History  The patient  has a past surgical history that includes Leg Surgery (Right, 2000); Ankle surgery (Right, 2000); Knee arthroscopy (Left, 9-11-15); pr colsc flx w/rmvl of tumor polyp lesion snare tq (7/16/2017); Upper gastrointestinal endoscopy (7/16/2017); and pr egd transoral biopsy single/multiple (N/A, 9/27/2017). Family History  This patient's family history includes COPD in his father; Dementia in his mother; High Blood Pressure in his father and mother; High Cholesterol in his father. Social History  Duane  reports that he quit smoking about 5 years ago.  His smoking use included cigarettes. He smoked 0.00 packs per day for 40.00 years. He has never used smokeless tobacco. He reports that he does not drink alcohol or use drugs. Health Maintenance:    Health Maintenance   Topic Date Due    Hepatitis C screen  1960    Hepatitis B Vaccine (1 of 3 - Risk 3-dose series) 09/29/1978    DTaP/Tdap/Td vaccine (1 - Tdap) 09/29/1978    Shingles Vaccine (2 of 3) 02/09/2018    Lipid screen  05/08/2019    Diabetic retinal exam  06/20/2019    Flu vaccine (Season Ended) 09/01/2019    Diabetic microalbuminuria test  09/26/2019    Diabetic foot exam  05/02/2020    A1C test (Diabetic or Prediabetic)  05/02/2020    TSH testing  05/02/2020    Potassium monitoring  05/02/2020    Creatinine monitoring  05/02/2020    Colon cancer screen colonoscopy  07/16/2027    Pneumococcal 0-64 years Vaccine  Completed    HIV screen  Completed       Subjective:      Review of Systems   Constitutional: Negative for activity change, appetite change, chills, diaphoresis, fatigue and fever. HENT: Positive for sneezing. Negative for congestion, sore throat, trouble swallowing and voice change. Eyes: Positive for visual disturbance (blurred left eye ). Respiratory: Positive for cough. Negative for apnea, choking and shortness of breath. Cardiovascular: Negative for chest pain and leg swelling. Gastrointestinal: Negative for abdominal pain, blood in stool, diarrhea, nausea and vomiting. Endocrine: Negative for cold intolerance, heat intolerance, polydipsia, polyphagia and polyuria. Genitourinary: Negative for difficulty urinating, frequency and urgency. Musculoskeletal: Negative for arthralgias, back pain and myalgias. Neurological: Negative for dizziness, weakness, light-headedness, numbness and headaches. Psychiatric/Behavioral: Negative for dysphoric mood, sleep disturbance and suicidal ideas. The patient is not nervous/anxious.         Objective:     /76   Pulse 74 Resp 16   Ht 5' 2.99\" (1.6 m)   Wt 222 lb 3.2 oz (100.8 kg)   SpO2 94%   BMI 39.37 kg/m²     Physical Exam   Constitutional: He is oriented to person, place, and time. He appears well-developed and well-nourished. No distress. HENT:   Head: Normocephalic. Right Ear: External ear normal.   Left Ear: External ear normal.   Mouth/Throat: Oropharynx is clear and moist. No oropharyngeal exudate. bandaid to forehead, posterior oropharynx with streaking, and boggy inflamed pale nasal turbinates   Eyes: Pupils are equal, round, and reactive to light. Conjunctivae and EOM are normal. Right eye exhibits no discharge. Left eye exhibits no discharge. No scleral icterus. Ecchymosis bilateral eyes, swelling left eye post op   Neck: Normal range of motion. Neck supple. No JVD present. No tracheal deviation present. No thyromegaly present. Pulmonary/Chest: Effort normal and breath sounds normal. No stridor. No respiratory distress. He has no wheezes. He has no rales. He exhibits tenderness (Mild to palpation only). Abdominal: Soft. He exhibits no distension. There is no tenderness. Musculoskeletal: Normal range of motion. He exhibits no edema, tenderness or deformity. Lymphadenopathy:     He has no cervical adenopathy. Neurological: He is alert and oriented to person, place, and time. No cranial nerve deficit. Skin: Skin is warm and dry. Capillary refill takes less than 2 seconds. No rash noted. He is not diaphoretic. No erythema. No pallor. Psychiatric: He has a normal mood and affect.  His behavior is normal. Judgment and thought content normal.       Labs Reviewed 6/17/2019:    Lab Results   Component Value Date    WBC 9.1 05/02/2019    HGB 13.3 (L) 05/02/2019    HCT 41.2 (L) 05/02/2019     05/02/2019    CHOL 265 (H) 01/29/2018    TRIG 200 (H) 09/26/2018    HDL 41 01/29/2018    LDLDIRECT 53.38 05/08/2018    ALT 23 09/26/2018    AST 33 01/29/2018     05/02/2019    K 4.6 05/02/2019     05/02/2019    CREATININE 1.0 05/02/2019    BUN 19 05/02/2019    CO2 27 05/02/2019    TSH 2.780 05/02/2019    INR 0.91 07/14/2017    GLUF 131 (H) 01/26/2017    LABA1C 6.3 (H) 05/02/2019    LABMICR 1.94 09/26/2018       Assessment/Plan      1. Reactive airway disease, mild intermittent, uncomplicated  Lungs clear, no wheeze, rales or rhonchi. Continue Combivent three times daily as needed. - fluticasone (FLONASE) 50 MCG/ACT nasal spray; 1 spray by Each Nostril route daily  Dispense: 1 Bottle; Refill: 0    2. Cough  Suspect due to post nasal drip from allergic rhinitis  Use Flonase one spray each nostril daily, tilt applicator out slightly towards eye to spray into sinus  Let me know if this works for you and I can refill. Call if no improvement in your cough. - fluticasone (FLONASE) 50 MCG/ACT nasal spray; 1 spray by Each Nostril route daily  Dispense: 1 Bottle; Refill: 0    3. Blurring, left eye  See opthamology. 4. Epidermoid cyst of face  S/P resection by [facility] dermatology, follow up with them for refills. 5. Chest wall strain  Tylenol 650 mg every 4-6 hours as needed for chest wall discomfort, try heat 20 minutes every 2 hours as needed or muscle rub to tender area  Call if no improvement, should improve over the coming few days. Keep follow up with Dr. Sindhu De La Garza on 9/3/19  Call me to be seen sooner if needed. No follow-ups on file. Patient given educational materials - see patient instructions. Discussed use, benefit, and side effects of prescribed medications. All patient questions answered. Pt voiced understanding. Reviewed health maintenance.        Electronically signed Adolm Snellen, APRN - CNP on 6/17/19 at 10:35 AM

## 2019-06-17 NOTE — PATIENT INSTRUCTIONS
Use Flonase one spray each nostril daily, tilt applicator out slightly towards eye to spray into sinus  Let me know if this works for you and I can refill. Continue Combivent three times daily as needed. Call if no improvement in your cough.      Tylenol 650 mg every 4-6 hours as needed for chest wall discomfort, try heat 20 minutes every 2 hours as needed or muscle rub to tender area    Follow with dermatology for your suture removal    See opthamology for blurred vision left eye

## 2019-06-18 ENCOUNTER — CARE COORDINATION (OUTPATIENT)
Dept: CARE COORDINATION | Age: 59
End: 2019-06-18

## 2019-06-18 NOTE — CARE COORDINATION
I visited pt in his home this am after a 7:30 phone call from him stating he has mail that he is not sure what it is. Pt also stated, that \"24h00 says I am overdrawn. \" Stopped at pt home, mail was new Medicaid card. Had pt cut out and put in wallet. Pt wanted me to meet Reva Quarles whom he just picked up form [address] and [employer] home for the day. \" Pt stated he has been trying to get a hold of Sydnee Qureshi, (his . at [facility].)  She was to check into his finances for him. Pt desires to get his tags for license plate and car ins. Encouraged pt to keep trying . Pt voiced understanding. Provided active listening and encouragement.

## 2019-06-19 ENCOUNTER — CARE COORDINATION (OUTPATIENT)
Dept: CARE COORDINATION | Age: 59
End: 2019-06-19

## 2019-06-19 NOTE — CARE COORDINATION
Pt called stating that he got a message on his phone about an appt this Friday, but could not make out who it is with. Informed pt no appts listed in appt desk. Pt recently had cyst removed by dermatology at [facility]. Provided him with office number to call them to see if that is who he has scheduled f/u with this wk.

## 2019-06-20 ENCOUNTER — CARE COORDINATION (OUTPATIENT)
Dept: CARE COORDINATION | Age: 59
End: 2019-06-20

## 2019-06-20 NOTE — CARE COORDINATION
Pt called me yesterday 6/19 to inform me that he had mail he didn't understand. I informed him I would stop today around 11am. Pt was not home. I tried to call him on phone 2x and pt has not returned my call. Will try again tomorrow.

## 2019-06-21 ENCOUNTER — CARE COORDINATION (OUTPATIENT)
Dept: CARE COORDINATION | Age: 59
End: 2019-06-21

## 2019-06-21 NOTE — CARE COORDINATION
I stopped at pt home to read letter he did not understand. Pt stated he had a Dr. singh at [facility] and would not be there but would put letter on chair. It was concerning his insurance and the ability to choose the plan. Will notify and educate pt on his choices.

## 2019-06-24 ENCOUNTER — OFFICE VISIT (OUTPATIENT)
Dept: INTERNAL MEDICINE CLINIC | Age: 59
End: 2019-06-24
Payer: MEDICARE

## 2019-06-24 VITALS — BODY MASS INDEX: 35.1 KG/M2 | WEIGHT: 218.4 LBS | HEIGHT: 66 IN

## 2019-06-24 DIAGNOSIS — E11.9 TYPE 2 DIABETES MELLITUS WITHOUT COMPLICATION, WITHOUT LONG-TERM CURRENT USE OF INSULIN (HCC): ICD-10-CM

## 2019-06-24 PROCEDURE — 97803 MED NUTRITION INDIV SUBSEQ: CPT | Performed by: DIETITIAN, REGISTERED

## 2019-06-24 NOTE — LETTER
[address] [facility] Internal [facility]  [address] [address]  Phone: [phone]  Fax: [phone] [facility], [place]        June 24, 2019     No referring provider defined for this encounter. Patient: Brie Bautista   MR Number: [id number]   YOB: 1960   Date of Visit: 6/24/2019       Dear Dr. Dima Caballero ref. provider found: Thank you for referring Cedrick Buckleys to me for evaluation. Below are the relevant portions of my assessment and plan of care. Assessment:     Vitals from current and previous visits:  Ht 5' 6\" (1.676 m)   Wt 218 lb 6.4 oz (99.1 kg)   BMI 35.25 kg/m²      -Body mass index is 35.25 kg/m². 35-39.9 - Obesity Grade II.   - Patient lost and 26 pounds over the last 9 mo. .  -Weight goal: lose weight. Nutrition Diagnosis:   Overweight/Obesity related to currently undergoing MNT as evidenced by Conditions associated with a diagnosis or treatment: Type 2 DB and BMI 35.25. Cecilio العلي Plan:     Intervention:  -Impression: Pt has made great progress in improving his eating habits and activity lifestyle. He receives great support from his friend & neighbor Salena Marquis. Salena Marquis shows him how to cook and use his stove, oven, microwave and grill. Salena Marquis helps him shop for the healthier lower carb food choices and un-processed meats and no red meat - primarily Duane only eats chicken & fish. He does not have a meter to check his BS. His lifestyle habits have improved resulting in favorable weight loss. (although AIC up from 6.1 to 6.3). -Instructed the patient on: Meal Planning for Regular, Balanced Meals & Snacks, The Importance of Regular Physical Activity.    -Handouts given for: plate planner, choose my plate with grocery list, food logging. Patient Instructions   1.)  Good job cooking food at home. - using the grill is a great way of cooking.    2.)  Good job eating 3 meals/day to prevent you from over-eating later. 3. )  Good snack choices - small portion of nuts & yogurt &  & fresh fruit & veggies. 4.)  Great job with your walking!!    Glad you have a friend like Hermann to help you with your healthy eating habits and keeping active. Bring a 5-7 day food log to next dietitian appt. Thanks! Have Hermann help you keep a food log the week before your appt on Dec. 9th. -Nutrition prescription: 1500 - 1600 calories/day, 150 - 165 g carbs/day. Comprehension verified using teachback method. Monitoring/Evaluation:   -Followup visit: 6 mo with dietitian.   -Receptiveness to education/goals: Agreeable.  -Evaluation of education: Indicates understanding.  -Readiness to change: maintenance - has made change and is trying and/or practicing different alternative behaviors keeping active and preparing food from scratch at home. -Expected compliance: good. Thank you for your referral of this patient. If you have questions, please do not hesitate to call me. I look forward to following Duane along with you.     Sincerely,        Marsha Bernal, KIRAN, LD

## 2019-06-24 NOTE — COMMUNICATION BODY
with dietitian.   -Receptiveness to education/goals: Agreeable.  -Evaluation of education: Indicates understanding.  -Readiness to change: maintenance - has made change and is trying and/or practicing different alternative behaviors keeping active and preparing food from scratch at home. -Expected compliance: good. Thank you for your referral of this patient.

## 2019-06-24 NOTE — PROGRESS NOTES
[address] [place] [address]. [address] [place] [address] James., Mariia De León, [address]  [phone] (phone)  [phone] (fax)    Patient Name: Arias Vaughn. Date of Birth: [id number]. MRN: [id number]      Assessment: Patient is a 60 y.o. male seen for follow-up MNT visit for Type 2 Diabetes. -Nutritionally relevant labs:   Lab Results   Component Value Date/Time    LABA1C 6.3 (H) 05/02/2019 01:42 PM    LABA1C 6.1 09/26/2018 12:13 PM    LABA1C 6.9 (H) 05/08/2018 02:05 PM    GLUCOSE 90 05/02/2019 03:24 PM    GLUCOSE 112 (H) 09/26/2018 12:13 PM    GLUCOSE 164 (H) 10/25/2016 11:40 AM    GLUCOSE 121 (H) 10/06/2016 06:33 PM    CHOL 265 (H) 01/29/2018 09:52 AM    HDL 41 01/29/2018 09:52 AM    LDLCALC 159 01/29/2018 09:52 AM    TRIG 200 (H) 09/26/2018 12:13 PM     -Blood sugar trends: Does not check his BS.    -Food recall:   Breakfast: ~730 am - yogurt OR goes to the rest home to see his SO, Arlet and has the breakfast offered there. Once a week he will share a doughnut with Arlet. Lunch:  ~ noon - salmon on the grill and mixed vegetables and glass of iced tea. Dinner: ~ 5 pm - 530 pm - last night - chicken on grill, baked beans, creamed corn and salad and glass of iced tea. Snacks: yogurt & fruit or occ small carrots. Pt's friend, Pavan Corbett, helps him with his grocery shopping and sometimes goes to [employer]. Duane purchases mixed fruit in the bag at [employer]. -Main Beverages: Iced tea - unsw., occ coffee. Drinks water and milk in the morning.    -Impression of Dietary Intake: in general, a \"healthy\" diet  , on average, 3 meals per day.     Current Outpatient Medications on File Prior to Visit   Medication Sig Dispense Refill    traZODone (DESYREL) 50 MG tablet Take 50 mg by mouth nightly      fluticasone (FLONASE) 50 MCG/ACT nasal spray 1 spray by Each Nostril route daily 1 Bottle 0    VORTIoxetine (TRINTELLIX) 5 MG tablet Take 10 mg by mouth daily  metoprolol succinate (TOPROL XL) 100 MG extended release tablet Take 1 tablet by mouth daily 30 tablet 5    aspirin (ASPIRIN LOW DOSE) 81 MG EC tablet Take 1 tablet by mouth daily 90 tablet 3    meloxicam (MOBIC) 15 MG tablet Take 1 tablet by mouth daily 30 tablet 5    losartan (COZAAR) 100 MG tablet Take 1 tablet by mouth daily 30 tablet 5    ezetimibe (ZETIA) 10 MG tablet Take 1 tablet by mouth daily 30 tablet 5    omeprazole (PRILOSEC) 40 MG delayed release capsule TAKE 1 CAPSULE BY MOUTH DAILY 30 capsule 5    metFORMIN (GLUCOPHAGE) 500 MG tablet Take 1 tablet by mouth daily (with breakfast) 30 tablet 5    levothyroxine (SYNTHROID) 50 MCG tablet Take 1 tablet by mouth daily TAKE 1 TABLET BY MOUTH EVERY DAY 30 tablet 5    atorvastatin (LIPITOR) 80 MG tablet Take 1 tablet by mouth daily 30 tablet 5    albuterol-ipratropium (COMBIVENT RESPIMAT)  MCG/ACT AERS inhaler Inhale 1 puff into the lungs every 6 hours as needed for Wheezing 1 Inhaler 5    donepezil (ARICEPT) 5 MG tablet TAKE 1 TABLET BY MOUTH AT BEDTIME 28 tablet 3    divalproex (DEPAKOTE) 250 MG DR tablet TK 1 T PO QHS  1    risperiDONE (RISPERDAL) 0.25 MG tablet Take 0.25 mg by mouth 2 times daily Indications: 1 tab every AM and 2 tabs at bedtime. restarted 4/19 at 0.25mg in AM only       acetaminophen (TYLENOL) 500 MG tablet Take 1,000 mg by mouth every 6 hours as needed for Pain       No current facility-administered medications on file prior to visit. Vitals from current and previous visits:  Ht 5' 6\" (1.676 m)   Wt 218 lb 6.4 oz (99.1 kg)   BMI 35.25 kg/m²     -Body mass index is 35.25 kg/m². 35-39.9 - Obesity Grade II.   - Patient lost and 26 pounds over the last 9 mo. .  -Weight goal: lose weight. Nutrition Diagnosis:   Overweight/Obesity related to currently undergoing MNT as evidenced by Conditions associated with a diagnosis or treatment: Type 2 DB and BMI 35.25. Evi Alberta          Intervention:  -Impression: Pt has made

## 2019-06-24 NOTE — PATIENT INSTRUCTIONS
1. )  Good job cooking food at home. - using the grill is a great way of cooking.    2.)  Good job eating 3 meals/day to prevent you from over-eating later. 3.)  Good snack choices - small portion of nuts & yogurt &  & fresh fruit & veggies. 4.)  Great job with your walking!!    Glad you have a friend like Byron Juarez to help you with your healthy eating habits and keeping active. Bring a 5-7 day food log to next dietitian appt. Thanks! Have Byron Juarez help you keep a food log the week before your appt on Dec. 9th.

## 2019-07-01 ENCOUNTER — CARE COORDINATION (OUTPATIENT)
Dept: CARE COORDINATION | Age: 59
End: 2019-07-01

## 2019-07-01 NOTE — CARE COORDINATION
Pt called to inform me that he cancelled his land line and got a new cell #. New # is [phone]. Pt stated he [employer] as a payee now, they went to [facility] office and they switched it over for me. \"  Pt then \"went out and traded my truck in and  bought a new truck. \" He stated he took [email or web address] sale shopping and to [facility]. Discussed with pt how much better he is doing mentally, and pt agreed. He stated, \"yea, I think I just gave up when [employer] went to the nursing home, I am feeling better though and looking forward to moving to [place].\" Encouraged pt to continue to take his meds and let  know of any changes. Pt voiced understanding. Active listening, education and encouragement provided.

## 2019-07-03 ENCOUNTER — CARE COORDINATION (OUTPATIENT)
Dept: CARE COORDINATION | Age: 59
End: 2019-07-03

## 2019-07-03 DIAGNOSIS — R41.3 MEMORY PROBLEM: Primary | ICD-10-CM

## 2019-07-03 RX ORDER — DONEPEZIL HYDROCHLORIDE 5 MG/1
TABLET, FILM COATED ORAL
Qty: 28 TABLET | Refills: 3 | Status: SHIPPED | OUTPATIENT
Start: 2019-07-03 | End: 2019-10-25 | Stop reason: SDUPTHER

## 2019-07-23 ENCOUNTER — CARE COORDINATION (OUTPATIENT)
Dept: CARE COORDINATION | Age: 59
End: 2019-07-23

## 2019-08-12 ENCOUNTER — CARE COORDINATION (OUTPATIENT)
Dept: CARE COORDINATION | Age: 59
End: 2019-08-12

## 2019-08-15 ENCOUNTER — CARE COORDINATION (OUTPATIENT)
Dept: CARE COORDINATION | Age: 59
End: 2019-08-15

## 2019-09-20 ENCOUNTER — CARE COORDINATION (OUTPATIENT)
Dept: CARE COORDINATION | Age: 59
End: 2019-09-20

## 2019-10-18 ENCOUNTER — CARE COORDINATION (OUTPATIENT)
Dept: CARE COORDINATION | Age: 59
End: 2019-10-18

## 2019-10-25 DIAGNOSIS — R41.3 MEMORY PROBLEM: Primary | ICD-10-CM

## 2019-10-25 RX ORDER — DONEPEZIL HYDROCHLORIDE 5 MG/1
TABLET, FILM COATED ORAL
Qty: 28 TABLET | Refills: 5 | Status: SHIPPED | OUTPATIENT
Start: 2019-10-25 | End: 2020-04-09

## 2019-11-01 DIAGNOSIS — E11.9 TYPE 2 DIABETES MELLITUS WITHOUT COMPLICATION, WITHOUT LONG-TERM CURRENT USE OF INSULIN (HCC): Primary | ICD-10-CM

## 2019-11-01 DIAGNOSIS — E78.5 HYPERLIPIDEMIA, UNSPECIFIED HYPERLIPIDEMIA TYPE: ICD-10-CM

## 2019-11-01 DIAGNOSIS — I10 ESSENTIAL HYPERTENSION: ICD-10-CM

## 2019-11-01 DIAGNOSIS — E78.00 PURE HYPERCHOLESTEROLEMIA: ICD-10-CM

## 2019-11-01 DIAGNOSIS — E03.9 ACQUIRED HYPOTHYROIDISM: ICD-10-CM

## 2020-03-17 ENCOUNTER — OFFICE VISIT (OUTPATIENT)
Dept: INTERNAL MEDICINE CLINIC | Age: 60
End: 2020-03-17
Payer: MEDICARE

## 2020-03-17 VITALS
HEART RATE: 104 BPM | BODY MASS INDEX: 42.56 KG/M2 | HEIGHT: 63 IN | WEIGHT: 240.2 LBS | DIASTOLIC BLOOD PRESSURE: 90 MMHG | SYSTOLIC BLOOD PRESSURE: 132 MMHG

## 2020-03-17 LAB — HBA1C MFR BLD: 6 % (ref 4.3–5.7)

## 2020-03-17 PROCEDURE — 1036F TOBACCO NON-USER: CPT | Performed by: INTERNAL MEDICINE

## 2020-03-17 PROCEDURE — G8484 FLU IMMUNIZE NO ADMIN: HCPCS | Performed by: INTERNAL MEDICINE

## 2020-03-17 PROCEDURE — 83036 HEMOGLOBIN GLYCOSYLATED A1C: CPT | Performed by: INTERNAL MEDICINE

## 2020-03-17 PROCEDURE — G8417 CALC BMI ABV UP PARAM F/U: HCPCS | Performed by: INTERNAL MEDICINE

## 2020-03-17 PROCEDURE — 3017F COLORECTAL CA SCREEN DOC REV: CPT | Performed by: INTERNAL MEDICINE

## 2020-03-17 PROCEDURE — 99214 OFFICE O/P EST MOD 30 MIN: CPT | Performed by: INTERNAL MEDICINE

## 2020-03-17 PROCEDURE — 2022F DILAT RTA XM EVC RTNOPTHY: CPT | Performed by: INTERNAL MEDICINE

## 2020-03-17 PROCEDURE — 3044F HG A1C LEVEL LT 7.0%: CPT | Performed by: INTERNAL MEDICINE

## 2020-03-17 PROCEDURE — G8427 DOCREV CUR MEDS BY ELIG CLIN: HCPCS | Performed by: INTERNAL MEDICINE

## 2020-03-17 RX ORDER — METOPROLOL SUCCINATE 100 MG/1
100 TABLET, EXTENDED RELEASE ORAL DAILY
Qty: 90 TABLET | Refills: 1 | Status: SHIPPED | OUTPATIENT
Start: 2020-03-17 | End: 2020-06-22 | Stop reason: SDUPTHER

## 2020-03-17 RX ORDER — EZETIMIBE 10 MG/1
10 TABLET ORAL DAILY
Qty: 90 TABLET | Refills: 1 | Status: SHIPPED | OUTPATIENT
Start: 2020-03-17 | End: 2020-06-22 | Stop reason: SDUPTHER

## 2020-03-17 RX ORDER — TRAZODONE HYDROCHLORIDE 100 MG/1
100 TABLET ORAL NIGHTLY
COMMUNITY
End: 2021-06-03

## 2020-03-17 RX ORDER — LEVOTHYROXINE SODIUM 0.05 MG/1
TABLET ORAL
Qty: 90 TABLET | Refills: 1 | Status: SHIPPED | OUTPATIENT
Start: 2020-03-17 | End: 2020-06-22 | Stop reason: SDUPTHER

## 2020-03-17 RX ORDER — MELOXICAM 15 MG/1
15 TABLET ORAL DAILY
Qty: 90 TABLET | Refills: 1 | Status: SHIPPED | OUTPATIENT
Start: 2020-03-17 | End: 2020-06-22 | Stop reason: SDUPTHER

## 2020-03-17 RX ORDER — DIVALPROEX SODIUM 500 MG/1
500 TABLET, DELAYED RELEASE ORAL NIGHTLY
COMMUNITY
End: 2021-06-03

## 2020-03-17 RX ORDER — LOSARTAN POTASSIUM 100 MG/1
100 TABLET ORAL DAILY
Qty: 90 TABLET | Refills: 1 | Status: SHIPPED | OUTPATIENT
Start: 2020-03-17 | End: 2020-06-22 | Stop reason: SDUPTHER

## 2020-03-17 RX ORDER — RISPERIDONE 0.5 MG/1
0.5 TABLET, FILM COATED ORAL EVERY EVENING
COMMUNITY
End: 2021-06-03

## 2020-03-17 RX ORDER — RISPERIDONE 1 MG/1
1 TABLET, FILM COATED ORAL EVERY EVENING
COMMUNITY
End: 2022-05-02 | Stop reason: ALTCHOICE

## 2020-03-17 RX ORDER — OMEPRAZOLE 40 MG/1
CAPSULE, DELAYED RELEASE ORAL
Qty: 90 CAPSULE | Refills: 1 | Status: SHIPPED | OUTPATIENT
Start: 2020-03-17 | End: 2020-06-22 | Stop reason: SDUPTHER

## 2020-03-17 RX ORDER — ASPIRIN 81 MG/1
81 TABLET ORAL DAILY
Qty: 90 TABLET | Refills: 1 | Status: SHIPPED | OUTPATIENT
Start: 2020-03-17 | End: 2020-06-22 | Stop reason: SDUPTHER

## 2020-03-17 RX ORDER — ATORVASTATIN CALCIUM 80 MG/1
80 TABLET, FILM COATED ORAL DAILY
Qty: 90 TABLET | Refills: 1 | Status: SHIPPED | OUTPATIENT
Start: 2020-03-17 | End: 2020-06-22 | Stop reason: SDUPTHER

## 2020-03-17 NOTE — PROGRESS NOTES
MG TABS tablet Take 20 mg by mouth daily      traZODone (DESYREL) 100 MG tablet Take 100 mg by mouth nightly      divalproex (DEPAKOTE) 500 MG DR tablet Take 500 mg by mouth nightly      atorvastatin (LIPITOR) 80 MG tablet Take 1 tablet by mouth daily 90 tablet 1    levothyroxine (SYNTHROID) 50 MCG tablet TAKE 1 TABLET BY MOUTH DAILY 90 tablet 1    omeprazole (PRILOSEC) 40 MG delayed release capsule Take one capsule by mouth daily. 90 capsule 1    ezetimibe (ZETIA) 10 MG tablet Take 1 tablet by mouth daily 90 tablet 1    meloxicam (MOBIC) 15 MG tablet Take 1 tablet by mouth daily 90 tablet 1    losartan (COZAAR) 100 MG tablet Take 1 tablet by mouth daily 90 tablet 1    metoprolol succinate (TOPROL XL) 100 MG extended release tablet Take 1 tablet by mouth daily 90 tablet 1    aspirin (ASPIRIN LOW DOSE) 81 MG EC tablet Take 1 tablet by mouth daily 90 tablet 1    donepezil (ARICEPT) 5 MG tablet TAKE 1 TABLET BY MOUTH AT BEDTIME 28 tablet 5    albuterol-ipratropium (COMBIVENT RESPIMAT)  MCG/ACT AERS inhaler Inhale 1 puff into the lungs every 6 hours as needed for Wheezing 1 Inhaler 5    acetaminophen (TYLENOL) 500 MG tablet Take 1,000 mg by mouth every 6 hours as needed for Pain       No current facility-administered medications for this visit.         No Known Allergies    Social History     Socioeconomic History    Marital status:      Spouse name: Not on file    Number of children: Not on file    Years of education: Not on file    Highest education level: Not on file   Occupational History    Not on file   Social Needs    Financial resource strain: Not on file    Food insecurity     Worry: Not on file     Inability: Not on file    Transportation needs     Medical: Not on file     Non-medical: Not on file   Tobacco Use    Smoking status: Former Smoker     Packs/day: 0.00     Years: 40.00     Pack years: 0.00     Types: Cigarettes     Last attempt to quit: 1/1/2014     Years since quittin.2    Smokeless tobacco: Never Used   Substance and Sexual Activity    Alcohol use: No     Alcohol/week: 0.0 standard drinks    Drug use: No    Sexual activity: Never   Lifestyle    Physical activity     Days per week: Not on file     Minutes per session: Not on file    Stress: Not on file   Relationships    Social connections     Talks on phone: Not on file     Gets together: Not on file     Attends Pentecostalism service: Not on file     Active member of club or organization: Not on file     Attends meetings of clubs or organizations: Not on file     Relationship status: Not on file    Intimate partner violence     Fear of current or ex partner: Not on file     Emotionally abused: Not on file     Physically abused: Not on file     Forced sexual activity: Not on file   Other Topics Concern    Not on file   Social History Narrative    Not on file       Family History   Problem Relation Age of Onset    Dementia Mother     High Blood Pressure Mother     COPD Father     High Cholesterol Father     High Blood Pressure Father        Review of Systems - General ROS: negative for - chills or fever, no longer having fatigue, sweats - happened at rest, activity, public and at home. None in the last couple years. Psychological ROS: positive for - anxiety and depression - stress with issues - seeing [facility]  Hematological and Lymphatic ROS: No history of blood clots or bleeding disorder. Respiratory ROS: no cough, shortness of breath, or wheezing - flare with mowing lawn.   No issues now in winter  Cardiovascular ROS: no chest pain, no dyspnea on exertion - cath neg 2017  Gastrointestinal ROS: no right lower abdominal pain, no change in bowel habits, or black or bloody stools  Genito-Urinary ROS: no dysuria, trouble voiding, or hematuria  Musculoskeletal ROS: negative for - joint pain, muscle pain or muscular weakness - except for chronic right leg/ankle pain, bilateral knee pain, using brace on (PRILOSEC) 40 MG delayed release capsule   6. Chronic pain of right ankle  meloxicam (MOBIC) 15 MG tablet   7. Other specified transient cerebral ischemias  aspirin (ASPIRIN LOW DOSE) 81 MG EC tablet     Hypertension - BP uncontrolled, as ran out of metoprolol and losartan. Refill medication and explained the need to continue this - risk for MI, CVA, etc.    DM - at goal off Metformin a couple months and diet changes - stop metformin. Foot exam done today, eye exam due 6/2019. Hyperlipidemia - was at goal on Lipitor and Zetia. Will wait 2 months to check lipid with next set of labs as has been off medication for unknown period of time. Hypothyroidism - was at goal on levothyroxine 50 mcg daily - restart it. Check TSH in 2 months. GERD - stable. Continue PPI for now. But could consider changing to Pepcid at next visit. Chronic right ankle pain - stable on Mobic -continue Mobic. Monitor BMP. History of TIA - stable. Follow up in 3 months for follow up of chronic issues. Lab due in 1-2 months. Allergies: Patient has no known allergies. Dr. Kamari Montgomery    [address]  [place], [address]    Phone number: [phone]  Fax number [phone]

## 2020-03-24 ENCOUNTER — CARE COORDINATION (OUTPATIENT)
Dept: CARE COORDINATION | Age: 60
End: 2020-03-24

## 2020-03-24 NOTE — CARE COORDINATION
Called pt to see if he was doing ok. Pt stated he is doing ok. He got groceries recently and has plenty of food.l He is still not allowed [facility] Aubree Woo takes pt to appt. etc. Pt doesn't have his truck anymore cause it quit working and he \"dropped it off at [employer] and quit paying for it\". He said it needed a new motor and \"they wouldn't do anything about it. \" Allowed pt to process thoughts and feelings. Active listening provided.

## 2020-04-09 RX ORDER — DONEPEZIL HYDROCHLORIDE 5 MG/1
TABLET, FILM COATED ORAL
Qty: 28 TABLET | Refills: 5 | Status: SHIPPED | OUTPATIENT
Start: 2020-04-09 | End: 2020-09-24

## 2020-06-22 ENCOUNTER — OFFICE VISIT (OUTPATIENT)
Dept: INTERNAL MEDICINE CLINIC | Age: 60
End: 2020-06-22
Payer: MEDICARE

## 2020-06-22 VITALS
SYSTOLIC BLOOD PRESSURE: 138 MMHG | HEIGHT: 63 IN | DIASTOLIC BLOOD PRESSURE: 84 MMHG | HEART RATE: 76 BPM | WEIGHT: 246.5 LBS | BODY MASS INDEX: 43.68 KG/M2 | TEMPERATURE: 97.2 F

## 2020-06-22 LAB — HBA1C MFR BLD: 5.7 % (ref 4.3–5.7)

## 2020-06-22 PROCEDURE — 83036 HEMOGLOBIN GLYCOSYLATED A1C: CPT | Performed by: INTERNAL MEDICINE

## 2020-06-22 PROCEDURE — 1036F TOBACCO NON-USER: CPT | Performed by: INTERNAL MEDICINE

## 2020-06-22 PROCEDURE — G8427 DOCREV CUR MEDS BY ELIG CLIN: HCPCS | Performed by: INTERNAL MEDICINE

## 2020-06-22 PROCEDURE — 2022F DILAT RTA XM EVC RTNOPTHY: CPT | Performed by: INTERNAL MEDICINE

## 2020-06-22 PROCEDURE — 3017F COLORECTAL CA SCREEN DOC REV: CPT | Performed by: INTERNAL MEDICINE

## 2020-06-22 PROCEDURE — G8417 CALC BMI ABV UP PARAM F/U: HCPCS | Performed by: INTERNAL MEDICINE

## 2020-06-22 PROCEDURE — 3044F HG A1C LEVEL LT 7.0%: CPT | Performed by: INTERNAL MEDICINE

## 2020-06-22 PROCEDURE — 99214 OFFICE O/P EST MOD 30 MIN: CPT | Performed by: INTERNAL MEDICINE

## 2020-06-22 RX ORDER — METOPROLOL SUCCINATE 100 MG/1
100 TABLET, EXTENDED RELEASE ORAL DAILY
Qty: 90 TABLET | Refills: 1 | Status: SHIPPED | OUTPATIENT
Start: 2020-06-22 | End: 2021-03-18 | Stop reason: DRUGHIGH

## 2020-06-22 RX ORDER — LOSARTAN POTASSIUM 100 MG/1
100 TABLET ORAL DAILY
Qty: 90 TABLET | Refills: 1 | Status: SHIPPED | OUTPATIENT
Start: 2020-06-22 | End: 2021-03-18 | Stop reason: DRUGHIGH

## 2020-06-22 RX ORDER — MELOXICAM 15 MG/1
15 TABLET ORAL DAILY
Qty: 90 TABLET | Refills: 1 | Status: SHIPPED | OUTPATIENT
Start: 2020-06-22 | End: 2020-06-23

## 2020-06-22 RX ORDER — LEVOTHYROXINE SODIUM 0.05 MG/1
TABLET ORAL
Qty: 90 TABLET | Refills: 1 | Status: SHIPPED | OUTPATIENT
Start: 2020-06-22 | End: 2021-04-22

## 2020-06-22 RX ORDER — ASPIRIN 81 MG/1
81 TABLET ORAL DAILY
Qty: 90 TABLET | Refills: 1 | Status: SHIPPED | OUTPATIENT
Start: 2020-06-22 | End: 2020-06-23

## 2020-06-22 RX ORDER — EZETIMIBE 10 MG/1
10 TABLET ORAL DAILY
Qty: 90 TABLET | Refills: 1 | Status: SHIPPED | OUTPATIENT
Start: 2020-06-22 | End: 2021-04-22

## 2020-06-22 RX ORDER — OMEPRAZOLE 40 MG/1
CAPSULE, DELAYED RELEASE ORAL
Qty: 90 CAPSULE | Refills: 1 | Status: SHIPPED | OUTPATIENT
Start: 2020-06-22 | End: 2021-04-22

## 2020-06-22 RX ORDER — ATORVASTATIN CALCIUM 80 MG/1
80 TABLET, FILM COATED ORAL DAILY
Qty: 90 TABLET | Refills: 1 | Status: SHIPPED | OUTPATIENT
Start: 2020-06-22 | End: 2021-04-22

## 2020-06-22 NOTE — PROGRESS NOTES
1960    Chief Complaint   Patient presents with    Diabetes     3 months / labs [facility] Feb.     Hypertension    Hyperlipidemia    Hypothyroidism    Gastroesophageal Reflux     Pt is a 60 y.o. male who presents for 3 month follow up visit. Left leg - pain in back of leg with ambulation. Not better with heat, Tylenol and Aleve. Ongoing about 4 months. Better with rest.  +2 edema on left leg to knee - pain with palpation posteriorly. Check Doppler venous, if neg - check X-ray knee. If OA- send back down to ortho in [place]. Normal pedal pulses. He broke his glasses and doesn't remember who he saw in [address] [place] [address]. Will set up with Dr. Haylie Langford with DM eye exam due 7/2/20 anyway. Again ran out of medication last 1-2 months per my refill report. Getting meds from [facility] in pill pack. DM - his diet has greatly improved - spent extensive time reviewing diet as his friend Maureen Rajan moved away and he was his major support system. He is eating a couple eggs with potato at breakfast, 2 hot dog an broccoli at lunch, supper is a 1/2 can beef stew or frozen dinners. HgA1c 6 3/2020 and likely has been off metformin 2 months. May snack on peanuts, bananas, celery, prunes, and carrots. Stopped metformin 3 months ago. Maureen Rajan found him a good yogart and types of foods to eat. He still comes into town periodically to visit him. HgA1c 5.7 6/2020 and doing well just on diet control. No neuropathy, no foot lesions. BMP 3/2020 - normal kidney function. Normal microalbumin 9/2018 - due now. No autonomic dysfunction. Eye exam 6/2019 - left eye affected by cyst on forehead and had it removed. Derm at [facility] did it - get path report. Saw eye MD 7/2019. Due now. LDL was elevated 2/2020, but due now after being back on statin last 3 months. He got depressed when restraining order put on him at [facility] where Tiki Costello is and daughter took her phone away, other friend moved out of state.   He still is living in [facility]. He has help with Clemente Galeazzi at [facility] to fill out forms. He is still seeing [facility]. He didn't worry about taking meds when gets depressed. Now is able to get messages to Camille Fearing so is feeling better about situation. Continue to encourage seeing [facility]. Now on Depakote, Trazodone, Trintellix, Risperdal, and Aricept and doing well today. BMI 42.56 - weight is up 18# at last visit. BMI 43.67 today. He states he is eating much better - need to exercise more - walk [place]. Hyperlipidemia - , trig in 300s initially. He is on Lipitor 80 mg and states he is taking every day. Added Zetia 1/2018 and LDL 53.38, YTDK 766, normal ALT 5/2018, normal ALT 9/2018. Repeat labs in a couple months - not on meds currently.  2/2020 but wasn't on medication. Now on Lipitor and Zetia x 3 months - repeat LDL. Trig 300's but will monitor for now. Hypertension -  BP controlled today on metoprolol and losartan.     Hypothyroidism - They increased thyroid med in hospital to 50 mcg need to repeat lab at end of 8/2017. TSH 2.8 8/29/17,1/29/2018, and 2.99 9/2018, will continue on this dose. Check TSH in a couple months after restart medication. TSH high and FT4 low 2/2020 but wasn't on meds - repeat now. GERD - resolved. Continue Prilosec 40 mg daily. He has seen [facility] in past, doubt seeing now as symptoms resolved. I think it is better with better diet. At next visit - consider decreasing to 20 mg daily. Reactive airway disease - it looks like Gab ROBLES started Combivent - took 6 days to get to him - was having SOB. Improving now that he has it. He states SOB worse with mowing lawn - ? Asthma exacerbated by grass/pollen. Lungs clear on exam today. He is not using currently. Difficulty focusing, memory, lack of life skills - Discussed results of MRI done 2/2018 - noted mild chronic microvascular ischemia angiopathy, no acute changes.   He had head CT 10/2016 - mild ischemia due to small vessel ischemic disease. He has a family history of Alzheimer's. TSH, CMP, CBC normal.  B12 high - it is chronically high. He denies being on supplement. He did see neurology - Psy evaluation and labs ordered and started Aricept. B12 high - this has been high for years. Unsure why as he denies B12 supplements. Please see note from 1/30/18 for his complex social situation. He has limited ability to care for self independently. He has now gotten help from  at [employer], he now has a  at [facility] See previous note for explanation of current changes in social situation. Now having some help with meals with neighbor. He is teaching him how to make things.     Discussed at previous visit:  He is more confused intermittently. He reports that recently his sister came over to his trailer and he argued with her. She wanted him to sign a form and he refused. He went to son's [facility] site to think - helps him usually. Then he wanted to go see his mom as she always comforted him. He drove to the town his mother lived in but instead of going to her [facility] site - he automatically drove to her old house. He walked in to the house and luckily the people who live there talked to him nicely. He realized then that his parents were dead. He went to [facility] and was assessed. He is trying to get someone to talk to. He is currently in process of psychiatrist and psychologist through [facility] With his memory issues - suggested MRI of brain. He is not wearing CPAP as not helping sweating episodes. Again suggested that he get back to using this.      Suspected TIA - Head CT and MRI negative for acute bleed or CVA. Incidentally noted forehead lump is a lipoma. He had carotid doppler which was unremarkable. He is to follow up with Dr. Lesly Dunn.   Face still with intermittent numbness.     MRI of adrenals and MIBG test negative for pheo. He is still having sweating, flushing, SOB, right eye twitching (looks more closed), right eye puffy, whole face has a weird feeling and pale, speech is slurred?, episodic, multiple times a day. When he wipes the sweat off, the face is painful. Will discuss with Endo on direction of work-up - refer to Endo but long wait to get in locally - offered specialist outside area and he would not give me an answer where he wanted to go. He is also complaining of difficulty concentrating, fatigue. He is extremely frustrated. Will increase metoprolol to 50 mg BID. Will treat mood with Prozac.      Left knee is painful - had knee scope done. I gave Mobic but told him to see ortho if still painful. He is to look on pill bottle to get name and number. Patient Active Problem List   Diagnosis    Hyperlipidemia    Hypertension    Right leg pain    Right ankle pain    Elevated LFTs    GERD (gastroesophageal reflux disease)    Fatigue    Intermittent explosive disorder    Witnessed apneic spells    Snoring    Sleep disturbance    Sleep difficulties    Nocturnal leg movements    Restless sleeper    Non-restorative sleep    Obesity (BMI 30-39. 9)    Daytime somnolence    Shifting sleep-work schedule    Acquired hypothyroidism    Anxiety    Diaphoresis    Reactive depression    Type 2 diabetes mellitus without complication, without long-term current use of insulin (HCC)     Past Medical History:   Diagnosis Date    Chronic leg pain     Diabetes ([address] 05/2018    GERD (gastroesophageal reflux disease)     Hyperlipidemia     Hypertension     Hypothyroidism     Sleep apnea     TIA (transient ischemic attack) 2016    Dr. Jony Churchill        Past Surgical History:   Procedure Laterality Date    ANKLE SURGERY Right 2000    KNEE ARTHROSCOPY Left 9-11-15    Torn cartliage    LEG SURGERY Right 2000    WI COLSC FLX W/RMVL OF TUMOR POLYP LESION SNARE TQ  7/16/2017    COLONOSCOPY POLYPECTOMY SNARE/COLD BIOPSY performed by Gisell Wolfe MD at [facility]    FL EGD TRANSORAL BIOPSY SINGLE/MULTIPLE N/A 9/27/2017    EGD BIOPSY performed by Felipe Kat [facility] at [facility]  7/16/2017    EGD DIAGNOSTIC ONLY performed by Gisell Wolfe MD at [facility]       Current Outpatient Medications   Medication Sig Dispense Refill    atorvastatin (LIPITOR) 80 MG tablet Take 1 tablet by mouth daily 90 tablet 1    levothyroxine (SYNTHROID) 50 MCG tablet TAKE 1 TABLET BY MOUTH DAILY 90 tablet 1    omeprazole (PRILOSEC) 40 MG delayed release capsule Take one capsule by mouth daily. 90 capsule 1    ezetimibe (ZETIA) 10 MG tablet Take 1 tablet by mouth daily 90 tablet 1    losartan (COZAAR) 100 MG tablet Take 1 tablet by mouth daily 90 tablet 1    metoprolol succinate (TOPROL XL) 100 MG extended release tablet Take 1 tablet by mouth daily 90 tablet 1    donepezil (ARICEPT) 5 MG tablet TAKE 1 TABLET BY MOUTH AT BEDTIME 28 tablet 5    risperiDONE (RISPERDAL) 1 MG tablet Take 1 mg by mouth every morning      risperiDONE (RISPERDAL) 0.5 MG tablet Take 0.5 mg by mouth every evening      VORTIoxetine HBr (TRINTELLIX) 20 MG TABS tablet Take 20 mg by mouth daily      traZODone (DESYREL) 100 MG tablet Take 100 mg by mouth nightly      divalproex (DEPAKOTE) 500 MG DR tablet Take 500 mg by mouth nightly      albuterol-ipratropium (COMBIVENT RESPIMAT)  MCG/ACT AERS inhaler Inhale 1 puff into the lungs every 6 hours as needed for Wheezing 1 Inhaler 5    acetaminophen (TYLENOL) 500 MG tablet Take 1,000 mg by mouth every 6 hours as needed for Pain      rivaroxaban (XARELTO) 20 MG TABS tablet Take 1 tablet by mouth daily (with breakfast) 90 tablet 0    rivaroxaban (XARELTO) 15 MG TABS tablet Take 1 tablet by mouth 2 times daily (with meals) Take for 21 days for DVT then start 20 mg daily 42 tablet 1     No current facility-administered medications for this visit. No Known Allergies    Social History     Socioeconomic History    Marital status:      Spouse name: Not on file    Number of children: Not on file    Years of education: Not on file    Highest education level: Not on file   Occupational History    Not on file   Social Needs    Financial resource strain: Not on file    Food insecurity     Worry: Not on file     Inability: Not on file    Transportation needs     Medical: Not on file     Non-medical: Not on file   Tobacco Use    Smoking status: Former Smoker     Packs/day: 0.00     Years: 40.00     Pack years: 0.00     Types: Cigarettes     Last attempt to quit: 2014     Years since quittin.5    Smokeless tobacco: Never Used   Substance and Sexual Activity    Alcohol use: No     Alcohol/week: 0.0 standard drinks    Drug use: No    Sexual activity: Never   Lifestyle    Physical activity     Days per week: Not on file     Minutes per session: Not on file    Stress: Not on file   Relationships    Social connections     Talks on phone: Not on file     Gets together: Not on file     Attends Gnosticism service: Not on file     Active member of club or organization: Not on file     Attends meetings of clubs or organizations: Not on file     Relationship status: Not on file    Intimate partner violence     Fear of current or ex partner: Not on file     Emotionally abused: Not on file     Physically abused: Not on file     Forced sexual activity: Not on file   Other Topics Concern    Not on file   Social History Narrative    Not on file       Family History   Problem Relation Age of Onset    Dementia Mother     High Blood Pressure Mother     COPD Father     High Cholesterol Father     High Blood Pressure Father        Review of Systems - General ROS: negative for - chills or fever, no longer having fatigue, sweats - happened at rest, activity, public and at home. None in the last couple years.   Psychological ROS: positive for - anxiety and depression - much improved today - seeing [facility]  Hematological and Lymphatic ROS: No history of blood clots or bleeding disorder. Respiratory ROS: no cough, shortness of breath, or wheezing - flare with mowing lawn. No issues in winter  Cardiovascular ROS: no chest pain, no dyspnea on exertion - cath neg 1/2017  Gastrointestinal ROS: no right lower abdominal pain, no change in bowel habits, or black or bloody stools  Genito-Urinary ROS: no dysuria, trouble voiding, or hematuria  Musculoskeletal ROS: negative for - joint pain, muscle pain or muscular weakness - except for chronic right leg/ankle pain, bilateral knee pain, using brace on left knee - and Bengay - on chronic Mobic. Try Lidocaine cream or Killian emu cream and if not improved - will refer to [address] [place] [address]. Doing well on Mobic now. Neurological ROS: negative for - seizures, positive tingling of shoulders and face, with episodes - none in last couple years. Dermatological ROS: negative for - rash or skin lesion changes    Blood pressure 138/84, pulse 76, temperature 97.2 °F (36.2 °C), height 5' 3\" (1.6 m), weight 246 lb 8 oz (111.8 kg).     Physical Examination: General appearance -alert, well appearing, and in no distress  Mental status - alert, oriented to person, place, and time  Neck - supple, no significant adenopathy  Chest - clear to auscultation, no wheezes, rales or rhonchi, symmetric air entry  Heart - normal rate, regular rhythm, normal S1, S2, no murmurs, rubs, clicks or gallops  Abdomen -soft, nontender, nondistended  Neurological - alert, oriented  Extremities - peripheral pulses normal, left leg with +2 edema to knee with pain to palpation of calf, no clubbing or cyanosis   Skin - warm and dry    Foot exam 3/17/20: no foot lesions, normal sensation with monofilament testing, +2 PT and DP pulses bilaterally    Diagnostic data:  Results for orders placed or performed in visit on 06/22/20   POCT glycosylated hemoglobin (Hb A1C)   Result Value Ref Range    Hemoglobin A1C 5.7 4.3 - 5.7 %     Assessment and Plan:      Diagnosis Orders   1. Pain of left lower extremity  VL DUP LOWER EXTREMITY VENOUS LEFT   2. Leg swelling  VL DUP LOWER EXTREMITY VENOUS LEFT   3. Essential hypertension  losartan (COZAAR) 100 MG tablet    metoprolol succinate (TOPROL XL) 100 MG extended release tablet   4. Type 2 diabetes mellitus without complication, without long-term current use of insulin ([facility])  POCT glycosylated hemoglobin (Hb A1C)    26020 - Collection Capillary Blood Specimen   5. Mixed hyperlipidemia  atorvastatin (LIPITOR) 80 MG tablet    ezetimibe (ZETIA) 10 MG tablet    LDL Cholesterol, Direct   6. Acquired hypothyroidism  levothyroxine (SYNTHROID) 50 MCG tablet    TSH with Reflex   7. Gastroesophageal reflux disease, esophagitis presence not specified  omeprazole (PRILOSEC) 40 MG delayed release capsule   8. Chronic pain of right ankle  DISCONTINUED: meloxicam (MOBIC) 15 MG tablet   9. Other specified transient cerebral ischemias  DISCONTINUED: aspirin (ASPIRIN LOW DOSE) 81 MG EC tablet     Left calf pain, edema - check venous doppler to rule out DVT although unusual as symptoms x 4 months. If negative doppler, order knee x-ray. Hypertension - BP controlled, on metoprolol and losartan. BMP normal 3/2020. DM - at goal off Metformin x 3-5 months and diet changes. Eye exam due now - set up visit with [facility]. Aurora Rendon in [facility]. Microalbumin due now. Hyperlipidemia - was at goal on Lipitor and Zetia, but LDL high 2/2020 as stopped medication. Now will repeat LDL as has been back on medication the last 3 months. Hypothyroidism - was at goal on levothyroxine 50 mcg daily - restarted it at last visit and repeat TSH now. GERD - stable. Continue PPI for now. But could consider decreasing dose or changing to Pepcid at next visit. Chronic right ankle pain - stable on Mobic -continue Mobic. Monitor BMP.       History of TIA - stable. Follow up in 6 weeks. Lab due now. Allergies: Patient has no known allergies. Dr. Inez Christine    [address] [place] [address] Pkwy  [place], [address]    Phone number: [phone]  Fax number [phone]

## 2020-06-23 ENCOUNTER — TELEPHONE (OUTPATIENT)
Dept: INTERNAL MEDICINE CLINIC | Age: 60
End: 2020-06-23

## 2020-06-23 ENCOUNTER — HOSPITAL ENCOUNTER (OUTPATIENT)
Dept: INTERVENTIONAL RADIOLOGY/VASCULAR | Age: 60
Discharge: HOME | End: 2020-06-23
Payer: MEDICARE

## 2020-06-23 ENCOUNTER — OFFICE VISIT (OUTPATIENT)
Dept: INTERNAL MEDICINE CLINIC | Age: 60
End: 2020-06-23
Payer: MEDICARE

## 2020-06-23 VITALS
HEART RATE: 80 BPM | DIASTOLIC BLOOD PRESSURE: 98 MMHG | SYSTOLIC BLOOD PRESSURE: 160 MMHG | WEIGHT: 245 LBS | BODY MASS INDEX: 43.41 KG/M2 | TEMPERATURE: 98.6 F | HEIGHT: 63 IN

## 2020-06-23 PROCEDURE — G8417 CALC BMI ABV UP PARAM F/U: HCPCS | Performed by: INTERNAL MEDICINE

## 2020-06-23 PROCEDURE — 93971 EXTREMITY STUDY: CPT

## 2020-06-23 PROCEDURE — G8427 DOCREV CUR MEDS BY ELIG CLIN: HCPCS | Performed by: INTERNAL MEDICINE

## 2020-06-23 PROCEDURE — 1036F TOBACCO NON-USER: CPT | Performed by: INTERNAL MEDICINE

## 2020-06-23 PROCEDURE — 3017F COLORECTAL CA SCREEN DOC REV: CPT | Performed by: INTERNAL MEDICINE

## 2020-06-23 PROCEDURE — 99213 OFFICE O/P EST LOW 20 MIN: CPT | Performed by: INTERNAL MEDICINE

## 2020-07-06 ENCOUNTER — TELEPHONE (OUTPATIENT)
Dept: INTERNAL MEDICINE CLINIC | Age: 60
End: 2020-07-06

## 2020-07-06 NOTE — TELEPHONE ENCOUNTER
Patient notified that a urinalysis was ordered. Patient states he will go to [address] in the [facility] building to have done .  Or

## 2020-07-06 NOTE — TELEPHONE ENCOUNTER
Patient called stating that he had a little bit of blood in his urine . Patient denies burning but he is having urinary frequency . Okay to order UA .

## 2020-07-08 ENCOUNTER — NURSE ONLY (OUTPATIENT)
Dept: LAB | Age: 60
End: 2020-07-08

## 2020-07-08 LAB
CREATININE, URINE: 123.2 MG/DL
LDL CHOLESTEROL DIRECT: 33.86 MG/DL
MICROALBUMIN UR-MCNC: 39.56 MG/DL
MICROALBUMIN/CREAT UR-RTO: 321 MG/G (ref 0–30)
TSH SERPL DL<=0.05 MIU/L-ACNC: 3.71 UIU/ML (ref 0.4–4.2)

## 2020-07-09 ENCOUNTER — TELEPHONE (OUTPATIENT)
Dept: INTERNAL MEDICINE CLINIC | Age: 60
End: 2020-07-09

## 2020-07-09 NOTE — TELEPHONE ENCOUNTER
----- Message from Jerry Meigs, MD sent at 7/9/2020 12:10 AM EDT -----  Let him know labs look ok - keep taking medication - no changes. He is on max dose of losartan for elevated microalbumin.

## 2020-09-23 NOTE — TELEPHONE ENCOUNTER
Spoke with patient who stated he will call [facility] neuropsychology to schedule appointment as discussed at last visit. He will call office back to schedule follow up once seen by neuropsychology.

## 2020-09-24 RX ORDER — DONEPEZIL HYDROCHLORIDE 5 MG/1
TABLET, FILM COATED ORAL
Qty: 28 TABLET | Refills: 5 | Status: SHIPPED | OUTPATIENT
Start: 2020-09-24 | End: 2021-06-03

## 2020-09-25 NOTE — TELEPHONE ENCOUNTER
Last visit- 6/23/2020  Next visit- 10/1/2020    Requested Prescriptions     Pending Prescriptions Disp Refills    XARELTO 20 MG TABS tablet [Pharmacy Med Name: Guillermina Marcus 20MG TAB] 28 tablet      Sig: TAKE 1 TABLET BY MOUTH DAILY WITH BREAKFAST

## 2020-09-28 RX ORDER — RIVAROXABAN 20 MG/1
TABLET, FILM COATED ORAL
Qty: 28 TABLET | Refills: 0 | Status: SHIPPED | OUTPATIENT
Start: 2020-09-28 | End: 2021-04-22

## 2020-10-13 ENCOUNTER — TELEPHONE (OUTPATIENT)
Dept: INTERNAL MEDICINE CLINIC | Age: 60
End: 2020-10-13

## 2020-10-13 NOTE — TELEPHONE ENCOUNTER
----- Message from Helen Hall MD sent at 10/12/2020  8:11 PM EDT -----  Please call patient and find out if taking blood thinner. Need to continue till see repeat Venous doppler - he didn't get it done and didn't show up for my appt. Need to get this done.

## 2020-10-28 NOTE — TELEPHONE ENCOUNTER
Attempted to reach patient and no answer , mailbox is full and not accepting new messages . Sent letter asking patient to contact the office and get doppler rescheduled and appointment rescheduled with Dr. Zita Homans. iPerre Chew

## 2021-03-09 ENCOUNTER — CARE COORDINATION (OUTPATIENT)
Dept: CARE COORDINATION | Age: 61
End: 2021-03-09

## 2021-03-09 NOTE — CARE COORDINATION
Pt called me accidentally, he meant to call [employer]. Pt dog  10/20 of a heart attack, and pt wants to adopt a dog. Pt doing well. No longer taking his medications, (hasn't for 2 months), but feels he needs his thyroid pill. Pt can now go visit/see his friend Nona Chambers again and he is glad. Informed me he has been eating healthier and is down to 231. He bought a home in [place]. He met a lady on the Internet named Mark Anthony Vargas. He thinks he will  her, she is from the [place]. He is still seeing counselor at [facility]. Sim Campos and Dr. Neha Cisse. Encouraged pt to contact Dr. Bela Grier for a check up. Pt voiced understanding and said he will call her. Made sure he has her phone #. Active listening provided.

## 2021-03-18 ENCOUNTER — OFFICE VISIT (OUTPATIENT)
Dept: INTERNAL MEDICINE CLINIC | Age: 61
End: 2021-03-18
Payer: MEDICARE

## 2021-03-18 VITALS
BODY MASS INDEX: 41.32 KG/M2 | SYSTOLIC BLOOD PRESSURE: 150 MMHG | TEMPERATURE: 98.8 F | HEART RATE: 92 BPM | WEIGHT: 233.2 LBS | DIASTOLIC BLOOD PRESSURE: 90 MMHG | HEIGHT: 63 IN

## 2021-03-18 DIAGNOSIS — E11.9 TYPE 2 DIABETES MELLITUS WITHOUT COMPLICATION, WITHOUT LONG-TERM CURRENT USE OF INSULIN (HCC): Primary | ICD-10-CM

## 2021-03-18 DIAGNOSIS — E78.2 MIXED HYPERLIPIDEMIA: ICD-10-CM

## 2021-03-18 DIAGNOSIS — I10 ESSENTIAL HYPERTENSION: ICD-10-CM

## 2021-03-18 DIAGNOSIS — E03.9 ACQUIRED HYPOTHYROIDISM: ICD-10-CM

## 2021-03-18 DIAGNOSIS — Z86.59 HISTORY OF SUICIDAL IDEATION: ICD-10-CM

## 2021-03-18 LAB — HBA1C MFR BLD: 5.9 % (ref 4.3–5.7)

## 2021-03-18 PROCEDURE — 99214 OFFICE O/P EST MOD 30 MIN: CPT | Performed by: STUDENT IN AN ORGANIZED HEALTH CARE EDUCATION/TRAINING PROGRAM

## 2021-03-18 PROCEDURE — 4004F PT TOBACCO SCREEN RCVD TLK: CPT | Performed by: STUDENT IN AN ORGANIZED HEALTH CARE EDUCATION/TRAINING PROGRAM

## 2021-03-18 PROCEDURE — 3044F HG A1C LEVEL LT 7.0%: CPT | Performed by: STUDENT IN AN ORGANIZED HEALTH CARE EDUCATION/TRAINING PROGRAM

## 2021-03-18 PROCEDURE — 2022F DILAT RTA XM EVC RTNOPTHY: CPT | Performed by: STUDENT IN AN ORGANIZED HEALTH CARE EDUCATION/TRAINING PROGRAM

## 2021-03-18 PROCEDURE — G8427 DOCREV CUR MEDS BY ELIG CLIN: HCPCS | Performed by: STUDENT IN AN ORGANIZED HEALTH CARE EDUCATION/TRAINING PROGRAM

## 2021-03-18 PROCEDURE — G8484 FLU IMMUNIZE NO ADMIN: HCPCS | Performed by: STUDENT IN AN ORGANIZED HEALTH CARE EDUCATION/TRAINING PROGRAM

## 2021-03-18 PROCEDURE — G8417 CALC BMI ABV UP PARAM F/U: HCPCS | Performed by: STUDENT IN AN ORGANIZED HEALTH CARE EDUCATION/TRAINING PROGRAM

## 2021-03-18 PROCEDURE — 83036 HEMOGLOBIN GLYCOSYLATED A1C: CPT | Performed by: STUDENT IN AN ORGANIZED HEALTH CARE EDUCATION/TRAINING PROGRAM

## 2021-03-18 PROCEDURE — 3017F COLORECTAL CA SCREEN DOC REV: CPT | Performed by: STUDENT IN AN ORGANIZED HEALTH CARE EDUCATION/TRAINING PROGRAM

## 2021-03-18 RX ORDER — LOSARTAN POTASSIUM 100 MG/1
100 TABLET ORAL DAILY
Qty: 90 TABLET | Refills: 1 | Status: CANCELLED | OUTPATIENT
Start: 2021-03-18

## 2021-03-18 RX ORDER — METOPROLOL SUCCINATE 50 MG/1
50 TABLET, EXTENDED RELEASE ORAL DAILY
Qty: 30 TABLET | Refills: 3 | Status: SHIPPED | OUTPATIENT
Start: 2021-03-18 | End: 2021-03-22 | Stop reason: SDUPTHER

## 2021-03-18 RX ORDER — LEVOTHYROXINE SODIUM 0.05 MG/1
TABLET ORAL
Qty: 90 TABLET | Refills: 1 | Status: CANCELLED | OUTPATIENT
Start: 2021-03-18

## 2021-03-18 RX ORDER — LOSARTAN POTASSIUM 50 MG/1
50 TABLET ORAL DAILY
Qty: 30 TABLET | Refills: 5 | Status: SHIPPED | OUTPATIENT
Start: 2021-03-18 | End: 2021-03-22 | Stop reason: SDUPTHER

## 2021-03-18 RX ORDER — METOPROLOL SUCCINATE 100 MG/1
100 TABLET, EXTENDED RELEASE ORAL DAILY
Qty: 90 TABLET | Refills: 1 | Status: CANCELLED | OUTPATIENT
Start: 2021-03-18

## 2021-03-18 SDOH — ECONOMIC STABILITY: TRANSPORTATION INSECURITY
IN THE PAST 12 MONTHS, HAS LACK OF TRANSPORTATION KEPT YOU FROM MEETINGS, WORK, OR FROM GETTING THINGS NEEDED FOR DAILY LIVING?: NO

## 2021-03-18 SDOH — ECONOMIC STABILITY: TRANSPORTATION INSECURITY
IN THE PAST 12 MONTHS, HAS THE LACK OF TRANSPORTATION KEPT YOU FROM MEDICAL APPOINTMENTS OR FROM GETTING MEDICATIONS?: NO

## 2021-03-18 SDOH — ECONOMIC STABILITY: FOOD INSECURITY: WITHIN THE PAST 12 MONTHS, YOU WORRIED THAT YOUR FOOD WOULD RUN OUT BEFORE YOU GOT MONEY TO BUY MORE.: NEVER TRUE

## 2021-03-18 ASSESSMENT — ENCOUNTER SYMPTOMS
VOMITING: 0
SHORTNESS OF BREATH: 0
RHINORRHEA: 0
ABDOMINAL PAIN: 0
DIARRHEA: 0
BACK PAIN: 0
CHEST TIGHTNESS: 0
SORE THROAT: 0
WHEEZING: 0
NAUSEA: 0
STRIDOR: 0
CONSTIPATION: 0

## 2021-03-18 NOTE — PROGRESS NOTES
Patient Name: Tequila Haynes  YOB: 1960  MRN: [id number]  Office visit date: 3/18/2021    Chief Complaint: Diabetes ( not seen since 6/20), Hypertension, Hyperlipidemia, Hypothyroidism, Gastroesophageal Reflux, Other (Reactive Airway Disease), and Other (Had suicidal ideations)    Assessment/Plan:    1. NIDDM2, controlled: HgbA1C 5.9% this visit. - Continue diet, exercise, and current lifestyle modifications. - Get labs now BMP and microalb/creat ratio    2. Acquired Hypothyroidism: Was on Synthroid 50 mcg. Has not been taking his medication since 9/2020  - Recheck TSH with reflex    3. Mixed hyperlipidemia  - Continue diet, exercise, and current lifestyle modifications. - Check lipid panel    4. Essential HTN, uncontrolled: Has not been taking his medication since 9/2020  - Restart Metoprolol XL 50mg and Losartan 50mg  - Recommend monitoring blood pressure at home    5. History of Suicidal Ideations: Occurred a few months ago, bought a gun, and had a plan; He had a change of heart after his dream seeing his family. He denies any suicidal ideations at this time. He is planning on giving the gun to his friend, Isaak Walter.  - Continue following [facility] and seeing your counselor    Return in about 1 month (around 4/18/2021). Subjective/Objective:    HPI:     Tequila Haynes is a 59-year-old male with extensive PMH that includes HTN, HLD, NIDDM 2, hypothyroidism, major depressive disorder, severe LEATHA with questionable compliance with PAP therapy, memory issues. On chart review patient also has a complex social history. There is note of a restraining order being placed on at the nursing home where his friend Adriano Morejon was and her daughter took her phone away, the friend moved out of state. He was still living in [facility]. Patient apparently has issues filling his medications on time, will go 1 to 2 months without meds. He now fills them at [facility] where he has a  assist him. Reviewed progress note from 2018. In summary, patient is a difficult historian. Apparently his friend Altagracia Isaac was very close to him and helped him quite often with daily activities of living. Furthermore, in his life his parents have always looked out for him however they are now . He was  but then got . He has children but he is not close with them (one son seems to have ). His major depressive disorder seems to stem from the loss of his parents, especially his mother. In the past, Altagracia Isaac was hospitalized and patient had expressed suicidal ideation. He eventually sought help at [facility]. There was concerns of memory issues, he apparently had driven to town where his mother once lived, went to her old house. He walked in the house to strangers, however there were nice after talk to him. He then apparently realized his parents are dead. Please see note on 2018 for details. Duane was last seen in 2020 and he was lost to follow-up. He has not been taking his medications for the past 1 year. Diabetes Mellitus - HgbA1c 5.9% - controlled, on 3/18/21. Patient does not check his blood sugars at home. Most recent eye over a year ago. There were concerns of findings on his eye exam and was told to follow-up, but he forgot. Patient reminded to have eye exam done yearly. Normal renal function, eGFR >90 (20), normal microalbumin 39.56 (20), elevated microalb/creat ratio of 321 (20). Patient denies foot lesions. Last foot exam today. Denies neuropathy. Denies autonomic dysfunction. LDL 33.86 (20). - He has been eating healthy foods. He has a friend, Arlet People, that has been helping him with his diet - eating fruits and vegetables. He has also [place], noon, evening without issues. - Get BMP, microalbumin/creat urine ratio  - Get yearly eye exam    Hypertension - BP uncontrolled.  Has not been taking his Losartan 100mg QD and Toprol XL 100mg QD. He denies headaches, changes in vision, chest pain   - Restart Losartan 100mg QD and Toprol XL 100mg QD    Hyperlipidemia - , trig in 300s initially. He was on Lipitor 80 mg. Added Zetia 1/2018 and LDL 53.38, trig 164, normal ALT 5/2018, normal ALT 9/2018. He is not currently on meds. LDL 33.86 7/2020.  - Repeat Lipid Panel now    Hypothyroidism - They increased thyroid med in hospital to 50 mcg need to repeat lab at end of 8/2017. TSH 2.8 8/29/17,1/29/2018, and 2.99 9/2018. TSH high and FT4 low 2/2020 but wasn't on meds. He still has not been taking his Synthroid. He has been taking 1 a day multivitamins and 1 a day energy pills OTC.  - Repeat TSH with reflex now. Previous Suicidal Ideations - States he bought a gun because he was feeling down and wanted to kill himself in the past. He was close two times in the past where he shot off the gun next to his pillow. Last month he was feeling down and depressed. He states he was tired and laid down and had the gun to his heart and said, \"God I don't know what's going on and I don't want to die. \" He then fell asleep and had a dream/vision and saw his mom, dad, grandma, and grandpa, and stillborn son, aunts, nephews, dog - after he had that dream/vision he has been feeling much more energetic and happier. He states he has been waking up in the morning livelier and dances to blue grass music. He states he he's going to give his gun to [place]. He denies having anymore suicidal ideations.  - Recommend he sees [facility] and his counselor again about     DVT, Provoked - Noted to be positive on 6/23/20 - of paired peroneal veins of left calf. Was supposed to get repeat venous doppler to ensure blood clots have fully resolved. Was taking Xarelto 20mg daily for about 3 months. He states his leg feels fine now.  Denies pain, swelling.   - Monitor for now as it has been since 9/2020 that he finished his Xarelto and he no longer has symptoms of pain or swelling. GERD - resolved. Was on Prilosec 40 mg daily. He has seen AN[facility] in past, doubt seeing now as symptoms resolved. Resolved since changing his diet. Does not want to be on Prilosec anymore. Will monitor. Reactive airway disease - it looks like Giselle ROBLES started Combivent - took 6 days to get to him - was having SOB. Improving now that he has it. He states SOB worse with mowing lawn - ? Asthma exacerbated by grass/pollen. Lungs clear on exam today. He is not using currently. - Has not had to use his inhaler for months. He states his SOB has resolved. Left knee pain - Had knee scope done. Using lidocaine patches, which relieves his pain    BMI BMI 41.32 today. Down 12lbs. He states he is eating much better and exercising more in the mornings when he wakes up. Encouraged to continue lifestyle modifications. Past Medical History:   Diagnosis Date    Chronic leg pain     Diabetes ([facility] [address] 05/2018    GERD (gastroesophageal reflux disease)     Hyperlipidemia     Hypertension     Hypothyroidism     Sleep apnea     TIA (transient ischemic attack) 2016    Dr. Ralph Marshall      Current Medications: He has not taken his scheduled medications since 9/2020. None    No Known Allergies     Review of Systems   Constitutional: Negative for activity change, appetite change, chills, fatigue and fever. HENT: Negative for congestion, rhinorrhea, sneezing and sore throat. Respiratory: Negative for chest tightness, shortness of breath, wheezing and stridor. Cardiovascular: Negative for chest pain, palpitations and leg swelling. Gastrointestinal: Negative for abdominal pain, constipation, diarrhea, nausea and vomiting. Endocrine: Negative for cold intolerance, heat intolerance, polyphagia and polyuria. Genitourinary: Negative for difficulty urinating, frequency and hematuria. Musculoskeletal: Negative for arthralgias, back pain and gait problem.    Skin: Negative for pallor and rash. Neurological: Negative for dizziness, facial asymmetry, speech difficulty, weakness, light-headedness and headaches. Psychiatric/Behavioral: Negative for agitation, confusion, self-injury, sleep disturbance and suicidal ideas. The patient is not nervous/anxious. Vitals:    03/18/21 1520   BP: (!) 150/90   Site: Left Upper Arm   Position: Sitting   Cuff Size: Medium Adult   Pulse: 92   Temp: 98.8 °F (37.1 °C)   Weight: 233 lb 3.2 oz (105.8 kg)   Height: 5' 2.99\" (1.6 m)     Wt Readings from Last 3 Encounters:   03/18/21 233 lb 3.2 oz (105.8 kg)   06/23/20 245 lb (111.1 kg)   06/22/20 246 lb 8 oz (111.8 kg)     BP Readings from Last 3 Encounters:   03/18/21 (!) 150/90   06/23/20 (!) 160/98   06/22/20 138/84     Physical Exam  Vitals signs reviewed. Constitutional:       General: He is not in acute distress. Appearance: Normal appearance. He is obese. He is not ill-appearing. HENT:      Head: Normocephalic and atraumatic. Right Ear: External ear normal.      Left Ear: External ear normal.      Nose: Nose normal.      Mouth/Throat:      Mouth: Mucous membranes are moist.      Pharynx: Oropharynx is clear. Eyes:      Extraocular Movements: Extraocular movements intact. Conjunctiva/sclera: Conjunctivae normal.      Pupils: Pupils are equal, round, and reactive to light. Neck:      Musculoskeletal: Normal range of motion and neck supple. Cardiovascular:      Rate and Rhythm: Normal rate and regular rhythm. Heart sounds: No murmur. No friction rub. No gallop. Pulmonary:      Effort: Pulmonary effort is normal. No respiratory distress. Breath sounds: Normal breath sounds. No stridor. No wheezing or rhonchi. Abdominal:      General: Bowel sounds are normal.      Palpations: Abdomen is soft. There is no mass. Tenderness: There is no abdominal tenderness. Musculoskeletal: Normal range of motion. General: No swelling or tenderness.       Right lower leg: No edema. Left lower leg: No edema. Skin:     General: Skin is warm and dry. Capillary Refill: Capillary refill takes less than 2 seconds. Neurological:      General: No focal deficit present. Mental Status: He is alert and oriented to person, place, and time. Psychiatric:         Mood and Affect: Mood normal.         Behavior: Behavior normal.         Thought Content: Thought content normal.         Judgment: Judgment normal.       Foot exam 3/18/21: no foot lesions, normal sensation with monofilament testing, +2 PT and DP pulses bilaterally    Diagnostic data:  I have reviewed recent diagnostic testing including labs with the patient today. Results for POC orders placed in visit on 03/18/21   POCT glycosylated hemoglobin (Hb A1C)   Result Value Ref Range    Hemoglobin A1C 5.9 (H) 4.3 - 5.7 %     Laboratory/imaging studies ordered this visit: TSH with reflex, BMP, microalb/creat ratio, lipid panel    The patient is in agreement with and verbalizes understanding of the plan of care today and has no additional questions or complaints. The patient was instructed to follow-up in 1 month or to contact our office sooner if problems should arise. Laboratory studies will be completed prior to next visit. Electronically signed by: Kareem Mims DO on 3/18/2021 at 5:19 PM  (Please note that portions of this note were completed with a voice recognition program and electronically transcribed. Efforts were made to edit the dictations but occasionally words are mis-transcribed. This transcription may contain errors not detected in proofreading.  This transcription was electronically signed.)

## 2021-03-18 NOTE — PATIENT INSTRUCTIONS
BLOOD PRESSURE - NORMAL  SYS - LESS THAN 130  HILLARY - LESS THAN 80    HEART RATE 60 to 100    MEDICATIONS FOR BLOOD PRESSURE  - LOSARTAN (COZAAR)  - METOPROLOL (TOPROL XL)

## 2021-03-22 ENCOUNTER — CARE COORDINATION (OUTPATIENT)
Dept: CARE COORDINATION | Age: 61
End: 2021-03-22

## 2021-03-22 DIAGNOSIS — I10 ESSENTIAL HYPERTENSION: ICD-10-CM

## 2021-03-22 NOTE — CARE COORDINATION
Pt called with questions of his medication. Informed pt that he needed to call PCP office and provided him with the #. Informed pt that I am a SW,  not a nurse. Pt voiced understanding. Active listening provided.

## 2021-03-22 NOTE — TELEPHONE ENCOUNTER
Called scripts to [facility] , left on pharmacy provider voicemail. Dr. Jose Arrieta is not covered on patient's medicaid .

## 2021-03-24 ENCOUNTER — TELEPHONE (OUTPATIENT)
Dept: INTERNAL MEDICINE CLINIC | Age: 61
End: 2021-03-24

## 2021-03-24 NOTE — TELEPHONE ENCOUNTER
Patient left message asking how often daily he should take BP. Advised patient to take BP daily 1-2 hours after taking BP medication. Advised pt to write down time medication was taken and time BP was taken. Pt verbalized understanding and repeated instruction back to me.

## 2021-03-26 ENCOUNTER — NURSE ONLY (OUTPATIENT)
Dept: LAB | Age: 61
End: 2021-03-26

## 2021-03-26 DIAGNOSIS — E11.9 TYPE 2 DIABETES MELLITUS WITHOUT COMPLICATION, WITHOUT LONG-TERM CURRENT USE OF INSULIN (HCC): ICD-10-CM

## 2021-03-26 DIAGNOSIS — E78.2 MIXED HYPERLIPIDEMIA: ICD-10-CM

## 2021-03-26 DIAGNOSIS — E03.9 ACQUIRED HYPOTHYROIDISM: ICD-10-CM

## 2021-03-26 LAB
ALBUMIN SERPL-MCNC: 4.2 G/DL (ref 3.5–5.1)
ALP BLD-CCNC: 71 U/L (ref 38–126)
ALT SERPL-CCNC: 19 U/L (ref 11–66)
ANION GAP SERPL CALCULATED.3IONS-SCNC: 12 MEQ/L (ref 8–16)
AST SERPL-CCNC: 22 U/L (ref 5–40)
BILIRUB SERPL-MCNC: 0.4 MG/DL (ref 0.3–1.2)
BUN BLDV-MCNC: 21 MG/DL (ref 7–22)
CALCIUM SERPL-MCNC: 9.6 MG/DL (ref 8.5–10.5)
CHLORIDE BLD-SCNC: 103 MEQ/L (ref 98–111)
CHOLESTEROL, TOTAL: 238 MG/DL (ref 100–199)
CO2: 26 MEQ/L (ref 23–33)
CREAT SERPL-MCNC: 1 MG/DL (ref 0.4–1.2)
CREATININE, URINE: 178.8 MG/DL
GFR SERPL CREATININE-BSD FRML MDRD: 76 ML/MIN/1.73M2
GLUCOSE FASTING: 106 MG/DL (ref 70–108)
HDLC SERPL-MCNC: 35 MG/DL
LDL CHOLESTEROL CALCULATED: 167 MG/DL
MICROALBUMIN UR-MCNC: 2.62 MG/DL
MICROALBUMIN/CREAT UR-RTO: 15 MG/G (ref 0–30)
POTASSIUM SERPL-SCNC: 4.1 MEQ/L (ref 3.5–5.2)
SODIUM BLD-SCNC: 141 MEQ/L (ref 135–145)
T4 FREE: 0.95 NG/DL (ref 0.93–1.76)
TOTAL PROTEIN: 7.1 G/DL (ref 6.1–8)
TRIGL SERPL-MCNC: 179 MG/DL (ref 0–199)
TSH SERPL DL<=0.05 MIU/L-ACNC: 6.87 UIU/ML (ref 0.4–4.2)

## 2021-03-26 RX ORDER — METOPROLOL SUCCINATE 50 MG/1
50 TABLET, EXTENDED RELEASE ORAL DAILY
Qty: 30 TABLET | Refills: 3 | OUTPATIENT
Start: 2021-03-26 | End: 2021-05-26 | Stop reason: SDUPTHER

## 2021-03-26 RX ORDER — LOSARTAN POTASSIUM 50 MG/1
50 TABLET ORAL DAILY
Qty: 30 TABLET | Refills: 5 | OUTPATIENT
Start: 2021-03-26 | End: 2021-05-26 | Stop reason: SDUPTHER

## 2021-04-22 ENCOUNTER — OFFICE VISIT (OUTPATIENT)
Dept: INTERNAL MEDICINE CLINIC | Age: 61
End: 2021-04-22
Payer: MEDICARE

## 2021-04-22 VITALS
HEIGHT: 63 IN | TEMPERATURE: 97.9 F | DIASTOLIC BLOOD PRESSURE: 78 MMHG | WEIGHT: 227.2 LBS | SYSTOLIC BLOOD PRESSURE: 132 MMHG | HEART RATE: 94 BPM | BODY MASS INDEX: 40.26 KG/M2

## 2021-04-22 DIAGNOSIS — E03.9 ACQUIRED HYPOTHYROIDISM: ICD-10-CM

## 2021-04-22 DIAGNOSIS — I10 ESSENTIAL HYPERTENSION: ICD-10-CM

## 2021-04-22 DIAGNOSIS — E11.9 TYPE 2 DIABETES MELLITUS WITHOUT COMPLICATION, WITHOUT LONG-TERM CURRENT USE OF INSULIN (HCC): ICD-10-CM

## 2021-04-22 DIAGNOSIS — E78.2 MIXED HYPERLIPIDEMIA: ICD-10-CM

## 2021-04-22 DIAGNOSIS — Z12.2 ENCOUNTER FOR SCREENING FOR LUNG CANCER: ICD-10-CM

## 2021-04-22 DIAGNOSIS — Z86.59 HISTORY OF SUICIDAL IDEATION: ICD-10-CM

## 2021-04-22 PROCEDURE — 3044F HG A1C LEVEL LT 7.0%: CPT | Performed by: STUDENT IN AN ORGANIZED HEALTH CARE EDUCATION/TRAINING PROGRAM

## 2021-04-22 PROCEDURE — G8417 CALC BMI ABV UP PARAM F/U: HCPCS | Performed by: STUDENT IN AN ORGANIZED HEALTH CARE EDUCATION/TRAINING PROGRAM

## 2021-04-22 PROCEDURE — 99214 OFFICE O/P EST MOD 30 MIN: CPT | Performed by: STUDENT IN AN ORGANIZED HEALTH CARE EDUCATION/TRAINING PROGRAM

## 2021-04-22 PROCEDURE — 4004F PT TOBACCO SCREEN RCVD TLK: CPT | Performed by: STUDENT IN AN ORGANIZED HEALTH CARE EDUCATION/TRAINING PROGRAM

## 2021-04-22 PROCEDURE — 3017F COLORECTAL CA SCREEN DOC REV: CPT | Performed by: STUDENT IN AN ORGANIZED HEALTH CARE EDUCATION/TRAINING PROGRAM

## 2021-04-22 PROCEDURE — 2022F DILAT RTA XM EVC RTNOPTHY: CPT | Performed by: STUDENT IN AN ORGANIZED HEALTH CARE EDUCATION/TRAINING PROGRAM

## 2021-04-22 PROCEDURE — G8428 CUR MEDS NOT DOCUMENT: HCPCS | Performed by: STUDENT IN AN ORGANIZED HEALTH CARE EDUCATION/TRAINING PROGRAM

## 2021-04-22 RX ORDER — LEVOTHYROXINE SODIUM 0.05 MG/1
50 TABLET ORAL DAILY
Qty: 90 TABLET | Refills: 1 | Status: SHIPPED | OUTPATIENT
Start: 2021-04-22 | End: 2021-07-26 | Stop reason: SDUPTHER

## 2021-04-22 RX ORDER — LEVOTHYROXINE SODIUM 0.05 MG/1
50 TABLET ORAL DAILY
Qty: 30 TABLET | Refills: 1 | Status: CANCELLED | OUTPATIENT
Start: 2021-04-22

## 2021-04-22 RX ORDER — ATORVASTATIN CALCIUM 80 MG/1
80 TABLET, FILM COATED ORAL DAILY
Qty: 90 TABLET | Refills: 1 | Status: SHIPPED | OUTPATIENT
Start: 2021-04-22 | End: 2021-07-26 | Stop reason: SDUPTHER

## 2021-04-22 ASSESSMENT — ENCOUNTER SYMPTOMS
NAUSEA: 0
COUGH: 0
DIARRHEA: 0
BACK PAIN: 0
WHEEZING: 0
SORE THROAT: 0
ABDOMINAL PAIN: 0
SHORTNESS OF BREATH: 0
VOMITING: 0
RHINORRHEA: 0

## 2021-04-22 NOTE — PROGRESS NOTES
Patient Name: Abbey Kearns  YOB: 1960  MRN: [id number]  Office visit date: 4/22/2021    Chief Complaint: Diabetes (1 month / labs completed), Hypothyroidism, Hypertension, Hyperlipidemia, and Other (Lung Cancer Screening)    Assessment/Plan:  1. NIDDM2, diet-controlled: HgbA1C 5.9% on 3/18/21. Labs on 3/26/21 CMP is normal, Microalb/Creat ratio 15 mg/g , Microalbumin is normal at 2.62 mg/dL. He has lost 6 lbs since last visit. He was 245 lbs in 6/2020, now 227 lbs this visit. He has a planned eye specialist appointment on 4/27/21 for an examination.  - Continue diet, exercise, and current lifestyle modifications.     2. Acquired Hypothyroidism: Was on Synthroid 50 mcg. Has not been taking his medication since 9/2020. He is still not taking any synthroid. TSH 6.870 (high), free T4 0.95 (low end of normal) on 3/26.  - Start Synthroid 50 mcg daily. Recheck TSH and free T4 in 6 weeks. 3. Essential HTN, uncontrolled: Has not been taking his medication since 9/2020  - Restart Metoprolol XL 50mg and Losartan 50mg  - Recommend monitoring blood pressure at home and bring us a list at next visit     4. Mixed hyperlipidemia: Lipid Panel on 3/26: Total cholesterol 238, Trig 179, HDL 35,   - Continue diet, exercise, and current lifestyle modifications.  - Start Lipitor 80mg QHS  - Check LDL and ALT in 3 months    5. Lung Cancer Screening: Used to smoke 5 cartons daily since high school. Quit in 2014. He denies any shortness of breath, cough, hemoptysis. - Get Low Dose CT Chest baseline    6. History of Suicidal Ideations, resolved: Occurred a few months ago, bought a gun, and had a plan; He had a change of heart after his dream seeing his family. He denies any suicidal ideations at this time. He was going to give his gun to Kian ovalles, his friend, but he has misplaced it in his home and unable to find it now. Discussed if he randomly comes across it to either get rid of it or give it to his friend, Kian ovalles. He has seen Chana who started him on two medications, but he is unsure which ones. Asked patient to bring all his medications to the next visit. - Continue following Chana and seeing your counselor    Return in about 3 months (around 7/22/2021). Subjective/Objective:    HPI:     Renato Chaudhry is a 63 y.o. male who presents for a follow-up visit. He is here for evaluation of Diabetes (1 month / labs completed), Hypothyroidism, Hypertension, Hyperlipidemia, and Other (Lung Cancer Screening)    Diabetes Mellitus - HgbA1c 5.9% - controlled, on 3/18/21. Patient does not check his blood sugars at home. Most recent eye over a year ago. There were concerns of findings on his eye exam and was told to follow-up, but he forgot. Labs on 3/26/21 - Normal renal function, eGFR 76, Microalb/Creat ratio 15 mg/g , Microalbumin is normal at 2.62 mg/dL  Patient denies foot lesions. Last foot exam on 3/18/21. Denies neuropathy. Denies autonomic dysfunction.  (3/26/21). He has been eating healthy foods. He has a friend, Berneta Jeans, that has been helping him with his diet - eating fruits and vegetables. He has also been exericising walking and biking. He has lost 6 lbs since his last visit on 3/18/21.  - He has an appointment on 4/27/21 with eye specialist.      Hypothyroidism - He was on Synthroid 50 mcg. TSH 2.8 8/29/17,1/29/2018, and 2.99 9/2018. TSH high and FT4 low 2/2020 but wasn't on meds. He still has not been taking his Synthroid. 3/26/21: TSH 6.870 (high), free T4 0.95 (low end of normal)  - Will restart Synthroid 25 mcg daily. Hypertension - BP this visit is 132/78. He had a home health nurse check recently and it was 131/72. Continue Toprol XL 50 QD and Cozaar 50mg  - Check your blood pressure for two weeks and keep a list. Bring it on your next visit.     Hyperlipidemia - , trig in 300s initially. He was on Lipitor 80 mg.  Added Zetia 1/2018 and LDL 53.38, trig 164, normal ALT 5/2018, normal ALT 9/2018. He is not currently on meds. LDL 33.86 7/2020.  - Lipid Panel on 3/26: Total cholesterol 238, Trig 179, HDL 35,     Previous Suicidal Ideations, resolved - He denies further suicidal or homicidal ideations. He was going to give his gun to his friend, but he has misplaced it and he can't find it in his home anymore. He went to [facility] last month and they started him two new medications. He does not remember what they are.   - Asked him to bring all his medications to his next visit so we can go over his medication list.    Lung Cancer Screening  - He used to smoke 5 cartons daily since high school. Quit in 2014. He denies shortness of breath, dyspnea on exertion, hemoptysis. His father did have a history of COPD due to smoking. Discussed the risks of his previous smoking history and the benefits of a baseline CT scan. He is agreeable, so we will get low dose CT scan for baseline.     Past Medical History:   Diagnosis Date    Chronic leg pain     Diabetes ([address] 05/2018    GERD (gastroesophageal reflux disease)     Hyperlipidemia     Hypertension     Hypothyroidism     Sleep apnea     TIA (transient ischemic attack) 2016    Dr. Tahira Patterson      Past Surgical History:   Procedure Laterality Date    ANKLE SURGERY Right 2000    KNEE ARTHROSCOPY Left 9-11-15    Torn cartliage    LEG SURGERY Right 2000    MN COLSC FLX W/RMVL OF TUMOR POLYP LESION SNARE TQ  7/16/2017    COLONOSCOPY POLYPECTOMY SNARE/COLD BIOPSY performed by Sarahi Chou MD at [facility]    MN EGD TRANSORAL BIOPSY SINGLE/MULTIPLE N/A 9/27/2017    EGD BIOPSY [facility] by Jose [facility] at [facility]  7/16/2017    EGD DIAGNOSTIC ONLY performed by Sarahi Chou MD at [facility]     Current Outpatient Medications   Medication Sig Dispense Refill    levothyroxine (SYNTHROID) 50 MCG tablet Take 1 tablet by mouth daily 90 tablet 1    atorvastatin (LIPITOR) 80 MG tablet Take 1 tablet by mouth daily 90 tablet 1    metoprolol succinate (TOPROL XL) 50 MG extended release tablet Take 1 tablet by mouth daily 30 tablet 3    losartan (COZAAR) 50 MG tablet Take 1 tablet by mouth daily 30 tablet 5    risperiDONE (RISPERDAL) 1 MG tablet Take 1 mg by mouth every morning      VORTIoxetine HBr (TRINTELLIX) 20 MG TABS tablet Take 20 mg by mouth daily      acetaminophen (TYLENOL) 500 MG tablet Take 1,000 mg by mouth every 6 hours as needed for Pain      donepezil (ARICEPT) 5 MG tablet TAKE 1 TABLET BY MOUTH AT BEDTIME (Patient not taking: Reported on 3/18/2021) 28 tablet 5    risperiDONE (RISPERDAL) 0.5 MG tablet Take 0.5 mg by mouth every evening      traZODone (DESYREL) 100 MG tablet Take 100 mg by mouth nightly      divalproex (DEPAKOTE) 500 MG DR tablet Take 500 mg by mouth nightly       No current facility-administered medications for this visit.       No Known Allergies    Social History     Socioeconomic History    Marital status:      Spouse name: Not on file    Number of children: Not on file    Years of education: Not on file    Highest education level: Not on file   Occupational History    Not on file   Social Needs    Financial resource strain: Not very hard    Food insecurity     Worry: Never true     Inability: Never true    Transportation needs     Medical: No     Non-medical: No   Tobacco Use    Smoking status: Former Smoker     Packs/day: 0.00     Years: 40.00     Pack years: 0.00     Types: Cigarettes     Quit date: 2014     Years since quittin.3    Smokeless tobacco: Never Used   Substance and Sexual Activity    Alcohol use: No     Alcohol/week: 0.0 standard drinks    Drug use: No    Sexual activity: Never   Lifestyle    Physical activity     Days per week: Not on file     Minutes per session: Not on file    Stress: Not on file   Relationships    Social connections     Talks on phone: Not on file     Gets together: Not on file     Attends Yarsani service: Not on file     Active member of club or organization: Not on file     Attends meetings of clubs or organizations: Not on file     Relationship status: Not on file    Intimate partner violence     Fear of current or ex partner: Not on file     Emotionally abused: Not on file     Physically abused: Not on file     Forced sexual activity: Not on file   Other Topics Concern    Not on file   Social History Narrative    Not on file     Family History   Problem Relation Age of Onset    Dementia Mother     High Blood Pressure Mother     COPD Father     High Cholesterol Father     High Blood Pressure Father      Review of Systems   Constitutional: Negative for activity change, chills and fever. HENT: Negative for congestion, rhinorrhea and sore throat. Respiratory: Negative for cough, shortness of breath and wheezing. Cardiovascular: Negative for chest pain, palpitations and leg swelling. Gastrointestinal: Negative for abdominal pain, diarrhea, nausea and vomiting. Genitourinary: Negative for difficulty urinating, dysuria, frequency and hematuria. Musculoskeletal: Negative for arthralgias and back pain. Skin: Negative for pallor, rash and wound. Neurological: Negative for dizziness, weakness, light-headedness and headaches. Psychiatric/Behavioral: Negative for agitation, behavioral problems and confusion. The patient is not nervous/anxious. Vitals:    04/22/21 1430   BP: 132/78   Site: Left Upper Arm   Position: Sitting   Cuff Size: Large Adult   Pulse: 94   Temp: 97.9 °F (36.6 °C)   Weight: 227 lb 3.2 oz (103.1 kg)   Height: 5' 2.99\" (1.6 m)     Wt Readings from Last 3 Encounters:   04/22/21 227 lb 3.2 oz (103.1 kg)   03/18/21 233 lb 3.2 oz (105.8 kg)   06/23/20 245 lb (111.1 kg)     BP Readings from Last 3 Encounters:   04/22/21 132/78   03/18/21 (!) 150/90   06/23/20 (!) 160/98     Physical Exam  Vitals signs and nursing note reviewed.    Constitutional:       General: He is not in acute distress. Appearance: Normal appearance. He is obese. He is not ill-appearing. HENT:      Head: Normocephalic and atraumatic. Right Ear: External ear normal.      Left Ear: External ear normal.      Nose: Nose normal.      Mouth/Throat:      Mouth: Mucous membranes are moist.      Pharynx: Oropharynx is clear. Eyes:      Conjunctiva/sclera: Conjunctivae normal.      Pupils: Pupils are equal, round, and reactive to light. Neck:      Musculoskeletal: Normal range of motion and neck supple. Cardiovascular:      Rate and Rhythm: Normal rate and regular rhythm. Pulses: Normal pulses. Heart sounds: Normal heart sounds. No murmur. No friction rub. No gallop. Pulmonary:      Effort: Pulmonary effort is normal. No respiratory distress. Breath sounds: Normal breath sounds. No wheezing. Abdominal:      General: Bowel sounds are normal.      Palpations: Abdomen is soft. Tenderness: There is no abdominal tenderness. There is no guarding. Musculoskeletal: Normal range of motion. General: No swelling or tenderness. Skin:     General: Skin is warm and dry. Capillary Refill: Capillary refill takes less than 2 seconds. Neurological:      General: No focal deficit present. Mental Status: He is alert and oriented to person, place, and time. Psychiatric:         Mood and Affect: Mood normal.         Behavior: Behavior normal.         Thought Content: Thought content normal.         Judgment: Judgment normal.       Diagnostic data:  I have reviewed recent diagnostic testing including labs with the patient today. Laboratory/imaging studies ordered this visit: TSH with reflex in 6 weeks; LDL and ALT in 3 months    The patient is in agreement with and verbalizes understanding of the plan of care today and has no additional questions or complaints. The patient was instructed to follow-up in 3 months or to contact our office sooner if problems should arise. Laboratory studies will be completed prior to next visit. Electronically signed by: Kareem Mims DO on 4/22/2021 at 4:52 PM  (Please note that portions of this note were completed with a voice recognition program and electronically transcribed. Efforts were made to edit the dictations but occasionally words are mis-transcribed. This transcription may contain errors not detected in proofreading.  This transcription was electronically signed.)

## 2021-04-22 NOTE — PATIENT INSTRUCTIONS
Continue Toprol XL 50mg once daily and Cozaar 50mg once daily    Start Levothyroxine 50mcg once daily. Do your TSH with reflex in about 6 weeks (around June 7th)    Start Lipitor 80mg once daily at night time    Get your LDL and ALT labs in 3 months    Keep a list of your blood pressures at home for about two weeks and bring it to your next appointment. Please bring all your medications to our next appointment.

## 2021-04-29 ENCOUNTER — HOSPITAL ENCOUNTER (OUTPATIENT)
Dept: CT IMAGING | Age: 61
Discharge: HOME | End: 2021-04-29
Payer: MEDICARE

## 2021-04-29 DIAGNOSIS — Z12.2 ENCOUNTER FOR SCREENING FOR LUNG CANCER: ICD-10-CM

## 2021-04-29 PROCEDURE — 71271 CT THORAX LUNG CANCER SCR C-: CPT

## 2021-05-01 ENCOUNTER — HOSPITAL ENCOUNTER (EMERGENCY)
Age: 61
Discharge: HOME | End: 2021-05-01
Payer: MEDICARE

## 2021-05-01 VITALS
SYSTOLIC BLOOD PRESSURE: 139 MMHG | BODY MASS INDEX: 40.25 KG/M2 | DIASTOLIC BLOOD PRESSURE: 80 MMHG | OXYGEN SATURATION: 96 % | TEMPERATURE: 98.1 F | HEART RATE: 118 BPM | RESPIRATION RATE: 18 BRPM | HEIGHT: 63 IN

## 2021-05-01 DIAGNOSIS — J44.1 COPD EXACERBATION (HCC): Primary | ICD-10-CM

## 2021-05-01 DIAGNOSIS — M19.042: ICD-10-CM

## 2021-05-01 DIAGNOSIS — Z71.89 COUNSELING ON HEALTH PROMOTION AND DISEASE PREVENTION: ICD-10-CM

## 2021-05-01 LAB
EKG ATRIAL RATE: 113 BPM
EKG P AXIS: 56 DEGREES
EKG P-R INTERVAL: 138 MS
EKG Q-T INTERVAL: 306 MS
EKG QRS DURATION: 84 MS
EKG QTC CALCULATION (BAZETT): 419 MS
EKG R AXIS: 20 DEGREES
EKG T AXIS: 56 DEGREES
EKG VENTRICULAR RATE: 113 BPM

## 2021-05-01 PROCEDURE — 99213 OFFICE O/P EST LOW 20 MIN: CPT

## 2021-05-01 PROCEDURE — 93005 ELECTROCARDIOGRAM TRACING: CPT | Performed by: NURSE PRACTITIONER

## 2021-05-01 PROCEDURE — 99202 OFFICE O/P NEW SF 15 MIN: CPT | Performed by: NURSE PRACTITIONER

## 2021-05-01 RX ORDER — ALBUTEROL SULFATE 90 UG/1
2 AEROSOL, METERED RESPIRATORY (INHALATION) EVERY 6 HOURS PRN
Qty: 1 INHALER | Refills: 0 | Status: SHIPPED | OUTPATIENT
Start: 2021-05-01 | End: 2021-06-29 | Stop reason: SDUPTHER

## 2021-05-01 RX ORDER — PREDNISONE 20 MG/1
20 TABLET ORAL 2 TIMES DAILY
Qty: 10 TABLET | Refills: 0 | Status: SHIPPED | OUTPATIENT
Start: 2021-05-01 | End: 2021-05-06

## 2021-05-01 ASSESSMENT — ENCOUNTER SYMPTOMS
SHORTNESS OF BREATH: 0
COUGH: 0
CHEST TIGHTNESS: 0
COLOR CHANGE: 0
CHOKING: 0
APNEA: 0
BACK PAIN: 0
WHEEZING: 0
STRIDOR: 0

## 2021-05-01 ASSESSMENT — PAIN DESCRIPTION - LOCATION: LOCATION: FINGER (COMMENT WHICH ONE)

## 2021-05-01 ASSESSMENT — PAIN DESCRIPTION - ORIENTATION: ORIENTATION: LEFT

## 2021-05-01 ASSESSMENT — PAIN DESCRIPTION - DESCRIPTORS: DESCRIPTORS: DISCOMFORT

## 2021-05-01 NOTE — ED TRIAGE NOTES
Patient ambualted to room with complaint of pain in left pinky. States pain started a month ago and is unsure if it was injured but rates 10/10 on pain scale. Also states that his chest \"hurts a little when he takes a deep breath\" for the past 3 months. Patient taken to room 5 for ekg before finger assessment.

## 2021-05-01 NOTE — ED PROVIDER NOTES
[facility]  Urgent Care Encounter      CHIEF COMPLAINT       Chief Complaint   Patient presents with    Finger Pain     numbness in left pinky       Nurses Notes reviewed and I agree except as noted in the HPI. HISTORY OFPRESENT ILLNESS   Wilner Pretty is a 63 y.o. The history is provided by the patient. No  was used. Hand Problem  Location:  Finger  Finger location:  L little finger  Injury: no    Pain details:     Quality:  Aching and pressure    Radiates to:  Does not radiate    Severity:  Moderate    Onset quality:  Gradual    Duration:  3 months    Timing:  Intermittent    Progression:  Worsening  Dislocation: no    Foreign body present:  No foreign bodies  Tetanus status:  Unknown  Prior injury to area:  No  Relieved by:  Nothing  Worsened by:  Bearing weight, exercise, movement, stretching area and stress  Ineffective treatments:  Acetaminophen  Associated symptoms: decreased range of motion and stiffness    Associated symptoms: no back pain, no fatigue, no fever, no muscle weakness, no neck pain, no numbness, no swelling and no tingling    Risk factors: no concern for non-accidental trauma, no known bone disorder, no frequent fractures and no recent illness        REVIEW OF SYSTEMS     Review of Systems   Constitutional: Negative for activity change, appetite change, chills, diaphoresis, fatigue and fever. Respiratory: Negative for apnea, cough, choking, chest tightness, shortness of breath, wheezing and stridor. Cardiovascular: Negative for chest pain, palpitations and leg swelling. Musculoskeletal: Positive for joint swelling, myalgias and stiffness. Negative for arthralgias, back pain, gait problem, neck pain and neck stiffness. Skin: Negative for color change, pallor, rash and wound. Neurological: Negative for dizziness, tremors, weakness, light-headedness, numbness and headaches.        PAST MEDICAL HISTORY         Diagnosis Date    Chronic leg pain     Diabetes ([facility] [address] 05/2018    GERD (gastroesophageal reflux disease)     Hyperlipidemia     Hypertension     Hypothyroidism     Sleep apnea     TIA (transient ischemic attack) 2016    Dr. Raquel Wilkerson     Patient  has a past surgical history that includes Leg Surgery (Right, 2000); Ankle surgery (Right, 2000); Knee arthroscopy (Left, 9-11-15); pr colsc flx w/rmvl of tumor polyp lesion snare tq (7/16/2017); Upper gastrointestinal endoscopy (7/16/2017); and pr egd transoral biopsy single/multiple (N/A, 9/27/2017). CURRENT MEDICATIONS       Previous Medications    ACETAMINOPHEN (TYLENOL) 500 MG TABLET    Take 1,000 mg by mouth every 6 hours as needed for Pain    ATORVASTATIN (LIPITOR) 80 MG TABLET    Take 1 tablet by mouth daily    DIVALPROEX (DEPAKOTE) 500 MG DR TABLET    Take 500 mg by mouth nightly    DONEPEZIL (ARICEPT) 5 MG TABLET    TAKE 1 TABLET BY MOUTH AT BEDTIME    LEVOTHYROXINE (SYNTHROID) 50 MCG TABLET    Take 1 tablet by mouth daily    LOSARTAN (COZAAR) 50 MG TABLET    Take 1 tablet by mouth daily    METOPROLOL SUCCINATE (TOPROL XL) 50 MG EXTENDED RELEASE TABLET    Take 1 tablet by mouth daily    RISPERIDONE (RISPERDAL) 0.5 MG TABLET    Take 0.5 mg by mouth every evening    RISPERIDONE (RISPERDAL) 1 MG TABLET    Take 1 mg by mouth every morning    TRAZODONE (DESYREL) 100 MG TABLET    Take 100 mg by mouth nightly    VORTIOXETINE HBR (TRINTELLIX) 20 MG TABS TABLET    Take 20 mg by mouth daily       ALLERGIES     Patient is has No Known Allergies. FAMILY HISTORY     Patient's family history includes COPD in his father; Dementia in his mother; High Blood Pressure in his father and mother; High Cholesterol in his father. SOCIAL HISTORY     Patient  reports that he has been smoking cigarettes. He has a 80.00 pack-year smoking history. He has never used smokeless tobacco. He reports that he does not drink alcohol or use drugs.     PHYSICAL EXAM     ED TRIAGE VITALS  BP: 139/80, Temp: 98.1 °F (36.7 °C), Pulse: 118, Resp: 18, SpO2: 96 %  Physical Exam  Vitals signs and nursing note reviewed. Constitutional:       General: He is not in acute distress. Appearance: Normal appearance. He is obese. He is not ill-appearing, toxic-appearing or diaphoretic. HENT:      Head: Normocephalic and atraumatic. Right Ear: External ear normal.      Left Ear: External ear normal.   Eyes:      Extraocular Movements: Extraocular movements intact. Conjunctiva/sclera: Conjunctivae normal.   Neck:      Musculoskeletal: Normal range of motion. Cardiovascular:      Rate and Rhythm: Normal rate and regular rhythm. Pulmonary:      Effort: Pulmonary effort is normal. No respiratory distress. Breath sounds: Normal breath sounds. No stridor. No wheezing, rhonchi or rales. Chest:      Chest wall: No tenderness. Abdominal:      General: Bowel sounds are normal.   Musculoskeletal:      Left hand: He exhibits decreased range of motion, tenderness and swelling. He exhibits no bony tenderness, normal two-point discrimination, normal capillary refill, no deformity and no laceration. Normal sensation noted. Decreased sensation is not present in the ulnar distribution, is not present in the medial redistribution and is not present in the radial distribution. Decreased strength noted. He exhibits finger abduction and thumb/finger opposition. He exhibits no wrist extension trouble. Hands:    Skin:     General: Skin is warm. Neurological:      General: No focal deficit present. Mental Status: He is alert and oriented to person, place, and time. Psychiatric:         Mood and Affect: Mood normal.         Behavior: Behavior normal.         Thought Content:  Thought content normal.         Judgment: Judgment normal.         DIAGNOSTIC RESULTS   Labs:  Results for orders placed or performed during the hospital encounter of 05/01/21   EKG 12 Lead   Result Value Ref Range    Ventricular Rate 113 BPM    Atrial Rate 113 BPM    P-R Interval 138 ms    QRS Duration 84 ms    Q-T Interval 306 ms    QTc Calculation (Bazett) 419 ms    P Axis 56 degrees    R Axis 20 degrees    T Axis 56 degrees       IMAGING:  No orders to display     URGENT CARE COURSE:     Vitals:    05/01/21 1541   BP: 139/80   Pulse: 118   Resp: 18   Temp: 98.1 °F (36.7 °C)   TempSrc: Temporal   SpO2: 96%   Height: 5' 3\" (1.6 m)       Medications - No data to display  PROCEDURES:  None  FINAL IMPRESSION      1. COPD exacerbation (HCC)    2. 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 Counseling on health promotion and disease prevention        DISPOSITION/PLAN   Decision To Discharge    I estimate there is LOW risk for PULMONARY EMBOLISM, PULMONARY EDEMA, PNEUMONIA, PNEUMOTHORAX, STATUS ASTHMATICUS, ACUTE RESPIRATORY FAILURE, OR ACUTE CORONARY SYNDROME, thus I consider the discharge disposition reasonable. The patient and/or family and I have discussed the diagnosis and risks, and we agree with discharging home to follow-up with their primary doctor. We also discussed returning to the Emergency Department immediately if new or worsening symptoms occur. We have discussed the symptoms which are most concerning (e.g., bloody sputum, fever, worsening pain or shortness of breath, vomiting, confusion or altered mental status) that necessitate immediate return.     PATIENT REFERRED TO:  Ricardo Stock MD  [facility], [address]  [facility] [address]    Schedule an appointment as soon as possible for a visit in 3 days  For re-check    [facility]  [address]  Go today  If symptoms worsen    DISCHARGE MEDICATIONS:  New Prescriptions    ALBUTEROL SULFATE  (90 BASE) MCG/ACT INHALER    Inhale 2 puffs into the lungs every 6 hours as needed for Wheezing    PREDNISONE (DELTASONE) 20 MG TABLET    Take 1 tablet by mouth 2 times daily for 5 days     Current Discharge Medication List          Steven Duty, APRN - CNP          Steven Duty, APRN - [facility]  05/01/21 8082

## 2021-05-20 ENCOUNTER — OFFICE VISIT (OUTPATIENT)
Dept: INTERNAL MEDICINE CLINIC | Age: 61
End: 2021-05-20
Payer: MEDICARE

## 2021-05-20 VITALS
HEART RATE: 88 BPM | TEMPERATURE: 98.4 F | BODY MASS INDEX: 38.84 KG/M2 | DIASTOLIC BLOOD PRESSURE: 90 MMHG | HEIGHT: 63 IN | SYSTOLIC BLOOD PRESSURE: 140 MMHG | WEIGHT: 219.2 LBS

## 2021-05-20 DIAGNOSIS — M54.50 ACUTE MIDLINE LOW BACK PAIN WITHOUT SCIATICA: ICD-10-CM

## 2021-05-20 DIAGNOSIS — R06.02 SHORTNESS OF BREATH: ICD-10-CM

## 2021-05-20 DIAGNOSIS — R19.5 DARK STOOLS: ICD-10-CM

## 2021-05-20 DIAGNOSIS — K59.00 CONSTIPATION, UNSPECIFIED CONSTIPATION TYPE: ICD-10-CM

## 2021-05-20 DIAGNOSIS — M79.662 TENDERNESS OF LEFT CALF: ICD-10-CM

## 2021-05-20 DIAGNOSIS — M79.89 PAIN AND SWELLING OF LOWER EXTREMITY, LEFT: ICD-10-CM

## 2021-05-20 DIAGNOSIS — R07.89 LEFT-SIDED CHEST WALL PAIN: ICD-10-CM

## 2021-05-20 DIAGNOSIS — M79.642 PAIN OF LEFT HAND: ICD-10-CM

## 2021-05-20 DIAGNOSIS — R06.2 WHEEZING: ICD-10-CM

## 2021-05-20 DIAGNOSIS — M79.605 PAIN AND SWELLING OF LOWER EXTREMITY, LEFT: ICD-10-CM

## 2021-05-20 PROCEDURE — 4004F PT TOBACCO SCREEN RCVD TLK: CPT | Performed by: STUDENT IN AN ORGANIZED HEALTH CARE EDUCATION/TRAINING PROGRAM

## 2021-05-20 PROCEDURE — G8417 CALC BMI ABV UP PARAM F/U: HCPCS | Performed by: STUDENT IN AN ORGANIZED HEALTH CARE EDUCATION/TRAINING PROGRAM

## 2021-05-20 PROCEDURE — G8427 DOCREV CUR MEDS BY ELIG CLIN: HCPCS | Performed by: STUDENT IN AN ORGANIZED HEALTH CARE EDUCATION/TRAINING PROGRAM

## 2021-05-20 PROCEDURE — 99214 OFFICE O/P EST MOD 30 MIN: CPT | Performed by: STUDENT IN AN ORGANIZED HEALTH CARE EDUCATION/TRAINING PROGRAM

## 2021-05-20 PROCEDURE — 3017F COLORECTAL CA SCREEN DOC REV: CPT | Performed by: STUDENT IN AN ORGANIZED HEALTH CARE EDUCATION/TRAINING PROGRAM

## 2021-05-20 RX ORDER — POLYETHYLENE GLYCOL 3350 17 G/17G
17 POWDER, FOR SOLUTION ORAL DAILY
Qty: 1530 G | Refills: 1 | Status: SHIPPED | OUTPATIENT
Start: 2021-05-20 | End: 2021-06-19

## 2021-05-20 ASSESSMENT — ENCOUNTER SYMPTOMS
EYES NEGATIVE: 1
ABDOMINAL PAIN: 0
RHINORRHEA: 0
CONSTIPATION: 1
DIARRHEA: 0
SORE THROAT: 0
SHORTNESS OF BREATH: 1
VOMITING: 0
NAUSEA: 0

## 2021-05-20 NOTE — PATIENT INSTRUCTIONS
1. Get labs now  2. Get imaging now  3. Start Miralax once daily. Drink plenty of water   4. Take Tylenol Extra Strength (ACETAMINOPHEN) every 6 hours for your hand pain and low back pain. Do not take more than 3000mg a day.

## 2021-05-20 NOTE — PROGRESS NOTES
Patient Name: Vergie Dancer  YOB: 1960  MRN: [id number]  Office visit date: 5/20/2021    Chief Complaint: Back Pain, Hand Pain (Urgent care visit F/U), Chest Pain, Leg Swelling, Constipation, Shortness of Breath, and Wheezing    Assessment/Plan:  1. Acute midline low back pain without sciatica: Exquisitely tender at midline. Concern for compression fracture, especially with recent activity in moving heavy boxes. - Recommend Tylenol extra strength every 6 hours. Do not take more than 3000mg daily. Also OTC Lidocaine patches PRN.  - Get Lumbar spine x-ray  2. Pain of left hand: Tender at hypothenar eminence, 4th and 5th digit joints. Questionable stress fracture vs. Strain from recent activities. - Recommend Tylenol extra strength every 6 hours. Do not take more than 3000mg daily.   - Get left hand x-ray  3. Left-sided chest wall pain: Tender to palpation of left chest wall. Likely muscle strain from recent activities. - Recommend Tylenol extra strength every 6 hours. Do not take more than 3000mg daily. 4. LLE pain and swelling and left calf tenderness: Concern for DVT, either incomplete resolution of previously noted DVT in 6/20 (unlikely) or he developed another one. He has been more actively recently with exercise and losing weight. - Get left venous doppler. If he has another DVT plan for anticoagulation therapy and coagulopathy work-up. 5. Constipation, associated with dark stools: Has been having once weekly bowel movements. It is hard and dry per patient even though he drinks plenty of water throughout the day. Has tried prunes, prune juice, and OTC stool softeners without much relief. He also reports seeing dark stool, almost black after he wipes. - Get H+H and FOBT. If FOBT positive, will need GI referral and work-up  - Start Miralax daily  6. Shortness of breath and wheezing: No PFT's in chart.  Never been officially diagnosed with COPD, however, does have extensive smoking history of 80 pack years. He quit in 2014, but started again. He has two cigarettes left which he plans on quitting after they are gone. He has been using his albuterol PRN 2-3x daily with resolution of his SOB and wheezing.  - Discussed smoking cessation for 10 minutes  - Continue Albuterol PRN for now. Will discuss again at next visit. If symptoms do not improve and use of albuterol does not decrease, then get PFT's. Return in about 1 month (around 6/20/2021). Subjective/Objective:    HPI:     Sanam Selby is a 63 y.o. male who presents for a follow-up visit. He is here for evaluation of Back Pain, Hand Pain (Urgent care visit F/U), Chest Pain, Leg Swelling, Constipation, Shortness of Breath, and Wheezing    Patient is here for a recent urgent care follow-up from 5/1/21. He presented to the urgent care with complaints of left pinky pain with unknown injury. During the visit he also complained of pain when taking a deep breath for the past 3 months. EKG performed at that time reviewed by me is noted to be sinus tachycardia without evidence of acute ischemic changes. 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 He was sent home with Albuterol PRN and Prednisone 20mg BID x5 days. Of note on his previous office visit a low dose lung CT scan was ordered due to his smoking history, which reports no suspicious masses or nodules, however, there are several slightly prominent lymph modes in the mediastrinum noted to be likely postinflammatory in nature and a few bulky calcified lymph nodes in the right hilum, consistent with old, healed granulomatous disease. It is recommended for a repeat CT in 6 months (November 2021). After reviewing patient's charts and symptoms, patient remembered that he recently helped his friend move heavy boxes and he may have injured himself at that time.  After helping his friend he feels worsened back pain, left hand pain, and left chest rib pain. Low Back Pain - He mentioned this to during his urgent care visit and patient states they said he may have a bulging disc. He does report falling from a tree many years ago, but followed up medically for an assessment. He states the pain is in the middle lower back, worse with activity. It is sharp and achy. It is especially tender in the midline. He states his pain is improved while he was taking the Prednisone that he was prescribed from the urgent care visit, but it has come back now after completing his 5 days.   - Get lumbar spinal x-ray  - Recommend Tylenol and OTC lidocaine pain patches    Left hand pain - Pain from hypothenar eminence traveling to his 4th and 5th digit. He states it just hurts with some numbness and feels like someone is squeezing his fingers. It started on the pinky and has now gone into the 4th left digit. He is unable to bend his 4th and 5th digit well without significant pain. He has pain on palpation of left 5th MCP and tenderness to joints upon palpation of 4th and 5th digit. He denies any recent injury, trauma, or falls. He states he may have injured it when he was helping his friend move heavy boxes recently. He has not tried taking pain medication for relief. - Recommend Tylenol for pain relief. Get X-ray of left hand. Left chest pain - He still has left chest tenderness since visiting the urgent care. It was exquisitely tender upon palpation. He states it feels dull and achy. He again may have pulled a muscle while helping his friend move recently. - Likely muscular strain. Recommended Tylenol for pain relief for now. Lower leg extremity - During this visit he is also concerned of developing a DVT again. He complains of left lower extremity pain, swelling, and calf tenderness. He was on anticoagulation in June of last year for a left peroneal vein DVT.  He completed three months of anticoagulation, but did not get a follow up venous doppler to confirm resolution. He states his left leg feels heavy, tight, and painful when squeezed from the calf down. Upon exam, his LLE is tighter than compared to RLE with +1 pitting edema to LLE and trace edema to RLE.  - Get venous doppler of left lower extremity. If positive for DVT, then plan to start Eliquis 10mg BID x7 days then 5mg BID x3 months. Also coagulopathy work-up. Constipation - He states he has only been having bowel movements about once a week. He has tried OTC stool softeners, eating prunes, and drinking prune juice, but it has not been helping. He states he does not know if there is any blood as he does not check after, but he does report that after wiping he notes his stool is dark, almost black. He states his stool is hard and dry. He is not currently taking any iron supplements. He states he drinks plenty of water. He has been eating more fruits and vegetables. - Get H+H, FOBT  - Start Miralax daily    Shortness of breath, wheezing - ? COPD Exacerbation - He has never been officially diagnosed with COPD, however, he does have an extensive smoking history of 80 pack years. He quit in 2014, but this year he started smoking again intermittently. He states he is on his last 2 cigarettes and plans to finally quit again for good. He has been using his albuterol 2-3 times a day with relief of his shortness of breath, chest tightness, wheezing. He also reports a dry cough. Otherwise denies fevers, chills, sputum production.  - Continue Albuterol PRN for now. Follow up in 1 month - if not decreasing his daily uses of albuterol after quitting smoking, then get PFT. - Discussed risks of smoking and smoking cessation for 10 minutes. He is ready to quit after his last two cigarettes remaining.     Past Medical History:   Diagnosis Date    Chronic leg pain     Diabetes ([facility] [address] 05/2018    GERD (gastroesophageal reflux disease)     Hyperlipidemia     Hypertension     Hypothyroidism     Sleep apnea     TIA (transient ischemic attack) 2016    Dr. Clemencia Painter      Current Outpatient Medications   Medication Sig Dispense Refill    polyethylene glycol (GLYCOLAX) 17 GM/SCOOP powder Take 17 g by mouth daily 1530 g 1    albuterol sulfate  (90 Base) MCG/ACT inhaler Inhale 2 puffs into the lungs every 6 hours as needed for Wheezing 1 Inhaler 0    levothyroxine (SYNTHROID) 50 MCG tablet Take 1 tablet by mouth daily 90 tablet 1    atorvastatin (LIPITOR) 80 MG tablet Take 1 tablet by mouth daily 90 tablet 1    metoprolol succinate (TOPROL XL) 50 MG extended release tablet Take 1 tablet by mouth daily 30 tablet 3    losartan (COZAAR) 50 MG tablet Take 1 tablet by mouth daily 30 tablet 5    donepezil (ARICEPT) 5 MG tablet TAKE 1 TABLET BY MOUTH AT BEDTIME 28 tablet 5    risperiDONE (RISPERDAL) 1 MG tablet Take 1 mg by mouth every morning      risperiDONE (RISPERDAL) 0.5 MG tablet Take 0.5 mg by mouth every evening      VORTIoxetine HBr (TRINTELLIX) 20 MG TABS tablet Take 20 mg by mouth daily      traZODone (DESYREL) 100 MG tablet Take 100 mg by mouth nightly      divalproex (DEPAKOTE) 500 MG DR tablet Take 500 mg by mouth nightly      acetaminophen (TYLENOL) 500 MG tablet Take 1,000 mg by mouth every 6 hours as needed for Pain       No current facility-administered medications for this visit. No Known Allergies    Review of Systems   Constitutional: Negative for activity change, appetite change, chills and fever. HENT: Negative for congestion, rhinorrhea and sore throat. Sinus pain: dry. Eyes: Negative. Respiratory: Positive for cough (dry), chest tightness, shortness of breath and wheezing. Cardiovascular: Positive for leg swelling. Negative for chest pain (left chest) and palpitations. Gastrointestinal: Positive for constipation. Negative for abdominal pain, diarrhea, nausea and vomiting. Genitourinary: Negative. Musculoskeletal: Negative for arthralgias and joint swelling.         Left pinky pain; left chest rib and muscle pain   Skin: Negative. Neurological: Negative. Psychiatric/Behavioral: Negative for agitation, behavioral problems and confusion. The patient is not nervous/anxious. Vitals:    05/20/21 1401   BP: (!) 140/90   Site: Left Upper Arm   Position: Sitting   Cuff Size: Medium Adult   Pulse: 88   Temp: 98.4 °F (36.9 °C)   Weight: 219 lb 3.2 oz (99.4 kg)   Height: 5' 2.99\" (1.6 m)     Wt Readings from Last 3 Encounters:   05/20/21 219 lb 3.2 oz (99.4 kg)   04/22/21 227 lb 3.2 oz (103.1 kg)   03/18/21 233 lb 3.2 oz (105.8 kg)     BP Readings from Last 3 Encounters:   05/20/21 (!) 140/90   05/01/21 139/80   04/22/21 132/78     Physical Exam  Vitals and nursing note reviewed. Constitutional:       General: He is not in acute distress. Appearance: He is obese. He is not ill-appearing. HENT:      Head: Normocephalic and atraumatic. Right Ear: External ear normal.      Left Ear: External ear normal.      Nose: Nose normal.      Mouth/Throat:      Mouth: Mucous membranes are moist.      Pharynx: Oropharynx is clear. Eyes:      Conjunctiva/sclera: Conjunctivae normal.      Pupils: Pupils are equal, round, and reactive to light. Cardiovascular:      Rate and Rhythm: Normal rate and regular rhythm. Pulses: Normal pulses. Heart sounds: Normal heart sounds. No murmur heard. No friction rub. No gallop. Pulmonary:      Effort: Pulmonary effort is normal. No respiratory distress. Breath sounds: No stridor. Wheezing present. No rhonchi. Chest:      Chest wall: Tenderness present. No deformity or crepitus. Abdominal:      General: Abdomen is flat. Bowel sounds are normal.      Palpations: Abdomen is soft. Tenderness: There is no abdominal tenderness. There is no guarding. Musculoskeletal:         General: Swelling (left lower extremity) and tenderness (left lower extremity) present. Left hand: Tenderness present. No deformity.  Decreased range of motion. Decreased strength of finger abduction. Normal sensation. Normal capillary refill. Normal pulse. Arms:       Cervical back: Normal range of motion and neck supple. Lumbar back: Tenderness and bony tenderness present. No deformity or signs of trauma. Back:       Right lower leg: No tenderness. Edema present. Left lower leg: Tenderness present. 1+ Pitting Edema present. Skin:     General: Skin is warm and dry. Capillary Refill: Capillary refill takes less than 2 seconds. Neurological:      General: No focal deficit present. Mental Status: He is alert and oriented to person, place, and time. Psychiatric:         Mood and Affect: Mood normal.         Behavior: Behavior normal.         Thought Content: Thought content normal.         Judgment: Judgment normal.       Diagnostic data:  I have reviewed recent diagnostic testing including labs with the patient today. No results found for this visit on 05/20/21. Laboratory/imaging studies ordered this visit: H+H, FOBT, Left hand x-ray, Left venous doppler    The patient is in agreement with and verbalizes understanding of the plan of care today and has no additional questions or complaints. The patient was instructed to follow-up in 1 month or to contact our office sooner if problems should arise. Laboratory studies will be completed prior to next visit. Electronically signed by: Morelia Rodriguez DO on 5/21/2021 at 7:13 AM  (Please note that portions of this note were completed with a voice recognition program and electronically transcribed. Efforts were made to edit the dictations but occasionally words are mis-transcribed. This transcription may contain errors not detected in proofreading.  This transcription was electronically signed.)

## 2021-05-21 ASSESSMENT — ENCOUNTER SYMPTOMS
COUGH: 1
CHEST TIGHTNESS: 1
WHEEZING: 1

## 2021-05-24 ENCOUNTER — TELEPHONE (OUTPATIENT)
Dept: INTERNAL MEDICINE CLINIC | Age: 61
End: 2021-05-24

## 2021-05-24 NOTE — TELEPHONE ENCOUNTER
Per Dr Jerry Goodwin, wanted patient to get doppler done today 5/24/21. It was scheduled for Wednesday 5/26/21. I called the patient and he said he was unable to go today 5/24/21.   He said he could go 5/25/21, I stressed the importance, but patient wanted to keep it for 5/25/21 at 1:00pm and to be on 2nd floor heart center at 12:30pm.

## 2021-05-26 ENCOUNTER — HOSPITAL ENCOUNTER (OUTPATIENT)
Dept: GENERAL RADIOLOGY | Age: 61
Discharge: HOME | End: 2021-05-26
Payer: MEDICARE

## 2021-05-26 ENCOUNTER — HOSPITAL ENCOUNTER (OUTPATIENT)
Age: 61
Discharge: HOME | End: 2021-05-26
Payer: MEDICARE

## 2021-05-26 ENCOUNTER — HOSPITAL ENCOUNTER (OUTPATIENT)
Dept: INTERVENTIONAL RADIOLOGY/VASCULAR | Age: 61
Discharge: HOME | End: 2021-05-26
Payer: MEDICARE

## 2021-05-26 DIAGNOSIS — M79.605 PAIN AND SWELLING OF LOWER EXTREMITY, LEFT: ICD-10-CM

## 2021-05-26 DIAGNOSIS — M79.642 PAIN OF LEFT HAND: ICD-10-CM

## 2021-05-26 DIAGNOSIS — M79.89 PAIN AND SWELLING OF LOWER EXTREMITY, LEFT: ICD-10-CM

## 2021-05-26 DIAGNOSIS — M54.50 ACUTE MIDLINE LOW BACK PAIN WITHOUT SCIATICA: ICD-10-CM

## 2021-05-26 DIAGNOSIS — M79.662 TENDERNESS OF LEFT CALF: ICD-10-CM

## 2021-05-26 DIAGNOSIS — I10 ESSENTIAL HYPERTENSION: ICD-10-CM

## 2021-05-26 LAB
ALT SERPL-CCNC: 15 U/L (ref 11–66)
HCT VFR BLD CALC: 47 % (ref 42–52)
HEMOGLOBIN: 15 GM/DL (ref 14–18)
LDL CHOLESTEROL DIRECT: 108.13 MG/DL
TSH SERPL DL<=0.05 MIU/L-ACNC: 2.75 UIU/ML (ref 0.4–4.2)

## 2021-05-26 PROCEDURE — 85014 HEMATOCRIT: CPT

## 2021-05-26 PROCEDURE — 85018 HEMOGLOBIN: CPT

## 2021-05-26 PROCEDURE — 93971 EXTREMITY STUDY: CPT

## 2021-05-26 PROCEDURE — 73130 X-RAY EXAM OF HAND: CPT

## 2021-05-26 PROCEDURE — 84443 ASSAY THYROID STIM HORMONE: CPT

## 2021-05-26 PROCEDURE — 84460 ALANINE AMINO (ALT) (SGPT): CPT

## 2021-05-26 PROCEDURE — 83721 ASSAY OF BLOOD LIPOPROTEIN: CPT

## 2021-05-26 PROCEDURE — 72100 X-RAY EXAM L-S SPINE 2/3 VWS: CPT

## 2021-05-26 PROCEDURE — 36415 COLL VENOUS BLD VENIPUNCTURE: CPT

## 2021-05-29 RX ORDER — LOSARTAN POTASSIUM 50 MG/1
TABLET ORAL
Qty: 90 TABLET | Refills: 1 | Status: SHIPPED | OUTPATIENT
Start: 2021-05-29 | End: 2021-09-01 | Stop reason: SDUPTHER

## 2021-05-29 RX ORDER — METOPROLOL SUCCINATE 50 MG/1
TABLET, EXTENDED RELEASE ORAL
Qty: 90 TABLET | Refills: 1 | Status: SHIPPED | OUTPATIENT
Start: 2021-05-29 | End: 2021-08-05

## 2021-06-02 ENCOUNTER — TELEPHONE (OUTPATIENT)
Dept: INTERNAL MEDICINE CLINIC | Age: 61
End: 2021-06-02

## 2021-06-02 NOTE — TELEPHONE ENCOUNTER
Pt called in asking for results of tests on his hand and back. They are still hurting. He states now both hands are hurting. At the time of visit was just in the left hand and now a couple of fingers in right hand are hurting.

## 2021-06-03 ENCOUNTER — OFFICE VISIT (OUTPATIENT)
Dept: INTERNAL MEDICINE CLINIC | Age: 61
End: 2021-06-03
Payer: MEDICARE

## 2021-06-03 ENCOUNTER — NURSE ONLY (OUTPATIENT)
Dept: LAB | Age: 61
End: 2021-06-03

## 2021-06-03 VITALS
DIASTOLIC BLOOD PRESSURE: 80 MMHG | WEIGHT: 213.8 LBS | HEIGHT: 63 IN | SYSTOLIC BLOOD PRESSURE: 120 MMHG | HEART RATE: 92 BPM | BODY MASS INDEX: 37.88 KG/M2 | TEMPERATURE: 98.2 F

## 2021-06-03 DIAGNOSIS — M25.571 CHRONIC PAIN OF RIGHT ANKLE: ICD-10-CM

## 2021-06-03 DIAGNOSIS — M79.642 BILATERAL HAND PAIN: ICD-10-CM

## 2021-06-03 DIAGNOSIS — G89.29 CHRONIC PAIN OF RIGHT ANKLE: ICD-10-CM

## 2021-06-03 DIAGNOSIS — M79.641 BILATERAL HAND PAIN: ICD-10-CM

## 2021-06-03 DIAGNOSIS — E03.9 ACQUIRED HYPOTHYROIDISM: ICD-10-CM

## 2021-06-03 DIAGNOSIS — I10 ESSENTIAL HYPERTENSION: ICD-10-CM

## 2021-06-03 DIAGNOSIS — M54.50 ACUTE BILATERAL LOW BACK PAIN WITHOUT SCIATICA: Primary | ICD-10-CM

## 2021-06-03 DIAGNOSIS — E66.9 OBESITY (BMI 30-39.9): ICD-10-CM

## 2021-06-03 DIAGNOSIS — E78.2 MIXED HYPERLIPIDEMIA: ICD-10-CM

## 2021-06-03 LAB
ALBUMIN SERPL-MCNC: 4.2 G/DL (ref 3.5–5.1)
ALP BLD-CCNC: 69 U/L (ref 38–126)
ALT SERPL-CCNC: 16 U/L (ref 11–66)
ANION GAP SERPL CALCULATED.3IONS-SCNC: 12 MEQ/L (ref 8–16)
AST SERPL-CCNC: 23 U/L (ref 5–40)
BASOPHILS # BLD: 0.4 %
BASOPHILS ABSOLUTE: 0 THOU/MM3 (ref 0–0.1)
BILIRUB SERPL-MCNC: 0.3 MG/DL (ref 0.3–1.2)
BUN BLDV-MCNC: 9 MG/DL (ref 7–22)
C-REACTIVE PROTEIN: < 0.3 MG/DL (ref 0–1)
CALCIUM SERPL-MCNC: 9.7 MG/DL (ref 8.5–10.5)
CHLORIDE BLD-SCNC: 105 MEQ/L (ref 98–111)
CO2: 26 MEQ/L (ref 23–33)
CREAT SERPL-MCNC: 0.9 MG/DL (ref 0.4–1.2)
EOSINOPHIL # BLD: 1.2 %
EOSINOPHILS ABSOLUTE: 0.1 THOU/MM3 (ref 0–0.4)
ERYTHROCYTE [DISTWIDTH] IN BLOOD BY AUTOMATED COUNT: 15.2 % (ref 11.5–14.5)
ERYTHROCYTE [DISTWIDTH] IN BLOOD BY AUTOMATED COUNT: 52.6 FL (ref 35–45)
GFR SERPL CREATININE-BSD FRML MDRD: 86 ML/MIN/1.73M2
GLUCOSE BLD-MCNC: 87 MG/DL (ref 70–108)
HCT VFR BLD CALC: 47.5 % (ref 42–52)
HEMOGLOBIN: 14.1 GM/DL (ref 14–18)
IMMATURE GRANS (ABS): 0.03 THOU/MM3 (ref 0–0.07)
IMMATURE GRANULOCYTES: 0.4 %
LYMPHOCYTES # BLD: 20.8 %
LYMPHOCYTES ABSOLUTE: 1.7 THOU/MM3 (ref 1–4.8)
MCH RBC QN AUTO: 28.1 PG (ref 26–33)
MCHC RBC AUTO-ENTMCNC: 29.7 GM/DL (ref 32.2–35.5)
MCV RBC AUTO: 94.6 FL (ref 80–94)
MONOCYTES # BLD: 6.7 %
MONOCYTES ABSOLUTE: 0.5 THOU/MM3 (ref 0.4–1.3)
NUCLEATED RED BLOOD CELLS: 0 /100 WBC
PLATELET # BLD: 257 THOU/MM3 (ref 130–400)
PMV BLD AUTO: 13 FL (ref 9.4–12.4)
POTASSIUM SERPL-SCNC: 5 MEQ/L (ref 3.5–5.2)
RBC # BLD: 5.02 MILL/MM3 (ref 4.7–6.1)
RHEUMATOID FACTOR: < 10 IU/ML (ref 0–13)
SEDIMENTATION RATE, ERYTHROCYTE: 15 MM/HR (ref 0–10)
SEG NEUTROPHILS: 70.5 %
SEGMENTED NEUTROPHILS ABSOLUTE COUNT: 5.6 THOU/MM3 (ref 1.8–7.7)
SODIUM BLD-SCNC: 143 MEQ/L (ref 135–145)
TOTAL PROTEIN: 6.9 G/DL (ref 6.1–8)
WBC # BLD: 8 THOU/MM3 (ref 4.8–10.8)

## 2021-06-03 PROCEDURE — 99214 OFFICE O/P EST MOD 30 MIN: CPT | Performed by: INTERNAL MEDICINE

## 2021-06-03 RX ORDER — AMOXICILLIN 500 MG/1
500 CAPSULE ORAL DAILY
COMMUNITY
End: 2021-07-26 | Stop reason: ALTCHOICE

## 2021-06-03 RX ORDER — MELOXICAM 15 MG/1
15 TABLET ORAL DAILY
Qty: 30 TABLET | Refills: 2 | Status: SHIPPED | OUTPATIENT
Start: 2021-06-03 | End: 2021-07-26

## 2021-06-03 RX ORDER — TIZANIDINE 4 MG/1
4 TABLET ORAL NIGHTLY PRN
Qty: 30 TABLET | Refills: 0 | Status: SHIPPED | OUTPATIENT
Start: 2021-06-03 | End: 2021-07-09 | Stop reason: SDUPTHER

## 2021-06-03 RX ORDER — M-VIT,TX,IRON,MINS/CALC/FOLIC 27MG-0.4MG
1 TABLET ORAL DAILY
COMMUNITY

## 2021-06-03 NOTE — PROGRESS NOTES
follow up with Dr. Mariam Hickey. Face still with intermittent numbness.     Patient Active Problem List   Diagnosis    Hyperlipidemia    Hypertension    Right leg pain    Right ankle pain    Elevated LFTs    GERD (gastroesophageal reflux disease)    Fatigue    Intermittent explosive disorder    Witnessed apneic spells    Snoring    Sleep disturbance    Sleep difficulties    Nocturnal leg movements    Restless sleeper    Non-restorative sleep    Obesity (BMI 30-39. 9)    Daytime somnolence    Shifting sleep-work schedule    Acquired hypothyroidism    Anxiety    Diaphoresis    Reactive depression    Type 2 diabetes mellitus without complication, without long-term current use of insulin (HCC)     Past Medical History:   Diagnosis Date    Chronic leg pain     Diabetes ([address] 05/2018    GERD (gastroesophageal reflux disease)     Hyperlipidemia     Hypertension     Hypothyroidism     Sleep apnea     TIA (transient ischemic attack) 2016    Dr. Mariam Hickey        Past Surgical History:   Procedure Laterality Date    ANKLE SURGERY Right 2000    KNEE ARTHROSCOPY Left 9-11-15    Torn cartliage    LEG SURGERY Right 2000    ME COLSC FLX W/RMVL OF TUMOR POLYP LESION SNARE TQ  7/16/2017    COLONOSCOPY POLYPECTOMY SNARE/COLD BIOPSY performed by Jose Cruz Lopez MD at [facility]    ME EGD TRANSORAL BIOPSY SINGLE/MULTIPLE N/A 9/27/2017    EGD BIOPSY performed by Joshua Henry MD at [address] [place] [address]  7/16/2017    EGD DIAGNOSTIC ONLY performed by Jose Cruz Lopez MD at [facility]       Current Outpatient Medications   Medication Sig Dispense Refill    amoxicillin (AMOXIL) 500 MG capsule Take 500 mg by mouth daily      Multiple Vitamins-Minerals (THERAPEUTIC MULTIVITAMIN-MINERALS) tablet Take 1 tablet by mouth daily      Misc Natural Products (OSTEO BI-FLEX ADV TRIPLE ST PO) Take by mouth daily      Multiple Vitamins-Minerals (OCUVITE-LUTEIN PO) Take by mouth daily  meloxicam (MOBIC) 15 MG tablet Take 1 tablet by mouth daily 30 tablet 2    tiZANidine (ZANAFLEX) 4 MG tablet Take 1 tablet by mouth nightly as needed (back pain) 30 tablet 0    losartan (COZAAR) 50 MG tablet TAKE 1 TABLET BY MOUTH EVERY DAY 90 tablet 1    metoprolol succinate (TOPROL XL) 50 MG extended release tablet TAKE 1 TABLET BY MOUTH EVERY DAY 90 tablet 1    polyethylene glycol (GLYCOLAX) 17 GM/SCOOP powder Take 17 g by mouth daily 1530 g 1    albuterol sulfate  (90 Base) MCG/ACT inhaler Inhale 2 puffs into the lungs every 6 hours as needed for Wheezing 1 Inhaler 0    levothyroxine (SYNTHROID) 50 MCG tablet Take 1 tablet by mouth daily 90 tablet 1    atorvastatin (LIPITOR) 80 MG tablet Take 1 tablet by mouth daily 90 tablet 1    risperiDONE (RISPERDAL) 1 MG tablet Take 1 mg by mouth every evening       VORTIoxetine HBr (TRINTELLIX) 20 MG TABS tablet Take 20 mg by mouth daily      acetaminophen (TYLENOL) 500 MG tablet Take 1,000 mg by mouth every 6 hours as needed for Pain       No current facility-administered medications for this visit. No Known Allergies    Review of Systems - General ROS: negative for - chills or fever  Psychological ROS: positive for - anxiety and paranoid behavior - seeing [facility] - he is much happy now than ever in the past.  Hematological and Lymphatic ROS: No history of blood clots or bleeding disorder. Respiratory ROS: no cough, shortness of breath, or wheezing - flare in past with mowing lawn.   No issues now   Cardiovascular ROS: no chest pain, no dyspnea on exertion - cath neg 1/2017  Gastrointestinal ROS: no right lower abdominal pain, no change in bowel habits, or black or bloody stools  Genito-Urinary ROS: no dysuria, trouble voiding, or hematuria  Musculoskeletal ROS: negative for - joint pain, muscle pain or muscular weakness - except for chronic right leg/ankle pain, bilateral knee pain, bilateral hand pain  Neurological ROS: negative for -

## 2021-06-03 NOTE — PATIENT INSTRUCTIONS
Add meloxicam daily for pain,    Add tizanidine as needed for muscle spasm in back. Get labs done now. Get started on physical therapy for back pain.

## 2021-06-14 ENCOUNTER — HOSPITAL ENCOUNTER (OUTPATIENT)
Dept: PHYSICAL THERAPY | Age: 61
Setting detail: THERAPIES SERIES
Discharge: HOME | End: 2021-06-14
Payer: MEDICARE

## 2021-06-14 PROCEDURE — 97161 PT EVAL LOW COMPLEX 20 MIN: CPT
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
## 2021-06-24 ENCOUNTER — HOSPITAL ENCOUNTER (OUTPATIENT)
Dept: PHYSICAL THERAPY | Age: 61
Setting detail: THERAPIES SERIES
Discharge: HOME | End: 2021-06-24
Payer: MEDICARE

## 2021-06-24 PROCEDURE — 97110 THERAPEUTIC EXERCISES: CPT

## 2021-06-24 PROCEDURE — 97032 APPL MODALITY 1+ESTIM EA 15: CPT

## 2021-06-24 PROCEDURE — 97530 THERAPEUTIC ACTIVITIES: CPT
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
## 2021-06-25 ENCOUNTER — APPOINTMENT (OUTPATIENT)
Dept: PHYSICAL THERAPY | Age: 61
End: 2021-06-25
Payer: MEDICARE

## 2021-06-29 ENCOUNTER — APPOINTMENT (OUTPATIENT)
Dept: PHYSICAL THERAPY | Age: 61
End: 2021-06-29
Payer: MEDICARE

## 2021-07-01 ENCOUNTER — APPOINTMENT (OUTPATIENT)
Dept: PHYSICAL THERAPY | Age: 61
End: 2021-07-01
Payer: MEDICARE

## 2021-07-01 RX ORDER — ALBUTEROL SULFATE 90 UG/1
2 AEROSOL, METERED RESPIRATORY (INHALATION) EVERY 6 HOURS PRN
Qty: 1 INHALER | Refills: 3 | Status: SHIPPED | OUTPATIENT
Start: 2021-07-01 | End: 2022-06-14 | Stop reason: SDUPTHER

## 2021-07-02 ENCOUNTER — TELEPHONE (OUTPATIENT)
Dept: INTERNAL MEDICINE CLINIC | Age: 61
End: 2021-07-02

## 2021-07-02 NOTE — TELEPHONE ENCOUNTER
Pt called in saying he needs a refill on Albuterol inhaler and also He took his last pill of Meloxicam.  He is in [place] for about 3 weeks. I advised him Dr Shane Aaron sent in albuterol last night to pharmacy. He also says he is out of meloxicam.  On the script it says maximum refills reached. I called out local pharmacy and he says pt  script on 6/26/21 in [facility]. I notified pts caretaker,  Monika Welch, that pt can not get anymore Meloxicam as he picked it up [place] on 6/26/21. It is too soon. Maybe he has misplaced his bottle. Caretaker verbalizes understanding.

## 2021-07-06 DIAGNOSIS — M54.50 ACUTE BILATERAL LOW BACK PAIN WITHOUT SCIATICA: ICD-10-CM

## 2021-07-07 ENCOUNTER — APPOINTMENT (OUTPATIENT)
Dept: PHYSICAL THERAPY | Age: 61
End: 2021-07-07
Payer: MEDICARE

## 2021-07-09 ENCOUNTER — APPOINTMENT (OUTPATIENT)
Dept: PHYSICAL THERAPY | Age: 61
End: 2021-07-09
Payer: MEDICARE

## 2021-07-09 RX ORDER — TIZANIDINE 4 MG/1
4 TABLET ORAL NIGHTLY PRN
Qty: 30 TABLET | Refills: 0 | Status: SHIPPED | OUTPATIENT
Start: 2021-07-09 | End: 2021-10-07

## 2021-07-12 ENCOUNTER — APPOINTMENT (OUTPATIENT)
Dept: PHYSICAL THERAPY | Age: 61
End: 2021-07-12
Payer: MEDICARE

## 2021-07-14 ENCOUNTER — APPOINTMENT (OUTPATIENT)
Dept: PHYSICAL THERAPY | Age: 61
End: 2021-07-14
Payer: MEDICARE

## 2021-07-15 ENCOUNTER — TELEPHONE (OUTPATIENT)
Dept: INTERNAL MEDICINE CLINIC | Age: 61
End: 2021-07-15

## 2021-07-15 DIAGNOSIS — M79.662 PAIN AND SWELLING OF LOWER LEG, LEFT: Primary | ICD-10-CM

## 2021-07-15 DIAGNOSIS — M79.89 PAIN AND SWELLING OF LOWER LEG, LEFT: Primary | ICD-10-CM

## 2021-07-15 NOTE — TELEPHONE ENCOUNTER
Patient called and left message on nurse voicemail  stating that he just came back from [place] and he is having knee pain and it looks more swollen than other side . Patient also states that he is having memory issues . Patient has appointment 7/22/21. Tried multiple times to return call to patient and no answer , voicemail is full and not accepting messages . Tried contacting his friend on contact list and no answer and voicemail is full and not accepting messages .

## 2021-07-19 NOTE — TELEPHONE ENCOUNTER
Per verbal order Dr. Bowens Liter okay to ordered leg doppler. Do not take Mobic with the Ibuprofen .

## 2021-07-19 NOTE — TELEPHONE ENCOUNTER
Doppler ordered and Anne-Marie Yepez scheduling . Patient informed that he should not be taking the Melexicam if he is using the Ibuprofen . Patient verbalized understanding.

## 2021-07-19 NOTE — TELEPHONE ENCOUNTER
Patient's friend Katherine Hook called stating that he brought Duane home from [place] after 2 weeks as Ruby Martin could not stand being around him. anymore . \"  Friend states that he is concerned about Duane's memory as he is repeating himself and does not seem to be able to remember anything and he has anger issues . Friend states that he acts very childish and inappropriate more so then usual . Katherine Hook states that Fam Bonds would take off walking and be gone for hours . Duane lost the pass word for his phone and is now using his old phone at [phone]. I contacted Duane and he states that his left knee is very painful and swollen from knee to his foot . Patient is concerned about another blood clot . Katherine Hook was giving Duane Ibuprofen 800 mg which does help . Patient is scheduled for an appointment on Thursday 7/22 . Do you want a doppler ordered of patient's lower leg ? Please advise.

## 2021-07-20 ENCOUNTER — HOSPITAL ENCOUNTER (OUTPATIENT)
Dept: INTERVENTIONAL RADIOLOGY/VASCULAR | Age: 61
Discharge: HOME | End: 2021-07-20
Payer: MEDICARE

## 2021-07-20 DIAGNOSIS — M79.89 PAIN AND SWELLING OF LOWER LEG, LEFT: ICD-10-CM

## 2021-07-20 DIAGNOSIS — M79.662 PAIN AND SWELLING OF LOWER LEG, LEFT: ICD-10-CM

## 2021-07-20 PROCEDURE — 93971 EXTREMITY STUDY: CPT

## 2021-07-22 ENCOUNTER — HOSPITAL ENCOUNTER (OUTPATIENT)
Dept: PHYSICAL THERAPY | Age: 61
Setting detail: THERAPIES SERIES
End: 2021-07-22
Payer: MEDICARE

## 2021-07-26 ENCOUNTER — NURSE ONLY (OUTPATIENT)
Dept: LAB | Age: 61
End: 2021-07-26

## 2021-07-26 ENCOUNTER — OFFICE VISIT (OUTPATIENT)
Dept: INTERNAL MEDICINE CLINIC | Age: 61
End: 2021-07-26
Payer: MEDICARE

## 2021-07-26 ENCOUNTER — TELEPHONE (OUTPATIENT)
Dept: INTERNAL MEDICINE CLINIC | Age: 61
End: 2021-07-26

## 2021-07-26 VITALS
BODY MASS INDEX: 35.86 KG/M2 | HEIGHT: 63 IN | TEMPERATURE: 99.4 F | SYSTOLIC BLOOD PRESSURE: 118 MMHG | WEIGHT: 202.4 LBS | DIASTOLIC BLOOD PRESSURE: 78 MMHG | HEART RATE: 94 BPM

## 2021-07-26 DIAGNOSIS — E78.2 MIXED HYPERLIPIDEMIA: ICD-10-CM

## 2021-07-26 DIAGNOSIS — M79.641 BILATERAL HAND PAIN: ICD-10-CM

## 2021-07-26 DIAGNOSIS — M25.571 CHRONIC PAIN OF RIGHT ANKLE: ICD-10-CM

## 2021-07-26 DIAGNOSIS — F68.8 PERSONALITY CHANGE: ICD-10-CM

## 2021-07-26 DIAGNOSIS — E03.9 ACQUIRED HYPOTHYROIDISM: ICD-10-CM

## 2021-07-26 DIAGNOSIS — R41.3 MEMORY CHANGES: ICD-10-CM

## 2021-07-26 DIAGNOSIS — M54.50 ACUTE BILATERAL LOW BACK PAIN WITHOUT SCIATICA: ICD-10-CM

## 2021-07-26 DIAGNOSIS — M79.642 BILATERAL HAND PAIN: ICD-10-CM

## 2021-07-26 DIAGNOSIS — G89.29 CHRONIC PAIN OF RIGHT ANKLE: ICD-10-CM

## 2021-07-26 DIAGNOSIS — M25.562 CHRONIC PAIN OF LEFT KNEE: Primary | ICD-10-CM

## 2021-07-26 DIAGNOSIS — R19.5 DARK STOOLS: ICD-10-CM

## 2021-07-26 DIAGNOSIS — G89.29 CHRONIC PAIN OF LEFT KNEE: Primary | ICD-10-CM

## 2021-07-26 LAB
ALT SERPL-CCNC: 15 U/L (ref 11–66)
HCT VFR BLD CALC: 44 % (ref 42–52)
HEMOGLOBIN: 13.9 GM/DL (ref 14–18)
LDL CHOLESTEROL DIRECT: 143.88 MG/DL
TSH SERPL DL<=0.05 MIU/L-ACNC: 2.74 UIU/ML (ref 0.4–4.2)

## 2021-07-26 PROCEDURE — G8417 CALC BMI ABV UP PARAM F/U: HCPCS | Performed by: INTERNAL MEDICINE

## 2021-07-26 PROCEDURE — 3017F COLORECTAL CA SCREEN DOC REV: CPT | Performed by: INTERNAL MEDICINE

## 2021-07-26 PROCEDURE — G8427 DOCREV CUR MEDS BY ELIG CLIN: HCPCS | Performed by: INTERNAL MEDICINE

## 2021-07-26 PROCEDURE — 99214 OFFICE O/P EST MOD 30 MIN: CPT | Performed by: INTERNAL MEDICINE

## 2021-07-26 PROCEDURE — 4004F PT TOBACCO SCREEN RCVD TLK: CPT | Performed by: INTERNAL MEDICINE

## 2021-07-26 RX ORDER — IBUPROFEN 800 MG/1
800 TABLET ORAL 2 TIMES DAILY
COMMUNITY
End: 2021-07-26 | Stop reason: SDUPTHER

## 2021-07-26 RX ORDER — ATORVASTATIN CALCIUM 80 MG/1
80 TABLET, FILM COATED ORAL DAILY
Qty: 90 TABLET | Refills: 1 | Status: SHIPPED | OUTPATIENT
Start: 2021-07-26 | End: 2021-08-16 | Stop reason: SDUPTHER

## 2021-07-26 RX ORDER — LEVOTHYROXINE SODIUM 0.05 MG/1
50 TABLET ORAL DAILY
Qty: 90 TABLET | Refills: 1 | Status: SHIPPED | OUTPATIENT
Start: 2021-07-26 | End: 2021-08-16 | Stop reason: SDUPTHER

## 2021-07-26 RX ORDER — IBUPROFEN 800 MG/1
800 TABLET ORAL 2 TIMES DAILY
Qty: 60 TABLET | Refills: 1 | Status: SHIPPED | OUTPATIENT
Start: 2021-07-26 | End: 2021-11-15 | Stop reason: ALTCHOICE

## 2021-07-26 NOTE — TELEPHONE ENCOUNTER
Patient needs to call billing dept at [facility] before he can schedule an appointment. I called patient to pass this information on to him however; I was unable to leave a mess his vm isn't set up. Also to let the patient know I had to leave a message with the Rheumatologist office they will call him or myself to set up this appointment.
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
## 2021-07-27 ENCOUNTER — HOSPITAL ENCOUNTER (OUTPATIENT)
Age: 61
Discharge: HOME | End: 2021-07-27
Payer: MEDICARE

## 2021-07-27 ENCOUNTER — HOSPITAL ENCOUNTER (OUTPATIENT)
Dept: GENERAL RADIOLOGY | Age: 61
Discharge: HOME | End: 2021-07-27
Payer: MEDICARE

## 2021-07-27 ENCOUNTER — HOSPITAL ENCOUNTER (OUTPATIENT)
Dept: PHYSICAL THERAPY | Age: 61
Setting detail: THERAPIES SERIES
Discharge: HOME | End: 2021-07-27
Payer: MEDICARE

## 2021-07-27 DIAGNOSIS — M25.562 CHRONIC PAIN OF LEFT KNEE: ICD-10-CM

## 2021-07-27 DIAGNOSIS — M25.571 CHRONIC PAIN OF RIGHT ANKLE: ICD-10-CM

## 2021-07-27 DIAGNOSIS — G89.29 CHRONIC PAIN OF RIGHT ANKLE: ICD-10-CM

## 2021-07-27 DIAGNOSIS — G89.29 CHRONIC PAIN OF LEFT KNEE: ICD-10-CM

## 2021-07-27 PROCEDURE — 73600 X-RAY EXAM OF ANKLE: CPT

## 2021-07-27 PROCEDURE — 73560 X-RAY EXAM OF KNEE 1 OR 2: CPT

## 2021-07-27 PROCEDURE — 97110 THERAPEUTIC EXERCISES: CPT
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
## 2021-07-30 DIAGNOSIS — I10 ESSENTIAL HYPERTENSION: ICD-10-CM

## 2021-08-03 ENCOUNTER — HOSPITAL ENCOUNTER (OUTPATIENT)
Dept: PHYSICAL THERAPY | Age: 61
Setting detail: THERAPIES SERIES
Discharge: HOME | End: 2021-08-03
Payer: MEDICARE

## 2021-08-03 PROCEDURE — 97110 THERAPEUTIC EXERCISES: CPT
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
## 2021-08-04 ENCOUNTER — TELEPHONE (OUTPATIENT)
Dept: INTERNAL MEDICINE CLINIC | Age: 61
End: 2021-08-04

## 2021-08-04 DIAGNOSIS — M79.642 BILATERAL HAND PAIN: Primary | ICD-10-CM

## 2021-08-04 DIAGNOSIS — R41.3 MEMORY CHANGES: ICD-10-CM

## 2021-08-04 DIAGNOSIS — F68.8 PERSONALITY CHANGE: Primary | ICD-10-CM

## 2021-08-04 DIAGNOSIS — M79.641 BILATERAL HAND PAIN: Primary | ICD-10-CM

## 2021-08-04 NOTE — TELEPHONE ENCOUNTER
Unable to add labs to previous lab draw . Labs ordered and mailed to patient as current phone # not working .

## 2021-08-04 NOTE — TELEPHONE ENCOUNTER
----- Message from Catie Malik MD sent at 8/2/2021  7:21 PM EDT -----  Add B12, B6, folate and RPR to blood in lab or draw if needed - mental status change.

## 2021-08-04 NOTE — TELEPHONE ENCOUNTER
----- Message from Tommy Broderick MD sent at 8/2/2021  7:48 PM EDT -----  Add to ortho referral - bilateral hand pain - send x-ray of knee, ankle, and hands to ortho.

## 2021-08-05 RX ORDER — METOPROLOL SUCCINATE 50 MG/1
TABLET, EXTENDED RELEASE ORAL
Qty: 90 TABLET | Refills: 1 | Status: SHIPPED | OUTPATIENT
Start: 2021-08-05 | End: 2021-08-16 | Stop reason: SDUPTHER

## 2021-08-05 NOTE — TELEPHONE ENCOUNTER
Previous order canceled ordered in error . Referral to [address] placed and faxed to [phone] for bilateral hand pain . Patient already scheduled for knee and ankle.

## 2021-08-12 ENCOUNTER — TELEPHONE (OUTPATIENT)
Dept: INTERNAL MEDICINE CLINIC | Age: 61
End: 2021-08-12

## 2021-08-12 NOTE — TELEPHONE ENCOUNTER
----- Message from Mendoza Traore MD sent at 8/8/2021  8:49 PM EDT -----  Tell him to stop Lipitor (atorvastatin) and see if pain in hands and legs gets any better.

## 2021-08-16 ENCOUNTER — TELEPHONE (OUTPATIENT)
Dept: INTERNAL MEDICINE CLINIC | Age: 61
End: 2021-08-16

## 2021-08-16 DIAGNOSIS — I10 ESSENTIAL HYPERTENSION: ICD-10-CM

## 2021-08-16 DIAGNOSIS — E03.9 ACQUIRED HYPOTHYROIDISM: ICD-10-CM

## 2021-08-16 DIAGNOSIS — E78.2 MIXED HYPERLIPIDEMIA: ICD-10-CM

## 2021-08-16 RX ORDER — LEVOTHYROXINE SODIUM 0.05 MG/1
50 TABLET ORAL DAILY
Qty: 90 TABLET | Refills: 1 | Status: SHIPPED | OUTPATIENT
Start: 2021-08-16 | End: 2021-11-15 | Stop reason: SDUPTHER

## 2021-08-16 RX ORDER — ATORVASTATIN CALCIUM 80 MG/1
80 TABLET, FILM COATED ORAL DAILY
Qty: 90 TABLET | Refills: 1 | Status: SHIPPED | OUTPATIENT
Start: 2021-08-16 | End: 2021-08-29 | Stop reason: ALTCHOICE

## 2021-08-16 RX ORDER — METOPROLOL SUCCINATE 50 MG/1
TABLET, EXTENDED RELEASE ORAL
Qty: 90 TABLET | Refills: 1 | Status: SHIPPED | OUTPATIENT
Start: 2021-08-16 | End: 2021-11-15 | Stop reason: SDUPTHER

## 2021-08-16 NOTE — TELEPHONE ENCOUNTER
Spoke with patient informed him that I have order his medications for Dr. Francisco Murillo to sign for his mail pharmacy . He may have to contact the pharmacy to give them his payment information . Patient verbalized understanding.

## 2021-08-24 ENCOUNTER — HOSPITAL ENCOUNTER (OUTPATIENT)
Age: 61
Discharge: HOME | End: 2021-08-24
Payer: MEDICARE

## 2021-08-24 DIAGNOSIS — R41.3 MEMORY CHANGES: ICD-10-CM

## 2021-08-24 LAB
FOLATE: 19.7 NG/ML (ref 4.8–24.2)
VITAMIN B-12: > 2000 PG/ML (ref 211–911)

## 2021-08-24 PROCEDURE — 82607 VITAMIN B-12: CPT

## 2021-08-24 PROCEDURE — 84207 ASSAY OF VITAMIN B-6: CPT

## 2021-08-24 PROCEDURE — 36415 COLL VENOUS BLD VENIPUNCTURE: CPT

## 2021-08-24 PROCEDURE — 82746 ASSAY OF FOLIC ACID SERUM: CPT

## 2021-08-24 PROCEDURE — 86592 SYPHILIS TEST NON-TREP QUAL: CPT

## 2021-08-25 LAB — RPR: NONREACTIVE

## 2021-08-29 LAB — VITAMIN B6: 25.5 NMOL/L (ref 20–125)

## 2021-09-01 ENCOUNTER — TELEPHONE (OUTPATIENT)
Dept: INTERNAL MEDICINE CLINIC | Age: 61
End: 2021-09-01

## 2021-09-01 DIAGNOSIS — I10 ESSENTIAL HYPERTENSION: ICD-10-CM

## 2021-09-01 RX ORDER — LOSARTAN POTASSIUM 50 MG/1
TABLET ORAL
Qty: 90 TABLET | Refills: 1 | Status: SHIPPED | OUTPATIENT
Start: 2021-09-01 | End: 2021-11-15 | Stop reason: SDUPTHER

## 2021-09-01 NOTE — TELEPHONE ENCOUNTER
Spoke with [facility] pharmacy and canceled Atorvastatin . The medication has not been shopped out to patient yet.

## 2021-09-14 ENCOUNTER — OFFICE VISIT (OUTPATIENT)
Dept: INTERNAL MEDICINE CLINIC | Age: 61
End: 2021-09-14
Payer: MEDICARE

## 2021-09-14 VITALS
SYSTOLIC BLOOD PRESSURE: 136 MMHG | WEIGHT: 186 LBS | BODY MASS INDEX: 32.96 KG/M2 | TEMPERATURE: 97.9 F | DIASTOLIC BLOOD PRESSURE: 82 MMHG | HEART RATE: 76 BPM | HEIGHT: 63 IN

## 2021-09-14 DIAGNOSIS — E78.2 MIXED HYPERLIPIDEMIA: ICD-10-CM

## 2021-09-14 DIAGNOSIS — F68.8 PERSONALITY CHANGE: ICD-10-CM

## 2021-09-14 DIAGNOSIS — M35.3 PMR (POLYMYALGIA RHEUMATICA) (HCC): Primary | ICD-10-CM

## 2021-09-14 DIAGNOSIS — R41.3 MEMORY CHANGES: ICD-10-CM

## 2021-09-14 PROCEDURE — 99214 OFFICE O/P EST MOD 30 MIN: CPT | Performed by: INTERNAL MEDICINE

## 2021-09-14 RX ORDER — PREDNISONE 20 MG/1
20 TABLET ORAL DAILY
Qty: 30 TABLET | Refills: 1 | Status: SHIPPED | OUTPATIENT
Start: 2021-09-14 | End: 2021-10-24 | Stop reason: SDUPTHER
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
## 2021-09-14 NOTE — PATIENT INSTRUCTIONS
Bring your medication to every visit. Start Prednisone 20 mg once a day for joint pain - you can stop ibuprofen (Mars pills) if on prednisone.

## 2021-09-27 ENCOUNTER — HOSPITAL ENCOUNTER (OUTPATIENT)
Dept: MRI IMAGING | Age: 61
Discharge: HOME | End: 2021-09-27
Payer: MEDICARE

## 2021-09-27 DIAGNOSIS — F68.8 PERSONALITY CHANGE: ICD-10-CM

## 2021-09-27 DIAGNOSIS — R41.3 MEMORY CHANGES: ICD-10-CM

## 2021-09-27 PROCEDURE — 70551 MRI BRAIN STEM W/O DYE: CPT

## 2021-09-29 ENCOUNTER — TELEPHONE (OUTPATIENT)
Dept: INTERNAL MEDICINE CLINIC | Age: 61
End: 2021-09-29

## 2021-09-29 NOTE — TELEPHONE ENCOUNTER
----- Message from Mati Hameed MD sent at 9/29/2021 12:04 AM EDT -----  Let him know MRI looks stable.

## 2021-10-03 ENCOUNTER — HOSPITAL ENCOUNTER (EMERGENCY)
Age: 61
Discharge: HOME | End: 2021-10-03
Payer: MEDICARE

## 2021-10-03 VITALS
HEART RATE: 89 BPM | DIASTOLIC BLOOD PRESSURE: 83 MMHG | RESPIRATION RATE: 18 BRPM | SYSTOLIC BLOOD PRESSURE: 151 MMHG | OXYGEN SATURATION: 98 % | TEMPERATURE: 98.6 F

## 2021-10-03 DIAGNOSIS — M79.642 BILATERAL HAND PAIN: Primary | ICD-10-CM

## 2021-10-03 DIAGNOSIS — M79.641 BILATERAL HAND PAIN: Primary | ICD-10-CM

## 2021-10-03 PROCEDURE — 99282 EMERGENCY DEPT VISIT SF MDM: CPT

## 2021-10-03 RX ORDER — PREDNISONE 20 MG/1
TABLET ORAL
Qty: 15 TABLET | Refills: 0 | Status: SHIPPED | OUTPATIENT
Start: 2021-10-03 | End: 2021-10-24 | Stop reason: ALTCHOICE

## 2021-10-03 ASSESSMENT — ENCOUNTER SYMPTOMS
BLOOD IN STOOL: 0
EYE PAIN: 0
SINUS PAIN: 0
SORE THROAT: 0
COUGH: 0
VOMITING: 0
ABDOMINAL DISTENTION: 0
NAUSEA: 0
DIARRHEA: 0
SHORTNESS OF BREATH: 0
ABDOMINAL PAIN: 0
SINUS PRESSURE: 0
CONSTIPATION: 0

## 2021-10-03 NOTE — LETTER
[address] Internal Medicine  [address]  [facility], [address]  Phone: [phone]  Fax: [phone]    October 5, 2021    Carmen Judith  [address]    Dear Willy Rader,    This letter is regarding your Emergency Department (ED) visit at [address] [address] on 10/3/21. Dr. Shai Nair wanted to make sure that you understand your discharge instructions and that you were able to fill any prescriptions that may have been ordered for you. Please contact the office at the above phone number if the ED advised you to follow up with Juan Vital, or if you have any further questions or needs. Also did you know -                             *You can call your doctor even after hours so they can direct you to the most appropriate care. [facility]) practices can often offer you an appointment on the same day that you call. *We have some [facility] offices that offer Walk-in appointments; check our website for availability in your community, [email or web address].      *Evisits are now available for patients for $36 through [employer] for certain conditions:  * Sinus, cold and or cough       * Diarrhea            * Headache  * Heartburn                                * Poison Giselle          * Back pain     * Urinary problems                         If you do not have [employer] and are interested, please contact the office and a staff member may assist you or go to [email or web address].     Sincerely,     Shai Nair MD and your [facility]

## 2021-10-03 NOTE — ED TRIAGE NOTES
Pt in through ED lobby. He states for the past 4-5 months he has had worsening numbness in both hands. He states he has been seeing his family doctor multiple times for this and they have not given him a diagnosis.

## 2021-10-03 NOTE — ED PROVIDER NOTES
[facility] [address] COMPLAINT       Chief Complaint   Patient presents with    Numbness       Nurses Notes reviewed and I agree except as notedin the HPI. HISTORY OF PRESENT ILLNESS    Viviann Kawasaki is a 57 y.o. male who presents complains of bilateral hand pain is been going on for the last 5 to 6 months. He saw his family doctor once he did x-rays of his hand that were negative. The patient states that he has not been told the exact reason for his bilateral hand pain. He states that his pain is worse at night he is describes it as numbness in the median nerve distribution. He states he feels like he has to drop things from time to time. His symptoms are on both sides. Location/Symptom: Bilateral hand pain  Timing/Onset: 5-6 months  Context/Setting: home  Quality: numbness  Duration: constant  Modifying Factors: none  Severity: none    REVIEW OF SYSTEMS     Review of Systems   Constitutional: Negative for chills and fever. HENT: Negative for congestion, ear pain, sinus pressure, sinus pain and sore throat. Eyes: Negative for pain. Respiratory: Negative for cough and shortness of breath. Cardiovascular: Negative for chest pain and leg swelling. Gastrointestinal: Negative for abdominal distention, abdominal pain, blood in stool, constipation, diarrhea, nausea and vomiting. Genitourinary: Negative for difficulty urinating, frequency and hematuria. Musculoskeletal: Negative for arthralgias. Skin: Negative for rash. Neurological: Negative for dizziness and headaches. All other systems reviewed and are negative. PAST MEDICAL HISTORY    has a past medical history of Chronic leg pain, Diabetes ([address] GERD (gastroesophageal reflux disease), Hyperlipidemia, Hypertension, Hypothyroidism, Sleep apnea, and TIA (transient ischemic attack).     SURGICAL HISTORY      has a past surgical history that includes Leg Surgery (Right, 2000); Ankle surgery (Right, 2000); Knee arthroscopy (Left, 9-11-15); pr colsc flx w/rmvl of tumor polyp lesion snare tq (7/16/2017); Upper gastrointestinal endoscopy (7/16/2017); and pr egd transoral biopsy single/multiple (N/A, 9/27/2017). CURRENT MEDICATIONS       Discharge Medication List as of 10/3/2021 11:34 AM      CONTINUE these medications which have NOT CHANGED    Details   ! ! predniSONE (DELTASONE) 20 MG tablet Take 1 tablet by mouth daily, Disp-30 tablet, R-1Normal      losartan (COZAAR) 50 MG tablet TAKE 1 TABLET BY MOUTH EVERY DAY, Disp-90 tablet, R-1**Patient requests 90 days supply**Normal      metoprolol succinate (TOPROL XL) 50 MG extended release tablet TAKE 1 TABLET BY MOUTH EVERY DAY, Disp-90 tablet, R-1**Patient requests 90 days supply**Normal      levothyroxine (SYNTHROID) 50 MCG tablet Take 1 tablet by mouth daily, Disp-90 tablet, R-1Normal      triazolam (HALCION) 0.25 MG tablet Historical Med      ibuprofen (ADVIL;MOTRIN) 800 MG tablet Take 1 tablet by mouth 2 times daily, Disp-60 tablet, R-1Normal      tiZANidine (ZANAFLEX) 4 MG tablet Take 1 tablet by mouth nightly as needed (back pain), Disp-30 tablet, R-0Normal      albuterol sulfate  (90 Base) MCG/ACT inhaler Inhale 2 puffs into the lungs every 6 hours as needed for Wheezing, Disp-1 Inhaler, R-3Normal      !! Multiple Vitamins-Minerals (THERAPEUTIC MULTIVITAMIN-MINERALS) tablet Take 1 tablet by mouth dailyHistorical Med      Misc Natural Products (OSTEO BI-FLEX ADV TRIPLE ST PO) Take by mouth dailyHistorical Med      !! Multiple Vitamins-Minerals (OCUVITE-LUTEIN PO) Take by mouth dailyHistorical Med      risperiDONE (RISPERDAL) 1 MG tablet Take 1 mg by mouth every evening Historical Med      acetaminophen (TYLENOL) 500 MG tablet Take 1,000 mg by mouth every 6 hours as needed for Pain       !! - Potential duplicate medications found. Please discuss with provider. ALLERGIES     has No Known Allergies.     HISTORY     He indicated that his mother is . He indicated that his father is . He indicated that his sister is alive. He indicated that all of his three brothers are alive. He indicated that his maternal aunt is . He indicated that his maternal uncle is . family history includes COPD in his father; Dementia in his maternal aunt and mother; High Blood Pressure in his father and mother; High Cholesterol in his father; Seizures in his maternal uncle. SOCIALHISTORY      reports that he has been smoking cigarettes and cigars. He has a 160.00 pack-year smoking history. He has never used smokeless tobacco. He reports that he does not drink alcohol and does not use drugs. PHYSICAL EXAM     INITIAL VITALS:  oral temperature is 98.6 °F (37 °C). His blood pressure is 151/83 (abnormal) and his pulse is 89. His respiration is 18 and oxygen saturation is 98%. Physical Exam  Vitals and nursing note reviewed. Constitutional:       Comments: Well Developed Well Nourished Appearing     HENT:      Head: Normocephalic and atraumatic. Eyes:      Pupils: Pupils are equal, round, and reactive to light. Cardiovascular:      Rate and Rhythm: Normal rate and regular rhythm. Heart sounds: Normal heart sounds. Pulmonary:      Effort: Pulmonary effort is normal. No respiratory distress. Breath sounds: Normal breath sounds. No wheezing. Abdominal:      General: Bowel sounds are normal. There is no distension. Palpations: Abdomen is soft. Musculoskeletal:      Cervical back: Normal range of motion and neck supple. Neurological:      Comments: Positive Tinel's and positive Phalen's. DIFFERENTIAL DIAGNOSIS:   Bilateral hand pain suspect carpal tunnel syndrome. Other etiology be tendinitis. Ulnar tunnel syndrome. And cervical radiculopathy. Stroke unlikely.     DIAGNOSTIC RESULTS     EKG: All EKG's are interpreted by the Emergency Department Physician who either signs or Co-signs this chart in the absence of a cardiologist.      RADIOLOGY: non-plain film images(s) such as CT, Ultrasound and MRI are read by the radiologist.  None      LABS:   Labs Reviewed - No data to display    EMERGENCY DEPARTMENT COURSE:   :    Vitals:    10/03/21 1108   BP: (!) 151/83   Pulse: 89   Resp: 18   Temp: 98.6 °F (37 °C)   TempSrc: Oral   SpO2: 98%     Patient was seen history physical exam was performed. See disposition below    CRITICAL CARE:  None    CONSULTS:  None    PROCEDURES:  None    FINAL IMPRESSION      1. Bilateral hand pain          DISPOSITION/PLAN   Discharge    PATIENT REFERRED TO:  [address] [place] [address]  [phone]  In 2 days        DISCHARGE MEDICATIONS:  Discharge Medication List as of 10/3/2021 11:34 AM      START taking these medications    Details   ! ! predniSONE (DELTASONE) 20 MG tablet Take 3 tablets by mouth once daily for 5 days, Disp-15 tablet, R-0Print       !! - Potential duplicate medications found. Please discuss with provider.           (Please note that portions of this note were completed with a voice recognitionprogram.  Efforts were made to edit the dictations but occasionally words are mis-transcribed.)    ALEXANDRA Sanford PA  10/03/21 [address] [place], [place]  10/03/21 1523

## 2021-10-05 ENCOUNTER — TELEPHONE (OUTPATIENT)
Dept: INTERNAL MEDICINE CLINIC | Age: 61
End: 2021-10-05

## 2021-10-05 ENCOUNTER — NURSE TRIAGE (OUTPATIENT)
Dept: OTHER | Facility: CLINIC | Age: 61
End: 2021-10-05

## 2021-10-05 NOTE — TELEPHONE ENCOUNTER
Received call from [facility] at [employer] with [employer]. Brief description of triage: as above pt c/o numbness/tingling in both hands was seen in er on 10/3 [place] on phone has numerous other c/os also dizziness h/a, cp all chronic for the past 3-4 months    Triage indicates for patient to be seen in 3 days    Care advice provided, patient verbalizes understanding; denies any other questions or concerns; instructed to call back for any new or worsening symptoms. Writer provided warm transfer to [facility] for appointment scheduling. Attention Provider: Thank you for allowing me to participate in the care of your patient. The patient was connected to triage in response to information provided to the ECC/PSC. Please do not respond through this encounter as the response is not directed to a shared pool. Reason for Disposition   Numbness or tingling in one or both hands is a chronic symptom (recurrent or ongoing problem lasting > 4 weeks)    Answer Assessment - Initial Assessment Questions  1. SYMPTOM: \"What is the main symptom you are concerned about? \" (e.g., weakness, numbness)      Numbness    2. ONSET: \"When did this start? \" (minutes, hours, days; while sleeping)      3-4 months    3. LAST NORMAL: \"When was the last time you were normal (no symptoms)? \"      3-4 months    4. PATTERN \"Does this come and go, or has it been constant since it started? \"  \"Is it present now? \"      Constant    5. CARDIAC SYMPTOMS: \"Have you had any of the following symptoms: chest pain, difficulty breathing, palpitations? \"    Cp      6. NEUROLOGIC SYMPTOMS: \"Have you had any of the following symptoms: headache, dizziness, vision loss, double vision, changes in speech, unsteady on your feet? \"      H/a, dizziness, blurred vision,     7. OTHER SYMPTOMS: \"Do you have any other symptoms? \"      Denies    8. PREGNANCY: \"Is there any chance you are pregnant? \" \"When was your last menstrual period? \" N/a    Protocols used: NEUROLOGIC DEFICIT-ADULT-[place]

## 2021-10-05 NOTE — TELEPHONE ENCOUNTER
Pt left a message that he would like a call back about his hand pain. He was in the ER. Looks like ER Gave prednisone and Dr Sandra Montez also ordered prednisone in September. Need to find out if he is taking both also.

## 2021-10-07 ENCOUNTER — OFFICE VISIT (OUTPATIENT)
Dept: INTERNAL MEDICINE CLINIC | Age: 61
End: 2021-10-07
Payer: MEDICARE

## 2021-10-07 VITALS
SYSTOLIC BLOOD PRESSURE: 120 MMHG | HEIGHT: 63 IN | WEIGHT: 169 LBS | DIASTOLIC BLOOD PRESSURE: 72 MMHG | BODY MASS INDEX: 29.95 KG/M2 | HEART RATE: 92 BPM | TEMPERATURE: 97.3 F

## 2021-10-07 DIAGNOSIS — M79.602 PARESTHESIA AND PAIN OF BOTH UPPER EXTREMITIES: Primary | ICD-10-CM

## 2021-10-07 DIAGNOSIS — M79.601 PARESTHESIA AND PAIN OF BOTH UPPER EXTREMITIES: Primary | ICD-10-CM

## 2021-10-07 DIAGNOSIS — R20.2 PARESTHESIA AND PAIN OF BOTH UPPER EXTREMITIES: Primary | ICD-10-CM

## 2021-10-07 PROCEDURE — 99214 OFFICE O/P EST MOD 30 MIN: CPT | Performed by: STUDENT IN AN ORGANIZED HEALTH CARE EDUCATION/TRAINING PROGRAM

## 2021-10-07 RX ORDER — GABAPENTIN 100 MG/1
100 CAPSULE ORAL 3 TIMES DAILY
Qty: 90 CAPSULE | Refills: 0 | Status: SHIPPED | OUTPATIENT
Start: 2021-10-07 | End: 2021-11-15 | Stop reason: SDUPTHER

## 2021-10-07 ASSESSMENT — ENCOUNTER SYMPTOMS
ABDOMINAL PAIN: 0
COLOR CHANGE: 0
SHORTNESS OF BREATH: 0
VOMITING: 0
EYES NEGATIVE: 1
NAUSEA: 0
BACK PAIN: 1

## 2021-10-07 NOTE — PROGRESS NOTES
Patient Name: Alayna Kwon  YOB: 1960  MRN: [id number]  Office visit date: 10/7/2021    Chief Complaint: Follow-Up from Hospital (f/u [address] [facility] -ER 10/3 numbness bilat. hands , big and little toe right foot )    Assessment/Plan:  1. Paresthesia and pain of both upper extremities  -     James Burgess DO, Physical Medicine & Rehab, [facility]  -     EMG; Future  -     gabapentin (NEURONTIN) 100 MG capsule; Take 1 capsule by mouth 3 times daily for 30 days. , Disp-90 capsule, R-0Normal    1. Paresthesia and pain of both upper extremities- Patient is having chronic bilateral hand pain. Refered to Dr. Angel Marmolejo for nerve pain. Prescribed Neurontin 100 mg, 3 times a day for nerve pain and ordered an EMG. Return in about 1 month (around 11/7/2021). Subjective/Objective:    HPI:     Alayna Kwon is a 57 y.o. male with a past medical history of hypertension, hyperlipidemia, hypothyroidism, and TIA in 2016 who presents for an established patient visit. He is here for a [place] [facility] [place] [address] [facility] -ER 10/3 numbness bilat. hands , big and little toe right foot )  Patient is a poor historian. Bilateral hand pain/numbness- Patient went to the ED 2 days ago complaining of bilateral hand pain. He stated the pain has been going on for 5 to 6 months. He described the pain being all over his hands and describes it as numbness. He was given Prednisone 60 mg for 5 days. Today, patient states he has worsening pain in his left hand and pain in his middle, ring, and pinkie fingers in his right hand. He has difficulty opening soda cans and picking up objects. He denies that the pain radiates to his elbows and states his hands feel cold. He says that last night he also started having pain and cold sensation in his right big toe and second toe. He is having pain when he walks. He states the prednisone has not been helping and feels that it is making the pain worse.  His fingers are very tender to touch and he does not have inhaler Inhale 2 puffs into the lungs every 6 hours as needed for Wheezing 1 Inhaler 3    Multiple Vitamins-Minerals (THERAPEUTIC MULTIVITAMIN-MINERALS) tablet Take 1 tablet by mouth daily      Misc Natural Products (OSTEO BI-FLEX ADV TRIPLE ST PO) Take by mouth daily      Multiple Vitamins-Minerals (OCUVITE-LUTEIN PO) Take by mouth daily      risperiDONE (RISPERDAL) 1 MG tablet Take 1 mg by mouth every evening       acetaminophen (TYLENOL) 500 MG tablet Take 1,000 mg by mouth every 6 hours as needed for Pain       No current facility-administered medications for this visit. No Known Allergies    Social History     Socioeconomic History    Marital status:      Spouse name: Not on file    Number of children: Not on file    Years of education: Not on file    Highest education level: Not on file   Occupational History    Not on file   Tobacco Use    Smoking status: Current Some Day Smoker     Packs/day: 4.00     Years: 40.00     Pack years: 160.00     Types: Cigarettes, Cigars    Smokeless tobacco: Never Used    Tobacco comment: 2-3 pk per day per patient   Vaping Use    Vaping Use: Never used   Substance and Sexual Activity    Alcohol use: No     Alcohol/week: 0.0 standard drinks    Drug use: No    Sexual activity: Never   Other Topics Concern    Not on file   Social History Narrative    Not on file     Social Determinants of Health     Financial Resource Strain: Low Risk     Difficulty of Paying Living Expenses: Not very hard   Food Insecurity: No Food Insecurity    Worried About Running Out of Food in the Last Year: Never true    Kareem of Food in the Last Year: Never true   Transportation Needs: No Transportation Needs    Lack of Transportation (Medical): No    Lack of Transportation (Non-Medical):  No   Physical Activity:     Days of Exercise per Week:     Minutes of Exercise per Session:    Stress:     Feeling of Stress :    Social Connections:     Frequency of Communication with Friends and Family:     Frequency of Social Gatherings with Friends and Family:     Attends Shinto Services:     Active Member of Clubs or Organizations:     Attends Club or Organization Meetings:     Marital Status:    Intimate Partner Violence:     Fear of Current or Ex-Partner:     Emotionally Abused:     Physically Abused:     Sexually Abused:        Family History   Problem Relation Age of Onset    Dementia Mother     High Blood Pressure Mother     COPD Father     High Cholesterol Father     High Blood Pressure Father     Dementia Maternal Aunt     Seizures Maternal Uncle        Review of Systems   Constitutional: Negative for chills and fever. HENT: Negative. Eyes: Negative. Respiratory: Negative for shortness of breath. Cardiovascular: Negative for chest pain. Gastrointestinal: Negative for abdominal pain, nausea and vomiting. Endocrine: Negative. Genitourinary: Negative. Musculoskeletal: Positive for back pain and neck pain. Bilateral hand pain and right first & second toes pain   Skin: Negative for color change, pallor and wound. Neurological: Positive for numbness. Negative for light-headedness and headaches. Hematological: Negative. Psychiatric/Behavioral: Positive for agitation. Vitals:    10/07/21 1446   BP: 120/72   Site: Left Upper Arm   Position: Sitting   Cuff Size: Medium Adult   Pulse: 92   Temp: 97.3 °F (36.3 °C)   Weight: 169 lb (76.7 kg)   Height: 5' 2.99\" (1.6 m)       Wt Readings from Last 3 Encounters:   10/07/21 169 lb (76.7 kg)   09/14/21 186 lb (84.4 kg)   07/26/21 202 lb 6.4 oz (91.8 kg)       BP Readings from Last 3 Encounters:   10/07/21 120/72   10/03/21 (!) 151/83   09/14/21 136/82       Physical Exam  Constitutional:       Appearance: Normal appearance. HENT:      Head: Normocephalic and atraumatic.       Nose: Nose normal.      Mouth/Throat:      Mouth: Mucous membranes are moist.      Pharynx: Oropharynx is clear. Eyes:      Pupils: Pupils are equal, round, and reactive to light. Cardiovascular:      Rate and Rhythm: Normal rate and regular rhythm. Pulses: Normal pulses. Heart sounds: Normal heart sounds. Pulmonary:      Effort: Pulmonary effort is normal.      Breath sounds: Normal breath sounds. Abdominal:      General: Bowel sounds are normal.      Palpations: Abdomen is soft. Musculoskeletal:         General: Tenderness present. No swelling or signs of injury. Cervical back: Normal range of motion and neck supple. Tenderness present. Comments: Bilateral hands, first & second toe tenderness upon touch. Lower back pain as well. Skin:     General: Skin is warm and dry. Capillary Refill: Capillary refill takes less than 2 seconds. Findings: No bruising or erythema. Neurological:      General: No focal deficit present. Mental Status: He is alert and oriented to person, place, and time. Psychiatric:         Mood and Affect: Mood normal.         Behavior: Behavior normal.       Diagnostic data:  I have reviewed recent diagnostic testing including labs with the patient today. No results found for this visit on 10/07/21. The patient is in agreement with and verbalizes understanding of the plan of care today and has no additional questions or complaints. The patient was instructed to follow-up in one month or to contact our office sooner if problems should arise. Laboratory studies will be completed prior to next visit. All findings, labs and other data reviewed, assessment and plan created with review by Dr. Jose Mayers MD.    Electronically signed by: Manuelito Sosa DO on 10/7/2021 at 4:32 PM  (Please note that portions of this note were completed with a voice recognition program and electronically transcribed. Efforts were made to edit the dictations but occasionally words are mis-transcribed.  This transcription may contain errors not detected in proofreading.  This transcription was electronically signed.) .

## 2021-10-24 ENCOUNTER — HOSPITAL ENCOUNTER (EMERGENCY)
Age: 61
Discharge: HOME | End: 2021-10-24
Attending: EMERGENCY MEDICINE
Payer: MEDICARE

## 2021-10-24 ENCOUNTER — APPOINTMENT (OUTPATIENT)
Dept: CT IMAGING | Age: 61
End: 2021-10-24
Payer: MEDICARE

## 2021-10-24 VITALS
WEIGHT: 169 LBS | TEMPERATURE: 98.9 F | HEART RATE: 86 BPM | BODY MASS INDEX: 31.1 KG/M2 | DIASTOLIC BLOOD PRESSURE: 73 MMHG | SYSTOLIC BLOOD PRESSURE: 132 MMHG | OXYGEN SATURATION: 96 % | RESPIRATION RATE: 16 BRPM | HEIGHT: 62 IN

## 2021-10-24 DIAGNOSIS — M54.10 RADICULOPATHY AFFECTING UPPER EXTREMITY: Primary | ICD-10-CM

## 2021-10-24 DIAGNOSIS — R91.1 PULMONARY NODULE: ICD-10-CM

## 2021-10-24 DIAGNOSIS — M35.3 PMR (POLYMYALGIA RHEUMATICA) (HCC): ICD-10-CM

## 2021-10-24 LAB
ANION GAP SERPL CALCULATED.3IONS-SCNC: 9 MEQ/L (ref 8–16)
BASOPHILS # BLD: 0.3 %
BASOPHILS ABSOLUTE: 0 THOU/MM3 (ref 0–0.1)
BUN BLDV-MCNC: 12 MG/DL (ref 7–22)
CALCIUM SERPL-MCNC: 9 MG/DL (ref 8.5–10.5)
CHLORIDE BLD-SCNC: 101 MEQ/L (ref 98–111)
CO2: 30 MEQ/L (ref 23–33)
CREAT SERPL-MCNC: 0.6 MG/DL (ref 0.4–1.2)
EOSINOPHIL # BLD: 2.1 %
EOSINOPHILS ABSOLUTE: 0.2 THOU/MM3 (ref 0–0.4)
ERYTHROCYTE [DISTWIDTH] IN BLOOD BY AUTOMATED COUNT: 14.5 % (ref 11.5–14.5)
ERYTHROCYTE [DISTWIDTH] IN BLOOD BY AUTOMATED COUNT: 48.2 FL (ref 35–45)
GFR SERPL CREATININE-BSD FRML MDRD: > 90 ML/MIN/1.73M2
GLUCOSE BLD-MCNC: 106 MG/DL (ref 70–108)
HCT VFR BLD CALC: 41.7 % (ref 42–52)
HEMOGLOBIN: 13.7 GM/DL (ref 14–18)
IMMATURE GRANS (ABS): 0.03 THOU/MM3 (ref 0–0.07)
IMMATURE GRANULOCYTES: 0.3 %
LYMPHOCYTES # BLD: 21 %
LYMPHOCYTES ABSOLUTE: 1.9 THOU/MM3 (ref 1–4.8)
MCH RBC QN AUTO: 30 PG (ref 26–33)
MCHC RBC AUTO-ENTMCNC: 32.9 GM/DL (ref 32.2–35.5)
MCV RBC AUTO: 91.2 FL (ref 80–94)
MONOCYTES # BLD: 5.9 %
MONOCYTES ABSOLUTE: 0.5 THOU/MM3 (ref 0.4–1.3)
NUCLEATED RED BLOOD CELLS: 0 /100 WBC
OSMOLALITY CALCULATION: 279.6 MOSMOL/KG (ref 275–300)
PLATELET # BLD: 212 THOU/MM3 (ref 130–400)
PMV BLD AUTO: 12.2 FL (ref 9.4–12.4)
POTASSIUM REFLEX MAGNESIUM: 3.7 MEQ/L (ref 3.5–5.2)
RBC # BLD: 4.57 MILL/MM3 (ref 4.7–6.1)
SEG NEUTROPHILS: 70.4 %
SEGMENTED NEUTROPHILS ABSOLUTE COUNT: 6.3 THOU/MM3 (ref 1.8–7.7)
SODIUM BLD-SCNC: 140 MEQ/L (ref 135–145)
TROPONIN T: < 0.01 NG/ML
WBC # BLD: 8.9 THOU/MM3 (ref 4.8–10.8)

## 2021-10-24 PROCEDURE — 84484 ASSAY OF TROPONIN QUANT: CPT

## 2021-10-24 PROCEDURE — 6360000004 HC RX CONTRAST MEDICATION: Performed by: EMERGENCY MEDICINE

## 2021-10-24 PROCEDURE — 6370000000 HC RX 637 (ALT 250 FOR IP): Performed by: EMERGENCY MEDICINE

## 2021-10-24 PROCEDURE — 99285 EMERGENCY DEPT VISIT HI MDM: CPT

## 2021-10-24 PROCEDURE — 6370000000 HC RX 637 (ALT 250 FOR IP)

## 2021-10-24 PROCEDURE — 93005 ELECTROCARDIOGRAM TRACING: CPT | Performed by: EMERGENCY MEDICINE

## 2021-10-24 PROCEDURE — 85025 COMPLETE CBC W/AUTO DIFF WBC: CPT

## 2021-10-24 PROCEDURE — 80048 BASIC METABOLIC PNL TOTAL CA: CPT

## 2021-10-24 PROCEDURE — 36415 COLL VENOUS BLD VENIPUNCTURE: CPT

## 2021-10-24 PROCEDURE — 72125 CT NECK SPINE W/O DYE: CPT

## 2021-10-24 PROCEDURE — 71275 CT ANGIOGRAPHY CHEST: CPT

## 2021-10-24 PROCEDURE — 2580000003 HC RX 258: Performed by: EMERGENCY MEDICINE

## 2021-10-24 RX ORDER — PREDNISONE 20 MG/1
40 TABLET ORAL ONCE
Status: COMPLETED | OUTPATIENT
Start: 2021-10-24 | End: 2021-10-24

## 2021-10-24 RX ORDER — ACETAMINOPHEN 500 MG
TABLET ORAL
Status: COMPLETED
Start: 2021-10-24 | End: 2021-10-24

## 2021-10-24 RX ORDER — ACETAMINOPHEN 500 MG
1000 TABLET ORAL ONCE
Status: COMPLETED | OUTPATIENT
Start: 2021-10-24 | End: 2021-10-24

## 2021-10-24 RX ORDER — 0.9 % SODIUM CHLORIDE 0.9 %
1000 INTRAVENOUS SOLUTION INTRAVENOUS ONCE
Status: COMPLETED | OUTPATIENT
Start: 2021-10-24 | End: 2021-10-24

## 2021-10-24 RX ORDER — PREDNISONE 20 MG/1
20 TABLET ORAL 2 TIMES DAILY
Qty: 10 TABLET | Refills: 0 | Status: SHIPPED | OUTPATIENT
Start: 2021-10-24 | End: 2021-11-15

## 2021-10-24 RX ADMIN — IOPAMIDOL 80 ML: 755 INJECTION, SOLUTION INTRAVENOUS at 17:30

## 2021-10-24 RX ADMIN — ACETAMINOPHEN 1000 MG: 500 TABLET ORAL at 18:19

## 2021-10-24 RX ADMIN — PREDNISONE 40 MG: 20 TABLET ORAL at 20:17

## 2021-10-24 RX ADMIN — Medication 1000 MG: at 18:19

## 2021-10-24 RX ADMIN — SODIUM CHLORIDE 1000 ML: 9 INJECTION, SOLUTION INTRAVENOUS at 17:24

## 2021-10-24 ASSESSMENT — PAIN DESCRIPTION - FREQUENCY: FREQUENCY: CONTINUOUS

## 2021-10-24 ASSESSMENT — ENCOUNTER SYMPTOMS
SORE THROAT: 0
NAUSEA: 0
ABDOMINAL PAIN: 0
VOMITING: 0
BACK PAIN: 0
EYE REDNESS: 0
SINUS PRESSURE: 0
COUGH: 0
CHEST TIGHTNESS: 0
PHOTOPHOBIA: 0
COLOR CHANGE: 0

## 2021-10-24 ASSESSMENT — PAIN DESCRIPTION - DESCRIPTORS: DESCRIPTORS: ACHING

## 2021-10-24 ASSESSMENT — PAIN SCALES - GENERAL
PAINLEVEL_OUTOF10: 10
PAINLEVEL_OUTOF10: 10

## 2021-10-24 ASSESSMENT — PAIN DESCRIPTION - LOCATION: LOCATION: HAND

## 2021-10-24 NOTE — ED TRIAGE NOTES
Pt comes to ED with complaints of bilateral hand pain and coldness in both hands. Pt states that he has been seen for this issue before here a few weeks ago and was given hand braces but the left hand pain has gotten worse and more cold over time. Pt states that the coldness in the left hand is starting to go up the left arm to the middle of the forearm. Radial and ulnar pulses bilaterally are 2+. Hands are warm and normal color for ethnicity. Pt respers regular and denies any other needs. Assessment completed and call light within reach.

## 2021-10-24 NOTE — ED NOTES
Bed bugs were found on patient's clothes. Pt placed in isolation and precautions being taken.               Wander Montesinos RN  10/24/21 1371

## 2021-10-24 NOTE — ED NOTES
Pt laying in bed at this time and is resting. Urinal is placed at bedside for pt to urinate. Pt respers are regular and call light within reach.       Anne-Marie Hernández RN  10/24/21 9784

## 2021-10-24 NOTE — ED PROVIDER NOTES
[address] [address]          Pt Name: Rojelio Alanis  MRN: [id number]  [place] 1960  Date of evaluation: 10/24/2021  Emergency Physician: Ivette Duke DO    CHIEF COMPLAINT       Chief Complaint   Patient presents with    Other     cold and painful hands     History obtained from the patient. HISTORY OF PRESENT ILLNESS    HPI  Rojelio Alanis is a 57 y.o. male who presents to the emergency department for evaluation of back pain, bilateral hand pain. Patient reports he has been having upper back pain as well as pain in his bilateral hands. He states it is primarily in his bilateral first 3 fingers. He states his fingers also seem to get cold. He reports this has been ongoing for the last month. He reports he is seen his primary care physician and ED previously. He states he was diagnosed with carpal tunnel and recommended to wear nighttime braces. He states he no longer wears braces and his symptoms are more frequent. No fever no chills no cough no congestion no chest pain no shortness of breath no difficulty breathing. No extremity weakness. The patient has no other acute complaints at this time. REVIEW OF SYSTEMS   Review of Systems   Constitutional: Negative for activity change, chills and fever. HENT: Negative for congestion, sinus pressure and sore throat. Eyes: Negative for photophobia, redness and visual disturbance. Respiratory: Negative for cough and chest tightness. Cardiovascular: Negative for chest pain, palpitations and leg swelling. Gastrointestinal: Negative for abdominal pain, nausea and vomiting. Endocrine: Negative for polydipsia and polyuria. Genitourinary: Negative for decreased urine volume, difficulty urinating, dysuria, flank pain, frequency and urgency. Musculoskeletal: Positive for myalgias. Negative for back pain and neck pain. Skin: Positive for pallor.  Negative for color change and needed for Wheezing, Disp: 1 Inhaler, Rfl: 3    Multiple Vitamins-Minerals (THERAPEUTIC MULTIVITAMIN-MINERALS) tablet, Take 1 tablet by mouth daily, Disp: , Rfl:     Misc Natural Products (OSTEO BI-FLEX ADV TRIPLE ST PO), Take by mouth daily, Disp: , Rfl:     Multiple Vitamins-Minerals (OCUVITE-LUTEIN PO), Take by mouth daily, Disp: , Rfl:     risperiDONE (RISPERDAL) 1 MG tablet, Take 1 mg by mouth every evening , Disp: , Rfl:     acetaminophen (TYLENOL) 500 MG tablet, Take 1,000 mg by mouth every 6 hours as needed for Pain, Disp: , Rfl:       SOCIAL HISTORY     Social History     Social History Narrative    Not on file     Social History     Tobacco Use    Smoking status: Current Some Day Smoker     Packs/day: 0.25     Years: 40.00     Pack years: 10.00     Types: Cigarettes, Cigars    Smokeless tobacco: Never Used    Tobacco comment: pt states 1 pack per week    Vaping Use    Vaping Use: Never used   Substance Use Topics    Alcohol use: No     Alcohol/week: 0.0 standard drinks    Drug use: No         ALLERGIES   No Known Allergies      FAMILY HISTORY     Family History   Problem Relation Age of Onset    Dementia Mother     High Blood Pressure Mother     COPD Father     High Cholesterol Father     High Blood Pressure Father     Dementia Maternal Aunt     Seizures Maternal Uncle          PHYSICAL EXAM     ED Triage Vitals [10/24/21 1547]   BP Temp Temp Source Pulse Resp SpO2 Height Weight   (!) 143/77 98.9 °F (37.2 °C) Oral 100 18 100 % 5' 2\" (1.575 m) 169 lb (76.7 kg)         Additional Vital Signs:  Vitals:    10/24/21 1809   BP: 133/70   Pulse: 80   Resp: 17   Temp:    SpO2: 97%       Physical Exam  Vitals and nursing note reviewed. Constitutional:       General: He is not in acute distress. Appearance: He is well-developed. He is not diaphoretic. HENT:      Head: Normocephalic. Eyes:      Pupils: Pupils are equal, round, and reactive to light. Neck:      Vascular: No JVD. Cardiovascular:      Rate and Rhythm: Normal rate and regular rhythm. Pulses:           Radial pulses are 2+ on the right side and 2+ on the left side. Femoral pulses are 2+ on the right side and 2+ on the left side. Heart sounds: Normal heart sounds. Pulmonary:      Effort: Pulmonary effort is normal.      Breath sounds: Normal breath sounds. Abdominal:      General: Bowel sounds are normal. There is no distension. Palpations: Abdomen is soft. Tenderness: There is no abdominal tenderness. Musculoskeletal:         General: No tenderness or deformity. Normal range of motion. Cervical back: Normal range of motion and neck supple. Skin:     General: Skin is warm and dry. Capillary Refill: Capillary refill takes less than 2 seconds. Neurological:      Mental Status: He is alert and oriented to person, place, and time. Comments: Non-focal. Moves all extremities. Initial vital signs and nursing assessment reviewed and normal.   Pulsoximetry is normal per my interpretation. MEDICAL DECISION MAKING   Initial Assessment: Given the patient's above chief complaint and findings on history and physical examination, I thought it was appropriate to consider the following emergency medical conditions: Radiculopathy, carpal tunnel syndrome, Raynaud's, dissection, ACS, electrolyte abnormality, although some of these diagnoses are unlikely they were considered in my medical decision making.     Plan: CBC, BMP, ECG, troponin, CT chest, CT neck symptomatic treatment with prednisone        ED RESULTS   Laboratory results:  Labs Reviewed   CBC WITH AUTO DIFFERENTIAL - Abnormal; Notable for the following components:       Result Value    RBC 4.57 (*)     Hemoglobin 13.7 (*)     Hematocrit 41.7 (*)     RDW-SD 48.2 (*)     All other components within normal limits   BASIC METABOLIC PANEL W/ REFLEX TO MG FOR LOW K   TROPONIN   ANION GAP   GLOMERULAR FILTRATION RATE, ESTIMATED OSMOLALITY       Radiologic studies results:  CTA Chest W WO Contrast   Final Result      1. Adequate opacification of the main pulmonary artery, lobar pulmonary arteries, and segmental pulmonary arteries. The subsegmental pulmonary arteries are not well opacified. No CT evidence of pulmonary embolus. 2. A 5 mm left lower lobe pulmonary nodule is seen. A 6 mm right middle lobe pulmonary nodule is seen. A 1 year follow-up CT of the chest without contrast is recommended. 3. Mild emphysematous changes. 4. Other findings as described above. **This report has been created using voice recognition software. It may contain minor errors which are inherent in voice recognition technology. **      Final report electronically signed by Dr Romana Romney on 10/24/2021 6:26 PM      CT Cervical Spine WO Contrast   Final Result   1. No acute cervical spine fracture. 2. Mild cervical spondylosis. 3. Degenerative disc disease with central canal and neuroforamina narrowing as described above. Please see above. 4. Other incidental findings as described above. **This report has been created using voice recognition software. It may contain minor errors which are inherent in voice recognition technology. **      Final report electronically signed by Dr Romana Romney on 10/24/2021 6:35 PM          ED Medications administered this visit:   Medications   0.9 % sodium chloride bolus (0 mLs IntraVENous Stopped 10/24/21 1825)   iopamidol (ISOVUE-370) 76 % injection 80 mL (80 mLs IntraVENous Given 10/24/21 1730)   acetaminophen (TYLENOL) tablet 1,000 mg (1,000 mg Oral Given 10/24/21 1819)         ED COURSE    Laboratory and chest imaging. Negative. CT neck without acute abnormality. Patient recommended to continue wearing his braces. Patient to repeat course of prednisone for radiculopathy. As this had helped with patient's symptoms in the past.  No evidence of acute vascular injury.   Distal pulses are intact. Patient described pallor likely due to paresthesia. The diagnosis, extensive differential diagnosis, laboratory and imaging findings were discussed at the bedside. The patient was an active participant in their care. They are agreeable to the plan of care. All questions and concerns were addressed at the time of the encounter. MEDICATION CHANGES     DISCHARGE MEDICATIONS:  Current Discharge Medication List               FINAL DISPOSITION     Final diagnoses:   Radiculopathy affecting upper extremity     Condition: condition: good  Dispo: Discharge to home    PATIENT REFERRED TO:  Eriberto Hernandez MD  [facility], [address]  [facility] [address]    Schedule an appointment as soon as possible for a visit in 2 days        Critical Care Time   None      This transcription was electronically signed. Parts of this transcriptions may have been dictated by use of voice recognition software and electronically transcribed, and parts may have been transcribed with the assistance of an ED scribe. The transcription may contain errors not detected in proofreading.     Electronically Signed: Kayleigh Parks DO, 10/24/21, 7:52 PM      Kayleigh Parks DO  10/1960

## 2021-10-24 NOTE — LETTER
[facility]  [address]  [facility], [address]  Phone: [phone]  Fax: [phone]    October 27, 2021    [address] [facility]    Dear Jo Vences,    This letter is regarding your Emergency Department (ED) visit at [facility] on 10/24/21. Dr. Zuleyka Pereira wanted to make sure that you understand your discharge instructions and that you were able to fill any prescriptions that may have been ordered for you. Please contact the office at the above phone number if the ED advised you to follow up with Noreen Black, or if you have any further questions or needs. Also did you know -                             *You can call your doctor even after hours so they can direct you to the most appropriate care. [facility]) practices can often offer you an appointment on the same day that you call. *We have some [facility] offices that offer Walk-in appointments; check our website for availability in your community, [email or web address].      *Evisits are now available for patients for $36 through [employer] for certain conditions:  * Sinus, cold and or cough       * Diarrhea            * Headache  * Heartburn                                * Poison Giselle          * Back pain     * Urinary problems                         If you do not have [employer] and are interested, please contact the office and a staff member may assist you or go to [email or web address]     Sincerely,     Zuleyka Pereira MD and your [facility]

## 2021-10-25 LAB
EKG ATRIAL RATE: 82 BPM
EKG P AXIS: 56 DEGREES
EKG P-R INTERVAL: 144 MS
EKG Q-T INTERVAL: 344 MS
EKG QRS DURATION: 128 MS
EKG QTC CALCULATION (BAZETT): 401 MS
EKG R AXIS: 17 DEGREES
EKG T AXIS: 59 DEGREES
EKG VENTRICULAR RATE: 82 BPM

## 2021-10-25 PROCEDURE — 93010 ELECTROCARDIOGRAM REPORT: CPT | Performed by: NUCLEAR MEDICINE

## 2021-10-25 NOTE — ED NOTES
Pt laying in bed at this time and denies any needs. respers are regular and call light within reach.       Johan Villalobos RN  10/24/21 2002

## 2021-11-15 ENCOUNTER — OFFICE VISIT (OUTPATIENT)
Dept: INTERNAL MEDICINE CLINIC | Age: 61
End: 2021-11-15
Payer: MEDICARE

## 2021-11-15 VITALS
DIASTOLIC BLOOD PRESSURE: 68 MMHG | TEMPERATURE: 98.4 F | BODY MASS INDEX: 33.91 KG/M2 | SYSTOLIC BLOOD PRESSURE: 118 MMHG | WEIGHT: 191.4 LBS | HEIGHT: 63 IN | HEART RATE: 98 BPM

## 2021-11-15 DIAGNOSIS — M79.602 PARESTHESIA AND PAIN OF BOTH UPPER EXTREMITIES: ICD-10-CM

## 2021-11-15 DIAGNOSIS — G56.23 CUBITAL TUNNEL SYNDROME, BILATERAL: Primary | ICD-10-CM

## 2021-11-15 DIAGNOSIS — M35.3 PMR (POLYMYALGIA RHEUMATICA) (HCC): ICD-10-CM

## 2021-11-15 DIAGNOSIS — R91.8 LUNG NODULES: ICD-10-CM

## 2021-11-15 DIAGNOSIS — R20.2 PARESTHESIA AND PAIN OF BOTH UPPER EXTREMITIES: ICD-10-CM

## 2021-11-15 DIAGNOSIS — M79.601 PARESTHESIA AND PAIN OF BOTH UPPER EXTREMITIES: ICD-10-CM

## 2021-11-15 DIAGNOSIS — E03.9 ACQUIRED HYPOTHYROIDISM: ICD-10-CM

## 2021-11-15 DIAGNOSIS — I10 ESSENTIAL HYPERTENSION: ICD-10-CM

## 2021-11-15 DIAGNOSIS — K21.9 GASTROESOPHAGEAL REFLUX DISEASE, UNSPECIFIED WHETHER ESOPHAGITIS PRESENT: ICD-10-CM

## 2021-11-15 PROCEDURE — 99214 OFFICE O/P EST MOD 30 MIN: CPT | Performed by: INTERNAL MEDICINE

## 2021-11-15 RX ORDER — PREDNISONE 10 MG/1
TABLET ORAL
Qty: 15 TABLET | Refills: 0 | Status: SHIPPED | OUTPATIENT
Start: 2021-11-15 | End: 2021-12-15 | Stop reason: ALTCHOICE

## 2021-11-15 RX ORDER — METOPROLOL SUCCINATE 50 MG/1
TABLET, EXTENDED RELEASE ORAL
Qty: 90 TABLET | Refills: 1 | Status: SHIPPED | OUTPATIENT
Start: 2021-11-15 | End: 2022-03-08 | Stop reason: SDUPTHER

## 2021-11-15 RX ORDER — OMEPRAZOLE 20 MG/1
20 CAPSULE, DELAYED RELEASE ORAL
Qty: 90 CAPSULE | Refills: 1 | Status: SHIPPED | OUTPATIENT
Start: 2021-11-15 | End: 2022-03-08 | Stop reason: SDUPTHER

## 2021-11-15 RX ORDER — GABAPENTIN 100 MG/1
100 CAPSULE ORAL 3 TIMES DAILY
COMMUNITY
End: 2021-11-15

## 2021-11-15 RX ORDER — LEVOTHYROXINE SODIUM 0.05 MG/1
50 TABLET ORAL DAILY
Qty: 90 TABLET | Refills: 1 | Status: SHIPPED | OUTPATIENT
Start: 2021-11-15 | End: 2022-03-08 | Stop reason: SDUPTHER

## 2021-11-15 RX ORDER — LOSARTAN POTASSIUM 50 MG/1
TABLET ORAL
Qty: 90 TABLET | Refills: 1 | Status: SHIPPED | OUTPATIENT
Start: 2021-11-15 | End: 2022-03-08 | Stop reason: SDUPTHER

## 2021-11-15 RX ORDER — GABAPENTIN 300 MG/1
300 CAPSULE ORAL 2 TIMES DAILY
Qty: 60 CAPSULE | Refills: 1 | Status: SHIPPED | OUTPATIENT
Start: 2021-11-15 | End: 2022-02-14 | Stop reason: ALTCHOICE

## 2021-11-15 NOTE — PATIENT INSTRUCTIONS
Change prednisone to 10 mg daily x 10 days, then 1/2 tab daily x 10 days then stop. If you need ibuprofen for aches you can take it after stopping Prednisone. Always take this medication with food.
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
## 2021-12-06 NOTE — PROGRESS NOTES
.Medicare Annual Wellness Visit  Name: Ella Sanchez Date: 2021   MRN: [id number] Sex: Male   Age: 57 y.o. Ethnicity: Non- / Non    : 1960 Race: White (non-)      Ghada Shultz is here for Annual Exam ( wellness )    Screenings for behavioral, psychosocial and functional/safety risks, and cognitive dysfunction are all negative except as indicated below. These results, as well as other patient data from the [address] [facility] form, are documented in Flowsheets linked to this Encounter. No Known Allergies      Prior to Visit Medications    Medication Sig Taking? Authorizing Provider   metoprolol succinate (TOPROL XL) 50 MG extended release tablet TAKE 1 TABLET BY MOUTH EVERY DAY Yes Kelsey Adams MD   levothyroxine (SYNTHROID) 50 MCG tablet Take 1 tablet by mouth daily Yes Kelsey Adams MD   gabapentin (NEURONTIN) 300 MG capsule Take 1 capsule by mouth 2 times daily for 60 days. Yes Kelsey Adams MD   losartan (COZAAR) 50 MG tablet TAKE 1 TABLET BY MOUTH EVERY DAY Yes Kelsey Adams MD   predniSONE (DELTASONE) 10 MG tablet Stop prednisone 20 mg daily. Start prednisone 10 mg po daily x 10 days then 0.5 mg po daily x 10 days then stop.  Yes Kelsey Adams MD   omeprazole (PRILOSEC) 20 MG delayed release capsule Take 1 capsule by mouth every morning (before breakfast) Yes Kelsey Adams MD   albuterol sulfate  (90 Base) MCG/ACT inhaler Inhale 2 puffs into the lungs every 6 hours as needed for Wheezing Yes Kelsey Adams MD   Multiple Vitamins-Minerals (THERAPEUTIC MULTIVITAMIN-MINERALS) tablet Take 1 tablet by mouth daily Yes Historical Provider, MD   [facility] Natural Products (OSTEO BI-FLEX ADV TRIPLE ST PO) Take by mouth daily Yes Historical Provider, MD   Multiple Vitamins-Minerals (OCUVITE-LUTEIN PO) Take by mouth daily Yes Historical Provider, MD   risperiDONE (RISPERDAL) 1 MG tablet Take 1 mg by mouth every evening  Yes Historical Provider, MD   acetaminophen (TYLENOL) 500 MG tablet Take 1,000 mg by mouth every 6 hours as needed for Pain Yes Historical Provider, MD         Past Medical History:   Diagnosis Date    Chronic leg pain     Diabetes ([address] 05/2018    GERD (gastroesophageal reflux disease)     Hyperlipidemia     Hypertension     Hypothyroidism     Sleep apnea     TIA (transient ischemic attack) 2016    Dr. Jacinto Leavitt        Past Surgical History:   Procedure Laterality Date    ANKLE SURGERY Right 2000    KNEE ARTHROSCOPY Left 9-11-15    Torn cartliage    LEG SURGERY Right 2000    DC COLSC FLX W/RMVL OF TUMOR POLYP LESION SNARE TQ  7/16/2017    COLONOSCOPY POLYPECTOMY SNARE/COLD BIOPSY performed by Libby Medrano MD at [facility]    DC EGD TRANSORAL BIOPSY SINGLE/MULTIPLE N/A 9/27/2017    EGD BIOPSY performed by Heather Brady MD at [address] [place] [address] ENDOSCOPY  7/16/2017    EGD DIAGNOSTIC ONLY performed by Libby Medrano MD at [facility]         Family History   Problem Relation Age of Onset    Dementia Mother     High Blood Pressure Mother     COPD Father     High Cholesterol Father     High Blood Pressure Father     Dementia Maternal Aunt     Seizures Maternal Uncle        CareTeam (Including outside providers/suppliers regularly involved in providing care):   Patient Care Team:  Shaun Smith MD as PCP - Delia Jeronimo MD as PCP - [facility] Empaneled Provider  Vinetta Boas, MD as Consulting Physician (Sleep Medicine)    Wt Readings from Last 3 Encounters:   12/07/21 194 lb 9.6 oz (88.3 kg)   11/15/21 191 lb 6.4 oz (86.8 kg)   10/24/21 169 lb (76.7 kg)     Vitals:    12/07/21 1442   BP: 124/76   Site: Left Upper Arm   Position: Sitting   Cuff Size: Large Adult   Pulse: 88   Temp: 99.1 °F (37.3 °C)   Weight: 194 lb 9.6 oz (88.3 kg)   Height: 5' 2.99\" (1.6 m)     Body mass index is 34.48 kg/m².     Based upon direct observation of the patient, evaluation of cognition reveals recent and remote memory intact. General Appearance: alert and oriented to person, place and time, well developed and well- nourished, in no acute distress  Skin: warm and dry, no rash or erythema  Head: normocephalic and atraumatic  Eyes: pupils equal, round, and reactive to light, extraocular eye movements intact, conjunctivae normal  ENT: tympanic membrane, external ear and ear canal normal bilaterally, nose without deformity, nasal mucosa and turbinates normal without polyps  Neck: supple and non-tender without mass, no thyromegaly or thyroid nodules, no cervical lymphadenopathy  Pulmonary/Chest: clear to auscultation bilaterally- no wheezes, rales or rhonchi, normal air movement, no respiratory distress  Cardiovascular: normal rate, regular rhythm, normal S1 and S2, no murmurs, rubs, clicks, or gallops, distal pulses intact, no carotid bruits  Abdomen: soft, non-tender, non-distended, normal bowel sounds, no masses or organomegaly  Extremities: no cyanosis, clubbing or edema  Musculoskeletal: normal range of motion, no joint swelling, deformity or tenderness  Neurologic: reflexes normal and symmetric, no cranial nerve deficit, gait, coordination and speech normal    Patient's complete Health Risk Assessment and screening values have been reviewed and are found in Flowsheets. The following problems were reviewed today and where indicated follow up appointments were made and/or referrals ordered. Positive Risk Factor Screenings with Interventions:     Fall Risk:  Timed Up and Go Test > 12 seconds?  (Complete if either Fall Risk answers are Yes): no  2 or more falls in past year?: (!) yes  Fall with injury in past year?: no  Fall Risk Interventions:    · Home safety tips provided  · Home exercises provided to promote strength and balance       Substance History:  Social History     Tobacco History     Smoking Status  Current Some Day Smoker Smoking Frequency  0.25 packs/day for 40 years (10 pk yrs) Smoking Tobacco Type  Cigarettes, Cigars    Smokeless Tobacco Use  Never Used    Tobacco Comment  pt states 1 pack per week           Alcohol History     Alcohol Use Status  No          Drug Use     Drug Use Status  No          Sexual Activity     Sexually Active  Never               Alcohol Screening:       A score of 8 or more is associated with harmful or hazardous drinking. A score of 13 or more in women, and 15 or more in men, is likely to indicate alcohol dependence. Substance Abuse Interventions:  · Tobacco abuse:  tobacco cessation tips and resources provided, patient is not ready to work toward tobacco cessation at this time    [facility] and ACP:  General  In general, how would you say your health is?: Fair  In the past 7 days, have you experienced any of the following?  New or Increased Pain, New or Increased Fatigue, Loneliness, Social Isolation, Stress or Anger?: (!) New or Increased Pain  Do you get the social and emotional support that you need?: Yes  Do you have a Living Will?: Yes  Advance Directives     Power of  Living Will ACP-Advance Directive ACP-Power of     Not on File Not on File Not on File Not on File      General Health Risk Interventions:  · Pain issues: patient advised to follow-up in this office for further evaluation and treatment within 4 week(s)    Health Habits/Nutrition:  Health Habits/Nutrition  Do you exercise for at least 20 minutes 2-3 times per week?: Yes  Have you lost any weight without trying in the past 3 months?: No  Do you eat only one meal per day?: No  Have you seen the dentist within the past year?: Yes  Body mass index: (!) 34.48  Health Habits/Nutrition Interventions:  · Nutritional issues:  educational materials for healthy, well-balanced diet provided    Hearing/Vision:  No exam data present  Hearing/Vision  Do you or your family notice any trouble with your hearing that hasn't been managed with hearing aids?: (!) Yes  Do you have the next 5-10 years is provided to the patient in written form: see Patient Instructions/AVS.    Saul Rodas was seen today for annual exam.    Diagnoses and all orders for this visit:    Routine general medical examination at a health care facility            Diagnosis Orders   1.  Routine general medical examination at a health care facility

## 2021-12-07 ENCOUNTER — OFFICE VISIT (OUTPATIENT)
Dept: INTERNAL MEDICINE CLINIC | Age: 61
End: 2021-12-07
Payer: MEDICARE

## 2021-12-07 ENCOUNTER — CARE COORDINATION (OUTPATIENT)
Dept: CARE COORDINATION | Age: 61
End: 2021-12-07

## 2021-12-07 VITALS
HEART RATE: 88 BPM | BODY MASS INDEX: 34.48 KG/M2 | HEIGHT: 63 IN | SYSTOLIC BLOOD PRESSURE: 124 MMHG | TEMPERATURE: 99.1 F | DIASTOLIC BLOOD PRESSURE: 76 MMHG | WEIGHT: 194.6 LBS

## 2021-12-07 DIAGNOSIS — Z00.00 ROUTINE GENERAL MEDICAL EXAMINATION AT A HEALTH CARE FACILITY: Primary | ICD-10-CM

## 2021-12-07 PROCEDURE — G0438 PPPS, INITIAL VISIT: HCPCS | Performed by: STUDENT IN AN ORGANIZED HEALTH CARE EDUCATION/TRAINING PROGRAM

## 2021-12-07 ASSESSMENT — PATIENT HEALTH QUESTIONNAIRE - PHQ9
SUM OF ALL RESPONSES TO PHQ QUESTIONS 1-9: 0
SUM OF ALL RESPONSES TO PHQ9 QUESTIONS 1 & 2: 0
2. FEELING DOWN, DEPRESSED OR HOPELESS: 0
1. LITTLE INTEREST OR PLEASURE IN DOING THINGS: 0

## 2021-12-07 ASSESSMENT — LIFESTYLE VARIABLES: HOW OFTEN DO YOU HAVE A DRINK CONTAINING ALCOHOL: 0

## 2021-12-07 NOTE — PATIENT INSTRUCTIONS
Personalized Preventive Plan for Heidi Vivas - 12/7/2021  Medicare offers a range of preventive health benefits. Some of the tests and screenings are paid in full while other may be subject to a deductible, co-insurance, and/or copay. Some of these benefits include a comprehensive review of your medical history including lifestyle, illnesses that may run in your family, and various assessments and screenings as appropriate. After reviewing your medical record and screening and assessments performed today your provider may have ordered immunizations, labs, imaging, and/or referrals for you. A list of these orders (if applicable) as well as your Preventive Care list are included within your After Visit Summary for your review. Other Preventive Recommendations:    · A preventive eye exam performed by an eye specialist is recommended every 1-2 years to screen for glaucoma; cataracts, macular degeneration, and other eye disorders. · A preventive dental visit is recommended every 6 months. · Try to get at least 150 minutes of exercise per week or 10,000 steps per day on a pedometer . · Order or download the FREE \"Exercise & Physical Activity: Your Everyday Guide\" from [employer]. Call 8-325.281.1744 or search [employer] online. · You need 9801-3744 mg of calcium and 1955-2129 IU of vitamin D per day. It is possible to meet your calcium requirement with diet alone, but a vitamin D supplement is usually necessary to meet this goal.  · When exposed to the sun, use a sunscreen that protects against both UVA and UVB radiation with an SPF of 30 or greater. Reapply every 2 to 3 hours or after sweating, drying off with a towel, or swimming. · Always wear a seat belt when traveling in a car. Always wear a helmet when riding a bicycle or motorcycle.

## 2021-12-07 NOTE — CARE COORDINATION
Spoke with Luciana Barahona at [facility]. She informed me that they were notified by email yesterday that they are \"no longer providing long distance transportation. \"  They are to only schedule transportation for the [address] [place] [address].

## 2021-12-07 NOTE — CARE COORDINATION
Called pt and informed him of what [employer] said. Pt will call ins. Co. to see if they are going to be able to transport him. Active listening provided.

## 2021-12-07 NOTE — CARE COORDINATION
Spoke with pt regarding current needs/concerns. Pt has an appt in [place], [place] on 12/10 and is not sure he has transportation. Informed pt I will call [employer]. Pt shared he is active with [facility] and has an 11:00 appt. There today. He also has appt today with PCP Kranthi Dinh. He has transportation for both of those times.

## 2021-12-13 ENCOUNTER — CARE COORDINATION (OUTPATIENT)
Dept: CARE COORDINATION | Age: 61
End: 2021-12-13

## 2021-12-14 ENCOUNTER — TELEPHONE (OUTPATIENT)
Dept: INTERNAL MEDICINE CLINIC | Age: 61
End: 2021-12-14

## 2021-12-14 NOTE — TELEPHONE ENCOUNTER
Patient called stating that the ride set up through [employer] did not show for his ortho appointment in [place] for his wrist . Appointment was rescheduled for 1/14/22. Patient said he was told that they only transport in a 60 mile radius but they took him to the same facility for his ankles . Patient was given a number to call regarding the ride . Instructed patient that he needs to call the number for the transportation he was given and speak with his  at his insurance company to sort the transportation out .

## 2021-12-15 ENCOUNTER — CARE COORDINATION (OUTPATIENT)
Dept: CARE COORDINATION | Age: 61
End: 2021-12-15

## 2021-12-15 SDOH — ECONOMIC STABILITY: HOUSING INSECURITY
IN THE LAST 12 MONTHS, WAS THERE A TIME WHEN YOU DID NOT HAVE A STEADY PLACE TO SLEEP OR SLEPT IN A SHELTER (INCLUDING NOW)?: NO

## 2021-12-15 SDOH — HEALTH STABILITY: PHYSICAL HEALTH: ON AVERAGE, HOW MANY DAYS PER WEEK DO YOU ENGAGE IN MODERATE TO STRENUOUS EXERCISE (LIKE A BRISK WALK)?: 5 DAYS

## 2021-12-15 SDOH — HEALTH STABILITY: PHYSICAL HEALTH: ON AVERAGE, HOW MANY MINUTES DO YOU ENGAGE IN EXERCISE AT THIS LEVEL?: 60 MIN

## 2021-12-15 SDOH — ECONOMIC STABILITY: INCOME INSECURITY: IN THE LAST 12 MONTHS, WAS THERE A TIME WHEN YOU WERE NOT ABLE TO PAY THE MORTGAGE OR RENT ON TIME?: NO

## 2021-12-15 ASSESSMENT — ENCOUNTER SYMPTOMS: DYSPNEA ASSOCIATED WITH: EXERTION

## 2021-12-15 ASSESSMENT — SOCIAL DETERMINANTS OF HEALTH (SDOH)
IN A TYPICAL WEEK, HOW MANY TIMES DO YOU TALK ON THE PHONE WITH FAMILY, FRIENDS, OR NEIGHBORS?: MORE THAN THREE TIMES A WEEK
HOW OFTEN DO YOU ATTEND CHURCH OR RELIGIOUS SERVICES?: NEVER
HOW OFTEN DO YOU ATTENT MEETINGS OF THE CLUB OR ORGANIZATION YOU BELONG TO?: NEVER
DO YOU BELONG TO ANY CLUBS OR ORGANIZATIONS SUCH AS CHURCH GROUPS UNIONS, FRATERNAL OR ATHLETIC GROUPS, OR SCHOOL GROUPS?: NO
HOW OFTEN DO YOU GET TOGETHER WITH FRIENDS OR RELATIVES?: MORE THAN THREE TIMES A WEEK

## 2021-12-15 ASSESSMENT — LIFESTYLE VARIABLES: HOW OFTEN DO YOU HAVE A DRINK CONTAINING ALCOHOL: NEVER

## 2021-12-15 NOTE — CARE COORDINATION
Ambulatory Care Coordination Note  12/15/2021  CM Risk Score: 6  Charlson 10 Year Mortality Risk Score: 23%     ACC: Chery Rendon, RN    Summary Note: Saul Rodas is being followed by care coordination for education and assistance in managing his chronic conditions and resource needs. Pt has h/o: HTN, intermittent explosive disorder, DM, hypothyroidism. Pt was referred to care coordination by PCP. Introduced self and role. Agreeable to f/u calls. Pt very upset as he has been working with [facility] for right ankle and bilat carpal tunnel syndrome. Pt reports that transportation has been a barrier. Utilized transport through [employer] for 1st trip. Had 2nd trip arranged on 12/10 and was never picked up. Pt has since rescheduled for 1/14 but unsure if he is going to be able to get a ride. Pt reports that he was told mileage is in an issue. Informed him I would look in to it. Spoke with Synetta Schirmer with [facility] re: transportation for appt with [facility]. Pt reports that he was to have transportation for 12/10 appt but transportation never showed. Pt has since rescheduled appt for 1/14/22 at 145pm.    Contacted [employer] transport-tried 3 different numbers. Was transferred or disconnected with each call. Intermittent Explosive Disorder-follows with Jaci Mclean. Has CM and counselor. Was following with provider but has not for some time now. Reports that he is out of Risperdal.  Will f/u with CM. Reports CM with Jaci Mclean takes him to his [place]. Reports compliance with meds. Gets meds delivered via mail. Declines blister packs. DM: A1C 5.9. Eats healthy choice frozen dinners and smoothies. Eats out only on occasion. Has [employer] membership at [employer]. Exercises appx 5x per wk. Right ankle and bilat wrist pain and weakness. Needs hardware removal/ankle replacement and carpal tunnel repair. Had cortisone inj to ankle at his last visit.    COPD: pt reports breathing is at baseline. Has albuterol inhaler. Uses PRN. Smokes cigars. Working to quit. Plan of care:  Weekly ACM calls   to mail DM zone mgmt   Will collaborate with SUSANNA to try to arrange transportation for pt to [facility]. Spoke with Kimber Christine. Informed that transport needs approved b/c it's outside of the allowed mileage and it cannot be scheduled greater than 30 days. Will work to collaborate with José re: behavioral health appts/medications-message left on VM. Reinforce COPD/DM zone tools with f/u calls  General Assessment    Do you have any symptoms that are causing concern?: Yes  Progression since Onset: Unchanged  Reported Symptoms:  (Comment: left ankle pain, bilat wr pain/weakness. following [facility])         Diabetes Assessment    Medic Alert ID: No  Meal Planning: Avoidance of concentrated sweets   How often do you test your blood sugar?: No Testing   Do you have barriers with adherence to non-pharmacologic self-management interventions?  (Nutrition/Exercise/Self-Monitoring): No   Have you ever had to go to the ED for symptoms of low blood sugar?: No       Do you have hyperglycemia symptoms?: No   Do you have hypoglycemia symptoms?: No   Blood Sugar Monitoring Regimen: Not Testing   Blood Sugar Trends: No Change       and   COPD Assessment    Does the patient have a nebulizer?: No  Does the patient use a space with inhaled medications?: No            Symptoms:  None: Yes      Symptom course: stable  Breathlessness: exertion  Increase use of rapid acting/rescue inhaled medications?: No  Change in chronic cough?: No/At Baseline  Change in sputum?: No/At Baseline  Sputum characteristics:  (Comment: reports that he is not coughing up any phlegm at this time)  Self Monitoring - SaO2: No  Have you had a recent diagnosis of pneumonia either by PCP or at a hospital?: No         Reinforce DM/COPD zones tools      Ambulatory Care Coordination Assessment    Care Coordination Protocol  Program Enrollment: Complex Care  Referral from Primary Care Provider: Yes  Week 1 - Initial Assessment     Do you have all of your prescriptions and are they filled?: Yes  Barriers to medication adherence: Complexity of regimen  Are you able to afford your medications?: Yes  How often do you have trouble taking your medications the way you have been told to take them?: Sometimes I take them as prescribed. Do you have Home O2 Therapy?: No      Ability to seek help/take action for Emergent Urgent situations i.e. fire, crime, inclement weather or health crisis. : Independent  Ability to ambulate to restroom: Independent  Ability handle personal hygeine needs (bathing/dressing/grooming): Independent  Ability to manage Medications: Needs Assistance  Ability to prepare Food Preparation: Needs Assistance  Ability to maintain home (clean home, laundry): Needs Assistance  Ability to drive and/or has transportation: Needs Assistance  Ability to do shopping: Needs Assistance  Ability to manage finances: Needs Assistance  Is patient able to live independently?: Yes     Current Housing: Private Residence           Frequent urination at night?: No  Do you use rails/bars?: No     Are you experiencing loss of meaning?: No (Comment: pt has difficult time with controlling his emotions. seeing counselor with [facility])  Are you experiencing loss of hope and peace?: No     Suggested Interventions and Community Resources  Behavioral Health: Completed (Comment: [facility]. Eric Rivera. Has  [facility] [phone] w/ [facility])     Meals on Wheels: Declined   Medi Set or Pill Pack: Declined   Senior Services: Completed   Social Work: In Process   Transportation Services: In Process   Zone Management Tools: In Process         Set up/Review an Education Plan, Set up/Review Goals              Prior to Admission medications    Medication Sig Start Date End Date Taking?  Authorizing Provider   metoprolol succinate (TOPROL XL) 50 MG extended release tablet TAKE 1 TABLET BY MOUTH EVERY DAY 11/15/21  Yes Shaun Smith MD   levothyroxine (SYNTHROID) 50 MCG tablet Take 1 tablet by mouth daily 11/15/21  Yes Shaun Smith MD   gabapentin (NEURONTIN) 300 MG capsule Take 1 capsule by mouth 2 times daily for 60 days.  11/15/21 1/14/22 Yes Shaun Smith MD   losartan (COZAAR) 50 MG tablet TAKE 1 TABLET BY MOUTH EVERY DAY 11/15/21  Yes Shaun Smith MD   omeprazole (PRILOSEC) 20 MG delayed release capsule Take 1 capsule by mouth every morning (before breakfast) 11/15/21  Yes Shaun Smith MD   albuterol sulfate  (90 Base) MCG/ACT inhaler Inhale 2 puffs into the lungs every 6 hours as needed for Wheezing 7/1/21  Yes Shaun Smith MD   Multiple Vitamins-Minerals (THERAPEUTIC MULTIVITAMIN-MINERALS) tablet Take 1 tablet by mouth daily   Yes Historical Provider, MD   Misc Natural Products (OSTEO BI-FLEX ADV TRIPLE ST PO) Take by mouth daily   Yes Historical Provider, MD   Multiple Vitamins-Minerals (OCUVITE-LUTEIN PO) Take by mouth daily   Yes Historical Provider, MD   risperiDONE (RISPERDAL) 1 MG tablet Take 1 mg by mouth every evening   Patient not taking: Reported on 12/15/2021    Historical Provider, MD   acetaminophen (TYLENOL) 500 MG tablet Take 1,000 mg by mouth every 6 hours as needed for Pain    Historical Provider, MD       Future Appointments   Date Time Provider Ifrah Lopez   1/11/2022 11:30 AM Shaun Smith MD [facility] Physic [facility] - [place] [facility]A AM [facility]   10/25/2022 11:20 AM STR CT IMAGING RM1  [facility]

## 2021-12-20 ENCOUNTER — CARE COORDINATION (OUTPATIENT)
Dept: CARE COORDINATION | Age: 61
End: 2021-12-20

## 2021-12-20 NOTE — CARE COORDINATION
Called pt to discuss transportation for up coming appt in [facility] for his hands. Pt irritated they did not show up on 12/10 to pick him up and he had to reschedule. Pt provided me with the phone # to call. Informed him I will check into this. Pt voiced understanding.

## 2021-12-20 NOTE — CARE COORDINATION
Called [place] and spoke with Elvin Sharma to schedule transportation for pt 1/14 appt. In [facility]. Transportation scheduled and trip # is [id number]. Notified pt of this. Pt voiced understanding.

## 2021-12-22 ENCOUNTER — CARE COORDINATION (OUTPATIENT)
Dept: CARE COORDINATION | Age: 61
End: 2021-12-22

## 2021-12-22 ASSESSMENT — ENCOUNTER SYMPTOMS: DYSPNEA ASSOCIATED WITH: EXERTION

## 2021-12-22 NOTE — CARE COORDINATION
[address]. Spoke with nurse Giles Garcia. Informed her that I have not been able to reach pt's SANDOR Cervantes via contact number provided to me by the pt.   She will send email to Kailee Carter and provide her with contact info to call myself or SUSANNA Vega.

## 2021-12-22 NOTE — CARE COORDINATION
Ambulatory Care Coordination Note  12/22/2021  CM Risk Score: 6  Charlson 10 Year Mortality Risk Score: 23%     ACC: Jason Graff, RN    Summary Note: Jama Spaulding is being followed by care coordination for education and assistance in managing his chronic conditions. Pt has h/o: HTN, intermittent explosive disorder, DM, hypothyroidism. Spoke with Duane today. Duane continues to be frustrated b/c bilat hand/wrist weakness. Has difficult time opening cans, etc causing him frustration and anger. Pt is f/u with [facility]. Has an appt 1/14. Needing surgery. Transport was arranged by  to this appt but pt received call yesterday from [facility] stating that they will not approve this trip. Contacted [employer]. Informed to call back Jan 3rd as they should be able to resume out of town trip transportation after first of year. Message routed to  re: this as well. Eyes: pt was seen by Dr Maria Luz Montemayor yesterday. Will be having cataract surgery on 1/17. Pt has to have someone attend appt with him. Pt reports that he doesn't have anyone except for CM with [employer]. Attempted to reach [facility] with [employer]. Message left to return call. Pt reports that he was at his appt for prolonged period of time getting multiple tests completed. CT head/orbits scheduled for 1/7. Pt will arrange transport today for that appt. Following with [facility] for behavioral health  Reports compliance with his medications. DM: monitoring diet closely. Not eating out. COPD: reports breathing at baseline. Smoking. Plan of care:  Weekly f/u calls between /ACM  Continue working with  re: transportation issues  Will continue to attempt collaboration with [employer].   Need to discuss if she is able to attend 1/17 appt with pt at Dr. Matthew Fajardo for cataract removal.   F/u with [address] on 1/14 for bilat hand/wrist pain and weakness-will have SW attempt to arrange transport through [address] since [facility] has denied trip. Work on controlling anger outbursts  Arrange transport for 1/7 CT        Diabetes Assessment    Medic Alert ID: No  Meal Planning: Avoidance of concentrated sweets   How often do you test your blood sugar?: No Testing   Do you have barriers with adherence to non-pharmacologic self-management interventions? (Nutrition/Exercise/Self-Monitoring): No   Have you ever had to go to the ED for symptoms of low blood sugar?: No       Do you have hyperglycemia symptoms?: No   Do you have hypoglycemia symptoms?: No   Blood Sugar Monitoring Regimen: Not Testing   Blood Sugar Trends: No Change      ,   COPD Assessment    Does the patient have a nebulizer?: No  Does the patient use a space with inhaled medications?: No            Symptoms:  None: Yes      Symptom course: stable  Breathlessness: exertion  Change in chronic cough?: No/At Baseline  Change in sputum?: No/At Baseline  Self Monitoring - SaO2: No  Have you had a recent diagnosis of pneumonia either by PCP or at a hospital?: No      and   General Assessment    Do you have any symptoms that are causing concern?: Yes  Progression since Onset: Unchanged  Reported Symptoms: Pain, Other (Comment: weakness and pain bilat hands/wrist. following with ortho in [address] )           Care Coordination Interventions    Program Enrollment: Complex Care  Referral from Primary Care Provider: Yes  Suggested Interventions and Community Resources  [facility] Completed (Comment: Flako Loza. Daphney Fleming. Has [facility] [phone] w/ Flako Loza)  Meals on Wheels: Declined  Medi Set or Pill Pack: Declined (Comment: gets meds via home delivery. not in blister packs)  Senior Services: Completed (Comment: Marie Vargas)  Social Work: In Process (Comment: Sayra Drew working with pt. )  Transportation Support: In Process  Zone Management Tools:  In Process (Comment: DM /COPD zone tools)  Other Services or Interventions: has Samy Samleron Goals Addressed                 This Visit's Progress     Conditions and Symptoms   Improving     I will schedule office visits, as directed by my provider. I will keep my appointment or reschedule if I have to cancel. I will notify my provider of any barriers to my plan of care. I will follow my Zone Management tool to seek urgent or emergent care. I will notify my provider of any symptoms that indicate a worsening of my condition. Barriers: lack of support, overwhelmed by complexity of regimen, and stress  Plan for overcoming my barriers: education and assistance from ACM, PCP, SW.  Utilize transportation services  Confidence: 8/10  Anticipated Goal Completion Date: 3/15/22              Prior to Admission medications    Medication Sig Start Date End Date Taking? Authorizing Provider   metoprolol succinate (TOPROL XL) 50 MG extended release tablet TAKE 1 TABLET BY MOUTH EVERY DAY 11/15/21   Maxine Sullivan MD   levothyroxine (SYNTHROID) 50 MCG tablet Take 1 tablet by mouth daily 11/15/21   Maxine Sullivan MD   gabapentin (NEURONTIN) 300 MG capsule Take 1 capsule by mouth 2 times daily for 60 days.  11/15/21 1/14/22  Maxine Sullivan MD   losartan (COZAAR) 50 MG tablet TAKE 1 TABLET BY MOUTH EVERY DAY 11/15/21   Maxine Sullivan MD   omeprazole (PRILOSEC) 20 MG delayed release capsule Take 1 capsule by mouth every morning (before breakfast) 11/15/21   Maxine Sullivan MD   albuterol sulfate  (90 Base) MCG/ACT inhaler Inhale 2 puffs into the lungs every 6 hours as needed for Wheezing 7/1/21   Maxine Sullivan MD   Multiple Vitamins-Minerals (THERAPEUTIC MULTIVITAMIN-MINERALS) tablet Take 1 tablet by mouth daily    Historical Provider, MD   [facility] Natural Products (OSTEO BI-FLEX ADV TRIPLE ST PO) Take by mouth daily    Historical Provider, MD   Multiple Vitamins-Minerals (OCUVITE-LUTEIN PO) Take by mouth daily    Historical Provider, MD   risperiDONE (RISPERDAL) 1 MG tablet Take 1 mg by mouth every evening   Patient not taking: Reported on 12/15/2021    Historical Provider, MD   acetaminophen (TYLENOL) 500 MG tablet Take 1,000 mg by mouth every 6 hours as needed for Pain    Historical Provider, MD       Future Appointments   Date Time Provider Ifrah Jessica   1/7/2022  2:20 PM STR CT IMAGING RM1  [facility] Radi[facility]   1/7/2022  2:30 PM STR CT IMAGING RM1  [facility]   1/11/2022 11:30 AM Kamila Prince MD [facility] Physic [facility] - MARCELLA HUFFMAN II.ANNAERTTIAN   10/25/2022 11:20 AM STR CT IMAGING RM1  [facility]

## 2021-12-23 ENCOUNTER — CARE COORDINATION (OUTPATIENT)
Dept: CARE COORDINATION | Age: 61
End: 2021-12-23

## 2021-12-27 ENCOUNTER — CARE COORDINATION (OUTPATIENT)
Dept: CARE COORDINATION | Age: 61
End: 2021-12-27

## 2021-12-27 NOTE — CARE COORDINATION
Called and left  denis Cervantes at [facility] to discuss pt transportation to surgery. Appt. Rescheduled for 2/9/22 at 10:50.

## 2022-01-03 ENCOUNTER — CARE COORDINATION (OUTPATIENT)
Dept: CARE COORDINATION | Age: 62
End: 2022-01-03

## 2022-01-03 ASSESSMENT — ENCOUNTER SYMPTOMS: DYSPNEA ASSOCIATED WITH: EXERTION

## 2022-01-03 NOTE — CARE COORDINATION
Ambulatory Care Coordination Note  1/3/2022  CM Risk Score: 6  Charlson 10 Year Mortality Risk Score: 23%     ACC: Chip Son, RN    Summary Note: Shanae Soler is being followed by care coordination for education and assistance in managing his chronic conditions and assisting with transportation barriers. Spoke with Gamaliel Brunner. She was able to get pt's transportation scheduled through [address] for his [place] appt on 1/14 at 145pm.  Address and physician verified with [facility] [address]. Pt will be seeing Dr. Jesus Cook. Pt informed. Aware that they will call to confirm prior to appt. Still working to get in touch with Aron PATTEN to verify if she can transport pt on 2/9 to [facility] for cataract surgery. Pt calling today to arrange transport for 1/7 CT scan and PCP on 1/11. Pt reports that he has felt ok. Trying to take it day by day. Denies new concerns or issues at this time. COPD: breathing at baseline. No changes per pt. Plan of care:  Reinforce zone tools  Continue assisting with transportation needs as they arise. F/u with [address] on 1/14 as scheduled to see Dr. Jesus Cook for cubital tunnel syndrome  Arrange transport for 1/7 and 1/11 appt. Reschedule dental appt  Collaboration with SUSANNA on resource needs  Collaboration with Kingsley Rees for assistance in behavioral health resources and transport when needed-message left to see if able to take pt on 2/9 to cataract surgery. Ongoing Evaluation of readiness for graduation from care coordination. Diabetes Assessment    Medic Alert ID: No  Meal Planning: Avoidance of concentrated sweets   How often do you test your blood sugar?: No Testing   Do you have barriers with adherence to non-pharmacologic self-management interventions?  (Nutrition/Exercise/Self-Monitoring): No   Have you ever had to go to the ED for symptoms of low blood sugar?: No       Do you have hyperglycemia symptoms?: No   Do you have hypoglycemia symptoms?: No   Blood Sugar Monitoring Regimen: Not Testing   Blood Sugar Trends: No Change       and   COPD Assessment    Does the patient have a nebulizer?: No  Does the patient use a space with inhaled medications?: No            Symptoms:  None: Yes      Symptom course: stable  Breathlessness: exertion  Increase use of rapid acting/rescue inhaled medications?: No  Change in chronic cough?: No/At Baseline  Change in sputum?: No/At Baseline  Self Monitoring - SaO2: No  Have you had a recent diagnosis of pneumonia either by PCP or at a hospital?: No               Care Coordination Interventions    Program Enrollment: Complex Care  Referral from Primary Care Provider: Yes  Suggested Interventions and Community Resources  [facility] Completed (Comment: Cash Nava. Linn Michelle. Has CM -[place] [phone] w/ Cash Nava)  Meals on Wheels: Declined  Medi Set or Pill Pack: Declined (Comment: gets meds via home delivery. not in blister packs)  Senior Services: Completed (Comment: Marie Vargas)  Social Work: In Process (Comment: Jacquelin Stewart working with pt. )  Transportation Support: In Process  Zone Management Tools: In Process (Comment: DM /COPD zone tools)  Other Services or Interventions: has Renee Schmid         Goals Addressed                 This Visit's Progress     Conditions and Symptoms   On track     I will schedule office visits, as directed by my provider. I will keep my appointment or reschedule if I have to cancel. I will notify my provider of any barriers to my plan of care. I will follow my Zone Management tool to seek urgent or emergent care. I will notify my provider of any symptoms that indicate a worsening of my condition.     Barriers: lack of support, overwhelmed by complexity of regimen, and stress  Plan for overcoming my barriers: education and assistance from ACM, PCP, SW.  Utilize transportation services  Confidence: 8/10  Anticipated Goal Completion Date: 3/15/22              Prior to Admission medications    Medication Sig Start Date End Date Taking? Authorizing Provider   metoprolol succinate (TOPROL XL) 50 MG extended release tablet TAKE 1 TABLET BY MOUTH EVERY DAY 11/15/21   Marilou Tran MD   levothyroxine (SYNTHROID) 50 MCG tablet Take 1 tablet by mouth daily 11/15/21   Marilou Tran MD   gabapentin (NEURONTIN) 300 MG capsule Take 1 capsule by mouth 2 times daily for 60 days.  11/15/21 1/14/22  Marilou Tran MD   losartan (COZAAR) 50 MG tablet TAKE 1 TABLET BY MOUTH EVERY DAY 11/15/21   Marilou Tran MD   omeprazole (PRILOSEC) 20 MG delayed release capsule Take 1 capsule by mouth every morning (before breakfast) 11/15/21   Marilou Tran MD   albuterol sulfate  (90 Base) MCG/ACT inhaler Inhale 2 puffs into the lungs every 6 hours as needed for Wheezing 7/1/21   Marilou Tran MD   Multiple Vitamins-Minerals (THERAPEUTIC MULTIVITAMIN-MINERALS) tablet Take 1 tablet by mouth daily    Historical Provider, MD   Misc Natural Products (OSTEO BI-FLEX ADV TRIPLE ST PO) Take by mouth daily    Historical Provider, MD   Multiple Vitamins-Minerals (OCUVITE-LUTEIN PO) Take by mouth daily    Historical Provider, MD   risperiDONE (RISPERDAL) 1 MG tablet Take 1 mg by mouth every evening   Patient not taking: Reported on 12/15/2021    Historical Provider, MD   acetaminophen (TYLENOL) 500 MG tablet Take 1,000 mg by mouth every 6 hours as needed for Pain    Historical Provider, MD       Future Appointments   Date Time Provider Ifrah Lopez   1/7/2022  2:20 PM STR CT IMAGING RM1  [facility]   1/7/2022  2:30 PM STR CT IMAGING RM1  [facility]   1/11/2022 11:30 AM Marilou Tran MD [facility] Phys[facility] [place]   10/25/2022 11:20 AM STR CT IMAGING RM1  [facility]

## 2022-01-03 NOTE — CARE COORDINATION
Called [facility] [employer] and spoke with Alek Gallagher. [facility] at [place] [address] [address] Rk Valadez. Alek Gallagher will notify pt and myself when transportation is set up.

## 2022-01-06 ENCOUNTER — CARE COORDINATION (OUTPATIENT)
Dept: CARE COORDINATION | Age: 62
End: 2022-01-06

## 2022-01-06 NOTE — CARE COORDINATION
Writer called Laila Caceres to see if pt CM can transport pt to his cataract surgery on 2/9/22 at 10:50 at the [facility]. Vm was left for intern CM Tony Pham to return my call.

## 2022-01-07 ENCOUNTER — HOSPITAL ENCOUNTER (OUTPATIENT)
Dept: CT IMAGING | Age: 62
Discharge: HOME | End: 2022-01-07
Payer: MEDICARE

## 2022-01-07 DIAGNOSIS — H53.461 RIGHT HOMONYMOUS HEMIANOPSIA: ICD-10-CM

## 2022-01-07 LAB — POC CREATININE WHOLE BLOOD: 0.8 MG/DL (ref 0.5–1.2)

## 2022-01-07 PROCEDURE — 70470 CT HEAD/BRAIN W/O & W/DYE: CPT

## 2022-01-07 PROCEDURE — 82565 ASSAY OF CREATININE: CPT

## 2022-01-07 PROCEDURE — 70482 CT ORBIT/EAR/FOSSA W/O&W/DYE: CPT

## 2022-01-07 PROCEDURE — 6360000004 HC RX CONTRAST MEDICATION: Performed by: OPHTHALMOLOGY

## 2022-01-07 RX ADMIN — IOPAMIDOL 80 ML: 755 INJECTION, SOLUTION INTRAVENOUS at 13:43

## 2022-01-10 ENCOUNTER — CARE COORDINATION (OUTPATIENT)
Dept: CARE COORDINATION | Age: 62
End: 2022-01-10

## 2022-01-10 NOTE — CARE COORDINATION
Pt called to discuss communicaiton with Pancho Pérez at [facility]. Pt is frustrated he has not heard back from her. Informed pt that I was told Pancho Pérez was on vacation last week. Pt more frustrated this phone call. Upset with neighbors. Inquired if pt is taking his meidication and he said \"I had words with jacobo but I think im taking them. \"  Asked what pt is taking and he said, \"I don't know but I'm taking like 6 or 8 pills. Allow pt to process thoughts and feelings. Advised pt to try to remain calm. Pt did calm a bit. Asked him to call me if he hears from Eduardcmreema Pérez at [facility]. Informed pt that appt for eyes will not change as I have a plan b. Pt voiced understanding.

## 2022-01-10 NOTE — CARE COORDINATION
Called pt to see if he has heard from his CM dedrick from [facility]. Pt unavailable at the time of my call and I was unable to leave a .

## 2022-01-10 NOTE — CARE COORDINATION
Received message from [address] Ramirez returning call. States that she is unable to attend 2/9 appt with pt but has arranged transportation for him on that day. [facility] Message left asking if transportation will be able to stay with pt that day for procedure as pt is required to have someone be present during his procedure. Requested return call.

## 2022-01-11 ENCOUNTER — TELEPHONE (OUTPATIENT)
Dept: INTERNAL MEDICINE CLINIC | Age: 62
End: 2022-01-11

## 2022-01-11 ENCOUNTER — OFFICE VISIT (OUTPATIENT)
Dept: INTERNAL MEDICINE CLINIC | Age: 62
End: 2022-01-11
Payer: MEDICARE

## 2022-01-11 VITALS
BODY MASS INDEX: 34.91 KG/M2 | WEIGHT: 197 LBS | TEMPERATURE: 98.1 F | DIASTOLIC BLOOD PRESSURE: 80 MMHG | SYSTOLIC BLOOD PRESSURE: 128 MMHG | HEART RATE: 68 BPM | HEIGHT: 63 IN

## 2022-01-11 DIAGNOSIS — J20.9 ACUTE BRONCHITIS, UNSPECIFIED ORGANISM: Primary | ICD-10-CM

## 2022-01-11 DIAGNOSIS — S29.011A STRAIN OF LEFT PECTORALIS MUSCLE, INITIAL ENCOUNTER: ICD-10-CM

## 2022-01-11 DIAGNOSIS — K21.9 GASTROESOPHAGEAL REFLUX DISEASE, UNSPECIFIED WHETHER ESOPHAGITIS PRESENT: ICD-10-CM

## 2022-01-11 DIAGNOSIS — R45.4 IRRITABILITY AND ANGER: ICD-10-CM

## 2022-01-11 DIAGNOSIS — G56.23 CUBITAL TUNNEL SYNDROME, BILATERAL: ICD-10-CM

## 2022-01-11 PROCEDURE — 99214 OFFICE O/P EST MOD 30 MIN: CPT | Performed by: INTERNAL MEDICINE

## 2022-01-11 RX ORDER — AZITHROMYCIN 250 MG/1
250 TABLET, FILM COATED ORAL SEE ADMIN INSTRUCTIONS
Qty: 6 TABLET | Refills: 0 | Status: SHIPPED | OUTPATIENT
Start: 2022-01-11 | End: 2022-01-16

## 2022-01-11 RX ORDER — PREDNISOLONE ACETATE 10 MG/ML
SUSPENSION/ DROPS OPHTHALMIC
COMMUNITY
Start: 2021-12-22 | End: 2022-03-08 | Stop reason: ALTCHOICE

## 2022-01-11 RX ORDER — OFLOXACIN 3 MG/ML
SOLUTION/ DROPS OPHTHALMIC
COMMUNITY
Start: 2021-12-21 | End: 2022-03-08 | Stop reason: ALTCHOICE
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
## 2022-01-11 NOTE — TELEPHONE ENCOUNTER
I left a message with [facility] professional psychiatrist and psychologist to call pt for appt and also for case manage to follow closely. I gave them pt phone number and if any questions to call us back, but to contact the pt.

## 2022-01-11 NOTE — PATIENT INSTRUCTIONS
I am trying to have Petrona set up transporation for visit to ortho 1/17/22. Please be expecting her call. Start Zithromax for cough - it is an antibiotic. Stop working out for a week to heal up left chest muscle and start back slow. Look for Smoothie recipes and start back on healthy diet. Go back to [facility] to work on medication for focus and irritability.

## 2022-01-13 ENCOUNTER — CARE COORDINATION (OUTPATIENT)
Dept: CARE COORDINATION | Age: 62
End: 2022-01-13

## 2022-01-13 NOTE — CARE COORDINATION
Verified with Santiago Doyle at [employer] that pt has transportation to [place] [address]. singh tomorrow. Santiago Doyle informed me that pt will be picked up at 11:45 from [facility]. Informed pt of this. Discussed concerns with pt and he was frustrated with past  \"rides that don't show up. \" Advised pt to be ready will not wait long for him. Discussed pt and taking his meds. Pt stated he is taking 7 of them, and spelled them out to me. Pt not taking mood med. Pt noticeably more agitated. Discussed pt diet. Pt interested with meeting with dietician and getting some books on what to eat. Active listening provided.      Plan of care;  Support pt in community  Provide resources for pt  Schedule transportation

## 2022-01-13 NOTE — CARE COORDINATION
Contacted Ashley Thorne regarding Dietitian referral. Pt answered, RD explained reason for call and role in care. Patient requested an in person appointment- RD will notify Berna Vigil. RD noted patient's A1C on 3/18/21 documented as 5.9%. RD noted patient's weight on 10/24/21 documented as 169 lb and patient's weight on 1/11/22 197 lb, which is a 28 lb weight gain in 3 months. RD will follow up as appropriate.      [facility] [facility], [address] [place] [address]

## 2022-01-13 NOTE — CARE COORDINATION
Central dietician spoke with pt today on phone. Pt would prefer to see a dietician in person. Agreeable to dietician referral for dietary education d/t recent wt gain and poor eating habits. Pt has seen Terry Manriquez RD, LD in past. Please review and advise.

## 2022-01-14 ENCOUNTER — CARE COORDINATION (OUTPATIENT)
Dept: CARE COORDINATION | Age: 62
End: 2022-01-14

## 2022-01-14 NOTE — CARE COORDINATION
Pt requested a reminder call for his 11:45  for appt in [facility]. Just spoke with pt. He is \"up and ready. \" Pt shared that he thinks he missed an appt. with Dr. Poly Monsalve for his ankle on 1/5. Advised pt to check into that today and reschedule if necessary. Pt voiced understanding.

## 2022-01-14 NOTE — CARE COORDINATION
Attempted to reach [facility] regarding eye drops. Pt unsure which ones to take prior to cataract surgery and what to take after. Pt has limited reading ability. Message left requesting call back to review medications.

## 2022-01-14 NOTE — TELEPHONE ENCOUNTER
Noted. Thanks for the update.   I have sent a message to PCP asking for a dietician referral.  Pt has seen dietician at our diabetic clinic in past.

## 2022-01-14 NOTE — CARE COORDINATION
Received call back from Erica Ramirez at [facility] center re: eye drops. Medications on med list with detailed instructions.

## 2022-01-17 ENCOUNTER — CARE COORDINATION (OUTPATIENT)
Dept: CARE COORDINATION | Age: 62
End: 2022-01-17

## 2022-01-17 NOTE — CARE COORDINATION
Called pt to discuss needs. Pt has upcoming appt and is not sure when. Pt stated, \"I have a bunch of paperwork here and I'm not sure what's what. \"  Informed pt that I will stop and look it over on 1/18. Pt voiced understanding.

## 2022-01-18 ENCOUNTER — CARE COORDINATION (OUTPATIENT)
Dept: CARE COORDINATION | Age: 62
End: 2022-01-18

## 2022-01-18 NOTE — CARE COORDINATION
Received call from [address]. She reports that she picked up Duane today for his counselor appt. Pt very agitated today. Has clearance form that needs to be completed prior to his cubital tunnel surgery on 2/1/22. Pt unsure if he needs to arrange a new appt since he was just seen on 1/11/22. Pt is willing to come back in if necessary. Chante Lopez is faxing form to office today ([phone]). Please advise. Chante Belchert informed me that pt has surgery date but no time as pt was told they would call once date gets closer. Will attempt to obtain surgery time so transportation can be arranged through [facility]. Discussed psychiatric  services. She reports that she will discuss again with pt. Pt has declined f/u in past. Voiced concern that pt has been more agitated recently and is not taking risperidone or any medications for his behavioral health issues. Also discussed pill packs with her as pt used to receive blister packs through [address] [place] [address] in past.  She informed me pt was discarding them as soon as he would get them before. She will discuss again with pt. Informed her that given pt's limited reading ability pill packs would be a safer method for pt vs just trying to take from the bottles. Informed her dietician referral was placed recently. Informed her pt has gained considerable amt of wt d/t poor dietary habits. Pt used to have someone that assisted him with eating healthy and since he is no longer here pt's diet has worsened. She is able to attend some appts for pt and is willing to attend dietician pt's when able. She can also take him to grocery store to help reinforce education that is provided at dietician appts.

## 2022-01-19 ENCOUNTER — CARE COORDINATION (OUTPATIENT)
Dept: CARE COORDINATION | Age: 62
End: 2022-01-19

## 2022-01-20 ENCOUNTER — CARE COORDINATION (OUTPATIENT)
Dept: CARE COORDINATION | Age: 62
End: 2022-01-20

## 2022-01-20 NOTE — CARE COORDINATION
Called pt to inform him 1/26 appt in [place] is cancelled. Pt currently has several other appt. Scheduled. 1/28 covid test in [place], I am trying to schedule locally, I have a call out to Dr. Barbara Ocampo office to see if it can be done in [facility]. Next appt. Is 2/1 for elbow in [place]. 2/9 Cataract in [facility],  2/11 Elbow surgery in [place].

## 2022-01-20 NOTE — CARE COORDINATION
Spoke with Lopez Matthews, dietician who suggested [facility] for pt. Will discuss with pt as in past he was not agreeable.

## 2022-01-20 NOTE — CARE COORDINATION
Spoke with Cate to cancel ankle appt on 1/26 and put through to  [address] at Dr. Augie Bowman office in [facility] @[phone]. Inquired if pt can have covid test done in [facility] so pt will not have to return to [facility] for that. Will wait for Litzy to return my call.

## 2022-01-20 NOTE — CARE COORDINATION
Pt called confused by upcoming appts. Pt has several scheduled and not sure when or for what. [facility] advised me to cancel 1/26 in [facility] as is just a consult. Notified pt that this is cancelled pt voiced understanding.

## 2022-01-20 NOTE — TELEPHONE ENCOUNTER
Just MOHAN- I will be setting up pre-op appt for 1/27/22 in afternoon. Thanks so much for helping him.

## 2022-01-21 ENCOUNTER — CARE COORDINATION (OUTPATIENT)
Dept: CARE COORDINATION | Age: 62
End: 2022-01-21

## 2022-01-21 NOTE — CARE COORDINATION
Pt scheduled his own transportation to Dr. Manjit Campbell office on 1/27 but is not sure \"who it is with. \"  Informed him that his covid test in [employer] is cancelled on  1/28 and per Guerita Dubose at Dr. Shanae Goff office pt can/ [facility]. Office will fax results to [address] [place] [address] at [phone] and [phone] pre admit. , if they don't receive by 1/31 surgery for pt will be cancelled, per Guerita Dubose.

## 2022-01-25 ENCOUNTER — CARE COORDINATION (OUTPATIENT)
Dept: CARE COORDINATION | Age: 62
End: 2022-01-25

## 2022-01-25 ASSESSMENT — ENCOUNTER SYMPTOMS: DYSPNEA ASSOCIATED WITH: EXERTION

## 2022-01-25 NOTE — CARE COORDINATION
Ambulatory Care Coordination Note  1/25/2022  CM Risk Score: 6  Charlson 10 Year Mortality Risk Score: 23%     ACC: Natanael Garcia, RN    Summary Note: Poncho Tang is being followed by care coordination for education and assistance in managing his chronic conditions. Pt has hx: intermittent explosive disorder, COPD, DM. Spoke with Duane today for f/u. Pt feeling down today. States that he missalban Nice-long time friend that is now is SNF. Provided support and encouragement. Pt has appt with PCP this wk for pre-op clearance for cubital tunnel surgery. Pt has transportation to appt. Still awaiting surgery time.  has my contact info to call once time gets arranged so transport can be set up. Following with [facility] counselor. Has CM through [facility] Arana that takes him to those appts. Pt reports that he has an appt with Noa. Unsure if Noa is one of the providers or is a counselor. [facility]. DM: diet controlled. Has experienced recent wt gain. Pt frustrated b/c neighbor no longer lives in the area and he always helped him make healthy food choices and took him to grocery store. PCP agreeable to dietician referral. Not scheduled at this time. Will check with office. Tonny Arana CM may be able to attend appts with pt as long as they are not on Wed. She can also take pt to grocery store. Pt has limited reading ability. Reports compliance with medications. Becomes overwhelmed easily. Discussed pill packs. Not interested in going back to [address] [place] [address]. Spoke with him about [facility] pharmacy. He will think about it and let me know. Continues to work with . COPD: breathing at baseline. No new SOB or cough. Has cataract surgery scheduled for 2/9. Vladimir Yi has arranged for someone to be with pt for procedure/transportation. Plan of care:   Will f/u with PCP office re: dietician referral  Will notify Aron PATTEN once appt arranged to see if she can attend appt with pt. F/u with PCP on 1/27 for pre op clearance. Cataract surgery on 2/9   Reinforce importance of staying active  Consider blister packs for medications. Pt to let me know once he has chance to think about it  Will clarify with Salvador Gomez if pt has agreed to see anyone other than counselor at [facility]. COPD Assessment    Does the patient have a nebulizer?: No  Does the patient use a space with inhaled medications?: No            Symptoms:  None: Yes      Symptom course: stable  Breathlessness: exertion  Increase use of rapid acting/rescue inhaled medications?: No  Change in chronic cough?: No/At Baseline  Change in sputum?: No/At Baseline  Self Monitoring - SaO2: No  Have you had a recent diagnosis of pneumonia either by PCP or at a hospital?: No                 Care Coordination Interventions    Program Enrollment: Complex Care  Referral from Primary Care Provider: Yes  Suggested Interventions and Community Resources  [address] [facility] [address]: Completed (Comment: [facility]. Lia Lopes Has CM -[place] [phone] w/ [facility])  Meals on Wheels: Declined  Medi Set or Pill Pack: Declined (Comment: gets meds via home delivery. not in blister packs)  Senior Services: Completed (Comment: [address])  Social Work: In Process (Comment: Linda Rodriguez working with pt. )  Transportation Support: In Process  Zone Management Tools: Completed (Comment: DM /COPD zone tools)  Other Services or Interventions: has Renee Schmid         Goals Addressed                 This Visit's Progress     Conditions and Symptoms   On track     I will schedule office visits, as directed by my provider. I will keep my appointment or reschedule if I have to cancel. I will notify my provider of any barriers to my plan of care. I will follow my Zone Management tool to seek urgent or emergent care. I will notify my provider of any symptoms that indicate a worsening of my condition.     Barriers: lack of support, overwhelmed by complexity of regimen, and stress  Plan for overcoming my barriers: education and assistance from ACM, PCP, SW.  Utilize transportation services  Confidence: 8/10  Anticipated Goal Completion Date: 3/15/22              Prior to Admission medications    Medication Sig Start Date End Date Taking? Authorizing Provider   prednisoLONE acetate (PRED FORTE) 1 % ophthalmic suspension SHAKE LIQUID AND INSTILL 1 DROP IN LEFT EYE FOUR TIMES DAILY  Patient not taking: Reported on 1/11/2022 12/22/21   Historical Provider, MD   ofloxacin (OCUFLOX) 0.3 % solution INSTILL 1 DROP IN LEFT EYE FOUR TIMES DAILY  Patient not taking: Reported on 1/11/2022 12/21/21   Historical Provider, MD   metoprolol succinate (TOPROL XL) 50 MG extended release tablet TAKE 1 TABLET BY MOUTH EVERY DAY 11/15/21   Jerod Qureshi MD   levothyroxine (SYNTHROID) 50 MCG tablet Take 1 tablet by mouth daily 11/15/21   Jerod Qureshi MD   gabapentin (NEURONTIN) 300 MG capsule Take 1 capsule by mouth 2 times daily for 60 days.  11/15/21 1/14/22  Jerod Qureshi MD   losartan (COZAAR) 50 MG tablet TAKE 1 TABLET BY MOUTH EVERY DAY 11/15/21   Jerod Qureshi MD   omeprazole (PRILOSEC) 20 MG delayed release capsule Take 1 capsule by mouth every morning (before breakfast) 11/15/21   Jerdo Qureshi MD   albuterol sulfate  (90 Base) MCG/ACT inhaler Inhale 2 puffs into the lungs every 6 hours as needed for Wheezing 7/1/21   Jerod Qureshi MD   Multiple Vitamins-Minerals (THERAPEUTIC MULTIVITAMIN-MINERALS) tablet Take 1 tablet by mouth daily    Historical Provider, MD   Misc Natural Products (OSTEO BI-FLEX ADV TRIPLE ST PO) Take by mouth daily    Historical Provider, MD   Multiple Vitamins-Minerals (OCUVITE-LUTEIN PO) Take by mouth daily    Historical Provider, MD   risperiDONE (RISPERDAL) 1 MG tablet Take 1 mg by mouth every evening   Patient not taking: Reported on 12/15/2021    Historical Provider, MD   acetaminophen (TYLENOL) 500 MG tablet Take 1,000 mg by mouth every 6 hours as needed for Pain    Historical Provider, MD       Future Appointments   Date Time Provider Ifrah Lopez   1/27/2022  1:30 PM Nola Stevens MD [facility] [address]   3/8/2022  9:30 AM [facility] Stevens [facility] [address]   10/25/2022 11:20 AM STR CT IMAGING RM1  [facility]

## 2022-01-25 NOTE — CARE COORDINATION
Just following up on dietician referral.  I see provider agreed but don't see appt scheduled at this time. Pt has seen Jocelin Leo in past.  Aron PATTEN can attend appt with pt as long as it's not on a Wed. Please advise.

## 2022-01-25 NOTE — CARE COORDINATION
Called pt to remind of 1/27 with Renu Jameson. Pt voiced understanding and stated he has transportation. Inquired about needs and pt stated, \"im doing good and going back to bed. \" Inquired if pt is staying warm in his mobile and pt stated he is. Active listening provided.

## 2022-01-26 ENCOUNTER — CARE COORDINATION (OUTPATIENT)
Dept: CARE COORDINATION | Age: 62
End: 2022-01-26

## 2022-01-26 NOTE — CARE COORDINATION
[place] with Kathleen at [employer] to set up transportation for Pt 2/1 appt in [facility].  Kathleen will try to set it up and let me know if [facility] can transport

## 2022-01-26 NOTE — CARE COORDINATION
Notified Rajesh Fernandez at Dr. Sexton Cuff office that pt has transportation on 2/1 for his elbow surgery  At [address] [place], [facility]. Pt has to be there at 6:30. surgery is scheduled for 8:30 which she stated will take an hr. And pt will have to wait awhile after that.  [facility] will transport pt back home

## 2022-01-26 NOTE — CARE COORDINATION
Shelle Oppenheim has arraigned transportation for pt on 2/1, K/ P will pick pt up at 4:30 am for his 6:30 arrival time and 8:30 surgery time. They will also transport pt back home after surgery per Shelle Oppenheim. I will notify pt of this.

## 2022-01-26 NOTE — CARE COORDINATION
Rodríguez Lofton from Dr. Tone Hidalgo in [facility] called to schedule an appt for pt at 8:30 on 2/1 he will need to be there by 6:30. I will call [employer] and see if this is something they can schedule.

## 2022-01-27 ENCOUNTER — HOSPITAL ENCOUNTER (OUTPATIENT)
Age: 62
Discharge: HOME | End: 2022-01-27
Payer: MEDICARE

## 2022-01-27 ENCOUNTER — OFFICE VISIT (OUTPATIENT)
Dept: INTERNAL MEDICINE CLINIC | Age: 62
End: 2022-01-27
Payer: MEDICARE

## 2022-01-27 VITALS
TEMPERATURE: 98.6 F | WEIGHT: 192.6 LBS | DIASTOLIC BLOOD PRESSURE: 80 MMHG | BODY MASS INDEX: 34.12 KG/M2 | SYSTOLIC BLOOD PRESSURE: 134 MMHG | HEIGHT: 63 IN | HEART RATE: 92 BPM

## 2022-01-27 DIAGNOSIS — I10 PRIMARY HYPERTENSION: ICD-10-CM

## 2022-01-27 DIAGNOSIS — E03.9 ACQUIRED HYPOTHYROIDISM: ICD-10-CM

## 2022-01-27 DIAGNOSIS — I10 ESSENTIAL HYPERTENSION: ICD-10-CM

## 2022-01-27 DIAGNOSIS — Z01.818 PRE-OP EXAM: Primary | ICD-10-CM

## 2022-01-27 DIAGNOSIS — G56.22 CUBITAL TUNNEL SYNDROME ON LEFT: ICD-10-CM

## 2022-01-27 DIAGNOSIS — E66.9 OBESITY (BMI 30-39.9): ICD-10-CM

## 2022-01-27 DIAGNOSIS — E78.2 MIXED HYPERLIPIDEMIA: ICD-10-CM

## 2022-01-27 DIAGNOSIS — Z01.818 PRE-OP EXAM: ICD-10-CM

## 2022-01-27 DIAGNOSIS — J01.10 SUBACUTE FRONTAL SINUSITIS: ICD-10-CM

## 2022-01-27 DIAGNOSIS — G47.30 SLEEP APNEA, UNSPECIFIED TYPE: ICD-10-CM

## 2022-01-27 LAB
ALBUMIN SERPL-MCNC: 4.4 G/DL (ref 3.5–5.1)
ALP BLD-CCNC: 68 U/L (ref 38–126)
ALT SERPL-CCNC: 12 U/L (ref 11–66)
ANION GAP SERPL CALCULATED.3IONS-SCNC: 14 MEQ/L (ref 8–16)
AST SERPL-CCNC: 21 U/L (ref 5–40)
BASOPHILS # BLD: 0.3 %
BASOPHILS ABSOLUTE: 0 THOU/MM3 (ref 0–0.1)
BILIRUB SERPL-MCNC: 0.2 MG/DL (ref 0.3–1.2)
BUN BLDV-MCNC: 12 MG/DL (ref 7–22)
CALCIUM SERPL-MCNC: 9.8 MG/DL (ref 8.5–10.5)
CHLORIDE BLD-SCNC: 100 MEQ/L (ref 98–111)
CO2: 24 MEQ/L (ref 23–33)
CREAT SERPL-MCNC: 0.7 MG/DL (ref 0.4–1.2)
EOSINOPHIL # BLD: 1.2 %
EOSINOPHILS ABSOLUTE: 0.1 THOU/MM3 (ref 0–0.4)
ERYTHROCYTE [DISTWIDTH] IN BLOOD BY AUTOMATED COUNT: 13.2 % (ref 11.5–14.5)
ERYTHROCYTE [DISTWIDTH] IN BLOOD BY AUTOMATED COUNT: 43.6 FL (ref 35–45)
GFR SERPL CREATININE-BSD FRML MDRD: > 90 ML/MIN/1.73M2
GLUCOSE BLD-MCNC: 86 MG/DL (ref 70–108)
HCT VFR BLD CALC: 49.8 % (ref 42–52)
HEMOGLOBIN: 16.9 GM/DL (ref 14–18)
IMMATURE GRANS (ABS): 0.02 THOU/MM3 (ref 0–0.07)
IMMATURE GRANULOCYTES: 0.2 %
LDL CHOLESTEROL DIRECT: 193.3 MG/DL
LYMPHOCYTES # BLD: 19.3 %
LYMPHOCYTES ABSOLUTE: 1.8 THOU/MM3 (ref 1–4.8)
MCH RBC QN AUTO: 30.8 PG (ref 26–33)
MCHC RBC AUTO-ENTMCNC: 33.9 GM/DL (ref 32.2–35.5)
MCV RBC AUTO: 90.7 FL (ref 80–94)
MONOCYTES # BLD: 5.5 %
MONOCYTES ABSOLUTE: 0.5 THOU/MM3 (ref 0.4–1.3)
NUCLEATED RED BLOOD CELLS: 0 /100 WBC
PLATELET # BLD: 190 THOU/MM3 (ref 130–400)
PMV BLD AUTO: 13.1 FL (ref 9.4–12.4)
POTASSIUM SERPL-SCNC: 4.2 MEQ/L (ref 3.5–5.2)
RBC # BLD: 5.49 MILL/MM3 (ref 4.7–6.1)
SEG NEUTROPHILS: 73.5 %
SEGMENTED NEUTROPHILS ABSOLUTE COUNT: 6.8 THOU/MM3 (ref 1.8–7.7)
SODIUM BLD-SCNC: 138 MEQ/L (ref 135–145)
T4 FREE: 1.42 NG/DL (ref 0.93–1.76)
TOTAL PROTEIN: 6.9 G/DL (ref 6.1–8)
TSH SERPL DL<=0.05 MIU/L-ACNC: 0.35 UIU/ML (ref 0.4–4.2)
WBC # BLD: 9.2 THOU/MM3 (ref 4.8–10.8)

## 2022-01-27 PROCEDURE — 80053 COMPREHEN METABOLIC PANEL: CPT

## 2022-01-27 PROCEDURE — 83721 ASSAY OF BLOOD LIPOPROTEIN: CPT

## 2022-01-27 PROCEDURE — U0003 INFECTIOUS AGENT DETECTION BY NUCLEIC ACID (DNA OR RNA); SEVERE ACUTE RESPIRATORY SYNDROME CORONAVIRUS 2 (SARS-COV-2) (CORONAVIRUS DISEASE [COVID-19]), AMPLIFIED PROBE TECHNIQUE, MAKING USE OF HIGH THROUGHPUT TECHNOLOGIES AS DESCRIBED BY CMS-2020-01-R: HCPCS

## 2022-01-27 PROCEDURE — 85025 COMPLETE CBC W/AUTO DIFF WBC: CPT

## 2022-01-27 PROCEDURE — U0005 INFEC AGEN DETEC AMPLI PROBE: HCPCS

## 2022-01-27 PROCEDURE — 93000 ELECTROCARDIOGRAM COMPLETE: CPT | Performed by: INTERNAL MEDICINE

## 2022-01-27 PROCEDURE — 84439 ASSAY OF FREE THYROXINE: CPT

## 2022-01-27 PROCEDURE — 99214 OFFICE O/P EST MOD 30 MIN: CPT | Performed by: INTERNAL MEDICINE

## 2022-01-27 PROCEDURE — 36415 COLL VENOUS BLD VENIPUNCTURE: CPT

## 2022-01-27 PROCEDURE — 84443 ASSAY THYROID STIM HORMONE: CPT

## 2022-01-27 RX ORDER — ATORVASTATIN CALCIUM 80 MG/1
TABLET, FILM COATED ORAL
COMMUNITY
Start: 2022-01-16 | End: 2022-01-27

## 2022-01-27 RX ORDER — FLUTICASONE PROPIONATE 50 MCG
2 SPRAY, SUSPENSION (ML) NASAL DAILY
Qty: 16 G | Refills: 5 | Status: SHIPPED | OUTPATIENT
Start: 2022-01-27

## 2022-01-27 RX ORDER — GABAPENTIN 300 MG/1
300 CAPSULE ORAL 2 TIMES DAILY
COMMUNITY
End: 2022-02-14 | Stop reason: ALTCHOICE

## 2022-01-27 RX ORDER — CETIRIZINE HYDROCHLORIDE 10 MG/1
10 TABLET ORAL DAILY
Qty: 90 TABLET | Refills: 1 | Status: SHIPPED | OUTPATIENT
Start: 2022-01-27

## 2022-01-27 NOTE — PROGRESS NOTES
1960    Chief Complaint   Patient presents with   Sully Wilson / left cubital tunnel surgery / 2/1/22 / [facility]        Pt is a 57 y.o. male who presents for a preoperative medical consultation at the request of Dr. Dr. Chay Zarco prior to Left elbow ulnar nerve decompression vs anterior transposition. Pt complains of left arm and hand pain which is severe. Pain is improved with nothing. Pain is worsened by using arms to get out of chair. Pt has failed conservative measures, therefore he wishes to proceed with surgical intervention. He was diagnosed with right homonymous hemianopsia by Dr. Merlin Sheriff - CT head with orbits 1/7/22 - moderate mucosal thickening in left ethmoid and right maxillary sinuses  - normal pituitary. Patient has had this for some time, unable to tell me when it started. He has cataract surgery coming up on 2/9/22. Will give Zyrtec and Flonase for sinuses - his only complaint is occasional pressure/headache. No acute infection per symptoms - no rhinorrhea. Sleep apnea - has CPAP but hasn't used in 4-5 years. DM - resolved with weight loss - 245# -> 192# now. Obesity - BMI 34. He is having a hard time figuring out what to eat and what to shop for. He is not eating well right now. Worry that he is not getting enough protein. Reviewed options of meat, cheese, nuts, peanut butter as options for protein. Explained that he needs to eat protein to heal well after surgery. He has a referral in for dietician. But as he doesn't read and has poor focus/attention - it is difficult to educate and have him retain information. I have care coordinators/social work helping him with transportation to multiple specialist visits and keeping him on track. But he really needs someone to take him shopping and show him how to make food    Hyperlipidemia - consider restart statin but wait till after surgery.   Stopped due to pain issues, doubt related to statin. Found a bug today - nurse suspected bedbug. Patient states his neighbors had bed bugs. He he has [employer] coming out and treating his trailer, he has had a couple treatments already. Reactions to anesthesia: none    History of excessive bleeding: none    History of blood clots: positive DVT Left leg 6/23/20. History of blood transfusions: none      Reactions to blood transfusion: none     Past Medical History:   Diagnosis Date    Chronic leg pain     Diabetes ([address] 05/2018    GERD (gastroesophageal reflux disease)     History of deep venous thrombosis (DVT) of distal vein of left lower extremity 2020    Hyperlipidemia     Hypertension     Hypothyroidism     Right homonymous hemianopsia     Sleep apnea     doesn't use CPAP    TIA (transient ischemic attack) 2016    Dr. Maggi Schumacher        Past Surgical History:   Procedure Laterality Date    ANKLE SURGERY Right 2000    KNEE ARTHROSCOPY Left 9-11-15    Torn cartliage    LEG SURGERY Right 2000    IA COLSC FLX W/RMVL OF TUMOR POLYP LESION SNARE TQ  7/16/2017    COLONOSCOPY POLYPECTOMY SNARE/COLD BIOPSY performed by P[facility]aj Steinberg [facility]    IA EGD TRANSORAL BIOPSY SINGLE/MULTIPLE N/A 9/27/2017    EGD BIOPSY performed by Ofe Choi MD at [address] [place] [address] ENDOSCOPY  7/16/2017    EGD DIAGNOSTIC ONLY [facility]       Current Outpatient Medications   Medication Sig Dispense Refill    gabapentin (NEURONTIN) 300 MG capsule Take 300 mg by mouth 2 times daily.       cetirizine (ZYRTEC) 10 MG tablet Take 1 tablet by mouth daily 90 tablet 1    fluticasone (FLONASE) 50 MCG/ACT nasal spray 2 sprays by Each Nostril route daily 16 g 5    metoprolol succinate (TOPROL XL) 50 MG extended release tablet TAKE 1 TABLET BY MOUTH EVERY DAY 90 tablet 1    levothyroxine (SYNTHROID) 50 MCG tablet Take 1 tablet by mouth daily 90 tablet 1    losartan (COZAAR) 50 MG tablet TAKE 1 TABLET BY MOUTH EVERY DAY 90 tablet 1    omeprazole (PRILOSEC) 20 MG delayed release capsule Take 1 capsule by mouth every morning (before breakfast) 90 capsule 1    albuterol sulfate  (90 Base) MCG/ACT inhaler Inhale 2 puffs into the lungs every 6 hours as needed for Wheezing 1 Inhaler 3    Multiple Vitamins-Minerals (THERAPEUTIC MULTIVITAMIN-MINERALS) tablet Take 1 tablet by mouth daily      Misc Natural Products (OSTEO BI-FLEX ADV TRIPLE ST PO) Take by mouth daily      Multiple Vitamins-Minerals (OCUVITE-LUTEIN PO) Take by mouth daily      acetaminophen (TYLENOL) 500 MG tablet Take 1,000 mg by mouth every 6 hours as needed for Pain      VORTIoxetine HBr (TRINTELLIX) 20 MG TABS tablet Take 20 mg by mouth daily      prednisoLONE acetate (PRED FORTE) 1 % ophthalmic suspension SHAKE LIQUID AND INSTILL 1 DROP IN LEFT EYE FOUR TIMES DAILY (Patient not taking: Reported on 2022)      ofloxacin (OCUFLOX) 0.3 % solution INSTILL 1 DROP IN LEFT EYE FOUR TIMES DAILY (Patient not taking: Reported on 2022)      gabapentin (NEURONTIN) 300 MG capsule Take 1 capsule by mouth 2 times daily for 60 days. 60 capsule 1    risperiDONE (RISPERDAL) 1 MG tablet Take 1 mg by mouth every evening  (Patient not taking: Reported on 12/15/2021)       No current facility-administered medications for this visit. No Known Allergies    Social History     Socioeconomic History    Marital status:      Spouse name: Not on file    Number of children: 3    Years of education: 6    Highest education level: GED or equivalent   Occupational History    Not on file   Tobacco Use    Smoking status: Current Every Day Smoker     Packs/day: 2.00     Years: 40.00     Pack years: 80.00     Types: Cigarettes, Cigars     Last attempt to quit: 2021     Years since quittin.1    Smokeless tobacco: Never Used   Vaping Use    Vaping Use: Never used   Substance and Sexual Activity    Alcohol use:  No Alcohol/week: 0.0 standard drinks    Drug use: No    Sexual activity: Not Currently   Other Topics Concern    Not on file   Social History Narrative    Not on file     Social Determinants of Health     Financial Resource Strain: Low Risk     Difficulty of Paying Living Expenses: Not very hard   Food Insecurity: No Food Insecurity    Worried About Running Out of Food in the Last Year: Never true    Kareem of Food in the Last Year: Never true   Transportation Needs: No Transportation Needs    Lack of Transportation (Medical): No    Lack of Transportation (Non-Medical): No   Physical Activity: Sufficiently Active    Days of Exercise per Week: 5 days    Minutes of Exercise per Session: 60 min   Stress:     Feeling of Stress : Not on file   Social Connections: Socially Isolated    Frequency of Communication with Friends and Family: More than three times a week    Frequency of Social Gatherings with Friends and Family: More than three times a week    Attends [employer] Services: Never    Active Member of Clubs or Organizations: No    Attends Club or Organization Meetings: Never    Marital Status:    Intimate Partner Violence:     Fear of Current or Ex-Partner: Not on file    Emotionally Abused: Not on file    Physically Abused: Not on file    Sexually Abused: Not on file   Housing Stability: Unknown    Unable to Pay for Housing in the Last Year: No    Number of [place] in the Last Year: Not on file    Unstable Housing in the Last Year: No       Family History   Problem Relation Age of Onset    Dementia Mother     High Blood Pressure Mother     COPD Father     High Cholesterol Father     High Blood Pressure Father     Dementia Maternal Aunt     Seizures Maternal Uncle        Review of Systems - General ROS: negative for - chills or fever  Psychological ROS: negative for - anxiety - positive depression - he has trouble concentrating, sleeping, anhedonia, decreased appetite. Referred to psychiatry. He is to see Viky Cruz today. This is first visit in some time. Hematological and Lymphatic ROS: No history of bleeding disorder. Respiratory ROS: no cough, shortness of breath, or wheezing - occasional smoker's cough  Cardiovascular ROS: no chest pain or dyspnea on exertion, tolerates vacuuming  Gastrointestinal ROS: no abdominal pain, change in bowel habits, or black or bloody stools  Genito-Urinary ROS: no dysuria, trouble voiding, or hematuria  Musculoskeletal ROS: negative for - muscle pain or muscular weakness, positive left hand pain  Neurological ROS: negative for - seizures or weakness, positive headaches, and numbness in hands  Dermatological ROS: negative for - rash or skin lesion changes    Blood pressure 134/80, pulse 92, temperature 98.6 °F (37 °C), height 5' 2.98\" (1.6 m), weight 192 lb 9.6 oz (87.4 kg). Physical Examination: General appearance -alert, well appearing, and in no distress  Mental status - alert, oriented to person, place, and time  Head - atraumatic, normocephalic  Eyes - pupils equal and reactive to light, extraocular muscles intact  Ears - external ears are normal, hearing is grossly normal  Mouth - oral pharynx clear, mucous membranes moist  Neck - supple, no significant adenopathy  Chest - clear to auscultation, no wheezes, rales or rhonchi, symmetric air entry  Heart - normal rate, regular rhythm, normal S1, S2, no murmurs, rubs, clicks or gallops  Abdomen -soft, nontender, nondistended  Neurological - alert, oriented, cranial nerves II-XII intact, motor and sensation are grossly intact bilateral upper and lower extremities. Extremities - peripheral pulses normal, no pedal edema, no clubbing or cyanosis  Skin - warm and dry    Diagnostic data:   I have reviewed recent diagnostic testing including EKG. Please see EKG for interpretation. Normal heart cath 2/2017.       Assessment and Plan:    Patient is an acceptable surgical candidate for planned procedure pending labs. Cardiac risk assessment:  Patient has minor clinical predictors of cardiac risk and tolerates greater than 4 mets of activity. Patient is scheduled for an elective intermediate risk surgery and is considered at an acceptable cardiac risk based on ACC/AHA criteria. Diagnosis Orders   1. Pre-op exam  EKG 12 Lead    COVID-19   2. Cubital tunnel syndrome on left     3. Primary hypertension  EKG 12 Lead   4. Essential hypertension  CBC Auto Differential    Comprehensive Metabolic Panel   5. Mixed hyperlipidemia  Comprehensive Metabolic Panel    LDL Cholesterol, Direct   6. Sleep apnea, unspecified type     7. Obesity (BMI 30-39.9)     8. Acquired hypothyroidism  TSH with Reflex   9. Subacute frontal sinusitis  cetirizine (ZYRTEC) 10 MG tablet    fluticasone (FLONASE) 50 MCG/ACT nasal spray     Cubital tunnel syndrome - continue Neurontin for now. Hope will improve with surgery. Hypertension - BP controlled on metoprolol, and losartan, continue up to and including the am of surgery. Due for labs now. Mixed hyperlipidemia - will consider restarting statin at next visit - would like to see pain issues from nerve impingement and arthritis/hardware improved. He is process of getting surgeries done to correct this. Due for labs now. Sleep apnea - he refuses to use CPAP. Monitor pulse oximetry intra-op and post-op. Obesity - BMI 34 - he has appt with dietician to help with education, but he really needs someone to help him to shop for healthy foods and show him how to make it. Suggested assisted living and he refused - likes living in trailer with his pet. Hypothyroidism - check TSH now. Asymptomatic. Continue Synthroid 50 mcg daily. Subacute sinusitis - seen on CT - start Zyrtec and Flonase. Right homonymous hemianopsia - negative head CT recently and MRI previously. Unsure of etiology. Dr. Syd Fiore to do cataract surgery after this arm surgery.     Suspicious for bedbug - encouraged him to continue regular treatments - already in process with this. Keep appt 3/8/22. Allergies: Patient has no known allergies. Cherie Ferguson MD    Reviewed labs 1/27/22 - acceptable for surgery.  - to restart Lipitor after surgery. TSH slightly low but FT4 normal, TSH normal in ED 1/28/22. Cherie Ferguson MD    . [facility] INTERNAL MEDICINE  [address]  Dept: [phone]  Dept Fax: [phone] : [phone]

## 2022-01-27 NOTE — PATIENT INSTRUCTIONS
You need to be ready at 4:30 am for transportation of surgery on 2/1/22. You have to be there at 6:30.     [facility] transport service will pick you up and take you home (may have to wait for ride back home)

## 2022-01-28 ENCOUNTER — HOSPITAL ENCOUNTER (EMERGENCY)
Age: 62
Discharge: HOME | End: 2022-01-28
Payer: MEDICARE

## 2022-01-28 ENCOUNTER — CARE COORDINATION (OUTPATIENT)
Dept: CARE COORDINATION | Age: 62
End: 2022-01-28

## 2022-01-28 VITALS
SYSTOLIC BLOOD PRESSURE: 153 MMHG | TEMPERATURE: 97.3 F | HEART RATE: 87 BPM | OXYGEN SATURATION: 99 % | DIASTOLIC BLOOD PRESSURE: 87 MMHG | RESPIRATION RATE: 18 BRPM

## 2022-01-28 DIAGNOSIS — R45.850 HOMICIDAL IDEATIONS: ICD-10-CM

## 2022-01-28 DIAGNOSIS — R45.4 IRRITABILITY AND ANGER: Primary | ICD-10-CM

## 2022-01-28 LAB
ACETAMINOPHEN LEVEL: < 5 UG/ML (ref 0–20)
ALBUMIN SERPL-MCNC: 4.6 G/DL (ref 3.5–5.1)
ALP BLD-CCNC: 69 U/L (ref 38–126)
ALT SERPL-CCNC: 13 U/L (ref 11–66)
AMPHETAMINE+METHAMPHETAMINE URINE SCREEN: NEGATIVE
ANION GAP SERPL CALCULATED.3IONS-SCNC: 13 MEQ/L (ref 8–16)
AST SERPL-CCNC: 24 U/L (ref 5–40)
BARBITURATE QUANTITATIVE URINE: NEGATIVE
BASOPHILS # BLD: 0.2 %
BASOPHILS ABSOLUTE: 0 THOU/MM3 (ref 0–0.1)
BENZODIAZEPINE QUANTITATIVE URINE: NEGATIVE
BILIRUB SERPL-MCNC: 0.3 MG/DL (ref 0.3–1.2)
BUN BLDV-MCNC: 11 MG/DL (ref 7–22)
CALCIUM SERPL-MCNC: 9.4 MG/DL (ref 8.5–10.5)
CANNABINOID QUANTITATIVE URINE: NEGATIVE
CHLORIDE BLD-SCNC: 100 MEQ/L (ref 98–111)
CO2: 25 MEQ/L (ref 23–33)
COCAINE METABOLITE QUANTITATIVE URINE: NEGATIVE
CREAT SERPL-MCNC: 0.7 MG/DL (ref 0.4–1.2)
EOSINOPHIL # BLD: 0.7 %
EOSINOPHILS ABSOLUTE: 0.1 THOU/MM3 (ref 0–0.4)
ERYTHROCYTE [DISTWIDTH] IN BLOOD BY AUTOMATED COUNT: 13.2 % (ref 11.5–14.5)
ERYTHROCYTE [DISTWIDTH] IN BLOOD BY AUTOMATED COUNT: 43.9 FL (ref 35–45)
ETHYL ALCOHOL, SERUM: < 0.01 %
GFR SERPL CREATININE-BSD FRML MDRD: > 90 ML/MIN/1.73M2
GLUCOSE BLD-MCNC: 96 MG/DL (ref 70–108)
HCT VFR BLD CALC: 50.2 % (ref 42–52)
HEMOGLOBIN: 17.1 GM/DL (ref 14–18)
IMMATURE GRANS (ABS): 0.04 THOU/MM3 (ref 0–0.07)
IMMATURE GRANULOCYTES: 0.5 %
LYMPHOCYTES # BLD: 17.8 %
LYMPHOCYTES ABSOLUTE: 1.5 THOU/MM3 (ref 1–4.8)
MCH RBC QN AUTO: 31 PG (ref 26–33)
MCHC RBC AUTO-ENTMCNC: 34.1 GM/DL (ref 32.2–35.5)
MCV RBC AUTO: 91.1 FL (ref 80–94)
MONOCYTES # BLD: 5.6 %
MONOCYTES ABSOLUTE: 0.5 THOU/MM3 (ref 0.4–1.3)
NUCLEATED RED BLOOD CELLS: 0 /100 WBC
OPIATES, URINE: NEGATIVE
OSMOLALITY CALCULATION: 274.9 MOSMOL/KG (ref 275–300)
OXYCODONE: NEGATIVE
PHENCYCLIDINE QUANTITATIVE URINE: NEGATIVE
PLATELET # BLD: 194 THOU/MM3 (ref 130–400)
PMV BLD AUTO: 13.5 FL (ref 9.4–12.4)
POTASSIUM REFLEX MAGNESIUM: 4.4 MEQ/L (ref 3.5–5.2)
RBC # BLD: 5.51 MILL/MM3 (ref 4.7–6.1)
SALICYLATE, SERUM: < 0.3 MG/DL (ref 2–10)
SARS-COV-2, NAAT: NOT  DETECTED
SEG NEUTROPHILS: 75.2 %
SEGMENTED NEUTROPHILS ABSOLUTE COUNT: 6.3 THOU/MM3 (ref 1.8–7.7)
SODIUM BLD-SCNC: 138 MEQ/L (ref 135–145)
TOTAL PROTEIN: 7 G/DL (ref 6.1–8)
TSH SERPL DL<=0.05 MIU/L-ACNC: 0.44 UIU/ML (ref 0.4–4.2)
WBC # BLD: 8.4 THOU/MM3 (ref 4.8–10.8)

## 2022-01-28 PROCEDURE — 99284 EMERGENCY DEPT VISIT MOD MDM: CPT

## 2022-01-28 PROCEDURE — 85025 COMPLETE CBC W/AUTO DIFF WBC: CPT

## 2022-01-28 PROCEDURE — 80053 COMPREHEN METABOLIC PANEL: CPT

## 2022-01-28 PROCEDURE — 80179 DRUG ASSAY SALICYLATE: CPT

## 2022-01-28 PROCEDURE — 80307 DRUG TEST PRSMV CHEM ANLYZR: CPT

## 2022-01-28 PROCEDURE — 84443 ASSAY THYROID STIM HORMONE: CPT

## 2022-01-28 PROCEDURE — 6370000000 HC RX 637 (ALT 250 FOR IP): Performed by: PHYSICIAN ASSISTANT

## 2022-01-28 PROCEDURE — 80143 DRUG ASSAY ACETAMINOPHEN: CPT

## 2022-01-28 PROCEDURE — 82077 ASSAY SPEC XCP UR&BREATH IA: CPT

## 2022-01-28 PROCEDURE — 36415 COLL VENOUS BLD VENIPUNCTURE: CPT

## 2022-01-28 PROCEDURE — 87635 SARS-COV-2 COVID-19 AMP PRB: CPT

## 2022-01-28 RX ORDER — ACETAMINOPHEN 325 MG/1
650 TABLET ORAL ONCE
Status: COMPLETED | OUTPATIENT
Start: 2022-01-28 | End: 2022-01-28

## 2022-01-28 RX ORDER — HYDROXYZINE PAMOATE 25 MG/1
50 CAPSULE ORAL ONCE
Status: COMPLETED | OUTPATIENT
Start: 2022-01-28 | End: 2022-01-28

## 2022-01-28 RX ADMIN — HYDROXYZINE PAMOATE 50 MG: 25 CAPSULE ORAL at 17:51

## 2022-01-28 RX ADMIN — ACETAMINOPHEN 650 MG: 325 TABLET ORAL at 17:51

## 2022-01-28 ASSESSMENT — SLEEP AND FATIGUE QUESTIONNAIRES
DO YOU USE A SLEEP AID: NO
DO YOU HAVE DIFFICULTY SLEEPING: NO

## 2022-01-28 ASSESSMENT — ENCOUNTER SYMPTOMS
SHORTNESS OF BREATH: 0
VOMITING: 0
COUGH: 0
NAUSEA: 0
ABDOMINAL PAIN: 0
RHINORRHEA: 0

## 2022-01-28 ASSESSMENT — PAIN SCALES - GENERAL: PAINLEVEL_OUTOF10: 10

## 2022-01-28 NOTE — CARE COORDINATION
[address] to see if pt was admitted. Spoke with Alia Wolfe. She said, give me 30 min. and call me back I am not sure yet. I called and Cherri had left for the weekend. I was directed to a gentleman that informed me he thought, \"that they emc'd pt. to [facility]. \"

## 2022-01-28 NOTE — CARE COORDINATION
[place] [employer] they connected me to the [facility] office. Requested well check and informed officer what pt had admitted to me. She will send deputy's and they will contact me when they arrive.  called me at 11:30 and was with pt. Discussed options. Pt willing to go to [facility] for assessment. They will transport.

## 2022-01-28 NOTE — ED NOTES
RN provided patient with warm water per request. Pt remains calm and cooperative. Safety sitter at bedside to ensure safety.       Elio Gee RN  01/28/22 [id number]

## 2022-01-28 NOTE — ED NOTES
Pt yelling hes experiencing headache 10/10 and no one is helping me. RN spoke with provider.       Apolinar Gabriel RN  01/28/22 [id number]

## 2022-01-28 NOTE — CARE COORDINATION
Pt has excalated over the week with his threats. He called stating he talked to his sister yesterday and he said, \"I woke up mad, pissed off, she said I have an anger problem like my mom, I'm gonna be honest with you, my dad said don't ever lie, I wanna kill everybody at that damn rest home, kill my X and Misty Lunch. I went off for 3 hrs with her, and today ain't no better, and after I am done killing them I'm\"  Asked pt how he would kill and he said \"my gun\". Allowed pt to verbalize and pt stated, I do a 360. Informed pt that I will be contacting [facility] and he will possibly need to stay at the [facility]. Answered pt questions.

## 2022-01-28 NOTE — ED TRIAGE NOTES
Patient to ED from [facility] by [address] [facility] with complaints of being [facility]. Patient on arrival is calm and cooperative. RN reads [facility] which states that patient is homocidal against his ex wife. Patient states that he has said that he wants to kill his ex wife but that he has never done anything to go through with it. Pt states they stay away from eachother. Patient reports that they have an understanding of leaving eachother alone. Patient states that he was taught not to lie so he says what he feels. RN explains [facility] to patient pt states that is simply not true. Patient states that he wanted to kill his ex wife due to her being a female. Patient given warm blanket for comfort. Patient placed in room that is ligature resistant with continuous monitoring in place. Provider notified, requested an assessment by behavioral health . Patient belongings secured  Explained suicide prevention precautions to the patient including constant observer.

## 2022-01-29 LAB
SARS-COV-2: NOT DETECTED
SOURCE: NORMAL

## 2022-01-29 NOTE — ED PROVIDER NOTES
[facility] EMERGENCY DEPT      CHIEF COMPLAINT       Chief Complaint   Patient presents with    Homicidal       Nurses Notes reviewed and I agree except as noted in the HPI. HISTORY OF PRESENT ILLNESS    Elsa Fisher is a 57 y.o. male who presents for mental evaluation under [facility] order. Patient informs me that he woke up mad. He remained mad all day and spoke with Jessy Rodriguez at [facility]. According to [facility] order patient was yelling and made homicidal statements against his ex-wife and the nursing home where his girlfriend resides as they will not let him visit. His plan was to shoot his ex-wife and he has access to guns. He also made statements that he would blow up the nursing home. [employer] was called after Savi placed [facility] order and he was escorted here. Patient currently is denying wanting to kill anybody. Patient denies any physical symptoms of chest pain, dyspnea, fever, recent illness, or other complaints. REVIEW OF SYSTEMS     Review of Systems   Constitutional: Negative for appetite change, chills and fever. HENT: Negative for congestion and rhinorrhea. Eyes: Negative for visual disturbance. Respiratory: Negative for cough and shortness of breath. Cardiovascular: Negative for chest pain. Gastrointestinal: Negative for abdominal pain, nausea and vomiting. Genitourinary: Negative for decreased urine volume and difficulty urinating. Musculoskeletal: Negative for gait problem. Skin: Negative for rash and wound. Neurological: Negative for dizziness and headaches. Psychiatric/Behavioral: Positive for agitation. Negative for hallucinations, self-injury, sleep disturbance and suicidal ideas.         PAST MEDICAL HISTORY    has a past medical history of Chronic leg pain, Diabetes ([address] GERD (gastroesophageal reflux disease), History of deep venous thrombosis (DVT) of distal vein of left lower extremity, Hyperlipidemia, Hypertension, Hypothyroidism, Right homonymous hemianopsia, Sleep apnea, and TIA (transient ischemic attack). SURGICAL HISTORY      has a past surgical history that includes Leg Surgery (Right, 2000); Ankle surgery (Right, 2000); Knee arthroscopy (Left, 9-11-15); pr colsc flx w/rmvl of tumor polyp lesion snare tq (7/16/2017); Upper gastrointestinal endoscopy (7/16/2017); and pr egd transoral biopsy single/multiple (N/A, 9/27/2017). CURRENT MEDICATIONS       Previous Medications    ACETAMINOPHEN (TYLENOL) 500 MG TABLET    Take 1,000 mg by mouth every 6 hours as needed for Pain    ALBUTEROL SULFATE  (90 BASE) MCG/ACT INHALER    Inhale 2 puffs into the lungs every 6 hours as needed for Wheezing    CETIRIZINE (ZYRTEC) 10 MG TABLET    Take 1 tablet by mouth daily    FLUTICASONE (FLONASE) 50 MCG/ACT NASAL SPRAY    2 sprays by Each Nostril route daily    GABAPENTIN (NEURONTIN) 300 MG CAPSULE    Take 1 capsule by mouth 2 times daily for 60 days. GABAPENTIN (NEURONTIN) 300 MG CAPSULE    Take 300 mg by mouth 2 times daily. LEVOTHYROXINE (SYNTHROID) 50 MCG TABLET    Take 1 tablet by mouth daily    LOSARTAN (COZAAR) 50 MG TABLET    TAKE 1 TABLET BY MOUTH EVERY DAY    METOPROLOL SUCCINATE (TOPROL XL) 50 MG EXTENDED RELEASE TABLET    TAKE 1 TABLET BY MOUTH EVERY DAY    MISC NATURAL PRODUCTS (OSTEO BI-FLEX ADV TRIPLE ST PO)    Take by mouth daily    MULTIPLE VITAMINS-MINERALS (OCUVITE-LUTEIN PO)    Take by mouth daily    MULTIPLE VITAMINS-MINERALS (THERAPEUTIC MULTIVITAMIN-MINERALS) TABLET    Take 1 tablet by mouth daily    OFLOXACIN (OCUFLOX) 0.3 % SOLUTION    INSTILL 1 DROP IN LEFT EYE FOUR TIMES DAILY    OMEPRAZOLE (PRILOSEC) 20 MG DELAYED RELEASE CAPSULE    Take 1 capsule by mouth every morning (before breakfast)    PREDNISOLONE ACETATE (PRED FORTE) 1 % OPHTHALMIC SUSPENSION    SHAKE LIQUID AND INSTILL 1 DROP IN LEFT EYE FOUR TIMES DAILY    RISPERIDONE (RISPERDAL) 1 MG TABLET    Take 1 mg by mouth every evening        ALLERGIES     has No Known Allergies.     FAMILY HISTORY     He indicated that his mother is . He indicated that his father is . He indicated that his sister is alive. He indicated that all of his three brothers are alive. He indicated that his maternal aunt is . He indicated that his maternal uncle is . family history includes COPD in his father; Dementia in his maternal aunt and mother; High Blood Pressure in his father and mother; High Cholesterol in his father; Seizures in his maternal uncle. SOCIAL HISTORY    reports that he has been smoking cigarettes and cigars. He has a 80.00 pack-year smoking history. He has never used smokeless tobacco. He reports that he does not drink alcohol and does not use drugs. PHYSICAL EXAM     INITIAL VITALS:  oral temperature is 97.3 °F (36.3 °C). His blood pressure is 153/87 (abnormal) and his pulse is 87. His respiration is 18 and oxygen saturation is 99%. Physical Exam  Vitals and nursing note reviewed. Constitutional:       General: He is not in acute distress. Appearance: He is well-developed. He is obese. He is not toxic-appearing or diaphoretic. HENT:      Head: Normocephalic and atraumatic. Right Ear: Hearing normal.      Left Ear: Hearing normal.      Nose: Nose normal. No rhinorrhea. Mouth/Throat:      Pharynx: Uvula midline. No oropharyngeal exudate. Eyes:      General: Lids are normal. No scleral icterus. Conjunctiva/sclera: Conjunctivae normal.      Pupils: Pupils are equal, round, and reactive to light. Neck:      Trachea: No tracheal deviation. Cardiovascular:      Rate and Rhythm: Normal rate and regular rhythm. Heart sounds: Normal heart sounds. No murmur heard. Pulmonary:      Effort: Pulmonary effort is normal. No respiratory distress. Breath sounds: Normal breath sounds. No stridor. No decreased breath sounds or wheezing. Abdominal:      General: There is no distension. Palpations: Abdomen is soft. Abdomen is not rigid. Tenderness: There is no abdominal tenderness. There is no guarding. Musculoskeletal:         General: Normal range of motion. Cervical back: Normal range of motion and neck supple. No rigidity. Lymphadenopathy:      Cervical: No cervical adenopathy. Skin:     General: Skin is warm and dry. Coloration: Skin is not pale. Findings: No rash. Neurological:      Mental Status: He is alert and oriented to person, place, and time. GCS: GCS eye subscore is 4. GCS verbal subscore is 5. GCS motor subscore is 6. Gait: Gait normal.      Comments: No gross deficits observed   Psychiatric:         Mood and Affect: Mood is anxious. Affect is angry. Speech: Speech is tangential.         Behavior: Behavior is agitated. Thought Content: Thought content includes homicidal ideation. Thought content includes homicidal plan. Comments: Patient answers most questions, some questions he answers with inappropriate answer or does not answer at all. Patient is upset at being here. Patient repeatedly asked to go out and smoke         DIFFERENTIAL DIAGNOSIS:   Including but not limited to: Adjustment disorder, bipolar disorder, other mood disorder, homicidal ideation, suicidal ideation    DIAGNOSTIC RESULTS     EKG: All [facility]'s [facility] who either signs or Co-signs this chart in the absence of a cardiologist.  None    RADIOLOGY: non-plain film images(s) such as CT,Ultrasound and MRI are read by the radiologist.  Plain radiographic images are visualized and preliminarily interpreted by the emergency physician unless otherwise stated below.   No orders to display       LABS:   Labs Reviewed   CBC WITH AUTO DIFFERENTIAL - Abnormal; Notable for the following components:       Result Value    MPV 13.5 (*)     All other components within normal limits   SALICYLATE LEVEL - Abnormal; Notable for the following components:    Salicylate, Serum < 0.3 (*)     All other components within normal limits   OSMOLALITY - Abnormal; Notable for the following components:    Osmolality Calc 274.9 (*)     All other components within normal limits   COVID-19, RAPID   COMPREHENSIVE METABOLIC PANEL W/ REFLEX TO MG FOR LOW K   URINE DRUG SCREEN   ETHANOL   ACETAMINOPHEN LEVEL   TSH WITH REFLEX   ANION GAP   GLOMERULAR FILTRATION RATE, ESTIMATED       EMERGENCY DEPARTMENT COURSE:   Vitals:    Vitals:    01/28/22 1624   BP: (!) 153/87   Pulse: 87   Resp: 18   Temp: 97.3 °F (36.3 °C)   TempSrc: Oral   SpO2: 99%       Patient was seen in the emergency department during the global pandemic, when there was surge capacity and regional healthcare crisis. MDM:  The patient was seen in emergency room by me for mental evaluation under [facility] order for anger and homicidal statements. Old records were reviewed. Physical exam revealed a 34-year-old gentleman who was essentially cooperative but angry at being in the ER. Appropriate labs were ordered. The patient was closely observed and vital signs monitored. Labs were reviewed upon completion. Labs reflected no acute abnormality. The results were discussed with the patient. The patient was thoroughly evaluated by Neda Victoria from [facility], who consulted Dr. Alexys Nair of psychiatry, who advised discharge to [facility]. The patient was discharged with strict return precautions and follow up instructions. Police were notified. All questions were addressed. CRITICAL CARE:   None    CONSULTS:  [facility])    PROCEDURES:  None    FINAL IMPRESSION      1. Irritability and anger    2. Homicidal ideations          DISPOSITION/PLAN     1. Irritability and anger    2. Homicidal ideations        PATIENT REFERRED TO:  Maurice Hardin  [address] [facility] [address]  [phone]    Schedule an appointment as soon as possible for a visit         DISCHARGE MEDICATIONS:  New Prescriptions    No medications on file       (Please note that portions of this note were completed with a voice recognition program.  Efforts were made to edit the dictations but occasionally words are mis-transcribed.)    Roly Carlisle PA-C 01/28/22 9:23 PM    BRI Garcia PA-C  01/28/22 2124

## 2022-01-29 NOTE — PROGRESS NOTES
Chief Complaint:    Homicidal       Provisional Diagnosis:        Risk, Psychosocial and Contextual Factors: (homeless, lack of social support etc.):       Current  Treatment: denies        Present Suicidal Behavior:    Verbal: denies     Attempt:denies       Access to Weapons:denies       C-SSRS Current Suicide Risk: Low, Moderate or High:    low      Past Suicidal Behavior:    Verbal:denies     Attempts:denies       Self-Injurious/Self-Mutilation: (Specify)denies       Traumatic Event Within Past 2 Weeks: (Specify) denies       Current Abuse:  (Specify)denies       Legal: (Specify)denies       Violence: (Specify)denies       Protective Factors:  Stable housing       Housing: resides alone       Clinical Summary:    Pt is a 57year old male who presents to the ED on [facility] from [facility]. Per [facility], pt made reports of wanting to kill his ex-wife and girlfriend's daughter. And has access to a firearm. Pt is agitated upon for interaction. When asked his name he states 'pissed-off, [de-identified] ass old man. \" Pt repeatedly states he does not want to talk to this clinician, but eventually cooperated. He states that he called Nikki Fox today to [facility] her some questions. \" Pt does not disclose what those questions were. \"next thing I know the whole world blew up. \" He states he was brought to [facility]. Pt states that he told duyen he spoke with his sister yesterday and she made him mad and ever since then he has been pissed off. When confronted about thoughts of wanting to kill people pt states he didn't tell anyone that and if he wanted to kill his ex-wife he would have done it back in [address] [facility] when they got a divorce. He denies having homicidal ideations towards anyone. Pt will not disclose the name of his ex-wife. This clinician asked about access to firearms and he denies. He states \"the only thing in my trailer is a dove and a gray cat. \" He also denies having ccw.  Pt states he does not know why someone would say he has access because he did not tell them that. Pt denies suicidal ideation. Denies hallucinations, delusions. Denies drug/alcohol use. Level of Care Disposition:      1928: Consulted with medical provider. Patient is medically stabilized. Consulted with patients RN about abnormalities or medical concerns. No abnormalities or medical concerns noted. 2014: Consulted with Dr Tone Mitchell, who states this is a police issue and is not recommending inpatient admission. 2020: informed medical provider if disposition. She states she will talk to the doctor.

## 2022-01-29 NOTE — ED NOTES
Pt d/c home. Verbalized understanding. Will call pt a [facility] home.       Violetta Patterson RN  01/28/22 [id number]

## 2022-01-31 ENCOUNTER — CARE COORDINATION (OUTPATIENT)
Dept: CARE COORDINATION | Age: 62
End: 2022-01-31

## 2022-01-31 NOTE — CARE COORDINATION
Received call from Mayra Espitia [place]. States that she received call from [address] with [address] stating that the COVID 19 test that was completed appears to be a rapid test and they need a PCR. Informed her that the COVID test completed on 1/27 was a PCR test.  Informed her that I will verify with lab and will contact Leonardo Goodman back. Spoke with [facility] in microbiology-confirmed that the [facility] [place] test completed on 1/27 was indeed a PCR test.  Results are negative. Spoke with Leonardo Goodman. Informed her of this. She states that lab result that was faxed to them this AM was from 1/28 which was a rapid COVID test.    Will fax 1/27 COVID results. To [facility] result faxed to number above as requested.

## 2022-01-31 NOTE — CARE COORDINATION
Ambulatory Care Coordination  ED Follow up Call    Reason for ED visit:  Irritability, anger, homicidal ideations   Status:     improved    Did you call your PCP prior to going to the ED? No    Pt was taken to ED by police from [facility]  Did you receive a discharge instructions from the Emergency Room? Yes  Review of Instructions:     Understands what to report/when to return?:  Yes   Understands discharge instructions?:  Yes   Following discharge instructions?:  Yes   If not why? Pt reports that he is feeling angry and having a hard time controlling it on his own. Pt states that he spoke with his sister Jenna Sigala who told him that their mother had to be on medication for her anger. Are there any new complaints of pain? No  New Pain Meds? No    Constipation prophylaxis needed? N/A    If you have a wound is the dressing clean, dry, and intact? N/A  Understands wound care regimen? N/A    Are there any other complaints/concerns that you wish to tell your provider? no    FU appts/Provider:    Future Appointments   Date Time Provider Ifrah Lopez   3/8/2022  9:30 AM Esteban Vazquez MD [facility] [address] [place] [address] Road   10/25/2022 11:20 AM STR CT IMAGING RM1  [facility]P [facility] Radiolog           New Medications?:   No      Medication Reconciliation by phone - No  Understands Medications? Yes  Taking Medications? Yes  Can you swallow your pills? Yes    Any further needs in the home i.e. Equipment? No    Link to services in community?:  Yes   Which services:  Ronnie-pt following with counselor. Now agreeable in seeing a psychiatrist.  Pt denies thoughts of harming himself or others. I know there is something wrong and I know I need to be on something to help. Pt states \"I've been talking to my sister Jenna Sigala and I know that my mom dealt with similar anger issues. \"    Message left on Nurse LIne at [facility] to get appt arranged with psychiatry.   Message left with DELBERT Benítez as well re: arranging an appt with psychiatry. Requested return call.

## 2022-02-01 ENCOUNTER — CARE COORDINATION (OUTPATIENT)
Dept: CARE COORDINATION | Age: 62
End: 2022-02-01

## 2022-02-01 ENCOUNTER — TELEPHONE (OUTPATIENT)
Dept: INTERNAL MEDICINE CLINIC | Age: 62
End: 2022-02-01

## 2022-02-01 NOTE — CARE COORDINATION
Pt called wanted me to speak with nurse Keegan Titus. Keegan Titus making sure someone will be available to check on pt this afternoon when he gets home. I will call Kkie Doveing a neighbor and make sure he is available.

## 2022-02-01 NOTE — TELEPHONE ENCOUNTER
----- Message from Ayala Morel MD sent at 1/21/2022  1:25 PM EST -----  LEONELA Garvin sent to Ayala Morel MD  Hi Dr. Dawit Gunn,   I spoke to the  Jia Vargas), at Dr. Rhonda Fung office in [facility] and she is allowing pt to have a Covid PCR test done in your office at pt appt. time on 1/27, instead of going to [facility] for that. She stated it HAS to be faxed to their office by Jan 31 or his surgery will be cancelled. [facility] fax to her at [phone]  and also to Pre-admit at [phone]. Pt does not need blood work but she is requesting an EKG be done at his appt. also. Just wanted to let you know all the needed items/contact info for the preop on Thursday.

## 2022-02-02 ENCOUNTER — CARE COORDINATION (OUTPATIENT)
Dept: CARE COORDINATION | Age: 62
End: 2022-02-02

## 2022-02-02 NOTE — CARE COORDINATION
I checked on pt at 3:00 when he got home from Surgery. Pt doing ok went over instructions with him. Pt had ice pack in freezer and encouraged him to use it again once frozen. Discussed pt medication with him. Pt confused by it. Assisted pt in going over med list. Bessy Keatings him to hold off on pain med till needed. Encouraged pt to rest and elevate arm. Pt voiced understanding.

## 2022-02-02 NOTE — CARE COORDINATION
Called pt to check on him. Pt said he slept from 5 pm., yesterday till midnight. Pt in pain this am. Advised him to take pain med. Pt took while on phone with me. Advised pt to elevate and ice arm, and not use it too much. Pt still confused about medications.  Advised pt to rest.

## 2022-02-02 NOTE — CARE COORDINATION
Called ([phone]), and spoke with pt neighbor Lluvia Singleton to discuss his availability to help pt next week after cataract surgery. Lluvia Singleton is willing to help pt.

## 2022-02-02 NOTE — CARE COORDINATION
Ambulatory Care Coordination Note  2/2/2022  CM Risk Score: 6  Charlson 10 Year Mortality Risk Score: 23%     ACC: Joel Kamara, RN    Summary Note: Jose Rubi is being followed by care coordination for education and assistance in managing his chronic conditions. Pt has h/o: intermittent explosive disorder, COPD, DM. Spoke with Duane today. Pt is s/p cubital tunnel of lue. Reports that he is having some pain today. Taking Norco every 6 hrs. Encouraged to take sparingly. Reminded pt of risk of constipation. SW working to get transport for his f/u appt. Psych-pt following with Kenny provider, Counselor. Has appt in March with counselor and Provider next wk. Per [facility] CM pt was placed on 2 new medications. Pt unsure what meds were started. Contacted [facility]. Spoke with [facility] at pharmacy. Informed pt was prescribed Trintellix and Risperdone and both were picked up on 2/1/22. Medications added to med list.   Called Duane back. Verified that he has both medications. Pt has started them. Strongly encouraged pt to take as prescribed. Verbalizes understanding. Pt has risperidone in his bathroom so he remembers to take prior to bed. COPD: breathing at baseline. Reviewed upcoming appts for next wk:   2/7: psychiatry-CM with [facility] will bring pt  2/9-cataract surgery-CM with [facility] Mat[facility] will transport pt  2/10-f/u at [facility] arranged transport through [facility]. Has dietician appt at 4pm-pt arranged transport through [facility]  2/11-f/u with [facility]-SW arranged through [facility]. Plan of care:  Reviewed upcoming f/u appts. Pt has on calendar and has scheduled transport for all appts. Will continue to assist with transport needs PRN  Will continue to reinforce importance of medication adherence.  Encourage use of pill box  Start eye drops on 2/7 for cataract surgery on 2/9  Will continue to collaborate with Benny Matute CM  F/u with psychiatry and counseling at [facility]  F/u with dietician to assist pt in improving dietary habits  COPD Assessment    Does the patient have a nebulizer?: No  Does the patient use a space with inhaled medications?: No            Symptoms:                    Care Coordination Interventions    Program Enrollment: Complex Care  Referral from Primary Care Provider: Yes  Suggested Interventions and [address]: Completed (Comment: Ernesto Patel. Augie Taylor Has  -[facility] [phone] w/ Ernesto Patel)  Meals on Wheels: Declined  Medi Set or Pill Pack: Declined (Comment: gets meds via home delivery. not in blister packs)  Senior Services: Completed (Comment: Marie Vargas)  Social Work: In Process (Comment: Jimmie Jeffers working with pt. )  Transportation Support: In Process  Zone Management Tools: Completed (Comment: DM /COPD zone tools)  Other Services or Interventions: has Renee Schmid         Goals Addressed                 This Visit's Progress     Conditions and Symptoms   On track     I will schedule office visits, as directed by my provider. I will keep my appointment or reschedule if I have to cancel. I will notify my provider of any barriers to my plan of care. I will follow my Zone Management tool to seek urgent or emergent care. I will notify my provider of any symptoms that indicate a worsening of my condition. Barriers: lack of support, overwhelmed by complexity of regimen, and stress  Plan for overcoming my barriers: education and assistance from ACM, PCP, SW.  Utilize transportation services  Confidence: 8/10  Anticipated Goal Completion Date: 3/15/22              Prior to Admission medications    Medication Sig Start Date End Date Taking? Authorizing Provider   VORTIoxetine HBr (TRINTELLIX) 20 MG TABS tablet Take 20 mg by mouth daily   Yes Historical Provider, MD   gabapentin (NEURONTIN) 300 MG capsule Take 300 mg by mouth 2 times daily.     Historical Provider, MD   cetirizine (ZYRTEC) 10 MG tablet Take 1 tablet by mouth daily 1/27/22   Marilou Tran MD   fluticasone [facility]) 50 MCG/ACT nasal spray 2 sprays by Each Nostril route daily 1/27/22   Marilou Tran MD   prednisoLONE acetate (PRED FORTE) 1 % ophthalmic suspension SHAKE LIQUID AND INSTILL 1 DROP IN LEFT EYE FOUR TIMES DAILY  Patient not taking: Reported on 1/11/2022 12/22/21   Historical Provider, MD   ofloxacin (OCUFLOX) 0.3 % solution INSTILL 1 DROP IN LEFT EYE FOUR TIMES DAILY  Patient not taking: Reported on 1/11/2022 12/21/21   Historical Provider, MD   metoprolol succinate (TOPROL XL) 50 MG extended release tablet TAKE 1 TABLET BY MOUTH EVERY DAY 11/15/21   Marilou Tran MD   levothyroxine (SYNTHROID) 50 MCG tablet Take 1 tablet by mouth daily 11/15/21   Marilou Tran MD   gabapentin (NEURONTIN) 300 MG capsule Take 1 capsule by mouth 2 times daily for 60 days.  11/15/21 1/14/22  Marilou Tran MD   losartan (COZAAR) 50 MG tablet TAKE 1 TABLET BY MOUTH EVERY DAY 11/15/21   Marilou Tran MD   omeprazole (PRILOSEC) 20 MG delayed release capsule Take 1 capsule by mouth every morning (before breakfast) 11/15/21   Marilou Tran MD   albuterol sulfate  (90 Base) MCG/ACT inhaler Inhale 2 puffs into the lungs every 6 hours as needed for Wheezing 7/1/21   Marilou Tran MD   Multiple Vitamins-Minerals (THERAPEUTIC MULTIVITAMIN-MINERALS) tablet Take 1 tablet by mouth daily    Historical Provider, MD   [facility] Natural Products (OSTEO BI-FLEX ADV TRIPLE ST PO) Take by mouth daily    Historical Provider, MD   Multiple Vitamins-Minerals (OCUVITE-LUTEIN PO) Take by mouth daily    Historical Provider, MD   risperiDONE (RISPERDAL) 1 MG tablet Take 1 mg by mouth every evening   Patient not taking: Reported on 12/15/2021    Historical Provider, MD   acetaminophen (TYLENOL) 500 MG tablet Take 1,000 mg by mouth every 6 hours as needed for Pain    Historical Provider, MD       Future Appointments   Date Time Provider Ifrah Lopez 2/10/2022  4:00 PM Tika Car RD, LD [facility] Physic [facility] - [place]   3/8/2022  9:30 AM Maggie Shipman MD SRPX Physic [facility] - MARCELLA ESPINOSA AM [email or web address]   10/25/2022 11:20 AM STR CT IMAGING RM1  [facility] OUT EXP STR Radiolog

## 2022-02-02 NOTE — CARE COORDINATION
Joaquin Cannon at [facility] re: pt's f/u appts following cataract surgery. Informed that pt has f/u POD#1 on 2/10/22 at 8am.  If someone can attend appt with pt that would be helpful but it is not required. Pt also has f/u appt at eye center on 2/24/22 at 915am.   Pt will need to bring both eye drops to surgery appt on 2/9/22 and again on 2/10/22. They will provide pt with a schedule for his eye drops. They are aware pt has limited reading ability. I attempted to reach pt's CM with [facility], AMelia to provide update re: appts and also attempt to arrange appt with psychiatry. No answer. Message left requesting return call. Attempted to reach [facility] scheduling dept. No answer. Message left. Secure email sent to Cata Zuñiga at [facility] to inquire about psych appt. I received response back informing me that pt is seeing NURA Hernandez for psych. Last seen on 1/27/22. I received secure email back from his CM [facility] who informs me that pt was started on 2 new medications at his [facility] appt on 1/27. Will attempt to obtain which medications he was started on so they can be placed on active med list and I can verify if pt was able to obtain from pharmacy. Pt has another appt with psych provider on 2/7/22 at [facility] CM is to call tomorrow to provide further update.

## 2022-02-03 ENCOUNTER — CARE COORDINATION (OUTPATIENT)
Dept: CARE COORDINATION | Age: 62
End: 2022-02-03

## 2022-02-03 NOTE — CARE COORDINATION
Received phone call from [facility]. Discussed pt's upcoming appts next wk. Pt does not need to see psych provider -Jessi Aden CNP on Monday 2/7. She will cancel this appt. Informed her pt is taking his Trintellix and Risperidone. Chelsy Silver with [facility] will be taking pt to [facility] eye center for surgery on 2/9. Escobar will f/u with Duane on 2/10 following his POD # 1 f/u at [facility] to review eye drop regimen. She will go out to home if needed and assist in getting simplified regimen and better understanding of eye drop schedule  She will provide updates via email re: his Daisha Whipple appts so myself and Trisha Jean Marie can remind and reinforce importance of these appts to pt. Updates will also be provided on any new medication changes. Escobar is not able to attend [facility] dietician appt but she will f/u with him on Monday 2/14/21 to review education sheets and can also take him to grocery. Message left with dietician to provide update.

## 2022-02-04 ENCOUNTER — CARE COORDINATION (OUTPATIENT)
Dept: CARE COORDINATION | Age: 62
End: 2022-02-04

## 2022-02-04 NOTE — CARE COORDINATION
Spoke with Duane. Went over eye drop schedule and appts for next wk several times. Pt repeated back to me and wrote appt dates and times down. SW will f/u with pt on Monday and will review with him again if needed. Pt has transportation arranged for all appts.

## 2022-02-07 DIAGNOSIS — E11.9 TYPE 2 DIABETES MELLITUS WITHOUT COMPLICATION, WITHOUT LONG-TERM CURRENT USE OF INSULIN (HCC): Primary | ICD-10-CM

## 2022-02-07 DIAGNOSIS — E66.9 OBESITY (BMI 30-39.9): ICD-10-CM

## 2022-02-08 ENCOUNTER — CARE COORDINATION (OUTPATIENT)
Dept: CARE COORDINATION | Age: 62
End: 2022-02-08

## 2022-02-08 NOTE — CARE COORDINATION
Pt unsure he is doing his elbow exercises correctly. Had pt read what paperwork said. Informed him I will call [facility] and ask them because I thought the exercises were for after his Friday appt. I called Manuel and was informed pt needs to wait and they will go over exercises on Friday at his appt. Informed pt of this.

## 2022-02-09 ENCOUNTER — CARE COORDINATION (OUTPATIENT)
Dept: CARE COORDINATION | Age: 62
End: 2022-02-09

## 2022-02-09 NOTE — CARE COORDINATION
Pt called with questions regarding his eye drops after his surgery today/ Informed pt that I will call [facility] and call him back with this information

## 2022-02-09 NOTE — CARE COORDINATION
[address] to clarify pt eye drops concerns. He is to put both drops in at supper, bedtime and in am. At his follow-up appt in the morning they will go over what he needs to do. Will inform pt. rolling walker vs forearm crutches pending re-assessment tomorrow

## 2022-02-09 NOTE — CARE COORDINATION
3 Attempts to reach pt to provide him information regarding eye drops. Pt unavailable at 2 calls. 3rd attempt I left a vm providing him this information.

## 2022-02-10 ENCOUNTER — OFFICE VISIT (OUTPATIENT)
Dept: INTERNAL MEDICINE CLINIC | Age: 62
End: 2022-02-10
Payer: MEDICARE

## 2022-02-10 VITALS — WEIGHT: 187 LBS | TEMPERATURE: 98.1 F | HEIGHT: 63 IN | BODY MASS INDEX: 33.13 KG/M2

## 2022-02-10 DIAGNOSIS — E11.9 TYPE 2 DIABETES MELLITUS WITHOUT COMPLICATION, WITHOUT LONG-TERM CURRENT USE OF INSULIN (HCC): ICD-10-CM

## 2022-02-10 PROCEDURE — 97803 MED NUTRITION INDIV SUBSEQ: CPT | Performed by: DIETITIAN, REGISTERED

## 2022-02-10 NOTE — PATIENT INSTRUCTIONS
Consider getting home delivered meals. Call [address] [place] [address] 3  # [phone]  - it may cost you $6-7.50/meal.    Leonie Gotti idea to have an omelette for breakfast and or have at any meal.    Eat 2-3 foods at each time you eat. - Eat 3 meals/day. (Refer to your handout of the picture of the plate)    Try skillet meals. Good idea - if you have a HB also do fried potatoes and peppers/onions with this.

## 2022-02-10 NOTE — PROGRESS NOTES
[address] [place] [address]. [address] [place] [address] James., [facility], [address]  [phone] (phone)  [phone] (fax)    Patient Name: Sebastian Mota. Date of Birth: [id number]. MRN: [id number]      Assessment: Patient is a 57 y.o. male seen for Initial MNT visit for Type 2 DB, obesity.    -Nutritionally relevant labs:   Lab Results   Component Value Date/Time    LABA1C 5.9 (H) 03/18/2021 02:26 PM    LABA1C 5.7 06/22/2020 09:13 AM    LABA1C 6.0 (H) 03/17/2020 09:24 AM    LABA1C 6.1 09/26/2018 12:13 PM    LABA1C 6.9 (H) 05/08/2018 02:05 PM    GLUCOSE 96 01/28/2022 05:15 PM    GLUCOSE 86 01/27/2022 03:58 PM    GLUCOSE 164 (H) 10/25/2016 11:40 AM    GLUCOSE 121 (H) 10/06/2016 06:33 PM    CHOL 238 (H) 03/26/2021 12:04 PM    HDL 35 03/26/2021 12:04 PM    LDLCALC 167 03/26/2021 12:04 PM    TRIG 179 03/26/2021 12:04 PM     -Blood sugar trends: Is not required to check his BS. Pt. C/o he does not know what to buy when at the grocery store. Pt c/o not hungry. Went through a grocery list with him and he has groceries at home but not preparing.    -Food recall: Takes and pills 2-3x/day and smokes a cigarette with each time he takes his pills. Breakfast: Sometimes he will have oatmeal or egg OR skips OR Banana  Today only had a yogurt so far. Yesterday ate a bag of cheeze its. Not a bread eater. Likes wheat bread. Pt explained how he can make a omelette with spinach, red bell pepper, garlic and canned salmon. Denies eating at [employer]. -Main Beverages: water.    -Impression of Dietary Intake: in general, an \"unhealthy\" diet    Current Outpatient Medications on File Prior to Visit   Medication Sig Dispense Refill    VORTIoxetine HBr (TRINTELLIX) 20 MG TABS tablet Take 20 mg by mouth daily      gabapentin (NEURONTIN) 300 MG capsule Take 300 mg by mouth 2 times daily.       cetirizine (ZYRTEC) 10 MG tablet Take 1 tablet by mouth daily 90 tablet 1    fluticasone (FLONASE) 50 MCG/ACT nasal spray 2 sprays by Each Nostril route daily 16 g 5    prednisoLONE acetate (PRED FORTE) 1 % ophthalmic suspension SHAKE LIQUID AND INSTILL 1 DROP IN LEFT EYE FOUR TIMES DAILY (Patient not taking: Reported on 1/11/2022)      ofloxacin (OCUFLOX) 0.3 % solution INSTILL 1 DROP IN LEFT EYE FOUR TIMES DAILY (Patient not taking: Reported on 1/11/2022)      metoprolol succinate (TOPROL XL) 50 MG extended release tablet TAKE 1 TABLET BY MOUTH EVERY DAY 90 tablet 1    levothyroxine (SYNTHROID) 50 MCG tablet Take 1 tablet by mouth daily 90 tablet 1    gabapentin (NEURONTIN) 300 MG capsule Take 1 capsule by mouth 2 times daily for 60 days. 60 capsule 1    losartan (COZAAR) 50 MG tablet TAKE 1 TABLET BY MOUTH EVERY DAY 90 tablet 1    omeprazole (PRILOSEC) 20 MG delayed release capsule Take 1 capsule by mouth every morning (before breakfast) 90 capsule 1    albuterol sulfate  (90 Base) MCG/ACT inhaler Inhale 2 puffs into the lungs every 6 hours as needed for Wheezing 1 Inhaler 3    Multiple Vitamins-Minerals (THERAPEUTIC MULTIVITAMIN-MINERALS) tablet Take 1 tablet by mouth daily      Misc Natural Products (OSTEO BI-FLEX ADV TRIPLE ST PO) Take by mouth daily      Multiple Vitamins-Minerals (OCUVITE-LUTEIN PO) Take by mouth daily      risperiDONE (RISPERDAL) 1 MG tablet Take 1 mg by mouth every evening  (Patient not taking: Reported on 12/15/2021)      acetaminophen (TYLENOL) 500 MG tablet Take 1,000 mg by mouth every 6 hours as needed for Pain       No current facility-administered medications on file prior to visit. Vitals from current and previous visits:  Temp 98.1 °F (36.7 °C)   Ht 5' 3\" (1.6 m)   Wt 187 lb (84.8 kg)   BMI 33.13 kg/m²     -Body mass index is 33.13 kg/m². 30-34.9 - Obesity Grade I.   -Weight goal: lose weight.      Nutrition Diagnosis:   Food and nutrition-related knowledge deficit related to Learning disability/cognitive limitations as evidenced by Food recall. Intervention:  -Impression: Went through a pictorial grocery list with him, because he states he does not know what to buy. But as we were going through the pictures - he states he has many healthy foods at home - selection of protein in the freezer, such as, HB and Chicken breasts, plus he states he has fresh fruit, yogurt  And frozen vegetables at home. He uses fresh garlic in cooking.    -Instructed the patient on: Plate Method, to represent 3 food groups at each meal.  Eating 3x/day. -Handouts given for: pictorial grocery list, pictorial smoothie recipe, pictorial sheet pan meals, plate method and simplified list of carbohydrate foods. Patient Instructions   Consider getting home delivered meals. Call [address] [place] [address]# [phone]  - it may cost you $6-7.50/meal.    Jyothi Avalos idea to have an omelette for breakfast and or have at any meal.    Eat 2-3 foods at each time you eat. - Eat 3 meals/day. (Refer to your handout of the picture of the plate)    Try skillet meals. Good idea - if you have a HB also do fried potatoes and peppers/onions with this.      -Nutrition prescription: 1500 calories/day, 150 - 165 g carbs/day. Adj wt. 140# (64 kg)    Comprehension verified using teachback method. Monitoring/Evaluation:   -Followup visit: will arrange appt with Emiliano Gray  with dietitian.   -Receptiveness to education/goals: Agreeable.  -Evaluation of education: Needs reinforcement.  -Readiness to change: precontemplative- eating 3x/day and representing 3 food groups at each meal.  -Expected compliance: fair. Thank you for your referral of this patient. Total time involved in direct patient education: 60 minutes for initial MNT visit.

## 2022-02-11 ENCOUNTER — CARE COORDINATION (OUTPATIENT)
Dept: CARE COORDINATION | Age: 62
End: 2022-02-11

## 2022-02-11 NOTE — CARE COORDINATION
Pt called to inform me that he is home from [facility] Dr. Kimberley singh and pt doesn't have to do the exercises, it is healing nicely, and now he can even shower. Pt has a f/u on 5/13 at 1:00. Will schedule transportation at a later date.

## 2022-02-14 ENCOUNTER — CARE COORDINATION (OUTPATIENT)
Dept: CARE COORDINATION | Age: 62
End: 2022-02-14

## 2022-02-14 RX ORDER — ATORVASTATIN CALCIUM 80 MG/1
80 TABLET, FILM COATED ORAL DAILY
COMMUNITY
End: 2022-03-08 | Stop reason: SDUPTHER

## 2022-02-14 ASSESSMENT — ENCOUNTER SYMPTOMS: DYSPNEA ASSOCIATED WITH: EXERTION

## 2022-02-14 NOTE — CARE COORDINATION
Spoke with pt today. He wanted to verify medications that he is currently taking. Pt asking about Gabapentin. Currently out of medication. Unsure if he is to continue taking it. Please clarify. If so pt will need new script sent to [facility]. Please advise. Pt's pain has improved since cubital tunnel surgery. I also wanted to inform you that pt is taking Atorvastatin 80mg daily. I added medication back to med list.  Seems to be tolerating medication without difficulty.

## 2022-02-14 NOTE — CARE COORDINATION
Ambulatory Care Coordination Note  2/14/2022  CM Risk Score: 6  Charlson 10 Year Mortality Risk Score: 23%     ACC: Cristina Contreras, RN    Summary Note: Abbey Segovia is being followed by care coordination for education and assistance in managing his chronic conditions. Pt has h/o: intermittent explosive disorder, COPD, DM. Spoke with Duane today for f/u. Pt s/p cubital tunnel surgery and cataract surgery. Reports that he is doing well. Pt had f/u with ortho last wk and reports that everything went well. Pt has some n/t in left 5th finger but aware that this is normal and should improve with time. Pt has all ready noticed improvement in his pain since procedure. No longer having to take pain medications. Pt asking if he is to continue Gabapentin. If so he is out and needs new script. I will discuss this with PCP. Cataract-has another f/u on 2/24. Denies problems with eye drop regimen. Reports that he is taking them as prescribed. Has noticed some improvement in his vision since procedure. Pt will arrange transportation today for 2/24 appt. Met with dietician last wk. Wanting to lose wt. Gets overwhelmed in grocery store as he has limited reading ability and is just unsure what healthy food options he should be choosing. Collaborating with dietician and José. Message left with Herschel Ormond to see if she is able to take pt to grocery store this wk or next and assist him in making healthy choices. Encouraged Duane to do the most of his shopping in the outer perimeter of store. Reviewed frozen dinner options. Home delivered meals discussed at dietician appt. Pt is not interested at this time d/t cost.   Following with Goldie Carrion for behavioral health issues. Taking Trintellix and Risperdal.  Continues to work with psych provider, CM, and counselor  COPD: breathing at baseline. Denies use of inhaler. Reviewed medications today. Pt shared that he is taking atorvastatin.   Was on at one time but had stopped. Reports compliance with medication. Will make PCP aware. Plan of care:  Weekly to bi weekly f/u calls  Continue working with [address] working with SUSANNA  Arrange transport for 2/24 eye appt  Has f/u in May with ortho  Continue meds as prescribed  Will clarify Gabapentin and inform PCP pt is back on atorvastatin  Try to increase activity  Work with Uzair Nava and dietician on making healthier food choices  Consider pill packs in future. COPD Assessment    Does the patient have a nebulizer?: No  Does the patient use a space with inhaled medications?: No            Symptoms:  None: Yes      Symptom course: stable  Breathlessness: exertion  Increase use of rapid acting/rescue inhaled medications?: No  Change in chronic cough?: No/At Baseline  Change in sputum?: No/At Baseline  Self Monitoring - SaO2: No  Have you had a recent diagnosis of pneumonia either by PCP or at a hospital?: No      and   General Assessment    Do you have any symptoms that are causing concern?: Yes  Progression since Onset: Gradually Improving  Reported Symptoms:  (Comment: s/p cataract surgery and cubital tunnel surgery)         General Assessment    Do you have any symptoms that are causing concern?: Yes  Progression since Onset: Gradually Improving  Reported Symptoms:  (Comment: s/p cataract surgery and cubital tunnel surgery)                 Care Coordination Interventions    Program Enrollment: Complex Care  Referral from Primary Care Provider: Yes  Suggested Interventions and Community Resources  [facility]: Completed (Comment: Jennifer Bares. Radha Gene. Has  -[facility] [phone] w/ Jennifer Davids)  Meals on Wheels: Declined  Medi Set or Pill Pack: Declined (Comment: gets meds via home delivery.  not in blister packs)  Senior Services: Completed (Comment: Marie Vargas)  Social Work: Completed (Comment: SUSANNA Hou working with pt. )  Transportation Support: Completed  Zone Management Tools: Take 1 tablet by mouth daily   Yes Historical Provider, MD   risperiDONE (RISPERDAL) 1 MG tablet Take 1 mg by mouth every evening    Yes Historical Provider, MD   gabapentin (NEURONTIN) 300 MG capsule Take 300 mg by mouth 2 times daily. Historical Provider, MD   cetirizine (ZYRTEC) 10 MG tablet Take 1 tablet by mouth daily  Patient not taking: Reported on 2/14/2022 1/27/22   Corrine Castleman, MD   fluticasone [facility]) 50 MCG/ACT nasal spray 2 sprays by Each Nostril route daily  Patient not taking: Reported on 2/14/2022 1/27/22   Corrine Castleman, MD   gabapentin (NEURONTIN) 300 MG capsule Take 1 capsule by mouth 2 times daily for 60 days.  11/15/21 1/14/22  Corrine Castleman, MD   albuterol sulfate  (90 Base) MCG/ACT inhaler Inhale 2 puffs into the lungs every 6 hours as needed for Wheezing 7/1/21   Corrine Castleman, MD   Misc Natural Products (OSTEO BI-FLEX ADV TRIPLE ST PO) Take by mouth daily  Patient not taking: Reported on 2/14/2022    Historical Provider, MD   Multiple Vitamins-Minerals (OCUVITE-LUTEIN PO) Take by mouth daily  Patient not taking: Reported on 2/14/2022    Historical Provider, MD   acetaminophen (TYLENOL) 500 MG tablet Take 1,000 mg by mouth every 6 hours as needed for Pain    Historical Provider, MD       Future Appointments   Date Time Provider Ifrah Lopez   3/8/2022  9:30 AM Corrine Castleman, MD [facility]   10/25/2022 11:20 AM STR CT IMAGING RM1  [facility] EXP STR Radiolog

## 2022-02-14 NOTE — TELEPHONE ENCOUNTER
Neurontin was given for nerve pain - if he is significantly better after surgery - he does not need to continue this.

## 2022-02-14 NOTE — TELEPHONE ENCOUNTER
Pt notified that since nerve pain is significantly better since surgery he does not need to continue with gabapentin. Pt verbalizes understanding.

## 2022-02-17 ENCOUNTER — CARE COORDINATION (OUTPATIENT)
Dept: CARE COORDINATION | Age: 62
End: 2022-02-17

## 2022-02-17 NOTE — CARE COORDINATION
Pt called stating that he went to arrange transportation for his 2/24 appt to find out that it has all ready been made. Pt reports that he doesn't recall arranging it but reports that he was informed transportation is set up for that day. Pt frustrated as he feels more forgetful. Pt has had multiple appts to try to keep track of recently. Denies further needs at this time.

## 2022-02-17 NOTE — CARE COORDINATION
Pt called stating he called his ins. To set up transportation for 2/24 appt to [facility] office and the gentleman said that it was \"already scheduled. \" Pt said he was worried because he didn't remember setting it up, advised pt to call them back and inquire about the address. Pt voiced understanding.

## 2022-02-17 NOTE — CARE COORDINATION
Called pt to verify transportation. Pt stated, \"everything checked out ok. \" they did have his correct address for next thurs appt. Advised pt to not worry about it , transportation is taken care of.

## 2022-02-18 ENCOUNTER — CARE COORDINATION (OUTPATIENT)
Dept: CARE COORDINATION | Age: 62
End: 2022-02-18

## 2022-02-18 NOTE — CARE COORDINATION
Received update from José Bustamante. She is going to meet with pt 2/22/22 and review education material received from dietician and potentially take pt to grocery.

## 2022-02-25 ENCOUNTER — CARE COORDINATION (OUTPATIENT)
Dept: CARE COORDINATION | Age: 62
End: 2022-02-25

## 2022-02-25 NOTE — CARE COORDINATION
Called pt 2/24 to discuss eye appt. Pt stated the \"Dr. Mikhail Puentes it looks good. \"  Inquired about elbow and pt stated it \"still hurts a bit but I think is it getting better. \" No current needs or concerns. Advised pt to call me if he needs anything. Pt voiced understanding.

## 2022-02-25 NOTE — CARE COORDINATION
Ambulatory Care Coordination Note  2/25/2022  CM Risk Score: 5  Charlson 10 Year Mortality Risk Score: 23%     ACC: Collins Cee RN    Summary Note: Lord Waldrop is being followed by care coordination for education and assistance in managing his chronic conditions. Pt has h/o: COPD, DM, intermittent explosive disorder. Spoke with Duane today. Pt reports that he is doing well. Pt is s/p cataract surgery. Pt had f/u with eye doctor this wk. Pt reports that he is able to see better. Still doing eye drops. DM: working with dietician. Angelo Faes CM took pt to grocery this wk. Pt reports that he was able to get some healthier foods. Norm Patterson will continue to assist pt with his grocery shopping. S/p cubital tunnel surgery. Pt reports that strength is improving. Has some n/t yet but aware that this is normal and will improve with time. Pt will f/u in [place] in 2-3 mths. COPD: breathing at baseline. No new congestion or cough. Plan of care:  F/u with PCp 3/8 as scheduled. Pt will arrange transport next wk. Continue using eye drops as prescribed. Continue working with [facility] counselor, CM, and psych provider  Take medications as prescribed  Reinforce importance of making healthy food choices. Work to increase activity  Diabetes Assessment    Medic Alert ID: No  Meal Planning: Avoidance of concentrated sweets   How often do you test your blood sugar?: No Testing   Do you have barriers with adherence to non-pharmacologic self-management interventions? (Nutrition/Exercise/Self-Monitoring): No   Have you ever had to go to the ED for symptoms of low blood sugar?: No           and   General Assessment    Do you have any symptoms that are causing concern?: Yes  Progression since Onset: Gradually Improving  Reported Symptoms:  (Comment: s/p cataract surgery.   vision has improved since procedure. )               Care Coordination Interventions    Program Enrollment: Complex Care  Referral from Primary Care Provider: Yes  Suggested Interventions and Community Resources  Behavorial Health: Completed (Comment: DELBERT. Gladys Post. Has [facility] [phone] w/ DELBERT)  Meals on Wheels: Declined  Medi Set or Pill Pack: Declined (Comment: gets meds via home delivery. not in blister packs)  Senior Services: Completed (Comment: [employer] Membership)  Social Work: Completed (Comment: SUSANNA Hou working with pt. )  Transportation Support: Completed  Zone Management Tools: Completed (Comment: DM /COPD zone tools)  Other Services or Interventions: has Renee Schmid         Goals Addressed                 This Visit's Progress     Conditions and Symptoms   On track     I will schedule office visits, as directed by my provider. I will keep my appointment or reschedule if I have to cancel. I will notify my provider of any barriers to my plan of care. I will follow my Zone Management tool to seek urgent or emergent care. I will notify my provider of any symptoms that indicate a worsening of my condition. Barriers: lack of support, overwhelmed by complexity of regimen, and stress  Plan for overcoming my barriers: education and assistance from ACM, PCP, SW.  Utilize transportation services  Confidence: 8/10  Anticipated Goal Completion Date: 3/15/22              Prior to Admission medications    Medication Sig Start Date End Date Taking?  Authorizing Provider   atorvastatin (LIPITOR) 80 MG tablet Take 80 mg by mouth daily    Historical Provider, MD   VORTIoxetine HBr (TRINTELLIX) 20 MG TABS tablet Take 20 mg by mouth daily    Historical Provider, MD   cetirizine (ZYRTEC) 10 MG tablet Take 1 tablet by mouth daily  Patient not taking: Reported on 2/14/2022 1/27/22   Sara Donohue MD   fluticasone [facility]) 50 MCG/ACT nasal spray 2 sprays by Each Nostril route daily  Patient not taking: Reported on 2/14/2022 1/27/22   Sara Donohue MD   prednisoLONE acetate (PRED FORTE) 1 % ophthalmic suspension SHAKE LIQUID AND INSTILL 1 DROP IN LEFT EYE FOUR TIMES DAILY 12/22/21   Historical Provider, MD   ofloxacin (OCUFLOX) 0.3 % solution INSTILL 1 DROP IN LEFT EYE FOUR TIMES DAILY 12/21/21   Historical Provider, MD   metoprolol succinate (TOPROL XL) 50 MG extended release tablet TAKE 1 TABLET BY MOUTH EVERY DAY 11/15/21   Kylee Chester MD   levothyroxine (SYNTHROID) 50 MCG tablet Take 1 tablet by mouth daily 11/15/21   Kylee Chester MD   losartan (COZAAR) 50 MG tablet TAKE 1 TABLET BY MOUTH EVERY DAY 11/15/21   Kylee Chester MD   omeprazole (PRILOSEC) 20 MG delayed release capsule Take 1 capsule by mouth every morning (before breakfast) 11/15/21   Kylee Chester MD   albuterol sulfate  (90 Base) MCG/ACT inhaler Inhale 2 puffs into the lungs every 6 hours as needed for Wheezing 7/1/21   Kylee Chester MD   Multiple Vitamins-Minerals (THERAPEUTIC MULTIVITAMIN-MINERALS) tablet Take 1 tablet by mouth daily    Historical Provider, MD   [facility] Natural Products (OSTEO BI-FLEX ADV TRIPLE ST PO) Take by mouth daily  Patient not taking: Reported on 2/14/2022    Historical Provider, MD   Multiple Vitamins-Minerals (OCUVITE-LUTEIN PO) Take by mouth daily  Patient not taking: Reported on 2/14/2022    Historical Provider, MD   risperiDONE (RISPERDAL) 1 MG tablet Take 1 mg by mouth every evening     Historical Provider, MD   acetaminophen (TYLENOL) 500 MG tablet Take 1,000 mg by mouth every 6 hours as needed for Pain    Historical Provider, MD       Future Appointments   Date Time Provider Ifrah Lopez   3/8/2022  9:30 AM Kylee Chester MD SRPX Physic [facility] - [facility]   10/25/2022 11:20 AM STR CT IMAGING RM1  [facility] OUT EXP STR Radiolog

## 2022-03-04 ENCOUNTER — CARE COORDINATION (OUTPATIENT)
Dept: CARE COORDINATION | Age: 62
End: 2022-03-04

## 2022-03-08 ENCOUNTER — OFFICE VISIT (OUTPATIENT)
Dept: INTERNAL MEDICINE CLINIC | Age: 62
End: 2022-03-08
Payer: MEDICARE

## 2022-03-08 VITALS
HEART RATE: 70 BPM | HEIGHT: 63 IN | TEMPERATURE: 97.4 F | DIASTOLIC BLOOD PRESSURE: 82 MMHG | WEIGHT: 184 LBS | BODY MASS INDEX: 32.6 KG/M2 | SYSTOLIC BLOOD PRESSURE: 120 MMHG

## 2022-03-08 DIAGNOSIS — E66.9 OBESITY (BMI 30-39.9): ICD-10-CM

## 2022-03-08 DIAGNOSIS — G56.22 CUBITAL TUNNEL SYNDROME ON LEFT: ICD-10-CM

## 2022-03-08 DIAGNOSIS — I10 ESSENTIAL HYPERTENSION: ICD-10-CM

## 2022-03-08 DIAGNOSIS — E78.2 MIXED HYPERLIPIDEMIA: Primary | ICD-10-CM

## 2022-03-08 DIAGNOSIS — K21.9 GASTROESOPHAGEAL REFLUX DISEASE, UNSPECIFIED WHETHER ESOPHAGITIS PRESENT: ICD-10-CM

## 2022-03-08 DIAGNOSIS — E03.9 ACQUIRED HYPOTHYROIDISM: ICD-10-CM

## 2022-03-08 PROCEDURE — 99214 OFFICE O/P EST MOD 30 MIN: CPT | Performed by: INTERNAL MEDICINE

## 2022-03-08 RX ORDER — METOPROLOL SUCCINATE 50 MG/1
TABLET, EXTENDED RELEASE ORAL
Qty: 90 TABLET | Refills: 1 | Status: SHIPPED | OUTPATIENT
Start: 2022-03-08

## 2022-03-08 RX ORDER — OMEPRAZOLE 20 MG/1
20 CAPSULE, DELAYED RELEASE ORAL
Qty: 90 CAPSULE | Refills: 1 | Status: SHIPPED | OUTPATIENT
Start: 2022-03-08

## 2022-03-08 RX ORDER — ATORVASTATIN CALCIUM 80 MG/1
80 TABLET, FILM COATED ORAL DAILY
Qty: 90 TABLET | Refills: 1 | Status: CANCELLED | OUTPATIENT
Start: 2022-03-08

## 2022-03-08 RX ORDER — LOSARTAN POTASSIUM 50 MG/1
TABLET ORAL
Qty: 90 TABLET | Refills: 1 | Status: SHIPPED | OUTPATIENT
Start: 2022-03-08 | End: 2022-07-06 | Stop reason: SDUPTHER

## 2022-03-08 RX ORDER — ATORVASTATIN CALCIUM 80 MG/1
80 TABLET, FILM COATED ORAL DAILY
Qty: 30 TABLET | Refills: 5 | Status: SHIPPED | OUTPATIENT
Start: 2022-03-08

## 2022-03-08 RX ORDER — LEVOTHYROXINE SODIUM 0.05 MG/1
50 TABLET ORAL DAILY
Qty: 90 TABLET | Refills: 1 | Status: SHIPPED | OUTPATIENT
Start: 2022-03-08

## 2022-03-08 ASSESSMENT — COLUMBIA-SUICIDE SEVERITY RATING SCALE - C-SSRS
1. WITHIN THE PAST MONTH, HAVE YOU WISHED YOU WERE DEAD OR WISHED YOU COULD GO TO SLEEP AND NOT WAKE UP?: NO
6. HAVE YOU EVER DONE ANYTHING, STARTED TO DO ANYTHING, OR PREPARED TO DO ANYTHING TO END YOUR LIFE?: NO
2. HAVE YOU ACTUALLY HAD ANY THOUGHTS OF KILLING YOURSELF?: NO

## 2022-03-08 ASSESSMENT — PATIENT HEALTH QUESTIONNAIRE - PHQ9
SUM OF ALL RESPONSES TO PHQ QUESTIONS 1-9: 3
1. LITTLE INTEREST OR PLEASURE IN DOING THINGS: 0
6. FEELING BAD ABOUT YOURSELF - OR THAT YOU ARE A FAILURE OR HAVE LET YOURSELF OR YOUR FAMILY DOWN: 0
5. POOR APPETITE OR OVEREATING: 0
3. TROUBLE FALLING OR STAYING ASLEEP: 1
8. MOVING OR SPEAKING SO SLOWLY THAT OTHER PEOPLE COULD HAVE NOTICED. OR THE OPPOSITE, BEING SO FIGETY OR RESTLESS THAT YOU HAVE BEEN MOVING AROUND A LOT MORE THAN USUAL: 0
9. THOUGHTS THAT YOU WOULD BE BETTER OFF DEAD, OR OF HURTING YOURSELF: 1
10. IF YOU CHECKED OFF ANY PROBLEMS, HOW DIFFICULT HAVE THESE PROBLEMS MADE IT FOR YOU TO DO YOUR WORK, TAKE CARE OF THINGS AT HOME, OR GET ALONG WITH OTHER PEOPLE: 0
SUM OF ALL RESPONSES TO PHQ QUESTIONS 1-9: 2
SUM OF ALL RESPONSES TO PHQ9 QUESTIONS 1 & 2: 0
4. FEELING TIRED OR HAVING LITTLE ENERGY: 1
7. TROUBLE CONCENTRATING ON THINGS, SUCH AS READING THE NEWSPAPER OR WATCHING TELEVISION: 0
SUM OF ALL RESPONSES TO PHQ QUESTIONS 1-9: 3
2. FEELING DOWN, DEPRESSED OR HOPELESS: 0
SUM OF ALL RESPONSES TO PHQ QUESTIONS 1-9: 3

## 2022-03-08 ASSESSMENT — ANXIETY QUESTIONNAIRES
6. BECOMING EASILY ANNOYED OR IRRITABLE: 1-SEVERAL DAYS
5. BEING SO RESTLESS THAT IT IS HARD TO SIT STILL: 1-SEVERAL DAYS
GAD7 TOTAL SCORE: 5
1. FEELING NERVOUS, ANXIOUS, OR ON EDGE: 0-NOT AT ALL
4. TROUBLE RELAXING: 1-SEVERAL DAYS
2. NOT BEING ABLE TO STOP OR CONTROL WORRYING: 0-NOT AT ALL
3. WORRYING TOO MUCH ABOUT DIFFERENT THINGS: 1-SEVERAL DAYS
7. FEELING AFRAID AS IF SOMETHING AWFUL MIGHT HAPPEN: 1-SEVERAL DAYS

## 2022-03-08 NOTE — PROGRESS NOTES
Intern Wally Wharton- Patient was screened today for depression and anxiety symptoms using the PHQ-9 and AMOR-7 tools. Patient reports being tired most of the time and feels that it is due to his medications. Patient stated that he does not like feeling tired and, \"It is not like me. \" He sees a doctor at [facility] who prescribes him Risperdal and Trintellix. Patient will discuss with the doctor his symptoms but does not know when his next appointment there is. Patient stated that he worries about \"my lady friend that is in a nursing home. \" Patient reports sometime feeling like he would be better off dead but stated that he would not hurt himself and denies current suicide ideation.

## 2022-03-11 ENCOUNTER — CARE COORDINATION (OUTPATIENT)
Dept: CARE COORDINATION | Age: 62
End: 2022-03-11

## 2022-03-11 NOTE — CARE COORDINATION
Attempted to reach Duane today for care coordination f/u. No answer. Message left to return call. Attempted to reach [facility]. No answer. Message left requesting return call.

## 2022-03-14 ENCOUNTER — CARE COORDINATION (OUTPATIENT)
Dept: CARE COORDINATION | Age: 62
End: 2022-03-14

## 2022-03-14 NOTE — CARE COORDINATION
Called pt to discuss concerns. Pt was \"resting in bed. \"  Discussed food options and inquired if SANDOR Wardia from [facility] has been helping him get groceries. Pt stated, \"yes I need to call her I am getting low, she helped me 2x and here I was getting the wrong TV dinners. \"  Pt stated he is taking his meds. His eye is better and no pain in elbow but 2 fingers still \"tingly and numb. \"     Plan of care:  Support pt in community  Assist pt with resources  Transportation

## 2022-03-17 ENCOUNTER — TELEPHONE (OUTPATIENT)
Dept: INTERNAL MEDICINE CLINIC | Age: 62
End: 2022-03-17

## 2022-03-17 NOTE — TELEPHONE ENCOUNTER
----- Message from Chio Prater MD sent at 3/14/2022 11:38 PM EDT -----  Call  at [facility] - noted he is having abnormal tongue movements (tardive dyskinesia) and may be from psych medication. Just wanted them to be aware.

## 2022-03-17 NOTE — TELEPHONE ENCOUNTER
Left message for Paty Viveros  at [facility] that at patient's recent appointment  He was complaining of dry mouth and abnormal tongue movement . Dr. Shana Busby wanted you aware in case in is due to patient's psych medications . Call the office if any questions .

## 2022-03-21 ENCOUNTER — CARE COORDINATION (OUTPATIENT)
Dept: CARE COORDINATION | Age: 62
End: 2022-03-21

## 2022-03-21 NOTE — CARE COORDINATION
Dietician samantha arranged for 3/29 at 1130am with Bran Arce. I spoke with Milan Cloud CM. That appt times works for her. Informed pt. [facility] She informs me that she is taking pt to grocery store again tomorrow. Pt is seeing psych provider 3/24.

## 2022-03-29 ENCOUNTER — CARE COORDINATION (OUTPATIENT)
Dept: CARE COORDINATION | Age: 62
End: 2022-03-29

## 2022-03-29 ENCOUNTER — OFFICE VISIT (OUTPATIENT)
Dept: INTERNAL MEDICINE CLINIC | Age: 62
End: 2022-03-29
Payer: MEDICARE

## 2022-03-29 ENCOUNTER — NURSE ONLY (OUTPATIENT)
Dept: LAB | Age: 62
End: 2022-03-29

## 2022-03-29 VITALS — HEIGHT: 63 IN | BODY MASS INDEX: 32.89 KG/M2 | WEIGHT: 185.6 LBS | TEMPERATURE: 98.1 F

## 2022-03-29 DIAGNOSIS — E78.2 MIXED HYPERLIPIDEMIA: ICD-10-CM

## 2022-03-29 DIAGNOSIS — E66.9 OBESITY (BMI 30-39.9): ICD-10-CM

## 2022-03-29 DIAGNOSIS — E11.9 TYPE 2 DIABETES MELLITUS WITHOUT COMPLICATION, WITHOUT LONG-TERM CURRENT USE OF INSULIN (HCC): ICD-10-CM

## 2022-03-29 LAB
ALT SERPL-CCNC: 42 U/L (ref 11–66)
CHOLESTEROL, TOTAL: 182 MG/DL (ref 100–199)
HDLC SERPL-MCNC: 51 MG/DL
LDL CHOLESTEROL CALCULATED: 84 MG/DL
TRIGL SERPL-MCNC: 237 MG/DL (ref 0–199)

## 2022-03-29 PROCEDURE — 97803 MED NUTRITION INDIV SUBSEQ: CPT | Performed by: DIETITIAN, REGISTERED

## 2022-03-29 RX ORDER — DIVALPROEX SODIUM 125 MG/1
250 CAPSULE, COATED PELLETS ORAL NIGHTLY
COMMUNITY

## 2022-03-29 NOTE — CARE COORDINATION
Pt called today asking about dietician appt. Informed him appt is today at 1130am.  Pt is aware that Juju Darius will be taking him to appt and attending appt with him. Denies further questions or needs.

## 2022-03-29 NOTE — PATIENT INSTRUCTIONS
Make sure you drink enough water each day - 4 bottles/day. Contact area agency of aging 3 - [phone]  - it may cost $6 - 7.50    Lean Cuisine or Healthy choice Guidelines  - <10 gms added sugar  - 30 - 45 gms total carbohydrates  - <600 mg sodium/meal.    Buy frozen steamer bags of vegetables to put in microwave and have with lunch and supper. - can also open up canned vegetables to heat up and eat with a meal.    Get exercise everyday - walk 10 - 15 minutes/day. Try to bring a 3-5 day food log before his next dietitian appt. on 6/21/22.

## 2022-03-29 NOTE — PROGRESS NOTES
[address] [place] [address]. [address] [place] [address] James., Mariia De León, [address]  [phone] (phone)  [phone] (fax)    Patient Name: Shavon Luther. Date of Birth: [id number]. MRN: [id number]      Assessment: Patient is a 57 y.o. male seen for follow-up MNT visit for Type 2 DB and Obesity.     -Nutritionally relevant labs:   Lab Results   Component Value Date/Time    LABA1C 5.9 (H) 03/18/2021 02:26 PM    LABA1C 5.7 06/22/2020 09:13 AM    LABA1C 6.0 (H) 03/17/2020 09:24 AM    LABA1C 6.1 09/26/2018 12:13 PM    LABA1C 6.9 (H) 05/08/2018 02:05 PM    GLUCOSE 96 01/28/2022 05:15 PM    GLUCOSE 86 01/27/2022 03:58 PM    GLUCOSE 164 (H) 10/25/2016 11:40 AM    GLUCOSE 121 (H) 10/06/2016 06:33 PM    CHOL 238 (H) 03/26/2021 12:04 PM    HDL 35 03/26/2021 12:04 PM    LDLCALC 167 03/26/2021 12:04 PM    TRIG 179 03/26/2021 12:04 PM     Pt here with Chelsea Piña mgr.   -Blood sugar trends: Does not check his BS at home.  -Food recall:   Breakfast: oatmeal - 1 pkt, cut up a banana - 1/2 or whole or frozen blueberries or strawberries. Sometimes has a yogurt in the morning with water. Lunch: unsure. Dinner: last night had cauliflower pizza from [employer] restaurant - 10 inch pizza ate 1/2. Snacks: Has not been buying snacks. Recently purchased Cereal, milk and oatmeal and tangelos from grocery store. Gerardo Seals and patient go to the grocery store about once a week. He buys Frozen lean Cuisine meals and Gerardo Seals states he looks at the nutrition facts label and makes sure no too high in sugars and carbs. -Main Beverages: Water - drinks bottled water. Has dry mouth all the time. Could not tell me the # of bottled bustos he drinks/day. Denies drinking pop, juice or milk.     -Impression of Dietary Intake: on average, 1-3 meals per day, lacking routine intake of fruits and vegetables    Current Outpatient Medications on File Prior to Visit   Medication Sig Dispense Refill    Diagnosis:   Food and nutrition-related knowledge deficit related to Learning disability/cognitive limitations as evidenced by Conditions associated with a diagnosis or treatment: Type 2 DB. Intervention:  -Impression: Reviewed handouts and provided copies to Wang Rodriguez, given to patient at his initial appt with the dietitian.     -Instructed the patient on: Meal Planning for Regular, Balanced Meals & Snacks, Plate Method and The Importance of Regular Physical Activity, making one homemade meal each week. -Handouts given for: pictorial grocery list, plate picture with simplified carb handout, pictorial sheet pan meals. Patient Instructions   Make sure you drink enough water each day - 4 bottles/day. Contact area agency of aging 3 - [phone]  - it may cost $6 - 7.50    Lean Cuisine or Healthy choice Guidelines  - <10 gms added sugar  - 30 - 45 gms total carbohydrates  - <600 mg sodium/meal.    Buy frozen steamer bags of vegetables to put in microwave and have with lunch and supper. - can also open up canned vegetables to heat up and eat with a meal.    Get exercise everyday - walk 10 - 15 minutes/day. Try to bring a 3-5 day food log before his next dietitian appt. on 6/21/22.    -Nutrition prescription: 1500 calories/day, 150 - 165 g carbs/day. Comprehension verified using teachback method. Monitoring/Evaluation:   -Followup visit: 3 mo with dietitian.   -Receptiveness to education/goals: Agreeable.  -Evaluation of education: Needs reinforcement.  -Readiness to change: precontemplative- eating 3 meals/day following plate picture, adding veggies with lunch and supper, walking for exercise or have another exercise plan in place.  -Expected compliance: fair. Thank you for your referral of this patient. Total time involved in direct patient education: 30 minutes for follow-up MNT visit.

## 2022-03-31 ENCOUNTER — TELEPHONE (OUTPATIENT)
Dept: INTERNAL MEDICINE CLINIC | Age: 62
End: 2022-03-31

## 2022-03-31 NOTE — TELEPHONE ENCOUNTER
Left message per Hipaa that dr. Yu Speaks reviewed his lab results and labs looked good . Continue medications as you have been taking . Call the office if any questions .

## 2022-03-31 NOTE — TELEPHONE ENCOUNTER
----- Message from Colby Castrejon MD sent at 3/30/2022 10:33 PM EDT -----  Let him know labs look good - continue my medications just as they are.

## 2022-04-06 ENCOUNTER — CARE COORDINATION (OUTPATIENT)
Dept: CARE COORDINATION | Age: 62
End: 2022-04-06

## 2022-04-14 ENCOUNTER — CARE COORDINATION (OUTPATIENT)
Dept: CARE COORDINATION | Age: 62
End: 2022-04-14

## 2022-04-14 ASSESSMENT — ENCOUNTER SYMPTOMS: DYSPNEA ASSOCIATED WITH: EXERTION

## 2022-04-14 NOTE — CARE COORDINATION
Ambulatory Care Coordination Note  4/14/2022  CM Risk Score: 6  Charlson 10 Year Mortality Risk Score: 23%     ACC: Stacy Gauthier RN    Summary Note: Makenzie Garza is being followed by care coordination for education and assistance in managing his chronic conditions. Pt has h/o: intermittent explosive disorder, COPD, DM. Spoke with pt today for f/u. Pt reports that he is doing well. DM: continues to work with dietician. Blanca Foley has been attending dietician appts and taking pt to grocery weekly. Pt reports that this has been a huge help and having his CM there makes the grocery store not feel so overwhelming. Reports that dietary habits have improved. Blanca Foley continues to take pt to see his counselor and his provider. Reports compliance with medications. Pt has upcoming f/u appt in [facility] for ortho f/u. Pt unable to locate time of appt. Advised pt to look for his d/c papers. Aware to call if unable to locate. SW will assist if needed to help pt get transport arranged. Reports that he is possibly going to be going to [facility] and then on to [place] with Alan Bolaños, friend that used to be his neighbor. Planning trip for June. Denies ankle bothering him recently. May need ankle surgery in future. Pt aware to call if starting to cause problems. Has been seen by ortho for ankle in past in [facility]. COPD: breathing remains at baseline. No new cough or congestion. Plan of care:  Continue working with dietician and [place] having CM assist with grocery shopping  Continue close f/u with Nilton Farris  Call if needing transport arranged for [place] [facility] appt. Work on increasing activity  Collaborate with Nilton Farris CM PRN.   Evaluate readiness for graduation from care coordination  Diabetes Assessment    Medic Alert ID: No  Meal Planning: Avoidance of concentrated sweets   How often do you test your blood sugar?: No Testing   Do you have barriers with adherence to non-pharmacologic self-management interventions? (Nutrition/Exercise/Self-Monitoring): No   Have you ever had to go to the ED for symptoms of low blood sugar?: No       Do you have hyperglycemia symptoms?: No   Do you have hypoglycemia symptoms?: No   Blood Sugar Monitoring Regimen: Not Testing   Blood Sugar Trends: No Change       and   COPD Assessment    Does the patient have a nebulizer?: No  Does the patient use a space with inhaled medications?: No            Symptoms:  None: Yes      Symptom course: stable  Breathlessness: exertion  Increase use of rapid acting/rescue inhaled medications?: No  Change in chronic cough?: No/At Baseline  Change in sputum?: No/At Baseline  Self Monitoring - SaO2: No  Have you had a recent diagnosis of pneumonia either by PCP or at a hospital?: No                 Care Coordination Interventions    Program Enrollment: Complex Care  Referral from Primary Care Provider: Yes  Suggested Interventions and [facility]: Completed (Comment: Lalo Kay. Tarun Foote. Has [facility] [phone] w/ Lalo Kay)  Meals on Wheels: Declined  Medi Set or Pill Pack: Declined (Comment: gets meds via home delivery. not in blister packs)  Pharmacist: Declined  Senior Services: Completed (Comment: [employer] Membership)  Social Work: Completed (Comment: SUSANNA Hou working with pt. )  Transportation Support: Completed  Zone Management Tools: Completed (Comment: DM /COPD zone tools)  Other Services or Interventions: has Renee Schmid         Goals Addressed                 This Visit's Progress    [address]   On track     I will work towards the following [facility] goals: I will continue to follow up with my psychologist /counselor and/or psychiatrist. and I will take my medications daily as prescribed.     Barriers: overwhelmed by complexity of regimen and lack of education  Plan for overcoming my barriers: support and education from [facility], PCP, Lalo Kay CM  Confidence: 8/10  Anticipated Goal Completion Date: 6/14/22       COMPLETED: Conditions and Symptoms        I will schedule office visits, as directed by my provider. I will keep my appointment or reschedule if I have to cancel. I will notify my provider of any barriers to my plan of care. I will follow my Zone Management tool to seek urgent or emergent care. I will notify my provider of any symptoms that indicate a worsening of my condition. Barriers: lack of support, overwhelmed by complexity of regimen, and stress  Plan for overcoming my barriers: education and assistance from ACM, PCP, SW.  Utilize transportation services  Confidence: 8/10  Anticipated Goal Completion Date: 3/15/22              Prior to Admission medications    Medication Sig Start Date End Date Taking?  Authorizing Provider   divalproex (DEPAKOTE SPRINKLES) 125 MG capsule Take 125 mg by mouth 2 times daily    Historical Provider, MD   metoprolol succinate (TOPROL XL) 50 MG extended release tablet TAKE 1 TABLET BY MOUTH EVERY DAY 3/8/22   Earl Bagley MD   levothyroxine (SYNTHROID) 50 MCG tablet Take 1 tablet by mouth daily 3/8/22   Earl Bagley MD   losartan (COZAAR) 50 MG tablet TAKE 1 TABLET BY MOUTH EVERY DAY 3/8/22   Earl Bagley MD   omeprazole (PRILOSEC) 20 MG delayed release capsule Take 1 capsule by mouth every morning (before breakfast) 3/8/22   Earl Bagley MD   atorvastatin (LIPITOR) 80 MG tablet Take 1 tablet by mouth daily 3/8/22   Earl Bagley MD   VORTIoxetine HBr (TRINTELLIX) 20 MG TABS tablet Take 20 mg by mouth daily  Patient not taking: Reported on 3/29/2022    Historical Provider, MD   cetirizine (ZYRTEC) 10 MG tablet Take 1 tablet by mouth daily  Patient not taking: Reported on 2/14/2022 1/27/22   Earl Bagley MD   fluticasone [facility]) 50 MCG/ACT nasal spray 2 sprays by Each Nostril route daily 1/27/22   Earl Bagley MD   albuterol sulfate  (90 Base) MCG/ACT inhaler Inhale 2 puffs into the lungs every 6 hours as needed for Wheezing 7/1/21   Franc Walker MD   Multiple Vitamins-Minerals (THERAPEUTIC MULTIVITAMIN-MINERALS) tablet Take 1 tablet by mouth daily    Historical Provider, MD   [facility] Natural Products (OSTEO BI-FLEX ADV TRIPLE ST PO) Take by mouth daily     Historical Provider, MD   Multiple Vitamins-Minerals (OCUVITE-LUTEIN PO) Take by mouth daily   Patient not taking: Reported on 3/29/2022    Historical Provider, MD   risperiDONE (RISPERDAL) 1 MG tablet Take 1 mg by mouth every evening   Patient not taking: Reported on 3/29/2022    Historical Provider, MD   acetaminophen (TYLENOL) 500 MG tablet Take 1,000 mg by mouth every 6 hours as needed for Pain    Historical Provider, MD       Future Appointments   Date Time Provider Ifrah Minayaisti   6/14/2022 10:00 AM Franc Walker MD SRPX Physic [facility]   6/21/2022 11:30 AM Umesh Queen RD, LD SRPX Physic P  [facility]   10/25/2022 11:20 AM STR CT IMAGING RM1  [facility] OUT EXP STR Radiolog

## 2022-04-22 ENCOUNTER — CARE COORDINATION (OUTPATIENT)
Dept: CARE COORDINATION | Age: 62
End: 2022-04-22

## 2022-04-22 NOTE — CARE COORDINATION
Called [employer] and left vm informing them of pt need for a ride on 5/20 at 11:30 to Dr. Rosemary [place] for post op check up on left elbow. Advised them to return my call so I can give pt  time and remind him of this.

## 2022-04-22 NOTE — CARE COORDINATION
[employer] returned my call took information on pt. She will call me back when she has info on who can transport.

## 2022-04-22 NOTE — CARE COORDINATION
Attempted to reach pt to inform him of upcoming appt. In [place] for Post op of left elbow with Dr. Geofm Nageotte. Pt unavailable at the time of my call and I did not leave a vm. I will attempt again later.

## 2022-04-22 NOTE — CARE COORDINATION
Writer called Dr. Heladio Garvin office in [place] I was informed that his 5/13 appt. was cancelled due to Dr. Heladio Garvin schedule. I rescheduled appt for 5/20 at 11:30. I will notifyu pt of this.

## 2022-04-22 NOTE — CARE COORDINATION
Called pt and informed him of the upcoming appt date and time. Had pt write it down, Pt repeated date and time back to me and said he has it written down. Informed pt that I will schedule transportation and  give him a reminder call closer to that day. Pt voiced understanding.

## 2022-04-28 ENCOUNTER — CARE COORDINATION (OUTPATIENT)
Dept: CARE COORDINATION | Age: 62
End: 2022-04-28

## 2022-04-28 NOTE — CARE COORDINATION
Left a vm for [employer] to reschedule transportation for pt appt on 6/3 at 11:30 w Dr. Kelsi Larsen at [place] [address] Dr. Shasha Wong.  [id number]

## 2022-04-28 NOTE — CARE COORDINATION
Pt called to inform me that his appt. In [place] with Dr. Mitchell Bojorquez has been rescheduled for the 2nd time. It will be 6/3 at 11:30. I will notify Chepe Nair at [employer].

## 2022-05-02 ENCOUNTER — CARE COORDINATION (OUTPATIENT)
Dept: CARE COORDINATION | Age: 62
End: 2022-05-02

## 2022-05-02 SDOH — ECONOMIC STABILITY: TRANSPORTATION INSECURITY
IN THE PAST 12 MONTHS, HAS THE LACK OF TRANSPORTATION KEPT YOU FROM MEDICAL APPOINTMENTS OR FROM GETTING MEDICATIONS?: YES

## 2022-05-02 SDOH — ECONOMIC STABILITY: FOOD INSECURITY: WITHIN THE PAST 12 MONTHS, YOU WORRIED THAT YOUR FOOD WOULD RUN OUT BEFORE YOU GOT MONEY TO BUY MORE.: NEVER TRUE

## 2022-05-02 SDOH — ECONOMIC STABILITY: INCOME INSECURITY: IN THE LAST 12 MONTHS, WAS THERE A TIME WHEN YOU WERE NOT ABLE TO PAY THE MORTGAGE OR RENT ON TIME?: NO

## 2022-05-02 SDOH — ECONOMIC STABILITY: FOOD INSECURITY: WITHIN THE PAST 12 MONTHS, THE FOOD YOU BOUGHT JUST DIDN'T LAST AND YOU DIDN'T HAVE MONEY TO GET MORE.: NEVER TRUE

## 2022-05-02 SDOH — ECONOMIC STABILITY: TRANSPORTATION INSECURITY
IN THE PAST 12 MONTHS, HAS LACK OF TRANSPORTATION KEPT YOU FROM MEETINGS, WORK, OR FROM GETTING THINGS NEEDED FOR DAILY LIVING?: YES

## 2022-05-02 SDOH — ECONOMIC STABILITY: HOUSING INSECURITY: IN THE LAST 12 MONTHS, HOW MANY PLACES HAVE YOU LIVED?: 1

## 2022-05-02 ASSESSMENT — ENCOUNTER SYMPTOMS: DYSPNEA ASSOCIATED WITH: EXERTION

## 2022-05-02 ASSESSMENT — SOCIAL DETERMINANTS OF HEALTH (SDOH): HOW HARD IS IT FOR YOU TO PAY FOR THE VERY BASICS LIKE FOOD, HOUSING, MEDICAL CARE, AND HEATING?: NOT VERY HARD

## 2022-05-02 NOTE — CARE COORDINATION
Ambulatory Care Coordination Note  5/2/2022  CM Risk Score: 6  Charlson 10 Year Mortality Risk Score: 23%     ACC: Rola Field, RN    Summary Note: Tana Martin is being followed by care coordination for education and assistance in managing his chronic conditions. Pt has h/o: COPD, DM, intermittent explosive disorder. Spoke with Duane today for f/u. Pt reports that overall he is doing well. Still planning on going with friend out of state for a couple of mths after his June 3rd appt. Intermittent Explosive Disorder: pt continues to f/u with Sergo Arora. Has CM, counselor, and provider. Recently taken off of Risperdal.  Now on Depakote and Trintellix. Tolerating meds without difficulty. Continues to work with dietician. Dorsey Denver takes him to grocery store weekly. Reports that this is working out very well and no longer feeling overwhelmed when trying to make healthy food choices. CM also attending his dietician appts. COPD: breathing at baseline. No cough or congestion at this time. Reviewed medications with pt. Pt compliant with meds. Denies need for pill packs at this time. Reports that his regimen is working out well. Pt calls if having any questions about his medications. Denies ankle pain. Pt was being seen by ortho for his previously and was told would need ankle replacement in future. Pt aware that if ankle starts bothering him to let us know and appt can be arranged. Cubital tunnel surgery-has some tingling still in left 4th and 5th metacarpal. Has f/u on 6/3. I spoke with Karlee Leon and she will f/u with [facility] regarding appt in next couple of wks. Plan of care:  Pt doing very well at this time. No recent ED visits or hospitalizations. Pt has this AC's contact number and knows to call with any new concerns  SW will continue to work with pt up until his appt in June  Continue close f/u with behavioral health.   Continue working with Dorsey Denver  Will graduate from care coordination at this time. Diabetes Assessment    Medic Alert ID: No  Meal Planning: Avoidance of concentrated sweets   How often do you test your blood sugar?: No Testing   Do you have barriers with adherence to non-pharmacologic self-management interventions? (Nutrition/Exercise/Self-Monitoring): No   Have you ever had to go to the ED for symptoms of low blood sugar?: No       Do you have hyperglycemia symptoms?: No   Do you have hypoglycemia symptoms?: No   Blood Sugar Monitoring Regimen: Not Testing   Blood Sugar Trends: No Change       and   COPD Assessment    Does the patient have a nebulizer?: No  Does the patient use a space with inhaled medications?: No            Symptoms:  None: Yes      Symptom course: stable  Breathlessness: exertion  Increase use of rapid acting/rescue inhaled medications?: No  Change in chronic cough?: No/At Baseline  Change in sputum?: No/At Baseline  Self Monitoring - SaO2: No  Have you had a recent diagnosis of pneumonia either by PCP or at a hospital?: No                 Care Coordination Interventions    Program Enrollment: Complex Care  Referral from Primary Care Provider: Yes  Suggested Interventions and [facility]: Completed (Comment: Remington Paula. Adam iM Has  -[place] [phone] w/ Remington Paula)  Meals on Wheels: Declined  Medi Set or Pill Pack: Declined (Comment: gets meds via home delivery.  not in blister packs)  Pharmacist: Declined  Senior Services: Completed (Comment: [employer] Membership)  Social Work: Completed (Comment: SUSANNA Hou working with pt. )  Transportation Support: Completed  Zone Management Tools: Completed (Comment: DM /COPD zone tools)  Other Services or Interventions: has Renee Schmid         Goals Addressed                 This Visit's Progress     COMPLETED: Behavioral Health   On track     I will work towards the following [facility] goals: I will continue to follow up with my psychologist Charla Baird and/or psychiatrist. and I will take my medications daily as prescribed. Barriers: overwhelmed by complexity of regimen and lack of education  Plan for overcoming my barriers: support and education from [facility], PCP, Krissy Shipman CM  Confidence: 8/10  Anticipated Goal Completion Date: 6/14/22            Prior to Admission medications    Medication Sig Start Date End Date Taking?  Authorizing Provider   divalproex (DEPAKOTE SPRINKLES) 125 MG capsule Take 250 mg by mouth nightly    Yes Historical Provider, MD   metoprolol succinate (TOPROL XL) 50 MG extended release tablet TAKE 1 TABLET BY MOUTH EVERY DAY 3/8/22  Yes Babita Began, MD   levothyroxine (SYNTHROID) 50 MCG tablet Take 1 tablet by mouth daily 3/8/22  Yes Babita Began, MD   losartan (COZAAR) 50 MG tablet TAKE 1 TABLET BY MOUTH EVERY DAY 3/8/22  Yes Babita Miller, MD   omeprazole (PRILOSEC) 20 MG delayed release capsule Take 1 capsule by mouth every morning (before breakfast) 3/8/22  Yes Babita Miller, MD   atorvastatin (LIPITOR) 80 MG tablet Take 1 tablet by mouth daily 3/8/22  Yes Babita Miller, MD   VORTIoxetine HBr (TRINTELLIX) 20 MG TABS tablet Take 20 mg by mouth daily    Yes Historical Provider, MD   fluticasone (FLONASE) 50 MCG/ACT nasal spray 2 sprays by Each Nostril route daily 1/27/22  Yes Babita Miller, MD   albuterol sulfate  (90 Base) MCG/ACT inhaler Inhale 2 puffs into the lungs every 6 hours as needed for Wheezing 7/1/21  Yes Babita Miller MD   Multiple Vitamins-Minerals (THERAPEUTIC MULTIVITAMIN-MINERALS) tablet Take 1 tablet by mouth daily   Yes Historical Provider, MD   Misc Natural Products (OSTEO BI-FLEX ADV TRIPLE ST PO) Take by mouth daily    Yes Historical Provider, MD   cetirizine (ZYRTEC) 10 MG tablet Take 1 tablet by mouth daily  Patient not taking: Reported on 2/14/2022 1/27/22   Babita Miller MD   Multiple Vitamins-Minerals (OCUVITE-LUTEIN PO) Take by mouth daily   Patient not taking: Reported on 5/2/2022    Historical Provider, MD   acetaminophen (TYLENOL) 500 MG tablet Take 1,000 mg by mouth every 6 hours as needed for Pain    Historical Provider, MD       Future Appointments   Date Time Provider Ifrah Jessica   6/14/2022 10:00 AM Tommy Broderick MD [facility] Physic [facility] [place]  [place]   6/21/2022 11:30 AM Vane Campoverde RD, LD SRPX Physic [facility] [place] [address]   10/25/2022 11:20 AM STR CT IMAGING RM1  [facility] OUT EXP STR Radiolog

## 2022-05-12 ENCOUNTER — CARE COORDINATION (OUTPATIENT)
Dept: CARE COORDINATION | Age: 62
End: 2022-05-12

## 2022-05-12 NOTE — CARE COORDINATION
Pt called and handed phone to Ifrah Grossman from [facility]. She inquired when pt next appt is with dietary. Provided her this information and she wrote on pt calendar. Discussed meals and she informed me that she was \"getting ready to take pt to grocery store. \" Thanked her for her help with pt.

## 2022-05-16 ENCOUNTER — CARE COORDINATION (OUTPATIENT)
Dept: CARE COORDINATION | Age: 62
End: 2022-05-16

## 2022-05-19 ENCOUNTER — CARE COORDINATION (OUTPATIENT)
Dept: CARE COORDINATION | Age: 62
End: 2022-05-19

## 2022-05-24 NOTE — CARE COORDINATION
Pt called today. He states that he did not get the message about coming in on Monday for an appt until after the fact. Pt states that his plans have changed and he will be here for a few more wks so he will be able to keep original appt on June 14th. Regi Mitchell

## 2022-06-02 ENCOUNTER — CARE COORDINATION (OUTPATIENT)
Dept: CARE COORDINATION | Age: 62
End: 2022-06-02

## 2022-06-02 NOTE — CARE COORDINATION
Left msg informing pt that [facility] will pick him up at 9:00 am 6/3 for appt in [facility].

## 2022-06-02 NOTE — CARE COORDINATION
Pt called to verify upcoming appts. Informed pt of dates and time. Pt wrote down and advised to call me if he has other concerns. Pt voiced understanding.

## 2022-06-03 NOTE — CARE COORDINATION
Pt called at 7:08 this am stating he \"can't remember what time you told me they would come get me today. \" Informed pt that [facility] will pick him up between 9-9:15 am. Pt voiced understanding

## 2022-06-14 ENCOUNTER — OFFICE VISIT (OUTPATIENT)
Dept: INTERNAL MEDICINE CLINIC | Age: 62
End: 2022-06-14
Payer: MEDICARE

## 2022-06-14 ENCOUNTER — NURSE ONLY (OUTPATIENT)
Dept: LAB | Age: 62
End: 2022-06-14

## 2022-06-14 VITALS
BODY MASS INDEX: 37.39 KG/M2 | SYSTOLIC BLOOD PRESSURE: 120 MMHG | WEIGHT: 211 LBS | HEIGHT: 63 IN | HEART RATE: 90 BPM | DIASTOLIC BLOOD PRESSURE: 80 MMHG | OXYGEN SATURATION: 96 % | TEMPERATURE: 98 F

## 2022-06-14 DIAGNOSIS — E66.9 OBESITY (BMI 30-39.9): ICD-10-CM

## 2022-06-14 DIAGNOSIS — I10 PRIMARY HYPERTENSION: ICD-10-CM

## 2022-06-14 DIAGNOSIS — R53.83 FATIGUE, UNSPECIFIED TYPE: ICD-10-CM

## 2022-06-14 DIAGNOSIS — E78.2 MIXED HYPERLIPIDEMIA: Primary | ICD-10-CM

## 2022-06-14 DIAGNOSIS — E03.9 ACQUIRED HYPOTHYROIDISM: ICD-10-CM

## 2022-06-14 DIAGNOSIS — E11.9 TYPE 2 DIABETES MELLITUS WITHOUT COMPLICATION, WITHOUT LONG-TERM CURRENT USE OF INSULIN (HCC): ICD-10-CM

## 2022-06-14 DIAGNOSIS — J45.20 MILD INTERMITTENT REACTIVE AIRWAY DISEASE WITHOUT COMPLICATION: ICD-10-CM

## 2022-06-14 DIAGNOSIS — K21.9 GASTROESOPHAGEAL REFLUX DISEASE, UNSPECIFIED WHETHER ESOPHAGITIS PRESENT: ICD-10-CM

## 2022-06-14 LAB
ANION GAP SERPL CALCULATED.3IONS-SCNC: 13 MEQ/L (ref 8–16)
BASOPHILS # BLD: 0.6 %
BASOPHILS ABSOLUTE: 0.1 THOU/MM3 (ref 0–0.1)
BUN BLDV-MCNC: 26 MG/DL (ref 7–22)
CALCIUM SERPL-MCNC: 9.8 MG/DL (ref 8.5–10.5)
CHLORIDE BLD-SCNC: 99 MEQ/L (ref 98–111)
CO2: 28 MEQ/L (ref 23–33)
CREAT SERPL-MCNC: 0.8 MG/DL (ref 0.4–1.2)
CREATININE, URINE: 187.1 MG/DL
EOSINOPHIL # BLD: 0.9 %
EOSINOPHILS ABSOLUTE: 0.1 THOU/MM3 (ref 0–0.4)
ERYTHROCYTE [DISTWIDTH] IN BLOOD BY AUTOMATED COUNT: 13.9 % (ref 11.5–14.5)
ERYTHROCYTE [DISTWIDTH] IN BLOOD BY AUTOMATED COUNT: 48.4 FL (ref 35–45)
GFR SERPL CREATININE-BSD FRML MDRD: > 90 ML/MIN/1.73M2
GLUCOSE BLD-MCNC: 104 MG/DL (ref 70–108)
HBA1C MFR BLD: 4.9 % (ref 4.3–5.7)
HCT VFR BLD CALC: 52.2 % (ref 42–52)
HEMOGLOBIN: 17.2 GM/DL (ref 14–18)
IMMATURE GRANS (ABS): 0.12 THOU/MM3 (ref 0–0.07)
IMMATURE GRANULOCYTES: 0.9 %
LYMPHOCYTES # BLD: 17.5 %
LYMPHOCYTES ABSOLUTE: 2.2 THOU/MM3 (ref 1–4.8)
MCH RBC QN AUTO: 31.3 PG (ref 26–33)
MCHC RBC AUTO-ENTMCNC: 33 GM/DL (ref 32.2–35.5)
MCV RBC AUTO: 94.9 FL (ref 80–94)
MICROALBUMIN UR-MCNC: 1.57 MG/DL
MICROALBUMIN/CREAT UR-RTO: 8 MG/G (ref 0–30)
MONOCYTES # BLD: 7.6 %
MONOCYTES ABSOLUTE: 1 THOU/MM3 (ref 0.4–1.3)
NUCLEATED RED BLOOD CELLS: 0 /100 WBC
PLATELET # BLD: 225 THOU/MM3 (ref 130–400)
PMV BLD AUTO: 12.9 FL (ref 9.4–12.4)
POTASSIUM SERPL-SCNC: 4.5 MEQ/L (ref 3.5–5.2)
RBC # BLD: 5.5 MILL/MM3 (ref 4.7–6.1)
SEG NEUTROPHILS: 72.5 %
SEGMENTED NEUTROPHILS ABSOLUTE COUNT: 9.2 THOU/MM3 (ref 1.8–7.7)
SODIUM BLD-SCNC: 140 MEQ/L (ref 135–145)
T4 FREE: 0.96 NG/DL (ref 0.93–1.76)
TSH SERPL DL<=0.05 MIU/L-ACNC: 4.39 UIU/ML (ref 0.4–4.2)
WBC # BLD: 12.7 THOU/MM3 (ref 4.8–10.8)

## 2022-06-14 PROCEDURE — 3044F HG A1C LEVEL LT 7.0%: CPT | Performed by: INTERNAL MEDICINE

## 2022-06-14 PROCEDURE — 99214 OFFICE O/P EST MOD 30 MIN: CPT | Performed by: INTERNAL MEDICINE

## 2022-06-14 PROCEDURE — 83036 HEMOGLOBIN GLYCOSYLATED A1C: CPT | Performed by: INTERNAL MEDICINE

## 2022-06-14 RX ORDER — ARIPIPRAZOLE 5 MG/1
5 TABLET ORAL DAILY
COMMUNITY

## 2022-06-14 RX ORDER — ALBUTEROL SULFATE 90 UG/1
2 AEROSOL, METERED RESPIRATORY (INHALATION) EVERY 6 HOURS PRN
Qty: 1 EACH | Refills: 2 | Status: SHIPPED | OUTPATIENT
Start: 2022-06-14
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
## 2022-06-20 ENCOUNTER — CARE COORDINATION (OUTPATIENT)
Dept: CARE COORDINATION | Age: 62
End: 2022-06-20

## 2022-06-20 NOTE — CARE COORDINATION
Pt called to ask me if he has an appt. tomorrow with dietician. I informed pt that he has an at 11:30  tomorrow with Jinny Aguila. He asked me to cancel the appt. tomorrow and he will reschedule at a later date. I left a vm for Jinny Aguila informing her of this.

## 2022-06-21 ENCOUNTER — TELEPHONE (OUTPATIENT)
Dept: INTERNAL MEDICINE CLINIC | Age: 62
End: 2022-06-21

## 2022-06-21 NOTE — TELEPHONE ENCOUNTER
----- Message from Alda Jensen MD sent at 6/19/2022 12:56 PM EDT -----  His thyroid numbers are showing mild hypothyroid but he is not taking thyroid medication separately in am - remind him to do this. He also appears dry - encourage at least 3 bottles of water a day.

## 2022-06-21 NOTE — TELEPHONE ENCOUNTER
Spoke with patient and patient states that he is taking his thyroid medication in the morning separate from his other medication . Instructed patient to make sure he is staying hydrated with a least 3 bottles of water a day . Patient verbal;ized understanding . [FreeTextEntry1] : I, Monty Hope, acted solely as a scribe for Dr. Joanna Akins on this date 08/27/2020.

## 2022-07-02 DIAGNOSIS — I10 ESSENTIAL HYPERTENSION: ICD-10-CM

## 2022-07-03 DIAGNOSIS — I10 ESSENTIAL HYPERTENSION: ICD-10-CM

## 2022-07-05 RX ORDER — LOSARTAN POTASSIUM 50 MG/1
TABLET ORAL
Qty: 30 TABLET | OUTPATIENT
Start: 2022-07-05

## 2022-07-06 RX ORDER — LOSARTAN POTASSIUM 50 MG/1
TABLET ORAL
Qty: 30 TABLET | Refills: 5 | Status: SHIPPED | OUTPATIENT
Start: 2022-07-06 | End: 2023-01-02

## 2022-08-09 ENCOUNTER — APPOINTMENT (OUTPATIENT)
Dept: GENERAL RADIOLOGY | Age: 62
End: 2022-08-09
Payer: MEDICARE

## 2022-08-09 ENCOUNTER — APPOINTMENT (OUTPATIENT)
Dept: CT IMAGING | Age: 62
End: 2022-08-09
Payer: MEDICARE

## 2022-08-09 ENCOUNTER — CARE COORDINATION (OUTPATIENT)
Dept: CARE COORDINATION | Age: 62
End: 2022-08-09

## 2022-08-09 ENCOUNTER — HOSPITAL ENCOUNTER (OUTPATIENT)
Age: 62
Setting detail: OBSERVATION
Discharge: HOME | End: 2022-08-13
Attending: STUDENT IN AN ORGANIZED HEALTH CARE EDUCATION/TRAINING PROGRAM
Payer: MEDICARE

## 2022-08-09 DIAGNOSIS — R07.9 CHEST PAIN, UNSPECIFIED TYPE: Primary | ICD-10-CM

## 2022-08-09 DIAGNOSIS — R42 VERTIGO: ICD-10-CM

## 2022-08-09 DIAGNOSIS — R55 SYNCOPE AND COLLAPSE: ICD-10-CM

## 2022-08-09 LAB
ALBUMIN SERPL-MCNC: 4.3 G/DL (ref 3.5–5.1)
ALP BLD-CCNC: 70 U/L (ref 38–126)
ALT SERPL-CCNC: 21 U/L (ref 11–66)
ANION GAP SERPL CALCULATED.3IONS-SCNC: 13 MEQ/L (ref 8–16)
AST SERPL-CCNC: 18 U/L (ref 5–40)
BASOPHILS # BLD: 0.4 %
BASOPHILS ABSOLUTE: 0 THOU/MM3 (ref 0–0.1)
BILIRUB SERPL-MCNC: 0.3 MG/DL (ref 0.3–1.2)
BUN BLDV-MCNC: 19 MG/DL (ref 7–22)
CALCIUM SERPL-MCNC: 9.4 MG/DL (ref 8.5–10.5)
CHLORIDE BLD-SCNC: 99 MEQ/L (ref 98–111)
CO2: 24 MEQ/L (ref 23–33)
CREAT SERPL-MCNC: 1 MG/DL (ref 0.4–1.2)
EOSINOPHIL # BLD: 0.7 %
EOSINOPHILS ABSOLUTE: 0.1 THOU/MM3 (ref 0–0.4)
ERYTHROCYTE [DISTWIDTH] IN BLOOD BY AUTOMATED COUNT: 12.6 % (ref 11.5–14.5)
ERYTHROCYTE [DISTWIDTH] IN BLOOD BY AUTOMATED COUNT: 40.6 FL (ref 35–45)
GFR SERPL CREATININE-BSD FRML MDRD: 75 ML/MIN/1.73M2
GLUCOSE BLD-MCNC: 106 MG/DL (ref 70–108)
GLUCOSE BLD-MCNC: 91 MG/DL
GLUCOSE BLD-MCNC: 91 MG/DL (ref 70–108)
HCT VFR BLD CALC: 48.9 % (ref 42–52)
HEMOGLOBIN: 16.5 GM/DL (ref 14–18)
IMMATURE GRANS (ABS): 0.03 THOU/MM3 (ref 0–0.07)
IMMATURE GRANULOCYTES: 0.3 %
LYMPHOCYTES # BLD: 15.2 %
LYMPHOCYTES ABSOLUTE: 1.5 THOU/MM3 (ref 1–4.8)
MCH RBC QN AUTO: 30.2 PG (ref 26–33)
MCHC RBC AUTO-ENTMCNC: 33.7 GM/DL (ref 32.2–35.5)
MCV RBC AUTO: 89.6 FL (ref 80–94)
MONOCYTES # BLD: 7 %
MONOCYTES ABSOLUTE: 0.7 THOU/MM3 (ref 0.4–1.3)
NUCLEATED RED BLOOD CELLS: 0 /100 WBC
OSMOLALITY CALCULATION: 273.8 MOSMOL/KG (ref 275–300)
PLATELET # BLD: 241 THOU/MM3 (ref 130–400)
PMV BLD AUTO: 12 FL (ref 9.4–12.4)
POTASSIUM REFLEX MAGNESIUM: 4.2 MEQ/L (ref 3.5–5.2)
PRO-BNP: < 5 PG/ML (ref 0–900)
RBC # BLD: 5.46 MILL/MM3 (ref 4.7–6.1)
SEG NEUTROPHILS: 76.4 %
SEGMENTED NEUTROPHILS ABSOLUTE COUNT: 7.7 THOU/MM3 (ref 1.8–7.7)
SODIUM BLD-SCNC: 136 MEQ/L (ref 135–145)
TOTAL PROTEIN: 6.9 G/DL (ref 6.1–8)
TROPONIN T: < 0.01 NG/ML
TROPONIN T: < 0.01 NG/ML
WBC # BLD: 10.1 THOU/MM3 (ref 4.8–10.8)

## 2022-08-09 PROCEDURE — G0378 HOSPITAL OBSERVATION PER HR: HCPCS

## 2022-08-09 PROCEDURE — 85025 COMPLETE CBC W/AUTO DIFF WBC: CPT

## 2022-08-09 PROCEDURE — 71045 X-RAY EXAM CHEST 1 VIEW: CPT

## 2022-08-09 PROCEDURE — 80053 COMPREHEN METABOLIC PANEL: CPT

## 2022-08-09 PROCEDURE — 73564 X-RAY EXAM KNEE 4 OR MORE: CPT

## 2022-08-09 PROCEDURE — 84484 ASSAY OF TROPONIN QUANT: CPT

## 2022-08-09 PROCEDURE — 6370000000 HC RX 637 (ALT 250 FOR IP): Performed by: STUDENT IN AN ORGANIZED HEALTH CARE EDUCATION/TRAINING PROGRAM

## 2022-08-09 PROCEDURE — 2580000003 HC RX 258

## 2022-08-09 PROCEDURE — 36415 COLL VENOUS BLD VENIPUNCTURE: CPT

## 2022-08-09 PROCEDURE — 1200000003 HC TELEMETRY R&B

## 2022-08-09 PROCEDURE — 93005 ELECTROCARDIOGRAM TRACING: CPT | Performed by: STUDENT IN AN ORGANIZED HEALTH CARE EDUCATION/TRAINING PROGRAM

## 2022-08-09 PROCEDURE — 99285 EMERGENCY DEPT VISIT HI MDM: CPT

## 2022-08-09 PROCEDURE — 80164 ASSAY DIPROPYLACETIC ACD TOT: CPT

## 2022-08-09 PROCEDURE — 72128 CT CHEST SPINE W/O DYE: CPT

## 2022-08-09 PROCEDURE — 72131 CT LUMBAR SPINE W/O DYE: CPT

## 2022-08-09 PROCEDURE — 72125 CT NECK SPINE W/O DYE: CPT

## 2022-08-09 PROCEDURE — 83880 ASSAY OF NATRIURETIC PEPTIDE: CPT

## 2022-08-09 PROCEDURE — 70450 CT HEAD/BRAIN W/O DYE: CPT

## 2022-08-09 PROCEDURE — 82948 REAGENT STRIP/BLOOD GLUCOSE: CPT

## 2022-08-09 RX ORDER — ONDANSETRON 4 MG/1
4 TABLET, ORALLY DISINTEGRATING ORAL EVERY 8 HOURS PRN
Status: DISCONTINUED | OUTPATIENT
Start: 2022-08-09 | End: 2022-08-13 | Stop reason: HOSPADM

## 2022-08-09 RX ORDER — SODIUM CHLORIDE 9 MG/ML
INJECTION, SOLUTION INTRAVENOUS PRN
Status: DISCONTINUED | OUTPATIENT
Start: 2022-08-09 | End: 2022-08-13 | Stop reason: HOSPADM

## 2022-08-09 RX ORDER — METOPROLOL SUCCINATE 50 MG/1
50 TABLET, EXTENDED RELEASE ORAL DAILY
Status: DISCONTINUED | OUTPATIENT
Start: 2022-08-10 | End: 2022-08-13 | Stop reason: HOSPADM

## 2022-08-09 RX ORDER — DIVALPROEX SODIUM 125 MG/1
250 CAPSULE, COATED PELLETS ORAL NIGHTLY
Status: DISCONTINUED | OUTPATIENT
Start: 2022-08-09 | End: 2022-08-13 | Stop reason: HOSPADM

## 2022-08-09 RX ORDER — 0.9 % SODIUM CHLORIDE 0.9 %
1000 INTRAVENOUS SOLUTION INTRAVENOUS ONCE
Status: COMPLETED | OUTPATIENT
Start: 2022-08-09 | End: 2022-08-09

## 2022-08-09 RX ORDER — DEXTROSE MONOHYDRATE 100 MG/ML
INJECTION, SOLUTION INTRAVENOUS CONTINUOUS PRN
Status: DISCONTINUED | OUTPATIENT
Start: 2022-08-09 | End: 2022-08-13 | Stop reason: HOSPADM

## 2022-08-09 RX ORDER — LEVOTHYROXINE SODIUM 0.05 MG/1
50 TABLET ORAL
Status: DISCONTINUED | OUTPATIENT
Start: 2022-08-10 | End: 2022-08-13 | Stop reason: HOSPADM

## 2022-08-09 RX ORDER — ENOXAPARIN SODIUM 100 MG/ML
40 INJECTION SUBCUTANEOUS DAILY
Status: DISCONTINUED | OUTPATIENT
Start: 2022-08-10 | End: 2022-08-13 | Stop reason: HOSPADM

## 2022-08-09 RX ORDER — NITROGLYCERIN 0.4 MG/1
0.4 TABLET SUBLINGUAL EVERY 5 MIN PRN
Status: DISCONTINUED | OUTPATIENT
Start: 2022-08-09 | End: 2022-08-13 | Stop reason: HOSPADM

## 2022-08-09 RX ORDER — SODIUM CHLORIDE 0.9 % (FLUSH) 0.9 %
5-40 SYRINGE (ML) INJECTION EVERY 12 HOURS SCHEDULED
Status: DISCONTINUED | OUTPATIENT
Start: 2022-08-09 | End: 2022-08-13 | Stop reason: HOSPADM

## 2022-08-09 RX ORDER — POLYETHYLENE GLYCOL 3350 17 G/17G
17 POWDER, FOR SOLUTION ORAL DAILY PRN
Status: DISCONTINUED | OUTPATIENT
Start: 2022-08-09 | End: 2022-08-13 | Stop reason: HOSPADM

## 2022-08-09 RX ORDER — ASPIRIN 81 MG/1
324 TABLET, CHEWABLE ORAL ONCE
Status: COMPLETED | OUTPATIENT
Start: 2022-08-09 | End: 2022-08-09

## 2022-08-09 RX ORDER — LOSARTAN POTASSIUM 50 MG/1
50 TABLET ORAL DAILY
Status: DISCONTINUED | OUTPATIENT
Start: 2022-08-10 | End: 2022-08-13 | Stop reason: HOSPADM

## 2022-08-09 RX ORDER — ONDANSETRON 2 MG/ML
4 INJECTION INTRAMUSCULAR; INTRAVENOUS EVERY 6 HOURS PRN
Status: DISCONTINUED | OUTPATIENT
Start: 2022-08-09 | End: 2022-08-13 | Stop reason: HOSPADM

## 2022-08-09 RX ORDER — ACETAMINOPHEN 650 MG/1
650 SUPPOSITORY RECTAL EVERY 6 HOURS PRN
Status: DISCONTINUED | OUTPATIENT
Start: 2022-08-09 | End: 2022-08-13 | Stop reason: HOSPADM

## 2022-08-09 RX ORDER — ALBUTEROL SULFATE 90 UG/1
2 AEROSOL, METERED RESPIRATORY (INHALATION) EVERY 6 HOURS PRN
Status: DISCONTINUED | OUTPATIENT
Start: 2022-08-09 | End: 2022-08-13 | Stop reason: HOSPADM

## 2022-08-09 RX ORDER — ARIPIPRAZOLE 5 MG/1
5 TABLET ORAL DAILY
Status: DISCONTINUED | OUTPATIENT
Start: 2022-08-10 | End: 2022-08-13 | Stop reason: HOSPADM

## 2022-08-09 RX ORDER — ACETAMINOPHEN 325 MG/1
650 TABLET ORAL EVERY 6 HOURS PRN
Status: DISCONTINUED | OUTPATIENT
Start: 2022-08-09 | End: 2022-08-13 | Stop reason: HOSPADM

## 2022-08-09 RX ORDER — SODIUM CHLORIDE 0.9 % (FLUSH) 0.9 %
5-40 SYRINGE (ML) INJECTION PRN
Status: DISCONTINUED | OUTPATIENT
Start: 2022-08-09 | End: 2022-08-13 | Stop reason: HOSPADM

## 2022-08-09 RX ORDER — ATORVASTATIN CALCIUM 80 MG/1
80 TABLET, FILM COATED ORAL DAILY
Status: DISCONTINUED | OUTPATIENT
Start: 2022-08-10 | End: 2022-08-13 | Stop reason: HOSPADM

## 2022-08-09 RX ADMIN — ASPIRIN 324 MG: 81 TABLET, CHEWABLE ORAL at 20:36

## 2022-08-09 RX ADMIN — SODIUM CHLORIDE 1000 ML: 9 INJECTION, SOLUTION INTRAVENOUS at 17:53

## 2022-08-09 RX ADMIN — NITROGLYCERIN 0.4 MG: 0.4 TABLET, ORALLY DISINTEGRATING SUBLINGUAL at 20:36

## 2022-08-09 ASSESSMENT — ENCOUNTER SYMPTOMS
NAUSEA: 0
VOMITING: 0
SHORTNESS OF BREATH: 0
CHEST TIGHTNESS: 1
BACK PAIN: 1

## 2022-08-09 ASSESSMENT — PAIN SCALES - GENERAL: PAINLEVEL_OUTOF10: 1

## 2022-08-09 ASSESSMENT — PAIN DESCRIPTION - LOCATION: LOCATION: CHEST

## 2022-08-09 ASSESSMENT — PAIN - FUNCTIONAL ASSESSMENT
PAIN_FUNCTIONAL_ASSESSMENT: NONE - DENIES PAIN
PAIN_FUNCTIONAL_ASSESSMENT: NONE - DENIES PAIN
PAIN_FUNCTIONAL_ASSESSMENT: 0-10

## 2022-08-09 NOTE — CARE COORDINATION
Pt called c/o dizziness/lightheadedness s/s. Reports that they have been occurring off and on for appx 1 mth. S/s occur intermittently and comes on with or without position changes. Pt is currently out of state in [place]. Pt advised to seek treatment at local UC/ED. Verbalizes understanding. Advised to f/u with PCP upon return to home.

## 2022-08-09 NOTE — ED NOTES
Pt resting on cot with unlabored respirations. Pt given urinal for sample at this time.       Josh COLEMAN RN  08/09/22 [id number]

## 2022-08-09 NOTE — ED NOTES
Pt medicated per MAR. Pt tolerated well. Pt to CT scan in stable condition. Respirations unlabored.       Connie LYNN RN  08/09/22 [id number]

## 2022-08-09 NOTE — ED NOTES
Pt to ED c/o dizziness. Pt states he is dizzy all the time causing him to fall. Pt respirations unlabored. Orthostatic vital signs negative. EKG complete. Monitor applied. Iv inserted.       Ashutosh GUERRERO, HOLLY  08/09/22 [id number]

## 2022-08-09 NOTE — ED NOTES
Pt resting on cot with unlabored respirations and no signs of distress.       Josie ONEAL RN  08/09/22 [id number]

## 2022-08-09 NOTE — ED PROVIDER NOTES
Endocrine: Negative. Genitourinary:  Positive for decreased urine volume. Negative for dysuria, frequency and urgency. Musculoskeletal:  Positive for back pain. Skin: Negative. Neurological:  Positive for dizziness, weakness and light-headedness. Psychiatric/Behavioral: Negative. PAST MEDICAL AND SURGICAL HISTORY     Past Medical History:   Diagnosis Date    Chronic leg pain     Diabetes ([address] 05/2018    resolved with weight loss.     GERD (gastroesophageal reflux disease)     History of deep venous thrombosis (DVT) of distal vein of left lower extremity 2020    Hyperlipidemia     Hypertension     Hypothyroidism     Right homonymous hemianopsia     Sleep apnea     doesn't use CPAP    TIA (transient ischemic attack) 2016    Dr. Lubna Saldivar      Past Surgical History:   Procedure Laterality Date    ANKLE SURGERY Right 01/01/2000    CATARACT REMOVAL Left 02/09/2022    ELBOW SURGERY Left 02/01/2022    ulnar nerve decompression    KNEE ARTHROSCOPY Left 09/11/2015    Torn cartliage    LEG SURGERY Right 01/01/2000    [place] COLSC FLX W/RMVL OF TUMOR POLYP LESION SNARE TQ  07/16/2017    COLONOSCOPY POLYPECTOMY SNARE/COLD BIOPSY performed by Alvarez Manriquez MD at [facility] [place] [facility]    [place] EGD TRANSORAL BIOPSY SINGLE/MULTIPLE N/A 09/27/2017    EGD BIOPSY performed by Mila Osuna MD at [facility] ENDOSCOPY  07/16/2017    EGD DIAGNOSTIC ONLY performed by Alvarez Manriquez MD at [facility] [place] [facility]         MEDICATIONS     Current Facility-Administered Medications:     0.9 % sodium chloride bolus, 1,000 mL, IntraVENous, Once, Fox Burgos MD    Current Outpatient Medications:     losartan (COZAAR) 50 MG tablet, TAKE 1 TABLET BY MOUTH EVERY DAY, Disp: 30 tablet, Rfl: 5    ARIPiprazole (ABILIFY) 5 MG tablet, Take 5 mg by mouth daily, Disp: , Rfl:     albuterol sulfate HFA (PROVENTIL;VENTOLIN;PROAIR) 108 (90 Base) MCG/ACT inhaler, Inhale 2 puffs into the lungs every 6 hours as needed for Wheezing, Disp: 1 each, Rfl: 2    divalproex (DEPAKOTE SPRINKLES) 125 MG capsule, Take 250 mg by mouth nightly , Disp: , Rfl:     metoprolol succinate (TOPROL XL) 50 MG extended release tablet, TAKE 1 TABLET BY MOUTH EVERY DAY, Disp: 90 tablet, Rfl: 1    levothyroxine (SYNTHROID) 50 MCG tablet, Take 1 tablet by mouth daily, Disp: 90 tablet, Rfl: 1    omeprazole (PRILOSEC) 20 MG delayed release capsule, Take 1 capsule by mouth every morning (before breakfast), Disp: 90 capsule, Rfl: 1    atorvastatin (LIPITOR) 80 MG tablet, Take 1 tablet by mouth daily, Disp: 30 tablet, Rfl: 5    VORTIoxetine HBr (TRINTELLIX) 20 MG TABS tablet, Take 20 mg by mouth daily , Disp: , Rfl:     cetirizine (ZYRTEC) 10 MG tablet, Take 1 tablet by mouth daily, Disp: 90 tablet, Rfl: 1    fluticasone (FLONASE) 50 MCG/ACT nasal spray, 2 sprays by Each Nostril route daily, Disp: 16 g, Rfl: 5    Multiple Vitamins-Minerals (THERAPEUTIC MULTIVITAMIN-MINERALS) tablet, Take 1 tablet by mouth daily, Disp: , Rfl:     Misc Natural Products (OSTEO BI-FLEX ADV TRIPLE ST PO), Take by mouth daily , Disp: , Rfl:     Multiple Vitamins-Minerals (OCUVITE-LUTEIN PO), Take by mouth daily , Disp: , Rfl:     acetaminophen (TYLENOL) 500 MG tablet, Take 1,000 mg by mouth every 6 hours as needed for Pain, Disp: , Rfl:       SOCIAL HISTORY     Social History     Social History Narrative    Not on file     Social History     Tobacco Use    Smoking status: Former     Packs/day: 2.00     Years: 40.00     Pack years: 80.00     Types: Cigarettes, Cigars     Quit date: 2021     Years since quittin.6    Smokeless tobacco: Never   Vaping Use    Vaping Use: Never used   Substance Use Topics    Alcohol use: No     Alcohol/week: 0.0 standard drinks    Drug use: No         ALLERGIES   No Known Allergies      FAMILY HISTORY     Family History   Problem Relation Age of Onset    Dementia Mother     High Blood Pressure Mother     COPD Father     High Cholesterol Father High Blood Pressure Father     Dementia Maternal Aunt     Seizures Maternal Uncle          PREVIOUS RECORDS   Previous records reviewed:  multiple . PHYSICAL EXAM     ED Triage Vitals [08/09/22 1558]   BP Temp Temp Source Heart Rate Resp SpO2 Height Weight   (!) 147/91 98.7 °F (37.1 °C) Oral (!) 103 22 92 % -- --     Initial vital signs and nursing assessment reviewed and normal. There is no height or weight on file to calculate BMI. Pulsoximetry is normal per my interpretation. Additional Vital Signs:  Vitals:    08/09/22 1558   BP: (!) 147/91   Pulse: (!) 103   Resp: 22   Temp: 98.7 °F (37.1 °C)   SpO2: 92%       Physical Exam  Constitutional:       General: He is not in acute distress. Appearance: Normal appearance. He is normal weight. He is not ill-appearing. HENT:      Head: Normocephalic and atraumatic. Right Ear: External ear normal.      Left Ear: External ear normal.      Mouth/Throat:      Mouth: Mucous membranes are moist.      Pharynx: Oropharynx is clear. Eyes:      Extraocular Movements: Extraocular movements intact. Pupils: Pupils are equal, round, and reactive to light. Cardiovascular:      Rate and Rhythm: Normal rate and regular rhythm. Pulses: Normal pulses. Heart sounds: Normal heart sounds. Pulmonary:      Effort: Pulmonary effort is normal.      Breath sounds: Normal breath sounds. Musculoskeletal:         General: Tenderness (Tenderness both laterally and medially to deep palpation of left knee) and signs of injury present. No swelling or deformity. Normal range of motion. Cervical back: Normal range of motion and neck supple. No rigidity or tenderness. Skin:     General: Skin is warm and dry. Capillary Refill: Capillary refill takes less than 2 seconds. Findings: No bruising or rash. Neurological:      General: No focal deficit present. Mental Status: He is alert and oriented to person, place, and time.  Mental status is at bolus (has no administration in time range)         ED COURSE          MEDICATION CHANGES     New Prescriptions    No medications on file         FINAL DISPOSITION     Final diagnoses:   None     Condition: condition: stable  Dispo: Signing off patient care to Dr. Adryan Aquino in the ED. This transcription was electronically signed. Parts of this transcriptions may have been dictated by use of voice recognition software and electronically transcribed, and parts may have been transcribed with the assistance of an ED scribe. The transcription may contain errors not detected in proofreading. Please refer to my supervising physician's documentation if my documentation differs.     Electronically Signed: You Quiroz MD, 08/09/22, 5:32 PM        You Quiroz MD  Resident  08/09/22 [id number]

## 2022-08-10 ENCOUNTER — APPOINTMENT (OUTPATIENT)
Dept: CT IMAGING | Age: 62
End: 2022-08-10
Payer: MEDICARE

## 2022-08-10 ENCOUNTER — APPOINTMENT (OUTPATIENT)
Dept: MRI IMAGING | Age: 62
End: 2022-08-10
Payer: MEDICARE

## 2022-08-10 ENCOUNTER — APPOINTMENT (OUTPATIENT)
Dept: INTERVENTIONAL RADIOLOGY/VASCULAR | Age: 62
End: 2022-08-10
Payer: MEDICARE

## 2022-08-10 LAB
EKG ATRIAL RATE: 100 BPM
EKG P AXIS: 67 DEGREES
EKG P-R INTERVAL: 138 MS
EKG Q-T INTERVAL: 318 MS
EKG QRS DURATION: 70 MS
EKG QTC CALCULATION (BAZETT): 410 MS
EKG R AXIS: 45 DEGREES
EKG T AXIS: 69 DEGREES
EKG VENTRICULAR RATE: 100 BPM
LV EF: 60 %
LVEF MODALITY: NORMAL
TROPONIN T: < 0.01 NG/ML
VALPROIC ACID LEVEL: < 2.8 UG/ML (ref 50–100)

## 2022-08-10 PROCEDURE — 84484 ASSAY OF TROPONIN QUANT: CPT

## 2022-08-10 PROCEDURE — 93005 ELECTROCARDIOGRAM TRACING: CPT

## 2022-08-10 PROCEDURE — 70553 MRI BRAIN STEM W/O & W/DYE: CPT

## 2022-08-10 PROCEDURE — 93010 ELECTROCARDIOGRAM REPORT: CPT | Performed by: INTERNAL MEDICINE

## 2022-08-10 PROCEDURE — 6360000004 HC RX CONTRAST MEDICATION

## 2022-08-10 PROCEDURE — 6360000002 HC RX W HCPCS

## 2022-08-10 PROCEDURE — 93306 TTE W/DOPPLER COMPLETE: CPT

## 2022-08-10 PROCEDURE — 6370000000 HC RX 637 (ALT 250 FOR IP)

## 2022-08-10 PROCEDURE — 2580000003 HC RX 258

## 2022-08-10 PROCEDURE — 93971 EXTREMITY STUDY: CPT

## 2022-08-10 PROCEDURE — 6360000004 HC RX CONTRAST MEDICATION: Performed by: NURSE PRACTITIONER

## 2022-08-10 PROCEDURE — 99232 SBSQ HOSP IP/OBS MODERATE 35: CPT | Performed by: NURSE PRACTITIONER

## 2022-08-10 PROCEDURE — 1200000003 HC TELEMETRY R&B

## 2022-08-10 PROCEDURE — G0378 HOSPITAL OBSERVATION PER HR: HCPCS

## 2022-08-10 PROCEDURE — A9579 GAD-BASE MR CONTRAST NOS,1ML: HCPCS

## 2022-08-10 PROCEDURE — 36415 COLL VENOUS BLD VENIPUNCTURE: CPT

## 2022-08-10 PROCEDURE — 96372 THER/PROPH/DIAG INJ SC/IM: CPT

## 2022-08-10 PROCEDURE — 71275 CT ANGIOGRAPHY CHEST: CPT

## 2022-08-10 RX ADMIN — ATORVASTATIN CALCIUM 80 MG: 80 TABLET, FILM COATED ORAL at 09:47

## 2022-08-10 RX ADMIN — DIVALPROEX SODIUM 250 MG: 125 CAPSULE ORAL at 23:42

## 2022-08-10 RX ADMIN — SODIUM CHLORIDE, PRESERVATIVE FREE 10 ML: 5 INJECTION INTRAVENOUS at 09:54

## 2022-08-10 RX ADMIN — METOPROLOL SUCCINATE 50 MG: 50 TABLET, EXTENDED RELEASE ORAL at 09:47

## 2022-08-10 RX ADMIN — ACETAMINOPHEN 650 MG: 325 TABLET ORAL at 23:42

## 2022-08-10 RX ADMIN — SODIUM CHLORIDE, PRESERVATIVE FREE 10 ML: 5 INJECTION INTRAVENOUS at 23:48

## 2022-08-10 RX ADMIN — IOPAMIDOL 80 ML: 755 INJECTION, SOLUTION INTRAVENOUS at 09:19

## 2022-08-10 RX ADMIN — ENOXAPARIN SODIUM 40 MG: 100 INJECTION SUBCUTANEOUS at 09:40

## 2022-08-10 RX ADMIN — ONDANSETRON 4 MG: 4 TABLET, ORALLY DISINTEGRATING ORAL at 00:52

## 2022-08-10 RX ADMIN — LOSARTAN POTASSIUM 50 MG: 50 TABLET, FILM COATED ORAL at 09:47

## 2022-08-10 RX ADMIN — VORTIOXETINE 20 MG: 10 TABLET, FILM COATED ORAL at 09:47

## 2022-08-10 RX ADMIN — ARIPIPRAZOLE 5 MG: 5 TABLET ORAL at 09:46

## 2022-08-10 RX ADMIN — LEVOTHYROXINE SODIUM 50 MCG: 0.05 TABLET ORAL at 09:40

## 2022-08-10 RX ADMIN — DIVALPROEX SODIUM 250 MG: 125 CAPSULE ORAL at 00:53

## 2022-08-10 RX ADMIN — SODIUM CHLORIDE, PRESERVATIVE FREE 10 ML: 5 INJECTION INTRAVENOUS at 00:53

## 2022-08-10 RX ADMIN — GADOTERIDOL 20 ML: 279.3 INJECTION, SOLUTION INTRAVENOUS at 09:04

## 2022-08-10 ASSESSMENT — PAIN DESCRIPTION - DESCRIPTORS: DESCRIPTORS: ACHING

## 2022-08-10 ASSESSMENT — PAIN DESCRIPTION - LOCATION: LOCATION: HEAD

## 2022-08-10 ASSESSMENT — PAIN SCALES - GENERAL
PAINLEVEL_OUTOF10: 1
PAINLEVEL_OUTOF10: 0

## 2022-08-10 NOTE — CARE COORDINATION
8/10/22, 7:11 AM EDT  DISCHARGE PLANNING EVALUATION:    Reuben Glynn       Admitted: 8/9/2022/ [address] day: 1   Location: 8A-18/018-A Reason for admit: Dizziness [R42]   PMH:  has a past medical history of Chronic leg pain, Diabetes ([address] GERD (gastroesophageal reflux disease), History of deep venous thrombosis (DVT) of distal vein of left lower extremity, Hyperlipidemia, Hypertension, Hypothyroidism, Right homonymous hemianopsia, Sleep apnea, and TIA (transient ischemic attack). Procedure: No.  Barriers to Discharge:  Pt to ER with CP, syncope, back pain. Hx DVT, TIA, DM, hypertension. Orthostatic VS. Echo ordered. PCP: Henna Shetty MD  Readmission Risk Score: 7.5%    Pt has no designated medical spokesperson and not interested in est this until he speaks with some people. Patient Goals/Plan/Treatment Preferences: Met with [facility] this morning. He resides alone. He has no home services or DME. He does not drives but uses bus or insurance transportation. He has a PCP, he is insured, and he has no issues getting medications. Transportation/Food Security/Housekeeping Addressed:  No issues identified.

## 2022-08-10 NOTE — H&P
Internal Medicine Resident History and Physical          Patient: Gary Fraga  : 1960  MRN: [id number]     Acct: [de-identified]    PCP: Amilcar Pal MD  Date of Admission: 2022  Date of Service: Pt seen/examined on 22  and Admitted to Inpatient with expected LOS less than two midnights due to medical therapy. Hospital Problems             Last Modified POA    * (Principal) Dizziness 2022 Yes       Assessment and Plan:  Dizziness: Unknown etiology. Possibly due to benign peripheral paroxysmal vertigo. Patient's dizziness is induced by quick rotations of his head. Describes the room as spinning him. Occurring in the last month. Occurs multiples times a day. Associated nausea. Patient put on telemetry for continuous monitoring. Initial ECG normal. Will get a repeat ECG. Initial orthostatic check normal. Will get recheck orthostatic blood pressure and pulse check. Neuro check every 4 hrs. Fall precautions. Complete echo was ordered for further evaluation. MRI ordered for further evaluation. Based off of results will considered neurology consult for further evaluation and recommendations. CT lumbar, thoracic, and cervical spine negative for fractures. CT head without contrast shows no acute intracranial hemorrhage or process identified. Chest pain, atypical: Patient mentions substernal angina like chest pain. Initial troponin in the ER negative. ECG obtained in the ER shows normal sinus rhythm no ST changes. Repeat troponin for continued evaluation ordered. Repeat twelve-lead ECG ordered. Patient is being placed on continuous monitoring while he is here. Nitro sublingual was ordered in the ER. patient has heart score 4. Pro-BNP: <5 If there is an increase in patient's troponin or change on ECG we will consult cardiology for further evaluation and recommendations. Patient reports he is not taking any blood thinners at this time.  He was started on Lovenox 40 mg DVT/PE prophylaxis. Chronic Conditions (reviewed and stable unless otherwise stated)   Diabetes mellitus type 2: Stable. initial fasting glucose in the ER was 91. POCT glucose checks Q4 with meals and before bed have been ordered. Hypoglycemia protocol ordered. At this time no sliding scale insulin ordered. Hypertension: Patient's home medication of losartan 50 mg was continued. Continuing patient's home Toprol-XL. We will monitor. Depression: Stable. Home medication of Trintellix continued. Abilify 5 mg continued. Goes to [facility] for this. Hyperlipidemia: Stable. Continuing home medication atorvastatin 80 mg. Obesity: BMI 37.39, will recommend lifestyle modifications of diet and exercise. History of seizure: Continuing patient Depakote. Obtaining Depakote, valproic acid levels. Seizure precautions have been ordered. Hypothyroidism: Noted. Continue home medication Synthroid. Sleep apnea: Patient does not use CPAP machine. Right homonymous hemianopsia: noted, followed by Dr. Romel Zuñiga. History of DVT: Patient is off anticoagulation at this time. Last venous Doppler was 7/20/2021 showed no DVTs at that time. On DVT prophylaxis at this time on Lovenox 40 mg.            =======================================================================      Chief Complaint: Dizziness    History Of Present Illness:  Marylin Angel is a 57 y.o. male with PMHx of hypertension, hyperlipidemia, history of seizures, hypothyroidism, who presents to [address] [place] [address] for evaluation of dizziness. Patient is a poor historian due to developmental delay. Patient states that he has been experiencing new onset dizziness for the past 1 month. Patient's dizziness is associated with lightheadedness. Patient does have some associated nausea with these episodes. But reports no episodes of emesis. He has been having multiple episodes a week. He states that today he has had 3 episodes of dizziness.   Reports the room starts spinning when they occur. Today patient says he was in his trailer when the episode began, and he felt like his trailer was moving back and forth and spending. Patient says he felt like he was going to fall and had to brace himself in his trailer so that he did not. He did go from a standing position to 1 knee, which is his left, and complains of some left knee pain. He states that these episodes of dizziness last 15 to 20 minutes before resolving on their own. After today's episodes he went to [facility] where his blood pressure was checked it was 163/106 and his heart rate was 114. This was at 1:45 PM.  His glucose was 174 at that time. Patient's blood pressure was rechecked at 320 is 158/103, he does complain of an accompanied headache. Patient states he has episodes can be brought on by turning his head superfast like looking to the left. Patient reports he has these episodes at night when he rolls over. Patient has he is having some chest pain that occurs at random with activity and without. He locates it to the center of his chest.  Patient adds he has some shortness of breath with these episodes. Patient reports he is having some numbness in his face started yesterday along his jaw line. He also is having some slight double vision. Patient denies weakness, fever, chills. He reports no chest pain or shortness of breath at this time. Patient has no abdominal pain, nausea, vomiting diarrhea or constipation, no bloody or black stools. He has not seen blood in his urine. Asking the patient to turn his head fast the left and I witnessed episode of dizziness. No nystagmus was observed. ED course:   Chest x-ray obtained showed no acute cardiopulmonary findings  CT head without contrast obtained no acute intercranial hemorrhage or process identified  CT cervical spine without contrast no acute cervical spine fracture identified  CT thoracic spine without contrast shows thoracic spondylosis.   No thoracic spine fracture seen  CT lumbar spine without contrast shows no lumbar spine fracture seen. X-ray of the left knee obtained no acute fracture seen. EKG in the ER obtained showed normal sinus rhythm  Troponin BMP obtained in ER were normal.  Orthostatic blood pressure and pulse obtained in the ER, were negative  Patient was given fluid in the ER  He was given a loading dose of aspirin. Past Medical History:        Diagnosis Date    Chronic leg pain     Diabetes ([address] 05/2018    resolved with weight loss. GERD (gastroesophageal reflux disease)     History of deep venous thrombosis (DVT) of distal vein of left lower extremity 2020    Hyperlipidemia     Hypertension     Hypothyroidism     Right homonymous hemianopsia     Sleep apnea     doesn't use CPAP    TIA (transient ischemic attack) 2016    Dr. Araceli Chávez      Past Surgical History:        Procedure Laterality Date    ANKLE SURGERY Right 01/01/2000    CATARACT REMOVAL Left 02/09/2022    ELBOW SURGERY Left 02/01/2022    ulnar nerve decompression    KNEE ARTHROSCOPY Left 09/11/2015    Torn cartliage    LEG SURGERY Right 01/01/2000    [place] COLSC FLX W/RMVL OF TUMOR POLYP LESION SNARE TQ  07/16/2017    COLONOSCOPY POLYPECTOMY SNARE/COLD BIOPSY performed by Tara Berman MD at [facility] [place] [facility]    [place] EGD TRANSORAL BIOPSY SINGLE/MULTIPLE N/A 09/27/2017    EGD BIOPSY performed by Antonia Melendez MD at [address] [place] [address]  07/16/2017    EGD DIAGNOSTIC [facility] [place] [facility]     Medications Prior to Admission:   Prior to Admission medications    Medication Sig Start Date End Date Taking?  Authorizing Provider   losartan (COZAAR) 50 MG tablet TAKE 1 TABLET BY MOUTH EVERY DAY 7/6/22 1/2/23  Maxine Sullivan MD   ARIPiprazole (ABILIFY) 5 MG tablet Take 5 mg by mouth daily    Historical Provider, MD   albuterol sulfate HFA (PROVENTIL;VENTOLIN;PROAIR) 108 (90 Base) MCG/ACT inhaler Inhale 2 puffs into the lungs every 6 hours as Cholesterol Father     High Blood Pressure Father     Dementia Maternal Aunt     Seizures Maternal Uncle      Review of Systems:   Pertinent positives and negatives as noted in the HPI. Otherwise complete ROS negative. Physical Exam:    BP (!) 167/95   Pulse 94   Temp 98.7 °F (37.1 °C) (Oral)   Resp 18   SpO2 95%     General appearance: No apparent distress, appears stated age. Resting comfortably on room air. Eyes:  Pupils equal, round. Conjunctivae/corneas clear. HENT: Head normal in appearance. External nares normal. Oral mucosa moist without lesions. No teeth present. Hearing grossly intact. Neck: Supple, with full range of motion. Trachea midline. No gross JVD appreciated. Respiratory:  Normal respiratory effort. Clear to auscultation in all lung fields, bilaterally without rales or wheezes or rhonchi. Cardiovascular: Normal rate, regular rhythm with normal S1/S2 without murmurs. No lower extremity edema. Abdomen: Soft, non-tender, non-distended with normal bowel sounds. Musculoskeletal: Normal tone. No abnormal movements. Tenderness to left knee. Skin: Warm and dry. No rashes or lesions. Neurologic:  No focal sensory/motor deficits in the upper and lower extremities. Cranial nerves:  grossly non-focal 2-12. Except patient has numbness to cranial 5 in the V3 distrubution. Psychiatric: Alert and oriented x4, normal insight and thought content. Developmentally delayed. Capillary Refill: Brisk,< 3 seconds. Peripheral Pulses: +2 palpable, equal bilaterally. Labs:     Recent Labs     08/09/22  1703   WBC 10.1   HGB 16.5   HCT 48.9        Recent Labs     08/09/22  1703      K 4.2   CL 99   CO2 24   BUN 19   CREATININE 1.0   CALCIUM 9.4     Recent Labs     08/09/22  1703   AST 18   ALT 21   BILITOT 0.3   ALKPHOS 70     No results for input(s): INR in the last 72 hours. No results for input(s): Adelina Comber in the last 72 hours.   Lab Results   Component Value Date/Time spine fracture seen   2. Multilevel degenerative facet disease      This document has been electronically signed by: Deanna Juarez MD on    08/09/2022 07:12 PM      All CTs at this facility use dose modulation techniques and iterative    reconstructions, and/or weight-based dosing   when appropriate to reduce radiation to a low as reasonably achievable. XR CHEST PORTABLE   Final Result   Impression:   No acute cardiopulmonary finding. This document has been electronically signed by: Pasha Escalante MD on 08/09/2022    05:09 PM         EKG:  I have reviewed the EKG with the following interpretation:   Normal sinus rhythm. PACs  Rate 100 bpm  Normal axis and regular intervals. PT/OT Eval Status: will be assessed  Diet: ADULT DIET; Regular; 4 carb choices (60 gm/meal)  DVT prophylaxis: Lovenox 40 mg  Code Status: Full Code  Disposition: admit to medicine     Thank you Barbara Gutierrez MD for the opportunity to be involved in this patient's care.     Electronically signed by Matthew Gibbs DO, PGY-1 on 8/9/2022 at 10:03 PM.     Case discussed with Attending, Dr. Daya Medina MD.

## 2022-08-10 NOTE — ED NOTES
ED to inpatient nurses report    Chief Complaint   Patient presents with    Dizziness      Present to ED from home  LOC: alert and orientated to name, place, date  Vital signs   Vitals:    08/09/22 1740 08/09/22 1908 08/09/22 2032 08/09/22 2153   BP: 139/88 127/86 (!) 167/95 132/87   Pulse: 99 (!) 101 94 94   Resp: 14 22 18 15   Temp:       TempSrc:       SpO2: 93% 92% 95% 95%      Oxygen Baseline room air    Current needs required room air Bipap/Cpap No  LDAs:   Peripheral IV 08/09/22 Right Wrist (Active)   Site Assessment Clean, dry & intact 08/09/22 1655   Line Status Brisk blood return;Normal saline locked 08/09/22 1655   Dressing Intervention New 08/09/22 1655     Mobility: Independent  Pending ED orders: none  Present condition: pt resting on cot with unlabored respirations.        C-SSRS Risk of Suicide: No Risk  Swallow Screening    Preferred Language: English     Electronically signed by Rohan Sawant RN on 8/9/2022 at 10:10 PM       Sergio Burnett RN  08/09/22 9946

## 2022-08-10 NOTE — PROGRESS NOTES
spending. Patient says he felt like he was going to fall and had to brace himself in his trailer so that he did not. He did go from a standing position to 1 knee, which is his left, and complains of some left knee pain. He states that these episodes of dizziness last 15 to 20 minutes before resolving on their own. After today's episodes he went to [facility] where his blood pressure was checked it was 163/106 and his heart rate was 114. This was at 1:45 PM.  His glucose was 174 at that time. Patient's blood pressure was rechecked at 320 is 158/103, he does complain of an accompanied headache. Patient states he has episodes can be brought on by turning his head superfast like looking to the left. Patient reports he has these episodes at night when he rolls over. Patient has he is having some chest pain that occurs at random with activity and without. He locates it to the center of his chest.  Patient adds he has some shortness of breath with these episodes. Patient reports he is having some numbness in his face started yesterday along his jaw line. He also is having some slight double vision. Patient denies weakness, fever, chills. He reports no chest pain or shortness of breath at this time. Patient has no abdominal pain, nausea, vomiting diarrhea or constipation, no bloody or black stools. He has not seen blood in his urine. Asking the patient to turn his head fast the left and I witnessed episode of dizziness. No nystagmus was observed. ED course:   Chest x-ray obtained showed no acute cardiopulmonary findings  CT head without contrast obtained no acute intercranial hemorrhage or process identified  CT cervical spine without contrast no acute cervical spine fracture identified  CT thoracic spine without contrast shows thoracic spondylosis. No thoracic spine fracture seen  CT lumbar spine without contrast shows no lumbar spine fracture seen.   X-ray of the left knee obtained no acute fracture seen.  EKG in the ER obtained showed normal sinus rhythm  Troponin BMP obtained in ER were normal.  Orthostatic blood pressure and pulse obtained in the ER, were negative  Patient was given fluid in the ER  He was given a loading dose of aspirin. 8/10--> hemodynamically stable, orthostatic vital signs slightly positive; upon review of telemetry is showing a sinus rhythm at 98 with occasional pauses noted    Subjective (past 24 hours): Complains of pressure to his left side of his chest rates 1-2, states he feels slightly nauseated and \"things are spinning\", states he has had this for 1 month, on exam his right lower extremity has some edema      Medications:  Reviewed    Infusion Medications    sodium chloride      dextrose       Scheduled Medications    sodium chloride flush  5-40 mL IntraVENous 2 times per day    enoxaparin  40 mg SubCUTAneous Daily    ARIPiprazole  5 mg Oral Daily    atorvastatin  80 mg Oral Daily    divalproex  250 mg Oral Nightly    levothyroxine  50 mcg Oral QAM AC    losartan  50 mg Oral Daily    metoprolol succinate  50 mg Oral Daily    VORTIoxetine HBr  20 mg Oral Daily     PRN Meds: nitroGLYCERIN, sodium chloride flush, sodium chloride, ondansetron **OR** ondansetron, polyethylene glycol, acetaminophen **OR** acetaminophen, glucose, dextrose bolus **OR** dextrose bolus, glucagon (rDNA), dextrose, albuterol sulfate HFA    No intake or output data in the 24 hours ending 08/10/22 0736    Diet:  ADULT DIET; Regular; 4 carb choices (60 gm/meal)    Exam:  /76   Pulse 81   Temp 98.4 °F (36.9 °C) (Oral)   Resp 16   Ht 5' 4\" (1.626 m)   Wt 212 lb (96.2 kg)   SpO2 95%   BMI 36.39 kg/m²     General appearance: No apparent distress, appears stated age and cooperative. HEENT: Pupils equal, round, and reactive to light. Conjunctivae/corneas clear. Neck: Supple, with full range of motion. No jugular venous distention. Trachea midline. Respiratory:  Normal respiratory effort.  Clear to auscultation, bilaterally without Rales/Wheezes/Rhonchi. Cardiovascular: Regular rate and rhythm with normal S1/S2 without murmurs, rubs or gallops. Abdomen: Soft, non-tender, non-distended with normal bowel sounds. Musculoskeletal: passive and active ROM x 4 extremities. Skin: Skin color, texture, turgor normal.    Neurologic:  Neurovascularly intact without any focal sensory/motor deficits. Cranial nerves: II-XII intact, grossly non-focal.  Psychiatric: Alert and oriented, thought content appropriate  Capillary Refill: Brisk,< 3 seconds   Peripheral Pulses: +2 palpable, equal bilaterally       Labs:   Recent Labs     08/09/22  1703   WBC 10.1   HGB 16.5   HCT 48.9        Recent Labs     08/09/22  1703      K 4.2   CL 99   CO2 24   BUN 19   CREATININE 1.0   CALCIUM 9.4     Recent Labs     08/09/22  1703   AST 18   ALT 21   BILITOT 0.3   ALKPHOS 70     Microbiology:    None    Radiology:  XR KNEE LEFT (MIN 4 VIEWS)    Result Date: 8/9/2022  Exam: 4 views of the left knee Comparison: None Clinical history: Fall, left knee pain Findings: No acute fracture or dislocation identified. No effusion. Tricompartment osteoarthritis of the knee that is moderate in the medial compartment. No radiodense foreign bodies are seen. Impression: 1. No acute fracture seen. 2. Tricompartment osteoarthritis which is moderate in the medial compartment This document has been electronically signed by: Bryson Sood MD on 08/09/2022 07:35 PM    CT HEAD WO CONTRAST    Result Date: 8/9/2022  Exam: CT brain without contrast Comparison: 1/7/2022 Clinical history: Shaan Hymen this morning Findings: No acute intracranial hemorrhage The ventricles are normal in size and position. No intracranial masses No midline shift identified. No abnormal extra-axial fluid collections are seen. No skull fracture identified. Impression: 1. No acute intracranial hemorrhage or process identified.  This document has been electronically signed by: Remington Meyers MD on 08/09/2022 07:04 PM All CTs at this facility use dose modulation techniques and iterative reconstructions, and/or weight-based dosing when appropriate to reduce radiation to a low as reasonably achievable. CT CERVICAL SPINE WO CONTRAST    Result Date: 8/9/2022  Exam: CT of the cervical spine without contrast Comparison: 10/24/2021 Clinical history: Fall Findings: Alignment of the cervical spine is anatomic. No acute cervical spine fracture identified. No prevertebral soft tissue swelling is seen. Vertebral body height is normal. Mildly decreased intervertebral disc height with endplate osteophytosis at C5-6 and C6-7 The dens appears intact. The lateral masses are normally aligned. The facet joints are normally aligned. Impression: 1. No acute cervical spine fracture identified. 2. Degenerative disease in the mid cervical spine This document has been electronically signed by: Remington Meyers MD on 08/09/2022 07:06 PM All CTs at this facility use dose modulation techniques and iterative reconstructions, and/or weight-based dosing when appropriate to reduce radiation to a low as reasonably achievable. CT THORACIC SPINE WO CONTRAST    Result Date: 8/9/2022  Exam: CT of the thoracic spine without contrast Comparison: None Clinical history: Fall Findings: Alignment of the thoracic spine is anatomic. No thoracic spine fractures are identified. Vertebral body height is normal. Mild endplate osteophytosis is seen in the mid to lower thoracic spine Intervertebral disc height is maintained. Impression: 1. Thoracic spondylosis. No thoracic spine fracture seen. This document has been electronically signed by: Remington Meyers MD on 08/09/2022 07:08 PM All CTs at this facility use dose modulation techniques and iterative reconstructions, and/or weight-based dosing when appropriate to reduce radiation to a low as reasonably achievable.     CT LUMBAR SPINE WO CONTRAST    Result Date: 8/9/2022  Exam: CT lumbar spine without contrast Comparison: None Clinical history: Fall Findings: Minimal posterior subluxation of L4 relative to L5 otherwise alignment of the lumbar spine is anatomic. Vertebral body height is normal. Intervertebral disc height is normal. No lumbar spine fracture identified. Degenerative facet disease seen in the left at L3-4 L4-5 and L5-S1     Impression: 1. No lumbar spine fracture seen 2. Multilevel degenerative facet disease This document has been electronically signed by: Adron Bence, MD on 08/09/2022 07:12 PM All CTs at this facility use dose modulation techniques and iterative reconstructions, and/or weight-based dosing when appropriate to reduce radiation to a low as reasonably achievable. XR CHEST PORTABLE    Result Date: 8/9/2022  Chest radiograph AP view Comparison: CT,[facility] - CTA CHEST W WO CONTRAST - 10/24/2021 05:29 PM EDT Findings: Heart size normal, uncoiling of the thoracic aorta. No consolidations. No pleural effusion. No pneumothorax. Calcified hilar lymph nodes, unchanged. No acute fracture. Soft tissue is unremarkable. Impression: No acute cardiopulmonary finding. This document has been electronically signed by:  Uzair Colmenraes MD on 08/09/2022 05:09 PM      DVT prophylaxis: [x] Lovenox                                 [] SCDs                                 [] SQ Heparin                                 [] Encourage ambulation           [] Already on Anticoagulation     Code Status: Full Code    PT/OT Eval Status:     Tele:   [x] Yes sinus rhythm heart rate 98 with a few pauses noted on review of telemetry             [] no    Active Hospital Problems    Diagnosis Date Noted    Dizziness [R42] 08/09/2022     Priority: Medium       Electronically signed by SITA Bo CNP on 8/10/2022 at 7:36 AM

## 2022-08-11 ENCOUNTER — APPOINTMENT (OUTPATIENT)
Dept: CT IMAGING | Age: 62
End: 2022-08-11
Payer: MEDICARE

## 2022-08-11 PROBLEM — R42 VERTIGO: Status: ACTIVE | Noted: 2022-08-11

## 2022-08-11 PROBLEM — I44.1 MOBITZ I: Status: ACTIVE | Noted: 2022-08-11

## 2022-08-11 PROBLEM — R07.9 CHEST PAIN: Status: ACTIVE | Noted: 2022-08-11

## 2022-08-11 LAB
EKG ATRIAL RATE: 83 BPM
EKG ATRIAL RATE: 83 BPM
EKG P AXIS: 43 DEGREES
EKG P AXIS: 49 DEGREES
EKG P-R INTERVAL: 144 MS
EKG P-R INTERVAL: 160 MS
EKG Q-T INTERVAL: 336 MS
EKG Q-T INTERVAL: 356 MS
EKG QRS DURATION: 70 MS
EKG QRS DURATION: 72 MS
EKG QTC CALCULATION (BAZETT): 394 MS
EKG QTC CALCULATION (BAZETT): 418 MS
EKG R AXIS: 13 DEGREES
EKG R AXIS: 20 DEGREES
EKG T AXIS: 62 DEGREES
EKG T AXIS: 64 DEGREES
EKG VENTRICULAR RATE: 83 BPM
EKG VENTRICULAR RATE: 83 BPM
GLUCOSE BLD-MCNC: 104 MG/DL (ref 70–108)
GLUCOSE BLD-MCNC: 115 MG/DL (ref 70–108)
GLUCOSE BLD-MCNC: 137 MG/DL (ref 70–108)
GLUCOSE BLD-MCNC: 95 MG/DL (ref 70–108)
TSH SERPL DL<=0.05 MIU/L-ACNC: 1.91 UIU/ML (ref 0.4–4.2)

## 2022-08-11 PROCEDURE — 97116 GAIT TRAINING THERAPY: CPT

## 2022-08-11 PROCEDURE — 70496 CT ANGIOGRAPHY HEAD: CPT

## 2022-08-11 PROCEDURE — 6370000000 HC RX 637 (ALT 250 FOR IP)

## 2022-08-11 PROCEDURE — 6370000000 HC RX 637 (ALT 250 FOR IP): Performed by: NURSE PRACTITIONER

## 2022-08-11 PROCEDURE — 99205 OFFICE O/P NEW HI 60 MIN: CPT | Performed by: INTERNAL MEDICINE

## 2022-08-11 PROCEDURE — 6360000004 HC RX CONTRAST MEDICATION: Performed by: NURSE PRACTITIONER

## 2022-08-11 PROCEDURE — 36415 COLL VENOUS BLD VENIPUNCTURE: CPT

## 2022-08-11 PROCEDURE — 97162 PT EVAL MOD COMPLEX 30 MIN: CPT

## 2022-08-11 PROCEDURE — 97530 THERAPEUTIC ACTIVITIES: CPT

## 2022-08-11 PROCEDURE — 93010 ELECTROCARDIOGRAM REPORT: CPT | Performed by: INTERNAL MEDICINE

## 2022-08-11 PROCEDURE — 70450 CT HEAD/BRAIN W/O DYE: CPT

## 2022-08-11 PROCEDURE — 96372 THER/PROPH/DIAG INJ SC/IM: CPT

## 2022-08-11 PROCEDURE — 6360000002 HC RX W HCPCS

## 2022-08-11 PROCEDURE — 2580000003 HC RX 258

## 2022-08-11 PROCEDURE — 99225 PR SBSQ OBSERVATION CARE/DAY 25 MINUTES: CPT | Performed by: NURSE PRACTITIONER

## 2022-08-11 PROCEDURE — 84443 ASSAY THYROID STIM HORMONE: CPT

## 2022-08-11 PROCEDURE — 70498 CT ANGIOGRAPHY NECK: CPT

## 2022-08-11 PROCEDURE — G0378 HOSPITAL OBSERVATION PER HR: HCPCS

## 2022-08-11 PROCEDURE — 82948 REAGENT STRIP/BLOOD GLUCOSE: CPT

## 2022-08-11 RX ORDER — MECLIZINE HCL 12.5 MG/1
25 TABLET ORAL 3 TIMES DAILY PRN
Status: DISCONTINUED | OUTPATIENT
Start: 2022-08-11 | End: 2022-08-13 | Stop reason: HOSPADM

## 2022-08-11 RX ADMIN — LEVOTHYROXINE SODIUM 50 MCG: 0.05 TABLET ORAL at 06:52

## 2022-08-11 RX ADMIN — VORTIOXETINE 20 MG: 10 TABLET, FILM COATED ORAL at 10:17

## 2022-08-11 RX ADMIN — ARIPIPRAZOLE 5 MG: 5 TABLET ORAL at 10:17

## 2022-08-11 RX ADMIN — ENOXAPARIN SODIUM 40 MG: 100 INJECTION SUBCUTANEOUS at 10:16

## 2022-08-11 RX ADMIN — ONDANSETRON 4 MG: 4 TABLET, ORALLY DISINTEGRATING ORAL at 15:57

## 2022-08-11 RX ADMIN — LOSARTAN POTASSIUM 50 MG: 50 TABLET, FILM COATED ORAL at 10:17

## 2022-08-11 RX ADMIN — MECLIZINE HCL 12.5 MG 25 MG: 12.5 TABLET ORAL at 10:17

## 2022-08-11 RX ADMIN — ATORVASTATIN CALCIUM 80 MG: 80 TABLET, FILM COATED ORAL at 10:17

## 2022-08-11 RX ADMIN — SODIUM CHLORIDE, PRESERVATIVE FREE 10 ML: 5 INJECTION INTRAVENOUS at 19:49

## 2022-08-11 RX ADMIN — IOPAMIDOL 80 ML: 755 INJECTION, SOLUTION INTRAVENOUS at 17:21

## 2022-08-11 RX ADMIN — METOPROLOL SUCCINATE 50 MG: 50 TABLET, EXTENDED RELEASE ORAL at 10:16

## 2022-08-11 RX ADMIN — DIVALPROEX SODIUM 250 MG: 125 CAPSULE ORAL at 19:49

## 2022-08-11 ASSESSMENT — PAIN SCALES - GENERAL
PAINLEVEL_OUTOF10: 0
PAINLEVEL_OUTOF10: 0
PAINLEVEL_OUTOF10: 2

## 2022-08-11 ASSESSMENT — PAIN DESCRIPTION - PAIN TYPE: TYPE: CHRONIC PAIN

## 2022-08-11 ASSESSMENT — PAIN DESCRIPTION - LOCATION: LOCATION: ABDOMEN

## 2022-08-11 ASSESSMENT — PAIN DESCRIPTION - ORIENTATION: ORIENTATION: RIGHT

## 2022-08-11 ASSESSMENT — PAIN DESCRIPTION - DESCRIPTORS: DESCRIPTORS: ACHING

## 2022-08-11 ASSESSMENT — PAIN DESCRIPTION - ONSET: ONSET: ON-GOING

## 2022-08-11 ASSESSMENT — PAIN - FUNCTIONAL ASSESSMENT: PAIN_FUNCTIONAL_ASSESSMENT: ACTIVITIES ARE NOT PREVENTED

## 2022-08-11 NOTE — CONSULTS
Dizziness- Vertigo  Intermittent- rare episodes of 2nd degree av block mobitz type I and intermittent non-conducted pacs- in the night  Chest pain atypical cp  HTN      Plan  Holter 48 hrs  Kush nuc    [id number]    Hector Ontiveros MD

## 2022-08-11 NOTE — CONSULTS
[address] [place] [address]                                  CONSULTATION    PATIENT NAME: Romel Dinh                      :        1960  MED REC NO:   [id number]                           ROOM:       0018  ACCOUNT NO:   [de-identified]                           ADMIT DATE: 2022  PROVIDER:     Genny Fernandez. Adolfo Stephenson M.D.    Reji Crick:  2022    REASON FOR CONSULTATION:  Pause of 2.5 seconds noted in the night on the  telemetry in a 57year-old gentleman presented with dizziness and  generalized body weakness. HISTORY OF PRESENT ILLNESS:  This is a 79-year-old gentleman with  history of hypertension, hyperlipidemia, hypothyroidism, reported  developmental delay presented to the emergency room yesterday because of  dizziness and the patient stated that he has been having intermittent  dizziness, lightheadedness in supine, sitting, and standing position and  gets worse when he changes position, turning, rolling over in bed can  induce his dizziness and turning his head induce his dizziness and he  described it as the room is spinning around him and so intermittently so  did not fall, no loss of consciousness and reported history of chest  pain occasionally and no chest pain now. The chest pain is atypical,  not exertion, induced last five minutes and resolved on its own. He  complains that he has some chest wall tenderness. He has a history of  intermittent chest pain and happens once in a while, has been worked up  before. Had a cardiac catheterization 2017, patent coronaries. Subsequent stress test was negative so Cardiology evaluation was sought  in view of the pause of 2.5 seconds noted in the night while the patient  asleep. REVIEW OF SYSTEMS:  Denied having shortness of breath, orthopnea,  paroxysmal nocturnal dyspnea, no nausea or vomiting, no orthopnea, no  leg swelling.   Rest of the review of system negative except the  above-mentioned ones. PAST MEDICAL HISTORY:  Includes the following chronic leg pain, history  of diabetes, gastroesophageal reflux disease, hypertension,  hyperlipidemia, hypothyroidism, sleep apnea, and reported history of TIA  in 2016. PAST SURGICAL HISTORY:  Ankle surgery, cataract removal, adipose  surgery, and leg surgeries. FAMILY HISTORY:  Positive for hypertension and dementia. SOCIAL HISTORY:  He is a former smoker. No alcohol. No drug use. ALLERGIES:  HE HAS NO KNOWN DRUG ALLERGY. MEDICATIONS:  Home medications prior to this admission acetaminophen,  albuterol, Abilify, Lipitor, _____, Depakote, Flonase, Synthroid,  losartan, Toprol-XL 50 daily, Prilosec, and Trintellix. PHYSICAL EXAMINATION:  GENERAL:  Looked sick, in no form of distress. VITAL SIGNS:  Blood pressure 100/64, pulse rate 60, respiratory rate 16,  and temperature 98. HEENT:  Pink conjunctivae. Pupils are equal and reactive bilateral to  light. NECK:  No lymphadenopathy. No goiter. No JVD. CHEST:  Clear to auscultation and resonant to percussion. CARDIOVASCULAR:  Arteries are felt; carotid, femoral, pedal.  No bruits. S1, S2 well heard. No murmur or galloped rhythm. ABDOMEN:  Soft, nontender, and nondistended. Bowel sounds are positive. GENITALIA:  No CVA, flank or suprapubic tenderness. LOCOMOTOR:  No cyanosis, clubbing, muscle or joint tenderness. SKIN:  No rashes, ulcers, or nodules. CNS:  Alert, awake, and oriented to time, person, and place. Cranial  nerves are intact. Sensation is intact to light touch and pain. Motor:  Normal muscle strength and tone. Appropriate mood and affect. WORKUP:  EKG:  EKG done yesterday, sinus rhythm with low voltage,  apparently nonspecific T-wave abnormality noted, otherwise no acute EKG  abnormality noted _____. Review of the telemetry revealed the following.   Review of the telemetry  showed at 07:40 a.m. today a non-conducted PAC one episode; with chest wall tenderness seems to be noncardiac. We will  continue functional study. Hypertension, stable well controlled. 3.  History of hyperlipidemia. PLAN AND RECOMMENDATIONS:  1. From a cardiac point of view, we will continue with the current  medical therapy, the Mobitz type 1 isolated single episode of 2:1 AV  block that seems probably sleep related, is not unusual at this time and  so no need for any further intervention. No pacemaker needed. 2.  We will get 48-hour Holter monitor on discharge, which confirm that  we do not miss anything of significant arrhythmic or bradyarrhythmic  situation. 3.  Continue his Toprol for now and echocardiogram within normal limits. We will get functional study in view of his atypical chest pain, which  appears to be noncardiac. 4.  With regards to his dizziness/vertigo, the patient may benefit from  vestibular therapy Epley maneuver so I will leave that at the discretion  of the hospitalist.  5.  Based on the course, we will gauge further care. Thank you for allowing me to participate in the care of this patient. We will follow up with you as needed. Jerrod Win.  Jairon Corona M.D.    D: 08/11/2022 10:47:00       T: 08/11/2022 12:59:17     [email or web address]  Job#: [id number]     Doc#: [id number]    CC:

## 2022-08-11 NOTE — PROGRESS NOTES
Hospitalist Progress Note    Patient:  Radha Quintero      Unit/Bed:8A-18/018-A    YOB: 1960    MRN: [id number]       Acct: [de-identified]     PCP: Danica Guillermo MD    Date of Admission: 8/9/2022    Assessment/Plan:    Dizziness--ACS ruled out by 3 negative troponins; CT head does not reveal anything acute; CTA chest (-) PE; will add vestibular evaluation by PT~nursing communication left as was ordered on 8/10 and not obtained yet today; MRI brain did not reveal any acute findings; echo with EF 60% and grade 1 diastolic dysfunction; couple pauses noted on review of telemetry so cardiology was consulted~he is on a beta-blocker; check TSH  Atypical chest pain--please see #1  Essential hypertension, uncontrolled--on Cozaar, Toprol; place hold parameters on blood pressure medicines, monitor  Hypothyroidism--treated  Sleep apnea--CPAP  Right homonymous hemianopsia--follows with Dr. Yumiko Hickey  Depression--treated  History of seizure disorder--on Depakote, valproic acid~level less than 2.8  Stable bilateral pulmonary nodules--needs outpatient follow-up  Chronic diastolic heart failure--echo with EF 60% and grade 1 diastolic dysfunction  History of DVT--off anticoagulation      Expected discharge date: Pending clinical course    Disposition:    [x] Home       [] TCU       [] Rehab       [] Psych       [] SNF       [] [place]       [] Other-    Chief Complaint: Dizziness    Hospital Course: Per H&P done 8/9: \"Duane Bernerd Distad is a 57 y.o. male with PMHx of hypertension, hyperlipidemia, history of seizures, hypothyroidism, who presents to [address] [place] [address] for evaluation of dizziness. Patient is a poor historian due to developmental delay. Patient states that he has been experiencing new onset dizziness for the past 1 month. Patient's dizziness is associated with lightheadedness. Patient does have some associated nausea with these episodes. But reports no episodes of emesis. He has been having multiple episodes a week. He states that today he has had 3 episodes of dizziness. Reports the room starts spinning when they occur. Today patient says he was in his trailer when the episode began, and he felt like his trailer was moving back and forth and spending. Patient says he felt like he was going to fall and had to brace himself in his trailer so that he did not. He did go from a standing position to 1 knee, which is his left, and complains of some left knee pain. He states that these episodes of dizziness last 15 to 20 minutes before resolving on their own. After today's episodes he went to [facility] where his blood pressure was checked it was 163/106 and his heart rate was 114. This was at 1:45 PM.  His glucose was 174 at that time. Patient's blood pressure was rechecked at 320 is 158/103, he does complain of an accompanied headache. Patient states he has episodes can be brought on by turning his head superfast like looking to the left. Patient reports he has these episodes at night when he rolls over. Patient has he is having some chest pain that occurs at random with activity and without. He locates it to the center of his chest.  Patient adds he has some shortness of breath with these episodes. Patient reports he is having some numbness in his face started yesterday along his jaw line. He also is having some slight double vision. Patient denies weakness, fever, chills. He reports no chest pain or shortness of breath at this time. Patient has no abdominal pain, nausea, vomiting diarrhea or constipation, no bloody or black stools. He has not seen blood in his urine. Asking the patient to turn his head fast the left and I witnessed episode of dizziness. No nystagmus was observed.      ED course:   Chest x-ray obtained showed no acute cardiopulmonary findings  CT head without contrast obtained no acute intercranial hemorrhage or process identified  CT cervical spine without contrast no acute cervical spine fracture identified  CT thoracic spine without contrast shows thoracic spondylosis. No thoracic spine fracture seen  CT lumbar spine without contrast shows no lumbar spine fracture seen. X-ray of the left knee obtained no acute fracture seen. EKG in the ER obtained showed normal sinus rhythm  Troponin BMP obtained in ER were normal.  Orthostatic blood pressure and pulse obtained in the ER, were negative  Patient was given fluid in the ER  He was given a loading dose of aspirin. 8/10--> hemodynamically stable, orthostatic vital signs slightly positive; upon review of telemetry is showing a sinus rhythm at 98 with occasional pauses noted    8/11--> blood pressure soft; not tachycardic; no orthostats obtained yet today; cardiology saw and planning stress test    Subjective (past 24 hours): Complains of left-sided chest pain rates 1 out of 10, also relates to feeling dizzy however describes it is more than room spinning sensation and also relates to some nausea    Medications:  Reviewed    Infusion Medications    sodium chloride      dextrose       Scheduled Medications    sodium chloride flush  5-40 mL IntraVENous 2 times per day    enoxaparin  40 mg SubCUTAneous Daily    ARIPiprazole  5 mg Oral Daily    atorvastatin  80 mg Oral Daily    divalproex  250 mg Oral Nightly    levothyroxine  50 mcg Oral QAM AC    losartan  50 mg Oral Daily    metoprolol succinate  50 mg Oral Daily    VORTIoxetine HBr  20 mg Oral Daily     PRN Meds: nitroGLYCERIN, sodium chloride flush, sodium chloride, ondansetron **OR** ondansetron, polyethylene glycol, acetaminophen **OR** acetaminophen, glucose, dextrose bolus **OR** dextrose bolus, glucagon (rDNA), dextrose, albuterol sulfate HFA      Intake/Output Summary (Last 24 hours) at 8/11/2022 0717  Last data filed at 8/10/2022 1459  Gross per 24 hour   Intake 660 ml   Output --   Net 660 ml       Diet:  ADULT DIET;  Regular; 4 carb choices (60 gm/meal)    Exam:  /70   Pulse 77   Temp 98 °F (36.7 °C) (Oral)   Resp 18   Ht 5' 4\" (1.626 m)   Wt 209 lb 12.8 oz (95.2 kg)   SpO2 96%   BMI 36.01 kg/m²     General appearance: No apparent distress, appears stated age and cooperative. HEENT: Pupils equal, round, and reactive to light. Conjunctivae/corneas clear. Neck: Supple, with full range of motion. No jugular venous distention. Trachea midline. Respiratory:  Normal respiratory effort. Clear to auscultation, bilaterally without Rales/Wheezes/Rhonchi. Cardiovascular: Regular rate and rhythm with normal S1/S2 without murmurs, rubs or gallops. Abdomen: Soft, non-tender, non-distended with normal bowel sounds. Musculoskeletal: passive and active ROM x 4 extremities. Skin: Skin color, texture, turgor normal.    Neurologic:  Neurovascularly intact without any focal sensory/motor deficits. Cranial nerves: II-XII intact, grossly non-focal.  Psychiatric: Alert and oriented, thought content appropriate  Capillary Refill: Brisk,< 3 seconds   Peripheral Pulses: +2 palpable, equal bilaterally       Labs:   Recent Labs     08/09/22  1703   WBC 10.1   HGB 16.5   HCT 48.9          Recent Labs     08/09/22  1703      K 4.2   CL 99   CO2 24   BUN 19   CREATININE 1.0   CALCIUM 9.4       Recent Labs     08/09/22  1703   AST 18   ALT 21   BILITOT 0.3   ALKPHOS 70       Microbiology:    None    Radiology:  XR KNEE LEFT (MIN 4 VIEWS)    Result Date: 8/9/2022  Exam: 4 views of the left knee Comparison: None Clinical history: Fall, left knee pain Findings: No acute fracture or dislocation identified. No effusion. Tricompartment osteoarthritis of the knee that is moderate in the medial compartment. No radiodense foreign bodies are seen. Impression: 1. No acute fracture seen.  2. Tricompartment osteoarthritis which is moderate in the medial compartment This document has been electronically signed by: Jim Solorzano MD on 08/09/2022 07:35 PM    CT HEAD WO CONTRAST    Result Date: 8/9/2022  Exam: CT brain without contrast Comparison: 1/7/2022 Clinical history: Ping Mancia this morning Findings: No acute intracranial hemorrhage The ventricles are normal in size and position. No intracranial masses No midline shift identified. No abnormal extra-axial fluid collections are seen. No skull fracture identified. Impression: 1. No acute intracranial hemorrhage or process identified. This document has been electronically signed by: Farooq Archer MD on 08/09/2022 07:04 PM All CTs at this facility use dose modulation techniques and iterative reconstructions, and/or weight-based dosing when appropriate to reduce radiation to a low as reasonably achievable. CT CERVICAL SPINE WO CONTRAST    Result Date: 8/9/2022  Exam: CT of the cervical spine without contrast Comparison: 10/24/2021 Clinical history: Fall Findings: Alignment of the cervical spine is anatomic. No acute cervical spine fracture identified. No prevertebral soft tissue swelling is seen. Vertebral body height is normal. Mildly decreased intervertebral disc height with endplate osteophytosis at C5-6 and C6-7 The dens appears intact. The lateral masses are normally aligned. The facet joints are normally aligned. Impression: 1. No acute cervical spine fracture identified. 2. Degenerative disease in the mid cervical spine This document has been electronically signed by: Farooq Archer MD on 08/09/2022 07:06 PM All CTs at this facility use dose modulation techniques and iterative reconstructions, and/or weight-based dosing when appropriate to reduce radiation to a low as reasonably achievable. CT THORACIC SPINE WO CONTRAST    Result Date: 8/9/2022  Exam: CT of the thoracic spine without contrast Comparison: None Clinical history: Fall Findings: Alignment of the thoracic spine is anatomic. No thoracic spine fractures are identified.  Vertebral body height is normal. Mild endplate osteophytosis is seen in the mid to lower thoracic spine Intervertebral disc height is maintained. Impression: 1. Thoracic spondylosis. No thoracic spine fracture seen. This document has been electronically signed by: Soheila Wells MD on 08/09/2022 07:08 PM All CTs at this facility use dose modulation techniques and iterative reconstructions, and/or weight-based dosing when appropriate to reduce radiation to a low as reasonably achievable. CT LUMBAR SPINE WO CONTRAST    Result Date: 8/9/2022  Exam: CT lumbar spine without contrast Comparison: None Clinical history: Fall Findings: Minimal posterior subluxation of L4 relative to L5 otherwise alignment of the lumbar spine is anatomic. Vertebral body height is normal. Intervertebral disc height is normal. No lumbar spine fracture identified. Degenerative facet disease seen in the left at L3-4 L4-5 and L5-S1     Impression: 1. No lumbar spine fracture seen 2. Multilevel degenerative facet disease This document has been electronically signed by: Soheila Wells MD on 08/09/2022 07:12 PM All CTs at this facility use dose modulation techniques and iterative reconstructions, and/or weight-based dosing when appropriate to reduce radiation to a low as reasonably achievable. XR CHEST PORTABLE    Result Date: 8/9/2022  Chest radiograph AP view Comparison: CT,[facility] - CTA CHEST W WO CONTRAST - 10/24/2021 05:29 PM EDT Findings: Heart size normal, uncoiling of the thoracic aorta. No consolidations. No pleural effusion. No pneumothorax. Calcified hilar lymph nodes, unchanged. No acute fracture. Soft tissue is unremarkable. Impression: No acute cardiopulmonary finding. This document has been electronically signed by:  Martín Kang MD on 08/09/2022 05:09 PM      DVT prophylaxis: [x] Lovenox                                 [] SCDs                                 [] SQ Heparin                                 [] Encourage ambulation           [] Already on Anticoagulation     Code Status: Full Code    PT/OT Eval Status:     Tele:   [x] Yes sinus rhythm heart rate 60 with 2 pauses noted on review of telemetry             [] no    Active Hospital Problems    Diagnosis Date Noted    Dizziness [R42] 08/09/2022     Priority: Medium       Electronically signed by SITA Rivera CNP on 8/11/2022 at 7:17 AM

## 2022-08-11 NOTE — PROGRESS NOTES
[address] [place] [address]  INPATIENT PHYSICAL THERAPY  EVALUATION  [facility] MED SURG 8A - 8A-18/018-A    Time In: 4369  Time Out: 1455  Timed Code Treatment Minutes: 23 Minutes  Minutes: 33          Date: 2022  Patient Name: Xochitl Naylor,  Gender:  male        MRN: [id number]  : 1960  (57 y.o.)      Referring Practitioner: SITA Berger CNP  Diagnosis: dizziness  Additional Pertinent Hx: Per H&P : Xochitl Naylor is a 57 y.o. male with PMHx of hypertension, hyperlipidemia, history of seizures, hypothyroidism, who presents to [address] [place] [address] for evaluation of dizziness. Patient is a poor historian due to developmental delay. Patient states that he has been experiencing new onset dizziness for the past 1 month. Patient's dizziness is associated with lightheadedness. Patient does have some associated nausea with these episodes. But reports no episodes of emesis. He has been having multiple episodes a week. He states that today he has had 3 episodes of dizziness. Reports the room starts spinning when they occur. Today patient says he was in his trailer when the episode began, and he felt like his trailer was moving back and forth and spending. Patient says he felt like he was going to fall and had to brace himself in his trailer so that he did not. He did go from a standing position to 1 knee, which is his left, and complains of some left knee pain. He states that these episodes of dizziness last 15 to 20 minutes before resolving on their own. After today's episodes he went to [facility] where his blood pressure was checked it was 163/106 and his heart rate was 114. This was at 1:45 PM.  His glucose was 174 at that time. Patient's blood pressure was rechecked at 320 is 158/103, he does complain of an accompanied headache. Patient states he has episodes can be brought on by turning his head superfast like looking to the left.   Patient reports he has these episodes at night when he rolls over. Patient has he is having some chest pain that occurs at random with activity and without. He locates it to the center of his chest.  Patient adds he has some shortness of breath with these episodes. Patient reports he is having some numbness in his face started yesterday along his jaw line. He also is having some slight double vision. Restrictions/Precautions:  Restrictions/Precautions: General Precautions, Fall Risk, Seizure  Position Activity Restriction  Other position/activity restrictions: monitor dizzniess, developmental delay    Subjective:  Chart Reviewed: Yes  Patient assessed for rehabilitation services?: Yes  Family / Caregiver Present: No  Subjective: RNMeli, approved session, does state the pt had meclizine early this morning. When asked states pt has not had chest pain, and cardiology did not say anything earlier in the day when they rounded regarding this. Pt agrees for PT session and vestibular screening, requesting to use the restroom first. Pt states some dizzniess with mobility to the restroom. When testing VBI asked the pt, \"did you have any visual changes with that test?\" He states yes, that this therapist and the room was \"moving down\". When asked if he was seeing double, pt unclear about his response with this. Did test VBI again, when asked,\" did you see double? \" Pt states yes, then elaborated that he saw two of this therapist, the tray table, and the edge of the bed. RN then notified holding further vestibular testing this date due to positive VBI testing. RN communicated these concerns via [employer] to the provider, a CTA of the head and neck was then noted to be ordered.     General:  Overall Orientation Status: Within Functional Limits (not formally assessed)  Vision: Impaired  Hearing: Exceptions to [facility]  Hearing Exceptions: Hard of hearing/hearing concerns       Pain: 0/10: denies    Vitals: Vitals not assessed per clinical judgement, see nursing flowsheet    Social/Functional History:    Lives With: Alone  Type of Home: Mobile home  Home Layout: One level  Home Access: Stairs to enter with rails  Entrance Stairs - Number of Steps: 3  Entrance Stairs - Rails: Both  Home Equipment: Darilyn Caldwell, 4 wheeled                   Ambulation Assistance: Independent  Transfer Assistance: Independent    Active : No     Additional Comments: pt unable to state support system. pt states he is mod I within the home, does not use AD, but has a history of falls. Pt has assistance for transportation. OBJECTIVE:  Range of Motion:  Bilateral Lower Extremity: WFL    Strength:  Bilateral Lower Extremity: [facility]  *Not formally assessed but noted to be a min of 3/5 with functional mobility assessment. Balance:  Static Sitting Balance:  Stand By Assistance, Contact Guard Assistance  Dynamic Sitting Balance: Stand By Assistance, Contact Guard Assistance  Static Standing Balance: Contact Guard Assistance    Bed Mobility:  Not Tested    Transfers:  Sit to Stand: Contact Guard Assistance, Minimal Assistance, cues for hand placement  Stand to [place] [address], cues for hand placement  Pt noted to require min A with initial stand as he was noted to begin to push up from the tray table, cues for improved technique provided. Improved to require CGA with later transfer trials. Ambulation:  Contact Guard Assistance, with verbal cues , with increased time for completion  Distance: ~10x2  Surface: Level Tile  Device:No Device  Gait Deviations: Forward Flexed Posture, Slow Sasha, Decreased Step Length Bilaterally, Decreased Heel Strike Bilaterally, Wide Base of Support, Mild Path Deviations, Unsteady Gait, and Decreased Terminal Knee Extension  CGA provided for safety with some noted decreased step length, wide MELISSA, and general unsteadiness. Pt noted to reach for objects in his environment for stability.      Pt may benefit from a RW ambulation trial     Exercise: none this session    Benign Paroxysmal Positional Vertigo (BPPV)    History of Falls: Yes, 3 this year  Diagnostic Testing: No- not prior to this PT session, but CTA of head an neck have since been ordered. C-spine special tests- cleared with Alar Ligament test and sharp angus test, both negative  VBI screen: positive, tested in seated x2 times. Pt reports visual changes both times, however, definitive in stating double vision with second trial. RN immediately notified and contacted the provider regarding further recommendations regarding this. Neck AROM: flexion- WFL  Cervical rotation: minimally limited ~45 degrees bilaterally  Cervical extension: minimally limited, however pt noted to get symptomatic following this, noted that his head felt \"off\". RN notified again. Pt states improvement in this following a rest at neutral position. Vestibular Screening Tool (VST):   8/8  *>4/8 Predictive of vestibular dysfunction    Oculomotor Function Tests:  held this date secondary to positive VBI test    POSITIONAL VERTIGO TESTING:  held this date secondary to positive VBI test    MANEUVER PERFORMED: held this date secondary to positive VBI test- await imaging and further recommendations regarding this prior to next session    Functional Outcome Measures: Completed  AM-PAC Inpatient Mobility without Stair Climbing Raw Score : 15  AM-PAC Inpatient without Stair Climbing T-Scale Score : 43.03    ASSESSMENT:  Activity Tolerance:  Patient tolerance of  treatment: fair. Pt tolerated mobility well, noted to have unsteadiness with mobility to the restroom and back requiring cues and CGA for safety. Pt noted to have some dizziness with mobility, and symptomatic with cervical AROM with a positive VBI test. Further vestibular rehabilitation held this date, with RN, Shayna Bunn, immediately notified and who contacted the provided for further recommendations.      Treatment Initiated: Treatment and education initiated within context of evaluation. Evaluation time included review of current medical information, gathering information related to past medical, social and functional history, completion of standardized testing, formal and informal observation of tasks, assessment of data and development of plan of care and goals. Treatment time included skilled education and facilitation of tasks to increase safety and independence with functional mobility for improved independence and quality of life. Assessment: Body Structures, Functions, Activity Limitations Requiring Skilled Therapeutic Intervention: Decreased functional mobility , Decreased endurance, Decreased safe awareness, Decreased balance, Vestibular Impairment, Decreased vision/visual deficit  Assessment: This patient is a 57 y.o. who presents with dizziness. This is a decline from the patient's baseline status of indep and living alone but with a history of falls. The patient is observed to have deficits in strength, balance, activity tolerance, and safety awareness and would benefit from skilled PT services to progress functional mobility, safety awareness, and to decrease overall risk of falls. Pt would benefit from increased assist, continued PT, and use of AD if receptive to this. Therapy Prognosis: Good    Requires PT Follow-Up: Yes    Discharge Recommendations:  Discharge Recommendations: Continue to assess pending progress, Therapy recommended at discharge    Patient Education:      .     Patient Education  Education Given To: Patient  Education Provided: Role of Therapy, Plan of Care, Transfer Training  Education Method: Demonstration, Verbal  Education Outcome: Continued education needed       Equipment Recommendations:  Equipment Needed: Yes (pt may benefit from a RW if receptive to one)    Plan:  Current Treatment Recommendations: Strengthening, Balance training, Functional mobility training, Transfer training, Gait training, Stair training, Endurance training, Neuromuscular re-education, Safety education & training, Patient/Caregiver education & training, Equipment evaluation, education, & procurement, Therapeutic activities, Vestibular rehab  Plan:  (5x V/GM)    Goals:  Patient goals : no  Short Term Goals  Time Frame for Short term goals: by hospital d/c  Short term goal 1: Pt to demo supine <->sit mod I for ease getting in and out of bed  Short term goal 2: Pt to complete sit <->stand mod I for safety with transfers ebtween surfaces  Short term goal 3: Pt to ambulate >=40' with S for improved safety in his environment  Short term goal 4: Pt to ascend/descend 1 step with no HR mod I for ease with home entry  Short term goal 5: PT to assess Eyad-Hallpike if medically appropriate  Long Term Goals  Time Frame for Long term goals : NA due to short ELOS    Following session, patient left in safe position with all fall risk precautions in place.

## 2022-08-11 NOTE — CARE COORDINATION
8/11/22, 2:42 PM EDT    DISCHARGE ON GOING EVALUATION    [facility] day: 0  Location: 8A-18/018-A Reason for admit: Dizziness [R42]   Procedure: 8-10-22 Echo. Barriers to Discharge: Cardiology consulted with recs given. Plan holter for 48 hrs at discharge. Nehemias Richmond ordered. PCP: Brenna Sims MD  Readmission Risk Score: 10.4%  Patient Goals/Plan/Treatment Preferences: Plans return home alone at discharge. Follow for needs. 1500 VBI testing noted to be + per PT. Martha PT notified RN Caryl Ascencio who informed the attending. New orders noted. Neuro now consulted. CT of head and neck ordered.

## 2022-08-12 ENCOUNTER — APPOINTMENT (OUTPATIENT)
Dept: NON INVASIVE DIAGNOSTICS | Age: 62
End: 2022-08-12
Payer: MEDICARE

## 2022-08-12 LAB
GLUCOSE BLD-MCNC: 114 MG/DL (ref 70–108)
GLUCOSE BLD-MCNC: 148 MG/DL (ref 70–108)
GLUCOSE BLD-MCNC: 90 MG/DL (ref 70–108)

## 2022-08-12 PROCEDURE — G0378 HOSPITAL OBSERVATION PER HR: HCPCS

## 2022-08-12 PROCEDURE — 82948 REAGENT STRIP/BLOOD GLUCOSE: CPT

## 2022-08-12 PROCEDURE — 6370000000 HC RX 637 (ALT 250 FOR IP): Performed by: NURSE PRACTITIONER

## 2022-08-12 PROCEDURE — 6370000000 HC RX 637 (ALT 250 FOR IP)

## 2022-08-12 PROCEDURE — 93017 CV STRESS TEST TRACING ONLY: CPT | Performed by: INTERNAL MEDICINE

## 2022-08-12 PROCEDURE — 99214 OFFICE O/P EST MOD 30 MIN: CPT | Performed by: PHYSICIAN ASSISTANT

## 2022-08-12 PROCEDURE — 78452 HT MUSCLE IMAGE SPECT MULT: CPT

## 2022-08-12 PROCEDURE — A9500 TC99M SESTAMIBI: HCPCS | Performed by: NURSE PRACTITIONER

## 2022-08-12 PROCEDURE — 97530 THERAPEUTIC ACTIVITIES: CPT

## 2022-08-12 PROCEDURE — 3430000000 HC RX DIAGNOSTIC RADIOPHARMACEUTICAL: Performed by: NURSE PRACTITIONER

## 2022-08-12 PROCEDURE — 2580000003 HC RX 258

## 2022-08-12 PROCEDURE — 96372 THER/PROPH/DIAG INJ SC/IM: CPT

## 2022-08-12 PROCEDURE — 96374 THER/PROPH/DIAG INJ IV PUSH: CPT | Performed by: INTERNAL MEDICINE

## 2022-08-12 PROCEDURE — 6360000002 HC RX W HCPCS

## 2022-08-12 PROCEDURE — 97112 NEUROMUSCULAR REEDUCATION: CPT

## 2022-08-12 RX ADMIN — Medication 34.2 MILLICURIE: at 09:10

## 2022-08-12 RX ADMIN — LOSARTAN POTASSIUM 50 MG: 50 TABLET, FILM COATED ORAL at 10:38

## 2022-08-12 RX ADMIN — METOPROLOL SUCCINATE 50 MG: 50 TABLET, EXTENDED RELEASE ORAL at 10:38

## 2022-08-12 RX ADMIN — ARIPIPRAZOLE 5 MG: 5 TABLET ORAL at 10:38

## 2022-08-12 RX ADMIN — MECLIZINE HCL 12.5 MG 25 MG: 12.5 TABLET ORAL at 11:09

## 2022-08-12 RX ADMIN — LEVOTHYROXINE SODIUM 50 MCG: 0.05 TABLET ORAL at 05:22

## 2022-08-12 RX ADMIN — ONDANSETRON 4 MG: 4 TABLET, ORALLY DISINTEGRATING ORAL at 18:41

## 2022-08-12 RX ADMIN — SODIUM CHLORIDE, PRESERVATIVE FREE 10 ML: 5 INJECTION INTRAVENOUS at 21:20

## 2022-08-12 RX ADMIN — Medication 9.6 MILLICURIE: at 07:45

## 2022-08-12 RX ADMIN — SODIUM CHLORIDE, PRESERVATIVE FREE 10 ML: 5 INJECTION INTRAVENOUS at 10:40

## 2022-08-12 RX ADMIN — DIVALPROEX SODIUM 250 MG: 125 CAPSULE ORAL at 21:22

## 2022-08-12 RX ADMIN — ATORVASTATIN CALCIUM 80 MG: 80 TABLET, FILM COATED ORAL at 10:38

## 2022-08-12 RX ADMIN — ENOXAPARIN SODIUM 40 MG: 100 INJECTION SUBCUTANEOUS at 10:37

## 2022-08-12 RX ADMIN — ACETAMINOPHEN 650 MG: 325 TABLET ORAL at 11:09

## 2022-08-12 RX ADMIN — VORTIOXETINE 20 MG: 10 TABLET, FILM COATED ORAL at 10:38

## 2022-08-12 ASSESSMENT — ENCOUNTER SYMPTOMS
ABDOMINAL PAIN: 0
VOMITING: 0
SORE THROAT: 0
SHORTNESS OF BREATH: 0
RHINORRHEA: 0
NAUSEA: 0
COUGH: 0

## 2022-08-12 NOTE — CARE COORDINATION
8/12/22, 12:16 PM EDT    DISCHARGE ON GOING EVALUATION    [facility] [address] day: 0  Location: 8A-18/018-A Reason for admit: Dizziness [R42]   Procedure: 8-12-22 Stress Test.   Barriers to Discharge:  Cardiology consulted with recs given. Plan holter for 48 hrs at discharge. Nehemias Richmond ordered and await results. . Awaiting Neuro to see. CT's ordered yesterday were negative. PCP: Kamila Prince MD  Readmission Risk Score: 10.4%  Patient Goals/Plan/Treatment Preferences: Plans return home alone. 8/12/22, 12:21 PM EDT    Patient goals/plan/ treatment preferences discussed by  and . Patient goals/plan/ treatment preferences reviewed with patient/ family. Patient/ family verbalize understanding of discharge plan and are in agreement with goal/plan/treatment preferences. Understanding was demonstrated using the teach back method. AVS provided by RN at time of discharge, which includes all necessary medical information pertaining to the patients current course of illness, treatment, post-discharge goals of care, and treatment preferences. Services At/After Discharge: Outpatient Holter monitor. IMM Letter  IMM Letter given to Patient/Family/Significant other/Guardian/POA/by[de-identified] Pt Access. IMM Letter date given[de-identified] 08/09/22  IMM Letter time given[de-identified] 2202  Observation Status Letter date given[de-identified] 08/11/22  Observation Status Letter time given[de-identified] 8030  Observation Status Letter given to Patient/Family/Significant other/Guardian/POA/by[de-identified] [place] BARRERA Case Manager. Anticipate discharge in next 24 hours. Plans home alone with holter monitor.

## 2022-08-12 NOTE — PLAN OF CARE
Problem: Discharge Planning  Goal: Discharge to home or other facility with appropriate resources  Outcome: Progressing  Flowsheets (Taken 8/11/2022 1930)  Discharge to home or other facility with appropriate resources: Identify barriers to discharge with patient and caregiver     Problem: Skin/Tissue Integrity  Goal: Absence of new skin breakdown  Description: 1. Monitor for areas of redness and/or skin breakdown  2. Assess vascular access sites hourly  3. Every 4-6 hours minimum:  Change oxygen saturation probe site  4. Every 4-6 hours:  If on nasal continuous positive airway pressure, respiratory therapy assess nares and determine need for appliance change or resting period. Outcome: Progressing  Note: Ongoing assessment & interventions provided throughout shift. Skin assessments provided. Encouraging/assisting patient to turn as needed. Problem: ABCDS Injury Assessment  Goal: Absence of physical injury  Outcome: Progressing     Problem: Safety - Adult  Goal: Free from fall injury  Outcome: Progressing  Flowsheets (Taken 8/12/2022 0124)  Free From Fall Injury: Instruct family/caregiver on patient safety  Note: Bed locked & in low position, call light in reach, side-rails up x2, bed/chair alarm utilized, non-slip socks on when ambulating, reminded patient to use call light to call for assistance.       Problem: Chronic Conditions and Co-morbidities  Goal: Patient's chronic conditions and co-morbidity symptoms are monitored and maintained or improved  Outcome: Progressing  Flowsheets (Taken 8/11/2022 1930)  Care Plan - Patient's Chronic Conditions and Co-Morbidity Symptoms are Monitored and Maintained or Improved: Collaborate with multidisciplinary team to address chronic and comorbid conditions and prevent exacerbation or deterioration     Problem: Pain  Goal: Verbalizes/displays adequate comfort level or baseline comfort level  Outcome: Progressing  Note: Ongoing assessment & interventions provided throughout shift. Reminded patient to report any pain, pressure, or shortness of breath to the nurse. Care plan reviewed with patient. Patient verbalizes understanding of the care plan and contributed to goal setting.

## 2022-08-12 NOTE — PROGRESS NOTES
[facility]  INPATIENT PHYSICAL THERAPY  DAILY NOTE  [facility] SURG 8A - 8A-18/018-A    Time In: 3131  Time Out: 1216  Timed Code Treatment Minutes: 25 Minutes  Minutes: 25          Date: 2022  Patient Name: Xochitl Naylor,  Gender:  male        MRN: [id number]  : 1960  (57 y.o.)     Referring Practitioner: SITA Berger CNP  Diagnosis: dizziness  Additional Pertinent Hx: Per H&P : Xochitl Naylor is a 57 y.o. male with PMHx of hypertension, hyperlipidemia, history of seizures, hypothyroidism, who presents to [facility] for evaluation of dizziness. Patient is a poor historian due to developmental delay. Patient states that he has been experiencing new onset dizziness for the past 1 month. Patient's dizziness is associated with lightheadedness. Patient does have some associated nausea with these episodes. But reports no episodes of emesis. He has been having multiple episodes a week. He states that today he has had 3 episodes of dizziness. Reports the room starts spinning when they occur. Today patient says he was in his trailer when the episode began, and he felt like his trailer was moving back and forth and spending. Patient says he felt like he was going to fall and had to brace himself in his trailer so that he did not. He did go from a standing position to 1 knee, which is his left, and complains of some left knee pain. He states that these episodes of dizziness last 15 to 20 minutes before resolving on their own. After today's episodes he went to [facility] where his blood pressure was checked it was 163/106 and his heart rate was 114. This was at 1:45 PM.  His glucose was 174 at that time. Patient's blood pressure was rechecked at 320 is 158/103, he does complain of an accompanied headache. Patient states he has episodes can be brought on by turning his head superfast like looking to the left.   Patient reports he has these episodes at night when he improved safety in his environment  Short term goal 4: Pt to ascend/descend 1 step with no HR mod I for ease with home entry  Short term goal 5: Pt to tolerate Canalith Repositioning Maneuver in attempt to approve his dizziness  Long Term Goals  Time Frame for Long term goals : NA due to short ELOS    Following session, patient left in safe position with all fall risk precautions in place.

## 2022-08-12 NOTE — PROGRESS NOTES
Cardiology Progress Note      Patient:  Luz Marina Pérez  YOB: 1960  MRN: [id number]   Acct: [de-identified]  Admit Date:  8/9/2022  Primary Cardiologist:  none    Note per dr Tamia Corbett FOR CONSULTATION:  Pause of 2.5 seconds noted in the night on the  telemetry in a 57year-old gentleman presented with dizziness and  generalized body weakness. HISTORY OF PRESENT ILLNESS:  This is a 57-year-old gentleman with  history of hypertension, hyperlipidemia, hypothyroidism, reported  developmental delay presented to the emergency room yesterday because of  dizziness and the patient stated that he has been having intermittent  dizziness, lightheadedness in supine, sitting, and standing position and  gets worse when he changes position, turning, rolling over in bed can  induce his dizziness and turning his head induce his dizziness and he  described it as the room is spinning around him and so intermittently so  did not fall, no loss of consciousness and reported history of chest  pain occasionally and no chest pain now. The chest pain is atypical,  not exertion, induced last five minutes and resolved on its own. He  complains that he has some chest wall tenderness. He has a history of  intermittent chest pain and happens once in a while, has been worked up  before. Had a cardiac catheterization 2017, patent coronaries. Subsequent stress test was negative so Cardiology evaluation was sought  in view of the pause of 2.5 seconds noted in the night while the patient  Asleep\"    Subjective (Events in last 24 hours): pt awake and alert. NAD. No cp or sob at present.   Pt has intermittent lightheadedness, sometimes when rolling in bed and sometimes with standing  On RA      Objective:   /74   Pulse 75   Temp 98.1 °F (36.7 °C) (Oral)   Resp 18   Ht 5' 4\" (1.626 m)   Wt 209 lb 12.8 oz (95.2 kg)   SpO2 98%   BMI 36.01 kg/m²        TELEMETRY: nsr    Physical Exam:  General Appearance: alert and oriented to person, place and time, in no acute distress  Cardiovascular: normal rate, regular rhythm, normal S1 and S2, no murmurs, rubs, clicks, or gallops, distal pulses intact, no carotid bruits, no JVD  Pulmonary/Chest: clear to auscultation bilaterally- no wheezes, rales or rhonchi, normal air movement, no respiratory distress  Abdomen: soft, non-tender, non-distended, normal bowel sounds, no masses Extremities: no cyanosis, clubbing or edema, pulse   Skin: warm and dry  Head: normocephalic and atraumatic  Eyes: pupils equal, round, and reactive to light  Neck: supple and non-tender without mass, no thyromegaly   Neurological: alert, oriented, normal speech, no focal findings or movement disorder noted    Medications:    sodium chloride flush  5-40 mL IntraVENous 2 times per day    enoxaparin  40 mg SubCUTAneous Daily    ARIPiprazole  5 mg Oral Daily    atorvastatin  80 mg Oral Daily    divalproex  250 mg Oral Nightly    levothyroxine  50 mcg Oral QAM AC    losartan  50 mg Oral Daily    metoprolol succinate  50 mg Oral Daily    VORTIoxetine HBr  20 mg Oral Daily      sodium chloride      dextrose       meclizine, 25 mg, TID PRN  nitroGLYCERIN, 0.4 mg, Q5 Min PRN  sodium chloride flush, 5-40 mL, PRN  sodium chloride, , PRN  ondansetron, 4 mg, Q8H PRN   Or  ondansetron, 4 mg, Q6H PRN  polyethylene glycol, 17 g, Daily PRN  acetaminophen, 650 mg, Q6H PRN   Or  acetaminophen, 650 mg, Q6H PRN  glucose, 4 tablet, PRN  dextrose bolus, 125 mL, PRN   Or  dextrose bolus, 250 mL, PRN  glucagon (rDNA), 1 mg, PRN  dextrose, , Continuous PRN  albuterol sulfate HFA, 2 puff, Q6H PRN        Diagnostics:  TTE 8/10/22   Summary   Technically difficult examination. Normal left ventricle size and systolic function. Ejection fraction was   estimated at 60 %. There were no regional left ventricular wall motion   abnormalities and wall thickness was within normal limits.    Doppler parameters were consistent with abnormal left ventricular   relaxation (grade 1 diastolic dysfunction). The left atrium is Mildly dilated. Signature      ----------------------------------------------------------------   Electronically signed by Merlinda Jaeger MD (Interpreting   physician) on 08/10/2022 at 08:33 PM      Lab Data:    Cardiac Enzymes:  No results for input(s): CKTOTAL, CKMB, CKMBINDEX, TROPONINI in the last 72 hours.     CBC:   Lab Results   Component Value Date/Time    WBC 10.1 08/09/2022 05:03 PM    RBC 5.46 08/09/2022 05:03 PM    HGB 16.5 08/09/2022 05:03 PM    HCT 48.9 08/09/2022 05:03 PM     08/09/2022 05:03 PM       CMP:    Lab Results   Component Value Date/Time     08/09/2022 05:03 PM    K 4.2 08/09/2022 05:03 PM    CL 99 08/09/2022 05:03 PM    CO2 24 08/09/2022 05:03 PM    BUN 19 08/09/2022 05:03 PM    CREATININE 1.0 08/09/2022 05:03 PM    AGRATIO 2.4 10/06/2016 06:33 PM    LABGLOM 75 08/09/2022 05:03 PM    GLUCOSE 91 08/09/2022 05:48 PM    GLUCOSE 164 10/25/2016 11:40 AM    CALCIUM 9.4 08/09/2022 05:03 PM       Hepatic Function Panel:    Lab Results   Component Value Date/Time    ALKPHOS 70 08/09/2022 05:03 PM    ALT 21 08/09/2022 05:03 PM    AST 18 08/09/2022 05:03 PM    PROT 6.9 08/09/2022 05:03 PM    BILITOT 0.3 08/09/2022 05:03 PM    BILIDIR <0.2 07/14/2017 02:01 PM    LABALBU 4.3 08/09/2022 05:03 PM       Magnesium:  No results found for: MG    PT/INR:    Lab Results   Component Value Date/Time    PROTIME 10.5 10/06/2016 06:33 PM    INR 0.91 07/14/2017 02:01 PM       HgBA1c:    Lab Results   Component Value Date/Time    LABA1C 4.9 06/14/2022 09:41 AM    LABA1C 6.1 09/26/2018 12:13 PM       FLP:    Lab Results   Component Value Date/Time    TRIG 237 03/29/2022 12:11 PM    HDL 51 03/29/2022 12:11 PM    LDLCALC 84 03/29/2022 12:11 PM    LDLDIRECT 193.30 01/27/2022 03:58 PM       TSH:    Lab Results   Component Value Date/Time    TSH 1.910 08/11/2022 11:14 AM         Assessment:    Vertigo - attending to follow   Orthostatics negative 8/11/22    Intermittent episodes of abnormal telemetry monitor noted with  intermittent very rare episode of second-degree AV block, Mobitz type 1 and intermittent non-conducted PAC. There is one isolated episode of 2:1 AV block, which is Mobitz type 1 at 02:20 a.m. in the morning so  probably the patient was sleeping at that time and one isolated episode giving rise a pause of 2.5 seconds. There is non-conducted PAC on two occasions that also this morning and otherwise heart rate in the 80-90 so probably it is actually sleep related. The patient is on a  beta-blocker so I will continue the beta blocker still.     Atypical chest pain - chest wall tenderness - trop neg x3  Ef 60 with grade 1 DDfx per TTE 8/10/22  HTN  HLD      Plan:    48 hour holter upon dc  Consider vestibular rehab - attending to decide  Cont statin/arb/BB  Stress test today  If stress test WNL, will follow prn  F/up dr Wade Osman 4 weeks  Call with any questions/concerns       Electronically signed by Antonieta Dominguez PA-C on 8/12/2022 at 9:24 AM

## 2022-08-12 NOTE — PROGRESS NOTES
onset dizziness for the past 1 month. Patient's dizziness is associated with lightheadedness. Patient does have some associated nausea with these episodes. But reports no episodes of emesis. He has been having multiple episodes a week. He states that today he has had 3 episodes of dizziness. Reports the room starts spinning when they occur. Today patient says he was in his trailer when the episode began, and he felt like his trailer was moving back and forth and spending. Patient says he felt like he was going to fall and had to brace himself in his trailer so that he did not. He did go from a standing position to 1 knee, which is his left, and complains of some left knee pain. He states that these episodes of dizziness last 15 to 20 minutes before resolving on their own. After today's episodes he went to [facility] where his blood pressure was checked it was 163/106 and his heart rate was 114. This was at 1:45 PM.  His glucose was 174 at that time. Patient's blood pressure was rechecked at 320 is 158/103, he does complain of an accompanied headache. Patient states he has episodes can be brought on by turning his head superfast like looking to the left. Patient reports he has these episodes at night when he rolls over. Patient has he is having some chest pain that occurs at random with activity and without. He locates it to the center of his chest.  Patient adds he has some shortness of breath with these episodes. Patient reports he is having some numbness in his face started yesterday along his jaw line. He also is having some slight double vision. Patient denies weakness, fever, chills. He reports no chest pain or shortness of breath at this time. Patient has no abdominal pain, nausea, vomiting diarrhea or constipation, no bloody or black stools. He has not seen blood in his urine. Asking the patient to turn his head fast the left and I witnessed episode of dizziness. No nystagmus was observed. glucagon (rDNA), dextrose, albuterol sulfate HFA      Intake/Output Summary (Last 24 hours) at 8/12/2022 0700  Last data filed at 8/11/2022 1621  Gross per 24 hour   Intake 220 ml   Output --   Net 220 ml         Diet:  Diet NPO    Exam:  /74   Pulse 75   Temp 98.1 °F (36.7 °C) (Oral)   Resp 18   Ht 5' 4\" (1.626 m)   Wt 209 lb 12.8 oz (95.2 kg)   SpO2 98%   BMI 36.01 kg/m²     General appearance: No apparent distress, appears stated age and cooperative. HEENT: Pupils equal, round, and reactive to light. Conjunctivae/corneas clear. Neck: Supple, with full range of motion. No jugular venous distention. Trachea midline. Respiratory:  Normal respiratory effort. Clear to auscultation, bilaterally without Rales/Wheezes/Rhonchi. Cardiovascular: Regular rate and rhythm with normal S1/S2 without murmurs, rubs or gallops. Abdomen: Soft, non-tender, non-distended with normal bowel sounds. Musculoskeletal: passive and active ROM x 4 extremities. Skin: Skin color, texture, turgor normal.    Neurologic:  Neurovascularly intact without any focal sensory/motor deficits. Cranial nerves: II-XII intact, grossly non-focal.  Psychiatric: Alert and oriented, thought content appropriate  Capillary Refill: Brisk,< 3 seconds   Peripheral Pulses: +2 palpable, equal bilaterally       Labs:   Recent Labs     08/09/22  1703   WBC 10.1   HGB 16.5   HCT 48.9          Recent Labs     08/09/22  1703      K 4.2   CL 99   CO2 24   BUN 19   CREATININE 1.0   CALCIUM 9.4       Recent Labs     08/09/22  1703   AST 18   ALT 21   BILITOT 0.3   ALKPHOS 70       Microbiology:    None    Radiology:  XR KNEE LEFT (MIN 4 VIEWS)    Result Date: 8/9/2022  Exam: 4 views of the left knee Comparison: None Clinical history: Fall, left knee pain Findings: No acute fracture or dislocation identified. No effusion. Tricompartment osteoarthritis of the knee that is moderate in the medial compartment. No radiodense foreign bodies are seen. Impression: 1. No acute fracture seen. 2. Tricompartment osteoarthritis which is moderate in the medial compartment This document has been electronically signed by: Beverly Lyman MD on 08/09/2022 07:35 PM    CT HEAD WO CONTRAST    Result Date: 8/9/2022  Exam: CT brain without contrast Comparison: 1/7/2022 Clinical history: [facility] [place] this morning Findings: No acute intracranial hemorrhage The ventricles are normal in size and position. No intracranial masses No midline shift identified. No abnormal extra-axial fluid collections are seen. No skull fracture identified. Impression: 1. No acute intracranial hemorrhage or process identified. This document has been electronically signed by: Beverly Lyman MD on 08/09/2022 07:04 PM All CTs at this facility use dose modulation techniques and iterative reconstructions, and/or weight-based dosing when appropriate to reduce radiation to a low as reasonably achievable. CT CERVICAL SPINE WO CONTRAST    Result Date: 8/9/2022  Exam: CT of the cervical spine without contrast Comparison: 10/24/2021 Clinical history: Fall Findings: Alignment of the cervical spine is anatomic. No acute cervical spine fracture identified. No prevertebral soft tissue swelling is seen. Vertebral body height is normal. Mildly decreased intervertebral disc height with endplate osteophytosis at C5-6 and C6-7 The dens appears intact. The lateral masses are normally aligned. The facet joints are normally aligned. Impression: 1. No acute cervical spine fracture identified. 2. 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     CT THORACIC SPINE WO CONTRAST    Result Date: 8/9/2022  Exam: CT of the thoracic spine without contrast Comparison: None Clinical history: Fall Findings: Alignment of the thoracic spine is anatomic. No thoracic spine fractures are identified. Vertebral body height is normal. Mild endplate osteophytosis is seen in the mid to lower thoracic spine Intervertebral disc height is maintained. Impression: 1. Thoracic spondylosis. No thoracic spine fracture seen. This document has been electronically signed by: Kaylee Quick MD on 08/09/2022 07:08 PM All CTs at this facility use dose modulation techniques and iterative reconstructions, and/or weight-based dosing when appropriate to reduce radiation to a low as reasonably achievable. CT LUMBAR SPINE WO CONTRAST    Result Date: 8/9/2022  Exam: CT lumbar spine without contrast Comparison: None Clinical history: Fall Findings: Minimal posterior subluxation of L4 relative to L5 otherwise alignment of the lumbar spine is anatomic. Vertebral body height is normal. Intervertebral disc height is normal. No lumbar spine fracture identified. Degenerative facet disease seen in the left at L3-4 L4-5 and L5-S1     Impression: 1. No lumbar spine fracture seen 2. Multilevel degenerative facet disease This document has been electronically signed by: Kaylee Quick MD on 08/09/2022 07:12 PM All CTs at this facility use dose modulation techniques and iterative reconstructions, and/or weight-based dosing when appropriate to reduce radiation to a low as reasonably achievable. XR CHEST PORTABLE    Result Date: 8/9/2022  Chest radiograph AP view Comparison: CT,[facility] - CTA CHEST W WO CONTRAST - 10/24/2021 05:29 PM EDT Findings: Heart size normal, uncoiling of the thoracic aorta. No consolidations. No pleural effusion. No pneumothorax. Calcified hilar lymph nodes, unchanged. No acute fracture. Soft tissue is unremarkable. Impression: No acute cardiopulmonary finding. This document has been electronically signed by:  Nancy Reinoso MD on 08/09/2022 05:09 PM      DVT prophylaxis: [x] Lovenox                                 [] SCDs                                 [] SQ Heparin                                 [] Encourage ambulation           [] Already on Anticoagulation     Code Status: Full Code    PT/OT Eval Status:     Tele:   [x] Yes sinus rhythm heart rate 76             [] no    Active Hospital Problems    Diagnosis Date Noted    Chest pain [R07.9] 08/11/2022     Priority: Medium    Mobitz I [I44.1] 08/11/2022     Priority: Medium    Vertigo [R42] 08/11/2022     Priority: Medium    Dizziness [R42] 08/09/2022     Priority: Medium       Electronically signed by SITA Calixto CNP on 8/12/2022 at 7:00 AM

## 2022-08-12 NOTE — PROGRESS NOTES
[facility]  PHYSICAL THERAPY MISSED TREATMENT NOTE  [facility] SURG 8A    Date: 2022  Patient Name: Rojelio Marti        MRN: [id number]   : 1960  (57 y.o.)  Gender: male   Referring Practitioner: SITA Delgado CNP  Diagnosis: dizziness         REASON FOR MISSED TREATMENT:  Patient at testing and/or off unit. Pt off of the unit for a stress test. PT to check back as time allows for treatment.      Mya Records PT, DPT

## 2022-08-12 NOTE — CONSULTS
Neurology Consult Note    Date:8/12/2022       UZSD:[id number]  Patient Daphne Stevens     YOB: 1960     Age:61 y.o. Requesting Physician: SITA Johansen *     Reason for Consult:  Evaluate for vertigo      Chief Complaint:   Chief Complaint   Patient presents with    Dizziness       Subjective     Jayla Chambers is a 57 y.o. male with a history of hypertension, hyperlipidemia, intermittent explosive disorder, hypothyroidism, and smoking who presents for evaluation and management of chest pain and dizziness. Our service was consulted for evaluation of the patient's dizziness. Upon speaking to the patient, he has been experiencing intermittent dizziness for approximately the last month. The dizzy spells are usually brought on by head movement and lasts approximately 5 to 10 minutes. He has a room spinning sensation and nausea with these episodes. He denies any previous history prior to this. They occur about 4-5 times per day. The patient has undergone a significant work-up here in the hospital including an MRI of his brain which was negative as well as a CTA of the head and neck which was negative. Physical therapy did see the patient and he had a positive Eyad-Hallpike maneuver. The patient's dizziness is not associated with any focal neurologic deficits. Notably, the patient's hearing is significantly impaired and he states he has never had an audiology evaluation. Currently, the patient has no complaints other than chest pain for which cardiology is seeing the patient. Review of Systems   Review of Systems   Constitutional:  Negative for chills and fever. HENT:  Positive for hearing loss. Negative for rhinorrhea and sore throat. Eyes:  Negative for visual disturbance. Respiratory:  Negative for cough and shortness of breath. Cardiovascular:  Negative for chest pain and palpitations. Gastrointestinal:  Negative for abdominal pain, nausea and vomiting. atorvastatin (LIPITOR) 80 MG tablet Take 1 tablet by mouth daily 30 tablet 5    VORTIoxetine HBr (TRINTELLIX) 20 MG TABS tablet Take 20 mg by mouth daily       cetirizine (ZYRTEC) 10 MG tablet Take 1 tablet by mouth daily 90 tablet 1    fluticasone (FLONASE) 50 MCG/ACT nasal spray 2 sprays by Each Nostril route daily (Patient not taking: Reported on 2022) 16 g 5    Multiple Vitamins-Minerals (THERAPEUTIC MULTIVITAMIN-MINERALS) tablet Take 1 tablet by mouth daily      Misc Natural Products (OSTEO BI-FLEX ADV TRIPLE ST PO) Take by mouth daily       Multiple Vitamins-Minerals (OCUVITE-LUTEIN PO) Take by mouth daily       acetaminophen (TYLENOL) 500 MG tablet Take 1,000 mg by mouth every 6 hours as needed for Pain       Past History    Past Medical History:   has a past medical history of Chronic leg pain, Diabetes ([address] GERD (gastroesophageal reflux disease), History of deep venous thrombosis (DVT) of distal vein of left lower extremity, Hyperlipidemia, Hypertension, Hypothyroidism, Right homonymous hemianopsia, Sleep apnea, and TIA (transient ischemic attack). Social History:   reports that he quit smoking about 7 months ago. His smoking use included cigarettes and cigars. He has a 80.00 pack-year smoking history. He has never used smokeless tobacco. He reports that he does not drink alcohol and does not use drugs. Family History:   Family History   Problem Relation Age of Onset    Dementia Mother     High Blood Pressure Mother     COPD Father     High Cholesterol Father     High Blood Pressure Father     Dementia Maternal Aunt     Seizures Maternal Uncle        Physical Examination      Vitals:  /74   Pulse 75   Temp 98.1 °F (36.7 °C) (Oral)   Resp 18   Ht 5' 4\" (1.626 m)   Wt 209 lb 12.8 oz (95.2 kg)   SpO2 98%   BMI 36.01 kg/m²   Temp (24hrs), Av.2 °F (36.8 °C), Min:98 °F (36.7 °C), Max:98.5 °F (36.9 °C)      I/O (24Hr):     Intake/Output Summary (Last 24 hours) at 2022 4830  Last data filed at 8/11/2022 1621  Gross per 24 hour   Intake 220 ml   Output --   Net 220 ml         Physical Exam  Vitals reviewed. Constitutional:       General: He is not in acute distress. Appearance: Normal appearance. He is not ill-appearing. HENT:      Head: Normocephalic and atraumatic. Right Ear: External ear normal.      Left Ear: External ear normal.      Nose: Nose normal.      Mouth/Throat:      Mouth: Mucous membranes are moist.      Pharynx: No oropharyngeal exudate or posterior oropharyngeal erythema. Eyes:      Extraocular Movements: EOM normal.      Pupils: Pupils are equal, round, and reactive to light. Cardiovascular:      Rate and Rhythm: Normal rate and regular rhythm. Heart sounds: Normal heart sounds. No murmur heard. Pulmonary:      Effort: Pulmonary effort is normal. No respiratory distress. Breath sounds: Normal breath sounds. No wheezing. Abdominal:      General: Bowel sounds are normal.      Palpations: Abdomen is soft. Tenderness: There is no abdominal tenderness. Musculoskeletal:         General: Normal range of motion. Right lower leg: No edema. Left lower leg: No edema. Skin:     General: Skin is warm. Findings: No rash. Neurological:      Mental Status: He is alert and oriented to person, place, and time. Psychiatric:         Mood and Affect: Mood normal.         Speech: Speech normal.         Behavior: Behavior normal.     Neurologic Exam     Mental Status   Oriented to person, place, and time. Attention: normal. Concentration: normal.   Speech: speech is normal   Level of consciousness: alert  Normal comprehension. Cranial Nerves     CN II   Visual fields full to confrontation. Right visual field deficit: none  Left visual field deficit: none     CN III, IV, VI   Pupils are equal, round, and reactive to light. Extraocular motions are normal.   Right pupil: Shape: regular. Reactivity: brisk.    Left pupil: Shape: regular. Reactivity: brisk. CN V   Facial sensation intact. Right facial sensation deficit: none  Left facial sensation deficit: none    CN VII   Facial expression full, symmetric. Right facial weakness: none  Left facial weakness: none    CN VIII   Hearing: impaired    CN IX, X   CN IX normal.   CN X normal.   Palate: symmetric    CN XI   CN XI normal.   Right trapezius strength: normal  Left trapezius strength: normal    CN XII   CN XII normal.   Tongue: not atrophic  Fasciculations: absent  Tongue deviation: none    Motor Exam   Muscle bulk: normal  Overall muscle tone: normal  Right arm tone: normal  Left arm tone: normal  Right arm pronator drift: absent  Left arm pronator drift: absent  Right leg tone: normal  Left leg tone: normal  Muscle strength 5/5 in bilateral upper and lower extremities     Sensory Exam   Light touch normal.      Labs/Imaging/Diagnostics   Labs:  CBC:  Recent Labs     08/09/22  1703   WBC 10.1   RBC 5.46   HGB 16.5   HCT 48.9   MCV 89.6        CHEMISTRIES:  Recent Labs     08/09/22 1703 08/09/22  1748     --    K 4.2  --    CL 99  --    CO2 24  --    BUN 19  --    CREATININE 1.0  --    GLUCOSE 91 91     COAGULATION STUDIES:No results for input(s): PROTIME, INR, APTT in the last 72 hours. LIVER PROFILE:  Recent Labs     08/09/22  1703   AST 18   ALT 21   BILITOT 0.3   ALKPHOS 70     CHOLESTEROL AND A1C:No results for input(s): LDLCALC, HDL, CHOL, TRIG, LABA1C in the last 720 hours.    Imaging Last 24 Hours:  Echocardiogram 2D/ M-Mode/ Colorflow/ Do    Result Date: 8/10/2022  Transthoracic Echocardiography Report (TTE)  Demographics   Patient Name    Brooke Shultz Gender               Male   MR #            [id number]     Race                                                  Ethnicity   Account #       [de-identified]     Room Number          0018   Accession       [id number]    Date of Study        08/10/2022  Number   Date of Birth   1960    Referring Physician  Piero SHORT MD                                                     Nina Brito DO   Age             57 year(s)    Sonographer          Lydia Willis,                                                     [facility]                                 Interpreting         Echo reader of the week                                Physician            Anitha Rincon MD  Procedure Type of Study   TTE procedure:ECHOCARDIOGRAM COMPLETE 2D W DOPPLER W COLOR. Procedure Date Date: 08/10/2022 Start: 10:08 AM Study Location: Bedside Technical Quality: Limited visualization due to poor acoustical window. Indications:Dizziness and Chest pain. Additional Medical History:Diabetes, GERD, history of DVT, hypertension, hyperlipidemia, hypothyroidism, sleep apnea, history of TIA Patient Status: Routine Height: 64.17 inches Weight: 211.64 pounds BSA: 2.01 m^2 BMI: 36.13 kg/m^2 BP: 121/76 mmHg  Conclusions   Summary  Technically difficult examination. Normal left ventricle size and systolic function. Ejection fraction was  estimated at 60 %. There were no regional left ventricular wall motion  abnormalities and wall thickness was within normal limits. Doppler parameters were consistent with abnormal left ventricular  relaxation (grade 1 diastolic dysfunction). The left atrium is Mildly dilated. Signature   ----------------------------------------------------------------  Electronically signed by Anitha Rincon MD (Interpreting  physician) on 08/10/2022 at 08:33 PM  ----------------------------------------------------------------   Findings   Mitral Valve  The mitral valve structure was normal with normal leaflet separation. DOPPLER: The transmitral velocity was within the normal range with no  evidence for mitral stenosis. There was no evidence of mitral  regurgitation. Aortic Valve  The aortic valve was trileaflet with normal thickness and cuspal  separation. DOPPLER: Transaortic velocity was within the normal range with  no evidence of aortic stenosis. There was no evidence of aortic  regurgitation. Tricuspid Valve  The tricuspid valve structure was normal with normal leaflet separation. DOPPLER: There was no evidence of tricuspid stenosis. Mild tricuspid  regurgitation visualized. Pulmonic Valve  The pulmonic valve leaflets exhibited normal thickness, no calcification,  and normal cuspal separation. DOPPLER: The transpulmonic velocity was  within the normal range with no evidence for regurgitation. Left Atrium  The left atrium is Mildly dilated. Left Ventricle  Normal left ventricle size and systolic function. Ejection fraction was  estimated at 60 %. There were no regional left ventricular wall motion  abnormalities and wall thickness was within normal limits. Doppler parameters were consistent with abnormal left ventricular  relaxation (grade 1 diastolic dysfunction). Right Atrium  Right atrial size was normal.   Right Ventricle  The right ventricular size was normal with normal systolic function and  wall thickness. Pericardial Effusion  The pericardium was normal in appearance with no evidence of a pericardial  effusion. Pleural Effusion  No evidence of pleural effusion. Aorta / Great Vessels  -Aortic root dimension within normal limits.  -The Pulmonary artery is within normal limits. -IVC size is within normal limits with normal respiratory phasic changes.   M-Mode/2D Measurements & Calculations   LV Diastolic    LV Systolic Dimension:    AV Cusp Separation: 1.7 cmLA  Dimension: 2.4  2.2 cm                    Dimension: 3 cmAO Root  cm              LV Volume Diastolic: 49.1 Dimension: 3.2 cmLA Area: 14.8  LV FS:8.3 %     ml                        cm^2  LV PW           LV Volume Systolic: 83.9  Diastolic: 1.2  ml  cm              LV EDV/LV EDV Index: 29.6  Septum          ml/15 m^2LV ESV/LV ESV    RV Diastolic Dimension: 2.7 cm  Diastolic: 1.2 Index: 10.6 ml/5 m^2  cm              EF Calculated: 64.2 %     LA/Aorta: 0.94                                            Ascending Aorta: 3.1 cm                                            LA volume/Index: 35.3 ml /18m^2  Doppler Measurements & Calculations   MV Peak E-Wave: 69.7 AV Peak Velocity: 165 LVOT Peak Velocity: 119 cm/s  cm/s                 cm/s                  LVOT Mean Velocity: 88.7 cm/s  MV Peak A-Wave: 112  AV Peak Gradient:     LVOT Peak Gradient: 6 mmHgLVOT  cm/s                 10.89 mmHg            Mean Gradient: 3 mmHg  MV E/A Ratio: 0.62   AV Mean Velocity: 110  MV Peak Gradient:    cm/s                  TV Peak E-Wave: 47.7 cm/s  1.94 mmHg            AV Mean Gradient: 5   TV Peak A-Wave: 63.9 cm/s                       mmHg  MV Deceleration      AV VTI: 26.8 cm       TV Peak Gradient: 0.91 mmHg  Time: 209 msec                             TR Velocity:191 cm/s  MV P1/2t: 61 msec                          TR Gradient:14.59 mmHg  MVA by PHT:3.61 cm^2 LVOT VTI: 23 cm       PV Peak Velocity: 88.6 cm/s                       IVRT: 82 msec         PV Peak Gradient: 3.14 mmHg  MV E' Septal  Velocity: 6.9 cm/s  MV A' Septal         AV DVI (VTI): 0.86AV  Velocity: 13.4 cm/s  DVI (Vmax):0.72  MV E' Lateral  Velocity: 9 cm/s  MV A' Lateral  Velocity: 20.6 cm/s  E/E' septal: 10.1  E/E' lateral: 7.74  [email or web address]    CTA HEAD W WO CONTRAST    Result Date: 8/11/2022  CT angiography head with contrast. 3D Postprocessing. Comparison: CT,SR - CT HEAD WO CONTRAST - 08/11/2022 05:20 PM EDT  CT,SR - CT HEAD WO CONTRAST - 08/09/2022 06:08 PM EDT Findings: Intracranial arteries are patent. No aneurysm, dissection, or occlusion. No abnormal intracranial enhancement. No intracranial mass, midline shift, hydrocephalus, abnormal contrast enhancement, or acute hemorrhage. There is no acute fracture. Patent head CTA. This document has been electronically signed by: Jolynn Pratt MD on 08/11/2022 08:51 PM All CTs at this facility use dose modulation techniques and iterative reconstructions, and/or weight-based dosing when appropriate to reduce radiation to a low as reasonably achievable. 3D Post-processing was performed on this study. CT HEAD WO CONTRAST    Result Date: 8/11/2022  CT head without contrast Comparison: MR,SR - MRI BRAIN W WO CONTRAST - 08/10/2022 08:37 AM EDT  CT,SR - CT HEAD WO CONTRAST - 08/09/2022 06:08 PM EDT Findings: Involutional change and nonspecific white matter hypodensity. No intracranial mass, midline shift, hydrocephalus, or acute hemorrhage. Orbits, paranasal sinuses, and mastoid air cells are unremarkable. No skull fracture     Impression: 1. No acute findings This document has been electronically signed by: Jolynn Pratt MD on 08/11/2022 07:59 PM All CTs at this facility use dose modulation techniques and iterative reconstructions, and/or weight-based dosing when appropriate to reduce radiation to a low as reasonably achievable. CTA NECK W WO CONTRAST    Result Date: 8/11/2022  CT angiography neck with contrast. 3D Postprocessing. Comparison: CT,SR - CT HEAD WO CONTRAST - 08/11/2022 05:20 PM EDT  MR,SR - MRI BRAIN W WO CONTRAST - 08/10/2022 08:37 AM EDT Findings: Aortic arch and cervical great vessels are patent. Relatively diminutive right vertebral artery, a normal variant. No stenosis or dissection. Visualized intracranial arteries are patent. No aneurysm, dissection, or occlusion. Thyroid gland unremarkable. No cervical mass or fluid collection. Calcified granuloma within the right lower lobe. Calcified right hilar lymph nodes. No acute abnormality of the lung apices. No acute fracture. Patent neck CTA.  This document has been electronically signed by: Jolynn Pratt MD on 08/11/2022 08:53 PM All CTs at this facility use dose modulation techniques and iterative reconstructions, and/or weight-based dosing when appropriate to reduce radiation to a low as reasonably achievable. Carotid stenosis and measurements are in accordance with NASCET criteria. 3D Post-processing was performed on this study. CTA CHEST W WO CONTRAST    Result Date: 8/10/2022  PROCEDURE: CTA CHEST W WO CONTRAST CLINICAL INFORMATION: Dizziness, mildly tachycardic. COMPARISON: 10/24/2020 TECHNIQUE: 3 mm axial images were obtained through the chest after the administration of IV contrast.  A non-contrast localizer was obtained. 3D reconstructions were performed on the scanner to include MIP coronal and sagittal images through the chest. Isovue was the intravenous contrast utilized. All CT scans at this facility use dose modulation, iterative reconstruction, and/or weight-based dosing when appropriate to reduce radiation dose to as low as reasonably achievable. FINDINGS: There is adequate opacification of the pulmonary arterial system. No pulmonary emboli are present. The aorta is within acceptable limits. The heart size is normal. There is no pericardial or pleural effusion. Increased size of the right paraesophageal mediastinal lymph node now measuring 11 mm short axis. Ill-defined retrocaval right paratracheal node stable measuring 13 mm short axis. Numerous subcentimeter mediastinal lymph nodes are stable. Nonenlarged left hilar node. Left basilar nodule now measures 3 mm compared to 5 previously stable 6 mm right middle lobe pulmonary nodule. Calcified granuloma right lower lobe. No suspicious osseous lesions are present. There are no suspicious findings in the imaged aspects of the upper abdomen. No pulmonary emboli. No focal airspace consolidations some dependent atelectasis is noted bilaterally. 2. Stable bilateral pulmonary nodules. **This report has been created using voice recognition software. It may contain minor errors which are inherent in voice recognition technology. ** Final report electronically signed by Dr. Maggie Mills on 8/10/2022 9:51 AM    VL DUP LOWER EXTREMITY VENOUS RIGHT    Result Date: 8/10/2022  PROCEDURE: VL DUP LOWER EXTREMITY VENOUS RIGHT CLINICAL INFORMATION: edema on exam COMPARISON: No prior study. TECHNIQUE: Multiple grayscale and color flow images of the major veins of the right lower extremity were obtained from the level of the groin to the level of the ankle. Multiple compression and augmentation maneuvers were performed and spectral Doppler waveforms were generated. . A few images of the contralateral common femoral vein were also obtained. FINDINGS:   All the  deep veins  are widely patent with normal flow and normal compressibility. .   The contralateral common femoral vein is unremarkable. Normal venous ultrasound. No evidence for acute deep venous thrombosis. **This report has been created using voice recognition software. It may contain minor errors which are inherent in voice recognition technology. ** Final report electronically signed by Dr. Olena Zamora on 8/10/2022 12:57 PM    MRI BRAIN W WO CONTRAST    Result Date: 8/10/2022  PROCEDURE: MRI BRAIN W WO CONTRAST CLINICAL INFORMATIONdizziness. COMPARISON: Brain MRI 9/27/2021. Head CT 8/9/2022. TECHNIQUE: Multiplanar and multiple spin echo T1 and T2-weighted images were obtained through the brain before and after the administration of intravenous contrast. FINDINGS: The diffusion-weighted images are normal. The brain volume is at the lower limits of normal..There are no intra-or extra-axial collections. There is no hydrocephalus, midline shift or mass effect. On the FLAIR and T2-weighted sequences, there is mild signal hyperintensity scattered in the white matter of the brain. This is most consistent with mild severity chronic small vessel ischemic changes. This is stable. On the gradient echo T2-weighted images, there is mineralization in the medial aspects of the basal ganglia.    There is no abnormal enhancement in the brain. The major intracranial vascular flow voids are present. The midline craniocervical junction structures are normal.  The brainstem and pituitary gland are normal. The cerebellopontine angles are grossly normal. There is no abnormal enhancement in the inner air structures. There is some chronic fluid signal in the right mastoid air cells. 1. No acute findings. 2. Stable mild severity chronic small vessel ischemic changes. **This report has been created using voice recognition software. It may contain minor errors which are inherent in voice recognition technology. ** Final report electronically signed by Dr. Shelly Lindsey on 8/10/2022 9:32 AM        Assessment and Plan:        1. Peripheral vertigo, likely BPPV. MRI of brain without contrast negative for stroke  CTA negative for findings hemodynamically significant stenosis in the vertebrobasilar circulation  Eyad-Hallpike maneuver is positive  Continue symptomatic treatment with meclizine  Given the patient's profound hearing loss and vertigo, he will benefit from an outpatient ENT evaluation. No further work-up from neurology at this time. Neurology will sign off. Please call if any questions or concerns. This patient was seen and evaluated with Dr. Therese Day and he is in agreement with the assessment and plan.     Electronically signed by Karma Brown PA-C on 8/12/22 at 8:22 AM EDT

## 2022-08-12 NOTE — PLAN OF CARE
call light to call for assistance. Problem: Chronic Conditions and Co-morbidities  Goal: Patient's chronic conditions and co-morbidity symptoms are monitored and maintained or improved  5/50/2973 1222 by Jacolyn Goldmann, RN  Outcome: Progressing  Flowsheets (Taken 8/12/2022 1019)  Care Plan - Patient's Chronic Conditions and Co-Morbidity Symptoms are Monitored and Maintained or Improved: Monitor and assess patient's chronic conditions and comorbid symptoms for stability, deterioration, or improvement  8/12/2022 0304 by Aysha Blair RN  Outcome: Progressing  Flowsheets (Taken 8/11/2022 1930)  Care Plan - Patient's Chronic Conditions and Co-Morbidity Symptoms are Monitored and Maintained or Improved: Collaborate with multidisciplinary team to address chronic and comorbid conditions and prevent exacerbation or deterioration     Problem: Pain  Goal: Verbalizes/displays adequate comfort level or baseline comfort level  4/83/6956 2635 by Jacolyn Goldmann, RN  Outcome: Progressing  8/12/2022 0304 by Aysha Blair RN  Outcome: Progressing  Note: Ongoing assessment & interventions provided throughout shift. Reminded patient to report any pain, pressure, or shortness of breath to the nurse.

## 2022-08-13 VITALS
HEIGHT: 64 IN | TEMPERATURE: 98.5 F | DIASTOLIC BLOOD PRESSURE: 82 MMHG | OXYGEN SATURATION: 96 % | SYSTOLIC BLOOD PRESSURE: 123 MMHG | BODY MASS INDEX: 35.82 KG/M2 | RESPIRATION RATE: 18 BRPM | WEIGHT: 209.8 LBS | HEART RATE: 64 BPM

## 2022-08-13 LAB — GLUCOSE BLD-MCNC: 91 MG/DL (ref 70–108)

## 2022-08-13 PROCEDURE — 6370000000 HC RX 637 (ALT 250 FOR IP): Performed by: NURSE PRACTITIONER

## 2022-08-13 PROCEDURE — 82948 REAGENT STRIP/BLOOD GLUCOSE: CPT

## 2022-08-13 PROCEDURE — 2580000003 HC RX 258

## 2022-08-13 PROCEDURE — 6360000002 HC RX W HCPCS

## 2022-08-13 PROCEDURE — 96372 THER/PROPH/DIAG INJ SC/IM: CPT

## 2022-08-13 PROCEDURE — G0378 HOSPITAL OBSERVATION PER HR: HCPCS

## 2022-08-13 PROCEDURE — 95992 CANALITH REPOSITIONING PROC: CPT

## 2022-08-13 PROCEDURE — 93246 EXT ECG>7D<15D RECORDING: CPT

## 2022-08-13 PROCEDURE — 99217 PR OBSERVATION CARE DISCHARGE MANAGEMENT: CPT | Performed by: OPHTHALMOLOGY

## 2022-08-13 PROCEDURE — 6370000000 HC RX 637 (ALT 250 FOR IP)

## 2022-08-13 RX ORDER — MECLIZINE HYDROCHLORIDE 25 MG/1
25 TABLET ORAL 3 TIMES DAILY PRN
Qty: 30 TABLET | Refills: 0 | Status: SHIPPED | OUTPATIENT
Start: 2022-08-13 | End: 2022-08-23

## 2022-08-13 RX ADMIN — ENOXAPARIN SODIUM 40 MG: 100 INJECTION SUBCUTANEOUS at 08:07

## 2022-08-13 RX ADMIN — VORTIOXETINE 20 MG: 10 TABLET, FILM COATED ORAL at 08:06

## 2022-08-13 RX ADMIN — LEVOTHYROXINE SODIUM 50 MCG: 0.05 TABLET ORAL at 08:07

## 2022-08-13 RX ADMIN — MECLIZINE HCL 12.5 MG 25 MG: 12.5 TABLET ORAL at 16:20

## 2022-08-13 RX ADMIN — METOPROLOL SUCCINATE 50 MG: 50 TABLET, EXTENDED RELEASE ORAL at 08:07

## 2022-08-13 RX ADMIN — ATORVASTATIN CALCIUM 80 MG: 80 TABLET, FILM COATED ORAL at 08:07

## 2022-08-13 RX ADMIN — ARIPIPRAZOLE 5 MG: 5 TABLET ORAL at 08:07

## 2022-08-13 RX ADMIN — SODIUM CHLORIDE, PRESERVATIVE FREE 10 ML: 5 INJECTION INTRAVENOUS at 08:07

## 2022-08-13 RX ADMIN — LOSARTAN POTASSIUM 50 MG: 50 TABLET, FILM COATED ORAL at 08:07

## 2022-08-13 RX ADMIN — MECLIZINE HCL 12.5 MG 25 MG: 12.5 TABLET ORAL at 08:15

## 2022-08-13 ASSESSMENT — PAIN SCALES - GENERAL: PAINLEVEL_OUTOF10: 0

## 2022-08-13 NOTE — DISCHARGE SUMMARY
Hospitalist Discharge Summary    Patient:  Marianne Lay  YOB: 1960    MRN: [id number]   Acct: [de-identified]    Primary Care Physician: Harry Burnett MD    Admit date:  8/9/2022    Discharge date:  8/13/2022    Discharge Diagnoses:  Principal Problem:    Dizziness  Active Problems:    Chest pain    Mobitz I    Vertigo  Resolved Problems:    * No resolved hospital problems. *      Discharge Medications:         Medication List        START taking these medications      meclizine 25 MG tablet  Commonly known as: ANTIVERT  Take 1 tablet by mouth 3 times daily as needed for Dizziness            CONTINUE taking these medications      acetaminophen 500 MG tablet  Commonly known as: TYLENOL     albuterol sulfate  (90 Base) MCG/ACT inhaler  Commonly known as: PROVENTIL;VENTOLIN;PROAIR  Inhale 2 puffs into the lungs every 6 hours as needed for Wheezing     ARIPiprazole 5 MG tablet  Commonly known as: ABILIFY     atorvastatin 80 MG tablet  Commonly known as: LIPITOR  Take 1 tablet by mouth daily     cetirizine 10 MG tablet  Commonly known as: ZYRTEC  Take 1 tablet by mouth daily     divalproex 125 MG capsule  Commonly known as: DEPAKOTE SPRINKLE     levothyroxine 50 MCG tablet  Commonly known as: SYNTHROID  Take 1 tablet by mouth daily     losartan 50 MG tablet  Commonly known as: COZAAR  TAKE 1 TABLET BY MOUTH EVERY DAY     metoprolol succinate 50 MG extended release tablet  Commonly known as: TOPROL XL  TAKE 1 TABLET BY MOUTH EVERY DAY     omeprazole 20 MG delayed release capsule  Commonly known as: PRILOSEC  Take 1 capsule by mouth every morning (before breakfast)     OSTEO BI-FLEX ADV TRIPLE ST PO     * therapeutic multivitamin-minerals tablet     * OCUVITE-LUTEIN PO     VORTIoxetine HBr 20 MG Tabs tablet  Commonly known as: TRINTELLIX           * This list has 2 medication(s) that are the same as other medications prescribed for you.  Read the directions carefully, and ask your doctor or other This document has been electronically signed by: Justo Tillman MD on    08/11/2022 08:53 PM      All CTs at this facility use dose modulation techniques and iterative    reconstructions, and/or weight-based dosing   when appropriate to reduce radiation to a low as reasonably achievable. Carotid stenosis and measurements are in accordance with NASCET criteria. 3D Post-processing was performed on this study. CTA HEAD W WO CONTRAST   Final Result   Patent head CTA. This document has been electronically signed by: Justo Tillman MD on    08/11/2022 08:51 PM      All CTs at this facility use dose modulation techniques and iterative    reconstructions, and/or weight-based dosing   when appropriate to reduce radiation to a low as reasonably achievable. 3D Post-processing was performed on this study. CT HEAD WO CONTRAST   Final Result   Impression:   1. No acute findings      This document has been electronically signed by: Justo Tillman MD on    08/11/2022 07:59 PM      All CTs at this facility use dose modulation techniques and iterative    reconstructions, and/or weight-based dosing   when appropriate to reduce radiation to a low as reasonably achievable. VL DUP LOWER EXTREMITY VENOUS RIGHT   Final Result   Normal venous ultrasound. No evidence for acute deep venous thrombosis. **This report has been created using voice recognition software. It may contain minor errors which are inherent in voice recognition technology. **      Final report electronically signed by Dr. Daylin Lion on 8/10/2022 12:57 PM      CTA CHEST W [address]   Final Result    No pulmonary emboli. No focal airspace consolidations some dependent atelectasis is noted bilaterally. 2. Stable bilateral pulmonary nodules. **This report has been created using voice recognition software. It may contain minor errors which are inherent in voice recognition technology. **      Final report appropriate to reduce radiation to a low as reasonably achievable. CT LUMBAR SPINE WO CONTRAST   Final Result   Impression:   1. No lumbar spine fracture seen   2. Multilevel degenerative facet disease      This document has been electronically signed by: Rosalie Flannery MD on    08/09/2022 07:12 PM      All CTs at this facility use dose modulation techniques and iterative    reconstructions, and/or weight-based dosing   when appropriate to reduce radiation to a low as reasonably achievable. XR CHEST PORTABLE   Final Result   Impression:   No acute cardiopulmonary finding. This document has been electronically signed by: Lida Elder MD on 08/09/2022    05:09 PM          Hospital Course: Please see the H&P for detailed admission details. Patient clinical course has improved,   Per H&P done 8/9: \"Duane Brendon Haring is a 57 y.o. male with PMHx of hypertension, hyperlipidemia, history of seizures, hypothyroidism, who presents to [address] [place] [address] for evaluation of dizziness. Patient is a poor historian due to developmental delay. Patient states that he has been experiencing new onset dizziness for the past 1 month. Patient's dizziness is associated with lightheadedness. Patient does have some associated nausea with these episodes. But reports no episodes of emesis. He has been having multiple episodes a week. He states that today he has had 3 episodes of dizziness. Reports the room starts spinning when they occur. Today patient says he was in his trailer when the episode began, and he felt like his trailer was moving back and forth and spending. Patient says he felt like he was going to fall and had to brace himself in his trailer so that he did not. He did go from a standing position to 1 knee, which is his left, and complains of some left knee pain. He states that these episodes of dizziness last 15 to 20 minutes before resolving on their own.   After today's episodes he went to [facility] where his PE; vestibular evaluation by PT done on 8/11~VST was 8 out of 8 and greater than 4 out of 8 is predictive of vestibular dysfunction, CTA head/neck ordered and negative, MRI brain did not reveal any acute findings; echo with EF 60% and grade 1 diastolic dysfunction; appreciate cardiology input, stress test -ve 8/12;  Antivert added on 8/11 and has only had 1 dose and I asked RN to give another dose now  BPPV: outpatient vestibular treatment  Atypical chest pain--please see #1  Essential hypertension, uncontrolled--on Cozaar, Toprol; place hold parameters on blood pressure medicines, monitor  Hypothyroidism--treated; TSH at 1.910  Sleep apnea--CPAP  Right homonymous hemianopsia--follows with Dr. Lance Sunshine  Depression--treated  History of seizure disorder--on Depakote, valproic acid~level less than 2.8  Stable bilateral pulmonary nodules--needs outpatient follow-up  Chronic diastolic heart failure--echo with EF 60% and grade 1 diastolic dysfunction  History of DVT--off anticoagulation  Disposition: home    Condition: Stable    Time Spent: 35 minutes        Andrey Loya MD, MD  Discharging Hospitalist

## 2022-08-13 NOTE — PROGRESS NOTES
[address] [place] [address]  INPATIENT PHYSICAL THERAPY  DAILY NOTE  [facility] MED SURG 8A - 8A-18/018-A    Time In:   Time Out: 1441  Timed Code Treatment Minutes: 14 Minutes  Minutes: 14          Date: 2022  Patient Name: Reuben Glynn,  Gender:  male        MRN: [id number]  : 1960  (57 y.o.)     Referring Practitioner: SITA Calixto CNP  Diagnosis: dizziness  Additional Pertinent Hx: Per H&P : Reuben Glynn is a 57 y.o. male with PMHx of hypertension, hyperlipidemia, history of seizures, hypothyroidism, who presents to [address] [place] [address] for evaluation of dizziness. Patient is a poor historian due to developmental delay. Patient states that he has been experiencing new onset dizziness for the past 1 month. Patient's dizziness is associated with lightheadedness. Patient does have some associated nausea with these episodes. But reports no episodes of emesis. He has been having multiple episodes a week. He states that today he has had 3 episodes of dizziness. Reports the room starts spinning when they occur. Today patient says he was in his trailer when the episode began, and he felt like his trailer was moving back and forth and spending. Patient says he felt like he was going to fall and had to brace himself in his trailer so that he did not. He did go from a standing position to 1 knee, which is his left, and complains of some left knee pain. He states that these episodes of dizziness last 15 to 20 minutes before resolving on their own. After today's episodes he went to [facility] where his blood pressure was checked it was 163/106 and his heart rate was 114. This was at 1:45 PM.  His glucose was 174 at that time. Patient's blood pressure was rechecked at 320 is 158/103, he does complain of an accompanied headache. Patient states he has episodes can be brought on by turning his head superfast like looking to the left.   Patient reports he has these episodes at night when he rolls over. Patient has he is having some chest pain that occurs at random with activity and without. He locates it to the center of his chest.  Patient adds he has some shortness of breath with these episodes. Patient reports he is having some numbness in his face started yesterday along his jaw line. He also is having some slight double vision. Prior Level of Function:  Lives With: Alone  Type of Home: Mobile home  Home Layout: One level  Home Access: Stairs to enter with rails  Entrance Stairs - Number of Steps: 3  Entrance Stairs - Rails: Both  Home Equipment: Johnathan Clunes, 4 wheeled        Ambulation Assistance: Independent  Transfer Assistance: Independent  Active : No  Additional Comments: pt unable to state support system. pt states he is mod I within the home, does not use AD, but has a history of falls. Pt has assistance for transportation. Restrictions/Precautions:  Restrictions/Precautions: General Precautions, Fall Risk, Seizure  Position Activity Restriction  Other position/activity restrictions: monitor dizzniess, developmental delay     SUBJECTIVE: RN approved session. Pt pleasant and agreeable to therapy. Pt reports his dizziness is better and he thinks the maneuver yesterday helped.     PAIN: 0/10: denies pain     Vitals: Vitals not assessed per clinical judgement, see nursing flowsheet    OBJECTIVE:  Bed Mobility:  Rolling to Right: Contact Guard Assistance   Supine to Sit: Contact Guard Assistance    Transfers:  Sit to Stand: Stand By Assistance  Stand to Sit:Stand By Assistance    Ambulation:  Stand By Assistance  Distance: 150'   Surface: Level Tile  Device:No Device  Gait Deviations:  Slow Sasha and Decreased Step Length Bilaterally    Balance:  Static Sitting Balance:  Stand By Assistance  Static Standing Balance: Stand By Assistance    Benign Paroxysmal Positional Vertigo (BPPV)  POSITIONAL VERTIGO TESTING:  LOADED OMAIRA STEPHENSON PIKE (Posterior/Anterior Canals)  Right Ear: N/A  Left Ear:   Positive    ROLL TEST (Horizontal Canals)  Right Ear:  N/A  Left Ear:   N/A    MANEUVER PERFORMED: Epley Maneuver      PATIENT EDUCATION: Pt educated on the pathophysiology of BPPV, treatment options, and benefits of physical therapy. No precautions required. *Referral to Outpatient Vestibular Physical Therapy Recommended*      Functional Outcome Measures: Completed  AM-PAC Inpatient Mobility Raw Score : 19  AM-PAC Inpatient T-Scale Score : 45.44    ASSESSMENT:  Assessment: Patient progressing toward established goals. Activity Tolerance:  Patient tolerance of  treatment: good.       Equipment Recommendations:Equipment Needed: Yes (pt may benefit from a RW if receptive to one)  Discharge Recommendations: Home with Assist as Needed and Home with Outpatient PT  Plan: Current Treatment Recommendations: Strengthening, Balance training, Functional mobility training, Transfer training, Gait training, Stair training, Endurance training, Neuromuscular re-education, Safety education & training, Patient/Caregiver education & training, Equipment evaluation, education, & procurement, Therapeutic activities, Vestibular rehab  Plan:  (5-6x V/GM)    Patient Education  Patient Education: Plan of Care, Bed Mobility, Transfers, Gait    Goals:  Patient goals : no  Short Term Goals  Time Frame for Short term goals: by hospital d/c  Short term goal 1: Pt to demo supine <->sit mod I for ease getting in and out of bed  Short term goal 2: Pt to complete sit <->stand mod I for safety with transfers ebtween surfaces  Short term goal 3: Pt to ambulate >=40' with S for improved safety in his environment  Short term goal 4: Pt to ascend/descend 1 step with no HR mod I for ease with home entry  Short term goal 5: Pt to tolerate Canalith Repositioning Maneuver in attempt to approve his dizziness  Long Term Goals  Time Frame for Long term goals : NA due to short ELOS    Following session, patient left in safe position with all

## 2022-08-13 NOTE — PROGRESS NOTES
Hospitalist Progress Note    Patient:  Radha Degroot      Unit/Bed:8A-18/018-A    YOB: 1960    MRN: [id number]       Acct: [de-identified]     PCP: Kamila Prince MD    Date of Admission: 8/9/2022    Assessment/Plan:    Dizziness--ACS ruled out by 3 negative troponins; CT head does not reveal anything acute; CTA chest (-) PE; vestibular evaluation by PT done on 8/11~VST was 8 out of 8 and greater than 4 out of 8 is predictive of vestibular dysfunction, CTA head/neck ordered and negative, MRI brain did not reveal any acute findings; echo with EF 60% and grade 1 diastolic dysfunction; appreciate cardiology input, stress test -ve 8/12; Antivert added on 8/11 and has only had 1 dose and I asked RN to give another dose now  BPPV: outpatient vestibular treatment  Atypical chest pain--please see #1  Essential hypertension, uncontrolled--on Cozaar, Toprol; place hold parameters on blood pressure medicines, monitor  Hypothyroidism--treated; TSH at 1.910  Sleep apnea--CPAP  Right homonymous hemianopsia--follows with Dr. Angel Luis Durham  Depression--treated  History of seizure disorder--on Depakote, valproic acid~level less than 2.8  Stable bilateral pulmonary nodules--needs outpatient follow-up  Chronic diastolic heart failure--echo with EF 60% and grade 1 diastolic dysfunction  History of DVT--off anticoagulation    Expected discharge date: Discharge home    Disposition:    [x] Home       [] TCU       [] Rehab       [] Psych       [] SNF       [] [facility]       [] Other-    Chief Complaint: Dizziness    Hospital Course: Per H&P done 8/9: \"Duane Susana Labor is a 57 y.o. male with PMHx of hypertension, hyperlipidemia, history of seizures, hypothyroidism, who presents to [address] [place] [address] for evaluation of dizziness. Patient is a poor historian due to developmental delay. Patient states that he has been experiencing new onset dizziness for the past 1 month.  Patient's dizziness is associated with lightheadedness. Patient does have some associated nausea with these episodes. But reports no episodes of emesis. He has been having multiple episodes a week. He states that today he has had 3 episodes of dizziness. Reports the room starts spinning when they occur. Today patient says he was in his trailer when the episode began, and he felt like his trailer was moving back and forth and spending. Patient says he felt like he was going to fall and had to brace himself in his trailer so that he did not. He did go from a standing position to 1 knee, which is his left, and complains of some left knee pain. He states that these episodes of dizziness last 15 to 20 minutes before resolving on their own. After today's episodes he went to [facility] where his blood pressure was checked it was 163/106 and his heart rate was 114. This was at 1:45 PM.  His glucose was 174 at that time. Patient's blood pressure was rechecked at 320 is 158/103, he does complain of an accompanied headache. Patient states he has episodes can be brought on by turning his head superfast like looking to the left. Patient reports he has these episodes at night when he rolls over. Patient has he is having some chest pain that occurs at random with activity and without. He locates it to the center of his chest.  Patient adds he has some shortness of breath with these episodes. Patient reports he is having some numbness in his face started yesterday along his jaw line. He also is having some slight double vision. Patient denies weakness, fever, chills. He reports no chest pain or shortness of breath at this time. Patient has no abdominal pain, nausea, vomiting diarrhea or constipation, no bloody or black stools. He has not seen blood in his urine. Asking the patient to turn his head fast the left and I witnessed episode of dizziness. No nystagmus was observed.      ED course:   Chest x-ray obtained showed no acute HFA      Intake/Output Summary (Last 24 hours) at 8/13/2022 0925  Last data filed at 8/13/2022 0912  Gross per 24 hour   Intake 990 ml   Output 150 ml   Net 840 ml       Diet:  ADULT DIET; Regular; 4 carb choices (60 gm/meal); Low Fat/Low Chol/High Fiber/2 gm Na    Exam:  /78   Pulse 74   Temp 98.2 °F (36.8 °C) (Oral)   Resp 18   Ht 5' 4\" (1.626 m)   Wt 209 lb 12.8 oz (95.2 kg)   SpO2 92%   BMI 36.01 kg/m²     General appearance: No apparent distress, appears stated age and cooperative. HEENT: Pupils equal, round, and reactive to light. Conjunctivae/corneas clear. Neck: Supple, with full range of motion. No jugular venous distention. Trachea midline. Respiratory:  Normal respiratory effort. Clear to auscultation, bilaterally without Rales/Wheezes/Rhonchi. Cardiovascular: Regular rate and rhythm with normal S1/S2 without murmurs, rubs or gallops. Abdomen: Soft, non-tender, non-distended with normal bowel sounds. Musculoskeletal: passive and active ROM x 4 extremities. Skin: Skin color, texture, turgor normal.    Neurologic:  Neurovascularly intact without any focal sensory/motor deficits. Cranial nerves: II-XII intact, grossly non-focal.  Psychiatric: Alert and oriented, thought content appropriate  Capillary Refill: Brisk,< 3 seconds   Peripheral Pulses: +2 palpable, equal bilaterally       Labs:   No results for input(s): WBC, HGB, HCT, PLT in the last 72 hours. No results for input(s): NA, K, CL, CO2, BUN, CREATININE, CALCIUM, PHOS in the last 72 hours. Invalid input(s): MAGNES  No results for input(s): AST, ALT, BILIDIR, BILITOT, ALKPHOS in the last 72 hours. Microbiology:    None    Radiology:  XR KNEE LEFT (MIN 4 VIEWS)    Result Date: 8/9/2022  Exam: 4 views of the left knee Comparison: None Clinical history: Fall, left knee pain Findings: No acute fracture or dislocation identified. No effusion. Tricompartment osteoarthritis of the knee that is moderate in the medial compartment.  No radiodense foreign bodies are seen. Impression: 1. No acute fracture seen. 2. Tricompartment osteoarthritis which is moderate in the medial compartment This document has been electronically signed by: Jim Solorzano MD on 08/09/2022 07:35 PM    CT HEAD WO CONTRAST    Result Date: 8/9/2022  Exam: CT brain without contrast Comparison: 1/7/2022 Clinical history: Jose Maria Cocker this morning Findings: No acute intracranial hemorrhage The ventricles are normal in size and position. No intracranial masses No midline shift identified. No abnormal extra-axial fluid collections are seen. No skull fracture identified. Impression: 1. No acute intracranial hemorrhage or process identified. This document has been electronically signed by: Jim Solorzano MD on 08/09/2022 07:04 PM All CTs at this facility use dose modulation techniques and iterative reconstructions, and/or weight-based dosing when appropriate to reduce radiation to a low as reasonably achievable. CT CERVICAL SPINE WO CONTRAST    Result Date: 8/9/2022  Exam: CT of the cervical spine without contrast Comparison: 10/24/2021 Clinical history: Fall Findings: Alignment of the cervical spine is anatomic. No acute cervical spine fracture identified. No prevertebral soft tissue swelling is seen. Vertebral body height is normal. Mildly decreased intervertebral disc height with endplate osteophytosis at C5-6 and C6-7 The dens appears intact. The lateral masses are normally aligned. The facet joints are normally aligned. Impression: 1. No acute cervical spine fracture identified. 2. 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     CT THORACIC SPINE WO CONTRAST    Result Date: 8/9/2022  Exam: CT of the thoracic spine without contrast Comparison: None Clinical history: Fall Findings: Alignment of the thoracic spine is anatomic. No thoracic spine fractures are identified. Vertebral body height is normal. Mild endplate osteophytosis is seen in the mid to lower thoracic spine Intervertebral disc height is maintained. Impression: 1. Thoracic spondylosis. No thoracic spine fracture seen. This document has been electronically signed by: Braeden Garvey MD on 08/09/2022 07:08 PM All CTs at this facility use dose modulation techniques and iterative reconstructions, and/or weight-based dosing when appropriate to reduce radiation to a low as reasonably achievable. CT LUMBAR SPINE WO CONTRAST    Result Date: 8/9/2022  Exam: CT lumbar spine without contrast Comparison: None Clinical history: Fall Findings: Minimal posterior subluxation of L4 relative to L5 otherwise alignment of the lumbar spine is anatomic. Vertebral body height is normal. Intervertebral disc height is normal. No lumbar spine fracture identified. Degenerative facet disease seen in the left at L3-4 L4-5 and L5-S1     Impression: 1. No lumbar spine fracture seen 2. Multilevel degenerative facet disease This document has been electronically signed by: Braeden Garvey MD on 08/09/2022 07:12 PM All CTs at this facility use dose modulation techniques and iterative reconstructions, and/or weight-based dosing when appropriate to reduce radiation to a low as reasonably achievable. XR CHEST PORTABLE    Result Date: 8/9/2022  Chest radiograph AP view Comparison: CT,[facility] - CTA CHEST W WO CONTRAST - 10/24/2021 05:29 PM EDT Findings: Heart size normal, uncoiling of the thoracic aorta. No consolidations. No pleural effusion. No pneumothorax. Calcified hilar lymph nodes, unchanged. No acute fracture. Soft tissue is unremarkable. Impression: No acute cardiopulmonary finding. This document has been electronically signed by:  Italo Hartman MD on 08/09/2022 05:09 PM      DVT prophylaxis: [x] Lovenox                                 [] SCDs                                 [] SQ Heparin                                 [] Encourage ambulation           [] Already on Anticoagulation     Code Status: Full Code    PT/OT Eval Status:     Tele:   [x] Yes sinus rhythm heart rate 76             [] no    Active Hospital Problems    Diagnosis Date Noted    Chest pain [R07.9] 08/11/2022     Priority: Medium    Mobitz I [I44.1] 08/11/2022     Priority: Medium    Vertigo [R42] 08/11/2022     Priority: Medium    Dizziness [R42] 08/09/2022     Priority: Medium       Electronically signed by Val De Jesus MD on 8/13/2022 at 9:25 AM

## 2022-08-13 NOTE — PLAN OF CARE
Problem: Discharge Planning  Goal: Discharge to home or other facility with appropriate resources  Outcome: Completed  Flowsheets  Taken 1/88/4637 [id number] by Mariana Zarate RN  Discharge to home or other facility with appropriate resources: Identify barriers to discharge with patient and caregiver  Taken 8/12/2022 2118 by Joaquina Kang RN  Discharge to home or other facility with appropriate resources: Identify barriers to discharge with patient and caregiver     Problem: Skin/Tissue Integrity  Goal: Absence of new skin breakdown  Description: 1. Monitor for areas of redness and/or skin breakdown  2. Assess vascular access sites hourly  3. Every 4-6 hours minimum:  Change oxygen saturation probe site  4. Every 4-6 hours:  If on nasal continuous positive airway pressure, respiratory therapy assess nares and determine need for appliance change or resting period.   Outcome: Completed     Problem: ABCDS Injury Assessment  Goal: Absence of physical injury  Outcome: Completed     Problem: Safety - Adult  Goal: Free from fall injury  Outcome: Completed     Problem: Chronic Conditions and Co-morbidities  Goal: Patient's chronic conditions and co-morbidity symptoms are monitored and maintained or improved  Outcome: Completed  Flowsheets  Taken 7/16/7491 1739 by Mariana Zarate RN  Care Plan - Patient's Chronic Conditions and Co-Morbidity Symptoms are Monitored and Maintained or Improved: Monitor and assess patient's chronic conditions and comorbid symptoms for stability, deterioration, or improvement  Taken 8/12/2022 2118 by Joaquina Kang RN  Care Plan - Patient's Chronic Conditions and Co-Morbidity Symptoms are Monitored and Maintained or Improved: Monitor and assess patient's chronic conditions and comorbid symptoms for stability, deterioration, or improvement     Problem: Pain  Goal: Verbalizes/displays adequate comfort level or baseline comfort level  Outcome: Completed  Flowsheets  Taken 2/23/7506 [id number] by Katie Costello Guillermina Grace, RN  Verbalizes/displays adequate comfort level or baseline comfort level: Encourage patient to monitor pain and request assistance  Taken 8/12/2022 2118 by Patrick Lyons RN  Verbalizes/displays adequate comfort level or baseline comfort level: Encourage patient to monitor pain and request assistance

## 2022-09-06 ENCOUNTER — CARE COORDINATION (OUTPATIENT)
Dept: CARE COORDINATION | Age: 62
End: 2022-09-06

## 2022-09-06 NOTE — CARE COORDINATION
Pt called asking when his next appt is with PCP. Informed pt he is scheduled to be seen 10/10/22. [place]. Advised pt to call once he returns and if needing to be seen earlier appt can be arranged at that time. Pt verbalizes understanding.

## 2022-09-06 NOTE — PROCEDURES
EKG performed at 0110.
EKG performed at 0700.
with actually Mobitz type 2 giving rise to 2.7-second  pause. Again on 08/19/2022 at 02:43 a.m. gave rise to 2:1 AV block with  Mobitz type 2 gave rise to 2.8-second pause. So on the 19th, there were  multiple episodes of AV block, but all of them were in the a.m., from  midnight to 06:30 a.m. I did not see any timing of p.m. AV block. There was no SVT, there was no atrial fibrillation. CONCLUSION:  This is an abnormal Holter monitor findings with basically  normal sinus rhythm with average rate of 79 beats per minute, ranging  from 26 to 140 beats per minute. The 26 beats per minute was around  12:07 a.m., at which time the patient had 2:1 AV block, persistent and  back to back. There was rare supraventricular ectopic beat. There was no  supraventricular tachycardia, one nonsustained ventricular tachycardia  composed of 3 beats at the rate of 76 beats per minute. There is no atrial fibrillation. There is no significantly prolonged pause of over 3 seconds. There are few episodes of AV block, second-degree Mobitz type 2 and 2:1  AV block, mostly noted after midnight and giving rise to a pause of 2.7  to 2.9 seconds, but it is a Mobitz type 2 AV block basically mainly in  the a.m. after midnight. There was no atrial fibrillation noted. RECOMMENDATIONS:  Needs further clinical correlation in view of the  second-degree AV block and longest pause of 2.9 seconds. The  second-degree AV block is Mobitz type 2, so it is concerning based on  the reported history of dizziness. Consider EP evaluation and refer possibly to the electrophysiologist for  further recommendation. Luz Marina Bender M.D.    D: 09/05/2022 23:56:33       T: 09/06/2022 3:01:59     EDIN/[email or web address]  Job#: [id number]     Doc#: [id number]    CC:

## 2023-01-03 DIAGNOSIS — E03.9 ACQUIRED HYPOTHYROIDISM: ICD-10-CM

## 2023-01-04 RX ORDER — LEVOTHYROXINE SODIUM 0.05 MG/1
50 TABLET ORAL DAILY
Qty: 90 TABLET | Refills: 1 | OUTPATIENT
Start: 2023-01-04

## 2023-04-03 DIAGNOSIS — E03.9 ACQUIRED HYPOTHYROIDISM: ICD-10-CM

## 2023-04-04 RX ORDER — LEVOTHYROXINE SODIUM 0.05 MG/1
50 TABLET ORAL DAILY
Qty: 90 TABLET | Refills: 1 | OUTPATIENT
Start: 2023-04-04

## 2023-06-01 ENCOUNTER — TELEPHONE (OUTPATIENT)
Dept: INTERNAL MEDICINE CLINIC | Age: 63
End: 2023-06-01

## 2023-06-01 NOTE — TELEPHONE ENCOUNTER
Garcia Beal from [employer] [facility] [employer] [facility] called asking if the certificate letter has been signed by  yet. I told her it has not and her nurse will have her sign it if pt shows up to his appt on 6/5/23. She verbalizes understanding.

## 2023-06-22 ENCOUNTER — TELEPHONE (OUTPATIENT)
Dept: INTERNAL MEDICINE CLINIC | Age: 63
End: 2023-06-22

## 2023-06-22 NOTE — TELEPHONE ENCOUNTER
Call placed to , voicemail box is full. Call placed to patient. No answer, voicemail box is not set up. Spoke with Steven Duran at [facility], patient is not there yet, waiting on state approval. [facility] is [facility]. Spoke with [employer], DON at [facility]. I asked her where I could fax his lab orders to. She informed us patient will be seeing Dr. Fidencio Avilez, the facility attending physician, from now on. Sravanthi states they are still going to get the PFT's you ordered. Chart notes faxed to [phone].

## 2023-06-22 NOTE — TELEPHONE ENCOUNTER
----- Message from Rosie Orellana MD sent at 6/18/2023  7:13 PM EDT -----  Call [facility] and see if he is still there or at facility now. Please call patient and let them know results were as expected as he was off medication. Make sure he is taking medication I ordered at last visit. Need to get new set of labs around 7/31/23. I ordered them today - this is to make sure medication are doing what they are intended to do. If he is in the facility - make sure you just fax to them so it gets done.

## 2023-07-06 ENCOUNTER — HOSPITAL ENCOUNTER (OUTPATIENT)
Dept: PULMONOLOGY | Age: 63
Discharge: HOME | End: 2023-07-06
Attending: INTERNAL MEDICINE
Payer: MEDICARE

## 2023-07-06 DIAGNOSIS — R06.02 SOB (SHORTNESS OF BREATH): ICD-10-CM

## 2023-07-06 PROCEDURE — 94729 DIFFUSING CAPACITY: CPT

## 2023-07-06 PROCEDURE — 94060 EVALUATION OF WHEEZING: CPT

## 2023-07-06 PROCEDURE — 94726 PLETHYSMOGRAPHY LUNG VOLUMES: CPT

## 2023-07-18 ENCOUNTER — TELEPHONE (OUTPATIENT)
Dept: INTERNAL MEDICINE CLINIC | Age: 63
End: 2023-07-18

## 2023-07-18 NOTE — TELEPHONE ENCOUNTER
----- Message from Benigno Leslie MD sent at 7/14/2023  2:20 PM EDT -----  Please call patient and let them know results were acceptable.

## 2023-07-18 NOTE — TELEPHONE ENCOUNTER
Sravanthi the nurse at [facility] notified of patient's  PFT result.

## 2023-07-31 ENCOUNTER — TELEPHONE (OUTPATIENT)
Dept: AUDIOLOGY | Age: 63
End: 2023-07-31

## 2023-07-31 ENCOUNTER — HOSPITAL ENCOUNTER (OUTPATIENT)
Dept: AUDIOLOGY | Age: 63
Discharge: HOME | End: 2023-07-31
Payer: MEDICARE

## 2023-07-31 PROCEDURE — 92567 TYMPANOMETRY: CPT | Performed by: AUDIOLOGIST

## 2023-07-31 PROCEDURE — 92557 COMPREHENSIVE HEARING TEST: CPT | Performed by: AUDIOLOGIST

## 2023-07-31 NOTE — PROGRESS NOTES
AUDIOLOGICAL EVALUATION      REASON FOR TESTING:  Audiometric evaluation per the request of Shamika Brumfield, due to the diagnosis of bilateral hearing loss, unspecified hearing loss type. Duane reported experiencing bilateral hearing loss for several years possibly since 2016. He has bilateral constant buzzing tinnitus. His ears do feel plugged. He reported a history of noise exposure from [address] work without consistent use of hearing protections. He denies otalgia, otorrhea, dizziness/vertigo, and a history of ear issues. He is diabetic. OTOSCOPY: minimal dry cerumen with dull appearing tympanic membrane- left ear. A small scratch was noted near the external meatus. Dry cerumen was removed by lighted curette leaving the canal clear with a dull appearing tympanic membrane- right ear. AUDIOGRAM          Reliability: Good    COMMENTS: Moderate to severe sensorineural hearing loss noted, bilaterally. Speech reception thresholds are in good agreement with puretone results. Word recognition ability is good at 90% for the left ear and good at 90% for the right ear. Tympanometry revealed normal peak pressure and normal middle ear compliance for both ears. RECOMMENDATION(S):   1- Follow up with Dr. Bal Mcclure regarding these results and any further testing or treatment recommendations. 2- Repeat testing as medically indicated or annually to monitor for progression, sooner with any significant changes or new concerns. 3- Binaural hearing aids are recommended pending medical clearance and patient motivation.

## 2023-07-31 NOTE — TELEPHONE ENCOUNTER
Spoke with Elijah Reed at the patient's care facility. She confirmed that the patient has Medicare and [employer]) insurance. I explained that the patient has no hearing aid benefits with his current insurance. According to Elijah Reed the patient should be transitioning to [facility]- when this happens she will contact [facility] to discuss the patient obtaining hearing aids.

## 2024-01-24 ENCOUNTER — APPOINTMENT (OUTPATIENT)
Dept: GENERAL RADIOLOGY | Age: 64
End: 2024-01-24
Payer: MEDICARE

## 2024-01-24 ENCOUNTER — HOSPITAL ENCOUNTER (EMERGENCY)
Age: 64
Discharge: HOME | End: 2024-01-24
Attending: EMERGENCY MEDICINE
Payer: MEDICARE

## 2024-01-24 ENCOUNTER — APPOINTMENT (OUTPATIENT)
Dept: CT IMAGING | Age: 64
End: 2024-01-24
Payer: MEDICARE

## 2024-01-24 VITALS
HEIGHT: 64 IN | SYSTOLIC BLOOD PRESSURE: 133 MMHG | OXYGEN SATURATION: 93 % | BODY MASS INDEX: 40.97 KG/M2 | RESPIRATION RATE: 18 BRPM | TEMPERATURE: 97.9 F | WEIGHT: 240 LBS | HEART RATE: 74 BPM | DIASTOLIC BLOOD PRESSURE: 66 MMHG
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
## 2024-01-24 DIAGNOSIS — R55 NEAR SYNCOPE: ICD-10-CM

## 2024-01-24 DIAGNOSIS — S50.01XA CONTUSION OF RIGHT ELBOW, INITIAL ENCOUNTER: ICD-10-CM

## 2024-01-24 DIAGNOSIS — S66.911A STRAIN OF RIGHT WRIST, INITIAL ENCOUNTER: ICD-10-CM

## 2024-01-24 DIAGNOSIS — S80.02XA CONTUSION OF LEFT KNEE, INITIAL ENCOUNTER: ICD-10-CM

## 2024-01-24 DIAGNOSIS — S09.90XA CLOSED HEAD INJURY, INITIAL ENCOUNTER: ICD-10-CM

## 2024-01-24 LAB
ALBUMIN SERPL BCG-MCNC: 4.1 G/DL (ref 3.5–5.1)
ALP SERPL-CCNC: 98 U/L (ref 38–126)
ALT SERPL W/O P-5'-P-CCNC: 25 U/L (ref 11–66)
ANION GAP SERPL CALC-SCNC: 12 MEQ/L (ref 8–16)
AST SERPL-CCNC: 23 U/L (ref 5–40)
BASOPHILS ABSOLUTE: 0 THOU/MM3 (ref 0–0.1)
BASOPHILS NFR BLD AUTO: 0.3 %
BILIRUB SERPL-MCNC: 0.5 MG/DL (ref 0.3–1.2)
BUN SERPL-MCNC: 11 MG/DL (ref 7–22)
CALCIUM SERPL-MCNC: 8.9 MG/DL (ref 8.5–10.5)
CHLORIDE SERPL-SCNC: 98 MEQ/L (ref 98–111)
CO2 SERPL-SCNC: 26 MEQ/L (ref 23–33)
CREAT SERPL-MCNC: 0.7 MG/DL (ref 0.4–1.2)
DEPRECATED RDW RBC AUTO: 43.6 FL (ref 35–45)
EKG ATRIAL RATE: 85 BPM
EKG P AXIS: 71 DEGREES
EKG P-R INTERVAL: 142 MS
EKG Q-T INTERVAL: 350 MS
EKG QRS DURATION: 66 MS
EKG QTC CALCULATION (BAZETT): 416 MS
EKG R AXIS: 16 DEGREES
EKG T AXIS: 71 DEGREES
EKG VENTRICULAR RATE: 85 BPM
EOSINOPHIL NFR BLD AUTO: 0.8 %
EOSINOPHILS ABSOLUTE: 0.1 THOU/MM3 (ref 0–0.4)
ERYTHROCYTE [DISTWIDTH] IN BLOOD BY AUTOMATED COUNT: 13.2 % (ref 11.5–14.5)
FLUAV RNA RESP QL NAA+PROBE: NOT DETECTED
FLUBV RNA RESP QL NAA+PROBE: NOT DETECTED
GFR SERPL CREATININE-BSD FRML MDRD: > 60 ML/MIN/1.73M2
GLUCOSE SERPL-MCNC: 105 MG/DL (ref 70–108)
HCT VFR BLD AUTO: 44.5 % (ref 42–52)
HGB BLD-MCNC: 14.6 GM/DL (ref 14–18)
IMM GRANULOCYTES # BLD AUTO: 0.07 THOU/MM3 (ref 0–0.07)
IMM GRANULOCYTES NFR BLD AUTO: 0.6 %
LYMPHOCYTES ABSOLUTE: 1.2 THOU/MM3 (ref 1–4.8)
LYMPHOCYTES NFR BLD AUTO: 10.8 %
MAGNESIUM SERPL-MCNC: 1.7 MG/DL (ref 1.6–2.4)
MCH RBC QN AUTO: 30.2 PG (ref 26–33)
MCHC RBC AUTO-ENTMCNC: 32.8 GM/DL (ref 32.2–35.5)
MCV RBC AUTO: 92.1 FL (ref 80–94)
MONOCYTES ABSOLUTE: 0.8 THOU/MM3 (ref 0.4–1.3)
MONOCYTES NFR BLD AUTO: 7.1 %
NEUTROPHILS NFR BLD AUTO: 80.4 %
NRBC BLD AUTO-RTO: 0 /100 WBC
NT-PROBNP SERPL IA-MCNC: < 36 PG/ML (ref 0–124)
OSMOLALITY SERPL CALC.SUM OF ELEC: 271.7 MOSMOL/KG (ref 275–300)
PLATELET # BLD AUTO: 182 THOU/MM3 (ref 130–400)
PMV BLD AUTO: 12.1 FL (ref 9.4–12.4)
POTASSIUM SERPL-SCNC: 3.7 MEQ/L (ref 3.5–5.2)
PROT SERPL-MCNC: 6.4 G/DL (ref 6.1–8)
RBC # BLD AUTO: 4.83 MILL/MM3 (ref 4.7–6.1)
SARS-COV-2 RNA RESP QL NAA+PROBE: NOT DETECTED
SEGMENTED NEUTROPHILS ABSOLUTE COUNT: 8.9 THOU/MM3 (ref 1.8–7.7)
SODIUM SERPL-SCNC: 136 MEQ/L (ref 135–145)
TROPONIN, HIGH SENSITIVITY: 6 NG/L (ref 0–12)
WBC # BLD AUTO: 11.1 THOU/MM3 (ref 4.8–10.8)

## 2024-01-24 PROCEDURE — 93010 ELECTROCARDIOGRAM REPORT: CPT | Performed by: INTERNAL MEDICINE

## 2024-01-24 PROCEDURE — 71260 CT THORAX DX C+: CPT

## 2024-01-24 PROCEDURE — 72125 CT NECK SPINE W/O DYE: CPT

## 2024-01-24 PROCEDURE — 70496 CT ANGIOGRAPHY HEAD: CPT

## 2024-01-24 PROCEDURE — 6360000004 HC RX CONTRAST MEDICATION: Performed by: EMERGENCY MEDICINE

## 2024-01-24 PROCEDURE — 80053 COMPREHEN METABOLIC PANEL: CPT

## 2024-01-24 PROCEDURE — 70498 CT ANGIOGRAPHY NECK: CPT

## 2024-01-24 PROCEDURE — 73564 X-RAY EXAM KNEE 4 OR MORE: CPT

## 2024-01-24 PROCEDURE — 73110 X-RAY EXAM OF WRIST: CPT

## 2024-01-24 PROCEDURE — 83735 ASSAY OF MAGNESIUM: CPT

## 2024-01-24 PROCEDURE — 85025 COMPLETE CBC W/AUTO DIFF WBC: CPT

## 2024-01-24 PROCEDURE — 99285 EMERGENCY DEPT VISIT HI MDM: CPT

## 2024-01-24 PROCEDURE — 93005 ELECTROCARDIOGRAM TRACING: CPT | Performed by: EMERGENCY MEDICINE

## 2024-01-24 PROCEDURE — 83880 ASSAY OF NATRIURETIC PEPTIDE: CPT

## 2024-01-24 PROCEDURE — 84484 ASSAY OF TROPONIN QUANT: CPT

## 2024-01-24 PROCEDURE — 87636 SARSCOV2 & INF A&B AMP PRB: CPT

## 2024-01-24 PROCEDURE — 74177 CT ABD & PELVIS W/CONTRAST: CPT

## 2024-01-24 PROCEDURE — 36415 COLL VENOUS BLD VENIPUNCTURE: CPT

## 2024-01-24 PROCEDURE — 70450 CT HEAD/BRAIN W/O DYE: CPT

## 2024-01-24 PROCEDURE — 73070 X-RAY EXAM OF ELBOW: CPT

## 2024-01-24 RX ADMIN — IOPAMIDOL 80 ML: 755 INJECTION, SOLUTION INTRAVENOUS at 20:14

## 2024-01-24 ASSESSMENT — PAIN DESCRIPTION - ORIENTATION: ORIENTATION: RIGHT

## 2024-01-24 ASSESSMENT — PAIN SCALES - GENERAL: PAINLEVEL_OUTOF10: 5

## 2024-01-24 ASSESSMENT — PAIN DESCRIPTION - PAIN TYPE: TYPE: ACUTE PAIN

## 2024-01-24 ASSESSMENT — PAIN DESCRIPTION - LOCATION: LOCATION: KNEE;ELBOW

## 2024-01-24 ASSESSMENT — PAIN - FUNCTIONAL ASSESSMENT: PAIN_FUNCTIONAL_ASSESSMENT: 0-10

## 2024-01-24 NOTE — ED TRIAGE NOTES
Patient presents to ED from [facility] via [facility] with complaints of dizziness and a fall. Patient states he was on his way back to his room when he got dizzy and fell. Patient denies hitting his head, states he fell sideways and hurt his R elbow and L knee. Patient states his is still currently feeling dizzy. Patient is alert and oriented x3 and VSS upon arrival.

## 2024-01-25 NOTE — ED PROVIDER NOTES
I performed a history and physical examination of the patient and discussed management with the resident. I reviewed the resident’s note and agree with the documented findings and plan of care. Any areas of disagreement are noted on the chart. I was personally present for the key portions of any procedures. I have documented in the chart those procedures where I was not present during the key portions. I have reviewed the emergency nurses triage note. I agree with the chief complaint, past medical history, past surgical history, allergies, medications, social and family history as documented unless otherwise noted below. Documentation of the HPI, Physical Exam and Medical Decision Making performed by medical students or scribes is based on my personal performance of the HPI, PE and MDM. For Phys Assistant/ Nurse Practitioner cases/documentation I have personally evaluated this patient and have completed at least one if not all key elements of the E/M (history, physical exam, and MDM). My findings are as noted below.    In other words, I personally saw and examined the patient I have reviewed and agreed with the resident findings including all diagnostic interpretations and treatment plans as written.  I was present for the key portion of any procedures performed and the inclusive time noted in any critical care statement.    Patient presents today from nursing home, complaints of dizziness and fall.  Patient does have a significant past medical history including dementia, prior TIAs, history of syncope, history of cardiac issues, history of dementia patient has mild abdominal pain after the fall today.  Patient also has some right arm pain right knee pain.  Patient still states he is feeling dizzy.  He is awake alert and oriented.  He is able to move all his extremities without any difficulty.  Denies any chest pain shortness of breath abdominal pain or changes in bowel or bladder habits.  Patient is otherwise 
Facundo Fletcher MD on    01/24/2024 09:04 PM      All CTs at this facility use dose modulation techniques and iterative    reconstructions, and/or weight-based dosing   when appropriate to reduce radiation to a low as reasonably achievable.      CTA HEAD W WO CONTRAST   Final Result   Calcified plaque in the bilateral intracranial internal carotid arteries    and left vertebral artery, causing up to mild-moderate stenosis.    Otherwise, patent head CTA.      This document has been electronically signed by: Facundo Fletcher MD on    01/24/2024 09:03 PM      All CTs at this facility use dose modulation techniques and iterative    reconstructions, and/or weight-based dosing   when appropriate to reduce radiation to a low as reasonably achievable.      3D Post-processing was performed on this study.      CTA NECK W WO CONTRAST   Final Result   Less than 50% stenosis bilaterally in the neck by NASCET criteria.      This document has been electronically signed by: Facundo Fletcher MD on    01/24/2024 09:04 PM      All CTs at this facility use dose modulation techniques and iterative    reconstructions, and/or weight-based dosing   when appropriate to reduce radiation to a low as reasonably achievable.      Carotid stenosis and measurements are in accordance with NASCET criteria.      3D Post-processing was performed on this study.      XR WRIST RIGHT (MIN 3 VIEWS)   Final Result   No acute fracture or dislocation.      This document has been electronically signed by: Facundo Fletcher MD on    01/24/2024 07:51 PM      XR KNEE LEFT (MIN 4 VIEWS)   Final Result   No acute fracture or dislocation.      This document has been electronically signed by: Facundo Fletcher MD on    01/24/2024 07:52 PM      XR ELBOW RIGHT (2 VIEWS)   Final Result   No acute fracture or dislocation.      This document has been electronically signed by: Facundo Fletcher MD on    01/24/2024 07:55 PM          ED Medications administered this visit:   Medications   iopamidol

## 2024-04-09 ENCOUNTER — HOSPITAL ENCOUNTER (OUTPATIENT)
Dept: AUDIOLOGY | Age: 64
Discharge: HOME | End: 2024-04-09
Payer: MEDICARE

## 2024-04-09 PROCEDURE — 92557 COMPREHENSIVE HEARING TEST: CPT | Performed by: AUDIOLOGIST

## 2024-04-09 PROCEDURE — 92567 TYMPANOMETRY: CPT | Performed by: AUDIOLOGIST

## 2024-04-09 NOTE — PROGRESS NOTES
AUDIOLOGICAL EVALUATION      REASON FOR TESTING:  Audiometric evaluation per the request of Dr.Paul Hinton due to the diagnosis of presbycusis. Duane continues to report significant hearing loss in both ears. He has bilateral constant buzzing tinnitus. His ears do feel plugged. 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 He denies otalgia, otorrhea, and a history of ear issues. He reports that the room spins when he lays down or rolls over. He has suffered several falls as a result of the vertigo. He is diabetic.     OTOSCOPY: excessive debris/cerumen preventing complete visualization of the tympanic membrane- bilaterally     AUDIOGRAM        Reliability: Good    COMMENTS: Mild to severe sensorineural hearing loss noted, bilaterally. Speech reception thresholds are in good agreement with puretone results. Word recognition ability is poor at 52% for the left ear and poor at 50% for the right ear. Tympanometry revealed normal peak pressure and normal middle ear compliance for both ears.     The patient's hearing loss is stable at 250-4000 Hz with increased thresholds at 0939-5006 Hz in both ears relative to previous testing.    NEW HEARING AID CONSULTATION:  Available hearing aid styles, technologies and options were discussed.  The patient is interested in pursuing Phonak L30-R BRI hearing aids in color P6 with #0 receivers and domes  Prior authorization will be submitted to the patient's insurance. The patient's hearing aid fitting will be scheduled once prior authorization is obtained.        RECOMMENDATION(S):   1- Follow up with Dr. Mancia regarding these results and any further testing or treatment recommendations.  2- Repeat testing as medically indicated or annually to monitor for progression, sooner with any significant changes or new concerns.  3- Consider vestibular testing, per physicians discretion.  4- Bilateral ear

## 2024-04-11 NOTE — TELEPHONE ENCOUNTER
Patient notified to stay off the atorvastatin to see if it helps with the hand pain and leg pain . Patient verbalized understanding . GOAL: Pt will improve  balance during (static/dynamic) (standing) activities by at least 1 balance grade within 3-4 weeks to assist with greater independence during functional mobility and ADL's.

## 2024-04-23 ENCOUNTER — TELEPHONE (OUTPATIENT)
Dept: AUDIOLOGY | Age: 64
End: 2024-04-23

## 2024-04-23 NOTE — TELEPHONE ENCOUNTER
Patient's medicaid prior auth for hearing aids has been approved and his hearing aids have been ordered (non-custom). Patient can be contacted and scheduled for a hearing aid fitting.

## 2024-05-13 ENCOUNTER — HOSPITAL ENCOUNTER (OUTPATIENT)
Dept: AUDIOLOGY | Age: 64
Discharge: HOME | End: 2024-05-13
Payer: MEDICARE

## 2024-05-13 PROCEDURE — V5261 HEARING AID, DIGIT, BIN, BTE: HCPCS | Performed by: AUDIOLOGIST

## 2024-05-13 PROCEDURE — V5160 DISPENSING FEE BINAURAL: HCPCS | Performed by: AUDIOLOGIST

## 2024-05-13 NOTE — PROGRESS NOTES
[facility]#: [id number]   [id number]  PAYOR: [place] Medicaid  PRIOR AUTH #: [id number]  Number of visits allowed: Unlimited visits while in warranty (warranty ends 05/22/2027).  Charge for follow up visits: Follow up visits (out of warranty) are subject to current office visit charge.      DIAGNOSIS: H90.3        NEW HEARING AID FITTING: A Phonak Audeo L30-R hearing aid was fit and dispensed for both ears. Explained care, use and insertion/removal.  Programmed. No bluetooth pairings completed. Patient is wearing hearing aids with sound recover active and no on board user controls. Patient is wearing the hearing aids with retention wires. Hearing aid fitting recheck scheduled for 05/30/2024.      SPEECH MAPPING:    The [employer] real ear system was used to perform verification of Duane's hearing aid fitting. The output of Duane's Phonak amplification was programmed to match appropriate NAL-NL2 targets for MPO, soft, medium and loud stimuli utilizing speech mapping based on his 04/09/2024 audiogram.                              Speech mapping results suggest: Improved audibility for speech with appropriate amplification.

## 2024-05-30 ENCOUNTER — HOSPITAL ENCOUNTER (OUTPATIENT)
Dept: AUDIOLOGY | Age: 64
Discharge: HOME | End: 2024-05-30

## 2024-05-30 PROCEDURE — 9990000010 HC NO CHARGE VISIT: Performed by: AUDIOLOGIST

## 2024-05-30 NOTE — PROGRESS NOTES
TWO WEEK CHECK: The patient is doing well with the new hearing aids.He stated that he currently cannot hear from his right hearing aid. He confirmed that it is charged (had a green light on the ). The cerustop filter was occluded on the right hearing aid and excessive debris noted in the dome. I noted the same on the left hearing aid. I changed the cerustop filter and dome on both hearing aids. Otoscopy revealed excessive cerumen- bilaterally. Recommended bilateral ear cleaning by primary care provider or ENT. Sent a note with this information back to the patient's facility. Scheduled 6 month hearing aid check for 12/02/2024. Will see patient sooner if any problems arise.

## 2024-07-30 NOTE — PROGRESS NOTES
SURGERY Right 01/01/2000    CATARACT REMOVAL Left 02/09/2022    ELBOW SURGERY Left 02/01/2022    ulnar nerve decompression    KNEE ARTHROSCOPY Left 09/11/2015    Torn cartliage    LEG SURGERY Right 01/01/2000    [place] COLSC FLX W/RMVL OF TUMOR POLYP LESION SNARE TQ  07/16/2017    COLONOSCOPY POLYPECTOMY SNARE/COLD BIOPSY performed by Ryan Perales MD at [facility]    [place] EGD TRANSORAL BIOPSY SINGLE/MULTIPLE N/A 09/27/2017    EGD BIOPSY performed by Glendy Morrison MD at [facility]    UPPER GASTROINTESTINAL ENDOSCOPY  07/16/2017    EGD DIAGNOSTIC ONLY performed by Ryan Perales MD at [facility]        MEDICATIONS:  Current Outpatient Medications   Medication Sig Dispense Refill    prazosin (MINIPRESS) 1 MG capsule Take 1 capsule by mouth nightly      calcium carbonate (TUMS) 500 MG chewable tablet Take 1 tablet by mouth daily      Lidocaine 3 % CREA Apply topically      meclizine (ANTIVERT) 12.5 MG tablet Take 1 tablet by mouth 3 times daily as needed      traZODone (DESYREL) 50 MG tablet Take 1 tablet by mouth nightly      Divalproex Sodium (DEPAKOTE SPRINKLES PO) Take 500 mg by mouth at bedtime      omeprazole (PRILOSEC) 20 MG delayed release capsule Take 1 capsule by mouth every morning (before breakfast) 30 capsule 2    losartan (COZAAR) 50 MG tablet Take 1 tablet by mouth daily 30 tablet 2    metoprolol succinate (TOPROL XL) 50 MG extended release tablet TAKE 1 TABLET BY MOUTH EVERY DAY 30 tablet 2    levothyroxine (SYNTHROID) 50 MCG tablet Take 1 tablet by mouth daily 30 tablet 2    atorvastatin (LIPITOR) 80 MG tablet Take 1 tablet by mouth daily 30 tablet 2    fluticasone (FLONASE) 50 MCG/ACT nasal spray 2 sprays by Each Nostril route daily 16 g 2    acetaminophen (TYLENOL) 500 MG tablet Take 2 tablets by mouth every 8 hours as needed for Pain 120 tablet 2    ARIPiprazole (ABILIFY) 5 MG tablet Take 1 tablet by mouth daily      VORTIoxetine HBr (TRINTELLIX) 20 MG TABS tablet Take 1 tablet by mouth

## 2024-07-31 ENCOUNTER — OFFICE VISIT (OUTPATIENT)
Dept: ENT CLINIC | Age: 64
End: 2024-07-31
Payer: MEDICARE

## 2024-07-31 VITALS
TEMPERATURE: 97.4 F | HEIGHT: 64 IN | DIASTOLIC BLOOD PRESSURE: 64 MMHG | SYSTOLIC BLOOD PRESSURE: 106 MMHG | BODY MASS INDEX: 41.52 KG/M2 | HEART RATE: 71 BPM | WEIGHT: 243.2 LBS | OXYGEN SATURATION: 92 %

## 2024-07-31 DIAGNOSIS — Z97.4 WEARS HEARING AID IN BOTH EARS: ICD-10-CM

## 2024-07-31 DIAGNOSIS — H61.23 BILATERAL IMPACTED CERUMEN: Primary | ICD-10-CM

## 2024-07-31 DIAGNOSIS — H90.3 SENSORINEURAL HEARING LOSS (SNHL) OF BOTH EARS: ICD-10-CM

## 2024-07-31 PROCEDURE — 4004F PT TOBACCO SCREEN RCVD TLK: CPT | Performed by: REGISTERED NURSE

## 2024-07-31 PROCEDURE — 3074F SYST BP LT 130 MM HG: CPT | Performed by: REGISTERED NURSE

## 2024-07-31 PROCEDURE — G8417 CALC BMI ABV UP PARAM F/U: HCPCS | Performed by: REGISTERED NURSE

## 2024-07-31 PROCEDURE — 69210 REMOVE IMPACTED EAR WAX UNI: CPT | Performed by: REGISTERED NURSE

## 2024-07-31 PROCEDURE — 3017F COLORECTAL CA SCREEN DOC REV: CPT | Performed by: REGISTERED NURSE

## 2024-07-31 PROCEDURE — G8427 DOCREV CUR MEDS BY ELIG CLIN: HCPCS | Performed by: REGISTERED NURSE

## 2024-07-31 PROCEDURE — 99203 OFFICE O/P NEW LOW 30 MIN: CPT | Performed by: REGISTERED NURSE

## 2024-07-31 PROCEDURE — 3078F DIAST BP <80 MM HG: CPT | Performed by: REGISTERED NURSE

## 2024-07-31 RX ORDER — MECLIZINE HCL 12.5 MG/1
12.5 TABLET ORAL 3 TIMES DAILY PRN
COMMUNITY

## 2024-07-31 RX ORDER — TRAZODONE HYDROCHLORIDE 50 MG/1
50 TABLET ORAL NIGHTLY
COMMUNITY

## 2024-07-31 RX ORDER — CALCIUM CARBONATE 500 MG/1
1 TABLET, CHEWABLE ORAL DAILY
COMMUNITY

## 2024-07-31 RX ORDER — LIDOCAINE 30 MG/G
CREAM TOPICAL
COMMUNITY

## 2024-07-31 RX ORDER — PRAZOSIN HYDROCHLORIDE 1 MG/1
1 CAPSULE ORAL NIGHTLY
COMMUNITY

## 2024-10-10 ENCOUNTER — HOSPITAL ENCOUNTER (INPATIENT)
Age: 64
LOS: 3 days | Discharge: NURSING, REHAB OR LONG-TERM CARE | DRG: 287 | End: 2024-10-13
Attending: STUDENT IN AN ORGANIZED HEALTH CARE EDUCATION/TRAINING PROGRAM
Payer: MEDICARE

## 2024-10-10 ENCOUNTER — APPOINTMENT (OUTPATIENT)
Dept: GENERAL RADIOLOGY | Age: 64
DRG: 287 | End: 2024-10-10
Payer: MEDICARE

## 2024-10-10 DIAGNOSIS — R07.9 CHEST PAIN, UNSPECIFIED TYPE: Primary | ICD-10-CM

## 2024-10-10 DIAGNOSIS — R06.02 SHORTNESS OF BREATH: ICD-10-CM

## 2024-10-10 DIAGNOSIS — R00.1 BRADYCARDIA: ICD-10-CM

## 2024-10-10 DIAGNOSIS — G47.9 SLEEP DIFFICULTIES: ICD-10-CM

## 2024-10-10 DIAGNOSIS — R07.89 OTHER CHEST PAIN: ICD-10-CM

## 2024-10-10 DIAGNOSIS — I44.30 ATRIOVENTRICULAR BLOCK: ICD-10-CM

## 2024-10-10 LAB
ALBUMIN SERPL BCG-MCNC: 4 G/DL (ref 3.5–5.1)
ALP SERPL-CCNC: 104 U/L (ref 38–126)
ALT SERPL W/O P-5'-P-CCNC: 22 U/L (ref 11–66)
ANION GAP SERPL CALC-SCNC: 9 MEQ/L (ref 8–16)
AST SERPL-CCNC: 24 U/L (ref 5–40)
BASOPHILS ABSOLUTE: 0 THOU/MM3 (ref 0–0.1)
BASOPHILS NFR BLD AUTO: 0.4 %
BILIRUB CONJ SERPL-MCNC: 0.2 MG/DL (ref 0.1–13.8)
BILIRUB SERPL-MCNC: 0.5 MG/DL (ref 0.3–1.2)
BUN SERPL-MCNC: 14 MG/DL (ref 7–22)
CALCIUM SERPL-MCNC: 8.6 MG/DL (ref 8.5–10.5)
CHLORIDE SERPL-SCNC: 100 MEQ/L (ref 98–111)
CO2 SERPL-SCNC: 29 MEQ/L (ref 23–33)
CREAT SERPL-MCNC: 0.7 MG/DL (ref 0.4–1.2)
DEPRECATED RDW RBC AUTO: 46.4 FL (ref 35–45)
EKG ATRIAL RATE: 73 BPM
EKG P AXIS: 20 DEGREES
EKG P-R INTERVAL: 150 MS
EKG Q-T INTERVAL: 324 MS
EKG QRS DURATION: 68 MS
EKG QTC CALCULATION (BAZETT): 356 MS
EKG R AXIS: 27 DEGREES
EKG T AXIS: 62 DEGREES
EKG VENTRICULAR RATE: 73 BPM
EOSINOPHIL NFR BLD AUTO: 1.1 %
EOSINOPHILS ABSOLUTE: 0.1 THOU/MM3 (ref 0–0.4)
ERYTHROCYTE [DISTWIDTH] IN BLOOD BY AUTOMATED COUNT: 13.9 % (ref 11.5–14.5)
GFR SERPL CREATININE-BSD FRML MDRD: > 90 ML/MIN/1.73M2
GLUCOSE SERPL-MCNC: 113 MG/DL (ref 70–108)
HCT VFR BLD AUTO: 42.4 % (ref 42–52)
HGB BLD-MCNC: 14 GM/DL (ref 14–18)
IMM GRANULOCYTES # BLD AUTO: 0.03 THOU/MM3 (ref 0–0.07)
IMM GRANULOCYTES NFR BLD AUTO: 0.4 %
LYMPHOCYTES ABSOLUTE: 1.3 THOU/MM3 (ref 1–4.8)
LYMPHOCYTES NFR BLD AUTO: 18.1 %
MCH RBC QN AUTO: 30.2 PG (ref 26–33)
MCHC RBC AUTO-ENTMCNC: 33 GM/DL (ref 32.2–35.5)
MCV RBC AUTO: 91.6 FL (ref 80–94)
MONOCYTES ABSOLUTE: 0.7 THOU/MM3 (ref 0.4–1.3)
MONOCYTES NFR BLD AUTO: 9.3 %
NEUTROPHILS ABSOLUTE: 4.9 THOU/MM3 (ref 1.8–7.7)
NEUTROPHILS NFR BLD AUTO: 70.7 %
NRBC BLD AUTO-RTO: 0 /100 WBC
NT-PROBNP SERPL IA-MCNC: < 36 PG/ML (ref 0–124)
OSMOLALITY SERPL CALC.SUM OF ELEC: 277 MOSMOL/KG (ref 275–300)
PLATELET # BLD AUTO: 162 THOU/MM3 (ref 130–400)
PMV BLD AUTO: 12.7 FL (ref 9.4–12.4)
POTASSIUM SERPL-SCNC: 4.1 MEQ/L (ref 3.5–5.2)
PROT SERPL-MCNC: 6.3 G/DL (ref 6.1–8)
RBC # BLD AUTO: 4.63 MILL/MM3 (ref 4.7–6.1)
SODIUM SERPL-SCNC: 138 MEQ/L (ref 135–145)
TROPONIN, HIGH SENSITIVITY: 6 NG/L (ref 0–12)
TROPONIN, HIGH SENSITIVITY: < 6 NG/L (ref 0–12)
WBC # BLD AUTO: 7 THOU/MM3 (ref 4.8–10.8)

## 2024-10-10 PROCEDURE — 6370000000 HC RX 637 (ALT 250 FOR IP): Performed by: STUDENT IN AN ORGANIZED HEALTH CARE EDUCATION/TRAINING PROGRAM

## 2024-10-10 PROCEDURE — 84484 ASSAY OF TROPONIN QUANT: CPT

## 2024-10-10 PROCEDURE — 93010 ELECTROCARDIOGRAM REPORT: CPT | Performed by: INTERNAL MEDICINE

## 2024-10-10 PROCEDURE — 71045 X-RAY EXAM CHEST 1 VIEW: CPT

## 2024-10-10 PROCEDURE — 2580000003 HC RX 258: Performed by: STUDENT IN AN ORGANIZED HEALTH CARE EDUCATION/TRAINING PROGRAM

## 2024-10-10 PROCEDURE — 83036 HEMOGLOBIN GLYCOSYLATED A1C: CPT

## 2024-10-10 PROCEDURE — 1200000003 HC TELEMETRY R&B

## 2024-10-10 PROCEDURE — 80048 BASIC METABOLIC PNL TOTAL CA: CPT

## 2024-10-10 PROCEDURE — 6360000002 HC RX W HCPCS: Performed by: STUDENT IN AN ORGANIZED HEALTH CARE EDUCATION/TRAINING PROGRAM

## 2024-10-10 PROCEDURE — 99285 EMERGENCY DEPT VISIT HI MDM: CPT

## 2024-10-10 PROCEDURE — 80076 HEPATIC FUNCTION PANEL: CPT

## 2024-10-10 PROCEDURE — 85025 COMPLETE CBC W/AUTO DIFF WBC: CPT

## 2024-10-10 PROCEDURE — 83880 ASSAY OF NATRIURETIC PEPTIDE: CPT

## 2024-10-10 PROCEDURE — 93005 ELECTROCARDIOGRAM TRACING: CPT | Performed by: STUDENT IN AN ORGANIZED HEALTH CARE EDUCATION/TRAINING PROGRAM

## 2024-10-10 PROCEDURE — 36415 COLL VENOUS BLD VENIPUNCTURE: CPT

## 2024-10-10 RX ORDER — SODIUM CHLORIDE 0.9 % (FLUSH) 0.9 %
5-40 SYRINGE (ML) INJECTION PRN
Status: DISCONTINUED | OUTPATIENT
Start: 2024-10-10 | End: 2024-10-13 | Stop reason: HOSPADM

## 2024-10-10 RX ORDER — DIVALPROEX SODIUM 125 MG/1
250 CAPSULE, COATED PELLETS ORAL NIGHTLY
Status: DISCONTINUED | OUTPATIENT
Start: 2024-10-11 | End: 2024-10-13 | Stop reason: HOSPADM

## 2024-10-10 RX ORDER — SODIUM CHLORIDE 0.9 % (FLUSH) 0.9 %
5-40 SYRINGE (ML) INJECTION EVERY 12 HOURS SCHEDULED
Status: DISCONTINUED | OUTPATIENT
Start: 2024-10-10 | End: 2024-10-13 | Stop reason: HOSPADM

## 2024-10-10 RX ORDER — ATORVASTATIN CALCIUM 80 MG/1
80 TABLET, FILM COATED ORAL DAILY
Status: DISCONTINUED | OUTPATIENT
Start: 2024-10-11 | End: 2024-10-13 | Stop reason: HOSPADM

## 2024-10-10 RX ORDER — CETIRIZINE HYDROCHLORIDE 10 MG/1
10 TABLET ORAL DAILY
Status: DISCONTINUED | OUTPATIENT
Start: 2024-10-11 | End: 2024-10-13 | Stop reason: HOSPADM

## 2024-10-10 RX ORDER — ENOXAPARIN SODIUM 100 MG/ML
30 INJECTION SUBCUTANEOUS 2 TIMES DAILY
Status: DISCONTINUED | OUTPATIENT
Start: 2024-10-10 | End: 2024-10-11

## 2024-10-10 RX ORDER — MAGNESIUM SULFATE IN WATER 40 MG/ML
2000 INJECTION, SOLUTION INTRAVENOUS PRN
Status: DISCONTINUED | OUTPATIENT
Start: 2024-10-10 | End: 2024-10-13 | Stop reason: HOSPADM

## 2024-10-10 RX ORDER — POTASSIUM CHLORIDE 1500 MG/1
40 TABLET, EXTENDED RELEASE ORAL PRN
Status: DISCONTINUED | OUTPATIENT
Start: 2024-10-10 | End: 2024-10-13 | Stop reason: HOSPADM

## 2024-10-10 RX ORDER — SODIUM CHLORIDE 9 MG/ML
INJECTION, SOLUTION INTRAVENOUS PRN
Status: DISCONTINUED | OUTPATIENT
Start: 2024-10-10 | End: 2024-10-13 | Stop reason: HOSPADM

## 2024-10-10 RX ORDER — METOPROLOL SUCCINATE 50 MG/1
50 TABLET, EXTENDED RELEASE ORAL DAILY
Status: DISCONTINUED | OUTPATIENT
Start: 2024-10-11 | End: 2024-10-11

## 2024-10-10 RX ORDER — POLYETHYLENE GLYCOL 3350 17 G/17G
17 POWDER, FOR SOLUTION ORAL DAILY PRN
Status: DISCONTINUED | OUTPATIENT
Start: 2024-10-10 | End: 2024-10-13 | Stop reason: HOSPADM

## 2024-10-10 RX ORDER — PANTOPRAZOLE SODIUM 40 MG/1
40 TABLET, DELAYED RELEASE ORAL
Status: DISCONTINUED | OUTPATIENT
Start: 2024-10-11 | End: 2024-10-13 | Stop reason: HOSPADM

## 2024-10-10 RX ORDER — ACETAMINOPHEN 650 MG/1
650 SUPPOSITORY RECTAL EVERY 6 HOURS PRN
Status: DISCONTINUED | OUTPATIENT
Start: 2024-10-10 | End: 2024-10-13 | Stop reason: HOSPADM

## 2024-10-10 RX ORDER — ARIPIPRAZOLE 5 MG/1
5 TABLET ORAL DAILY
Status: DISCONTINUED | OUTPATIENT
Start: 2024-10-11 | End: 2024-10-13 | Stop reason: HOSPADM

## 2024-10-10 RX ORDER — LEVOTHYROXINE SODIUM 50 UG/1
50 TABLET ORAL DAILY
Status: DISCONTINUED | OUTPATIENT
Start: 2024-10-11 | End: 2024-10-13 | Stop reason: HOSPADM

## 2024-10-10 RX ORDER — CALCIUM CARBONATE 500 MG/1
1 TABLET, CHEWABLE ORAL DAILY
Status: DISCONTINUED | OUTPATIENT
Start: 2024-10-11 | End: 2024-10-13 | Stop reason: HOSPADM

## 2024-10-10 RX ORDER — POTASSIUM CHLORIDE 7.45 MG/ML
10 INJECTION INTRAVENOUS PRN
Status: DISCONTINUED | OUTPATIENT
Start: 2024-10-10 | End: 2024-10-13 | Stop reason: HOSPADM

## 2024-10-10 RX ORDER — ONDANSETRON 4 MG/1
4 TABLET, ORALLY DISINTEGRATING ORAL EVERY 8 HOURS PRN
Status: DISCONTINUED | OUTPATIENT
Start: 2024-10-10 | End: 2024-10-13 | Stop reason: HOSPADM

## 2024-10-10 RX ORDER — ONDANSETRON 2 MG/ML
4 INJECTION INTRAMUSCULAR; INTRAVENOUS EVERY 6 HOURS PRN
Status: DISCONTINUED | OUTPATIENT
Start: 2024-10-10 | End: 2024-10-13 | Stop reason: HOSPADM

## 2024-10-10 RX ORDER — NITROGLYCERIN 0.4 MG/1
0.4 TABLET SUBLINGUAL EVERY 5 MIN PRN
Status: DISCONTINUED | OUTPATIENT
Start: 2024-10-10 | End: 2024-10-11 | Stop reason: SDUPTHER

## 2024-10-10 RX ORDER — ACETAMINOPHEN 325 MG/1
650 TABLET ORAL EVERY 6 HOURS PRN
Status: DISCONTINUED | OUTPATIENT
Start: 2024-10-10 | End: 2024-10-13 | Stop reason: HOSPADM

## 2024-10-10 RX ORDER — MECLIZINE HCL 12.5 MG 12.5 MG/1
12.5 TABLET ORAL 3 TIMES DAILY PRN
Status: DISCONTINUED | OUTPATIENT
Start: 2024-10-10 | End: 2024-10-13 | Stop reason: HOSPADM

## 2024-10-10 RX ADMIN — ENOXAPARIN SODIUM 30 MG: 100 INJECTION SUBCUTANEOUS at 21:48

## 2024-10-10 RX ADMIN — NITROGLYCERIN 0.4 MG: 0.4 TABLET, ORALLY DISINTEGRATING SUBLINGUAL at 16:45

## 2024-10-10 RX ADMIN — SODIUM CHLORIDE, PRESERVATIVE FREE 10 ML: 5 INJECTION INTRAVENOUS at 21:48

## 2024-10-10 ASSESSMENT — PAIN SCALES - GENERAL
PAINLEVEL_OUTOF10: 4
PAINLEVEL_OUTOF10: 7
PAINLEVEL_OUTOF10: 1

## 2024-10-10 ASSESSMENT — PAIN DESCRIPTION - PAIN TYPE: TYPE: ACUTE PAIN

## 2024-10-10 ASSESSMENT — PAIN DESCRIPTION - LOCATION
LOCATION: CHEST

## 2024-10-10 ASSESSMENT — HEART SCORE: ECG: NORMAL

## 2024-10-10 ASSESSMENT — PAIN - FUNCTIONAL ASSESSMENT
PAIN_FUNCTIONAL_ASSESSMENT: 0-10
PAIN_FUNCTIONAL_ASSESSMENT: NONE - DENIES PAIN

## 2024-10-10 NOTE — ED NOTES
ED to inpatient nurses report      Chief Complaint:  Chief Complaint   Patient presents with    Chest Pain     Present to ED from: [facility]    MOA:     LOC: alert and orientated to name, place, date  Mobility: Requires assistance * 2  Oxygen Baseline: ROOM AIR     Current needs required: NONE     Code Status:   Prior    Mental Status:  Level of Consciousness: Alert (0)    Psych Assessment:        Vitals:  Patient Vitals for the past 24 hrs:   BP Temp Temp src Pulse Resp SpO2 Height Weight   10/10/24 1831 121/74 -- -- 69 18 96 % -- --   10/10/24 1747 125/66 -- -- 68 18 96 % -- --   10/10/24 1645 133/67 -- -- 74 16 95 % -- --   10/10/24 1540 124/78 97.9 °F (36.6 °C) Oral 72 18 95 % 1.651 m (5' 5\") 104.3 kg (230 lb)        LDAs:   Peripheral IV 10/10/24 Distal;Right Cephalic (Active)   Site Assessment Clean, dry & intact 10/10/24 1835   Phlebitis Assessment No symptoms 10/10/24 1835       Ambulatory Status:  No data recorded    Diagnosis:  DISPOSITION     Final diagnoses:   None        Consults:  None     Pain Score:  Pain Assessment  Pain Assessment: 0-10  Pain Level: 1  Pain Location: Chest  Pain Type: Acute pain    C-SSRS:   Risk of Suicide: No Risk    Sepsis Screening:       [place] Fall Risk:       Swallow Screening        Preferred Language:   English      ALLERGIES     Patient has no known allergies.    SURGICAL HISTORY       Past Surgical History:   Procedure Laterality Date    ANKLE SURGERY Right 01/01/2000    CATARACT REMOVAL Left 02/09/2022    ELBOW SURGERY Left 02/01/2022    ulnar nerve decompression    KNEE ARTHROSCOPY Left 09/11/2015    Torn cartliage    LEG SURGERY Right 01/01/2000    [place] COLSC FLX W/RMVL OF TUMOR POLYP LESION SNARE TQ  07/16/2017    COLONOSCOPY POLYPECTOMY SNARE/COLD BIOPSY performed by Ryan Perales MD at [facility]    [place] EGD TRANSORAL BIOPSY SINGLE/MULTIPLE N/A 09/27/2017    EGD BIOPSY performed by Glendy Morrison MD at [facility]    UPPER GASTROINTESTINAL

## 2024-10-10 NOTE — ED NOTES
Pt resting on cot w/ eyes open upon entrance. Respirations even and unlabored. TV on for pt comfort.

## 2024-10-10 NOTE — ED TRIAGE NOTES
Pt presents to the ED by EMS from [facility] with c/c chest pain. Pt reports chest pain started this morning. Pt reports hx MI in the past. Pt given 324 mg aspirin PTA. Rates pain 5/10 at this time, EKG completed.

## 2024-10-10 NOTE — ED NOTES
Pt reassessed at this time. Pt states pain is now 3/10, pt is not reliable source of pain scale as value he chooses continues to change.  Provider made aware.

## 2024-10-10 NOTE — H&P
High Blood Pressure Father     Dementia Maternal Aunt     Seizures Maternal Uncle        Review of Systems:   Pertinent positives and negatives as noted in the HPI. Otherwise complete ROS negative.    Physical Exam:    /89   Pulse 80   Temp 97.9 °F (36.6 °C) (Oral)   Resp 19   Ht 1.651 m (5' 5\")   Wt 104.3 kg (230 lb)   SpO2 97%   BMI 38.27 kg/m²     General appearance: Obese, older male, laying on ED bed, shirtless, in no acute distress, appears stated age.  Eyes:  EOMI, No scleral Icterus  HENT: NC/AT, External nares normal.  Oral mucosa moist without lesions.  Hearing grossly intact.   Neck: Supple, with full range of motion. Trachea midline.  No gross JVD appreciated.  Respiratory:  CTA bilat, Normal inspiratory effort, No wheezing, rales, or rhonchi heard  Cardiovascular: RRR, Normal S1/S2 No Murmurs, Rubs, or Gallops heard, No LE edema.   Abdomen: Soft, non-tender, non-distended with normal bowel sounds.  Musculoskeletal: No joint swelling or tenderness. Normal tone. No abnormal movements.   Skin: Warm and dry. No rashes or lesions.  Neurologic:  No focal sensory/motor deficits in the upper and lower extremities. Cranial nerves:  grossly non-focal 2-12.     Psychiatric: Alert and oriented, normal insight and thought content.   Capillary Refill: Brisk,< 3 seconds.  Peripheral Pulses: PT and radial Pulses 2+, bilat       Labs:     Recent Labs     10/10/24  1604   WBC 7.0   HGB 14.0   HCT 42.4        Recent Labs     10/10/24  1604      K 4.1      CO2 29   BUN 14   CREATININE 0.7   CALCIUM 8.6     Recent Labs     10/10/24  1604   AST 24   ALT 22   BILIDIR 0.2   BILITOT 0.5   ALKPHOS 104     No results for input(s): \"INR\" in the last 72 hours.  No results for input(s): \"CKTOTAL\", \"TROPONINI\" in the last 72 hours.  Lab Results   Component Value Date/Time    NITRU NEGATIVE 09/27/2018 03:56 PM    WBCUA 2-4 09/27/2018 03:56 PM    WBCUA 0-5 10/06/2016 08:02 PM    BACTERIA NONE 09/27/2018

## 2024-10-10 NOTE — ED PROVIDER NOTES
minor errors which are inherent in voice recognition technology.**      Electronically signed by Dr. Den Osorio        See ED course below for my interpretation if applicable.  All radiology images independently reviewed by me in the clinical context of this patient, in addition to interpretation provided by the radiologist.      EKG interpretation (none if blank):  normal sinus rhythm, rate 73 with nonspecific T wave changes  All EKG results are individually reviewed and interpreted by me in the clinical context of this patient.  All EKGs are also interpreted by our Cardiology department, final interpretation may not be available as of the writing of this note.      PREVIOUS RECORDS  AND EXTERNAL INFORMATION REVIEWED   History obtained from: chart review and the patient.    Pertinent previous and/or external records reviewed:  Patient's chart reviewed .    Case discussed with specialties other than Emergency Medicine: Hospitalist      MEDICAL DECISION MAKING   Initial Assessment Summary:   39-year-old male known case of HTN, HLD, hypothyroidism presented to the ED with substernal chest pain.  Patient's physical had clear bilateral air entry with left chest tenderness and mild abdominal LLQ tenderness, otherwise unremarkable.  Please see ED course and MDM sections below for continuation and resolution of this initial assessment if applicable.    Initial plan:   CBC, BMP, LFT, troponin, BNP  Chest x-ray  EKG  Nitro for pain      Comorbid conditions pertinent to this ED encounter:  HTN      Differential Diagnosis includes but is not limited to:  ACS  COPD  Costochondritis      Decision Rules/Clinical Scores utilized:  HEART Score 5   Heart Score    Heart Score for chest pain patients  History: Moderately Suspicious  ECG: Normal  Patient Age: > 65 years  *Risk factors for Atherosclerotic disease: Cigarette smoking, Hypercholesterolemia, Obesity, Hypertension, Coronary Artery Disease  Risk Factors: > 3 Risk factors

## 2024-10-11 ENCOUNTER — APPOINTMENT (OUTPATIENT)
Age: 64
DRG: 287 | End: 2024-10-11
Attending: HOSPITALIST
Payer: MEDICARE

## 2024-10-11 PROBLEM — I20.0 UNSTABLE ANGINA (HCC): Status: ACTIVE | Noted: 2024-10-11

## 2024-10-11 LAB
ANION GAP SERPL CALC-SCNC: 10 MEQ/L (ref 8–16)
ANION GAP SERPL CALC-SCNC: 13 MEQ/L (ref 8–16)
APTT PPP: 29.8 SECONDS (ref 22–38)
BASOPHILS ABSOLUTE: 0 THOU/MM3 (ref 0–0.1)
BASOPHILS NFR BLD AUTO: 0.3 %
BUN SERPL-MCNC: 11 MG/DL (ref 7–22)
BUN SERPL-MCNC: 11 MG/DL (ref 7–22)
CALCIUM SERPL-MCNC: 8.2 MG/DL (ref 8.5–10.5)
CALCIUM SERPL-MCNC: 8.4 MG/DL (ref 8.5–10.5)
CHLORIDE SERPL-SCNC: 102 MEQ/L (ref 98–111)
CHLORIDE SERPL-SCNC: 104 MEQ/L (ref 98–111)
CO2 SERPL-SCNC: 25 MEQ/L (ref 23–33)
CO2 SERPL-SCNC: 26 MEQ/L (ref 23–33)
CREAT SERPL-MCNC: 0.6 MG/DL (ref 0.4–1.2)
CREAT SERPL-MCNC: 0.6 MG/DL (ref 0.4–1.2)
DEPRECATED MEAN GLUCOSE BLD GHB EST-ACNC: 129 MG/DL (ref 70–126)
DEPRECATED RDW RBC AUTO: 46.9 FL (ref 35–45)
DEPRECATED RDW RBC AUTO: 47 FL (ref 35–45)
ECHO AO ASC DIAM: 3.2 CM
ECHO AO ASCENDING AORTA INDEX: 1.5 CM/M2
ECHO AV CUSP MM: 1.9 CM
ECHO AV PEAK GRADIENT: 7 MMHG
ECHO AV PEAK VELOCITY: 1.4 M/S
ECHO AV VELOCITY RATIO: 0.71
ECHO BSA: 2.23 M2
ECHO BSA: 2.23 M2
ECHO LA AREA 2C: 16.3 CM2
ECHO LA AREA 4C: 16.5 CM2
ECHO LA DIAMETER INDEX: 1.64 CM/M2
ECHO LA DIAMETER: 3.5 CM
ECHO LA MAJOR AXIS: 5.4 CM
ECHO LA MINOR AXIS: 5.1 CM
ECHO LA VOL BP: 42 ML (ref 18–58)
ECHO LA VOL MOD A2C: 41 ML (ref 18–58)
ECHO LA VOL MOD A4C: 40 ML (ref 18–58)
ECHO LA VOL/BSA BIPLANE: 20 ML/M2 (ref 16–34)
ECHO LA VOLUME INDEX MOD A2C: 19 ML/M2 (ref 16–34)
ECHO LA VOLUME INDEX MOD A4C: 19 ML/M2 (ref 16–34)
ECHO LV E' LATERAL VELOCITY: 7.7 CM/S
ECHO LV E' SEPTAL VELOCITY: 4.8 CM/S
ECHO LV EJECTION FRACTION BIPLANE: 55 % (ref 55–100)
ECHO LV FRACTIONAL SHORTENING: 28 % (ref 28–44)
ECHO LV INTERNAL DIMENSION DIASTOLE INDEX: 1.83 CM/M2
ECHO LV INTERNAL DIMENSION DIASTOLIC: 3.9 CM (ref 4.2–5.9)
ECHO LV INTERNAL DIMENSION SYSTOLIC INDEX: 1.31 CM/M2
ECHO LV INTERNAL DIMENSION SYSTOLIC: 2.8 CM
ECHO LV ISOVOLUMETRIC RELAXATION TIME (IVRT): 67 MS
ECHO LV IVSD: 1.2 CM (ref 0.6–1)
ECHO LV MASS 2D: 159.3 G (ref 88–224)
ECHO LV MASS INDEX 2D: 74.8 G/M2 (ref 49–115)
ECHO LV POSTERIOR WALL DIASTOLIC: 1.2 CM (ref 0.6–1)
ECHO LV RELATIVE WALL THICKNESS RATIO: 0.62
ECHO LVOT PEAK GRADIENT: 4 MMHG
ECHO LVOT PEAK VELOCITY: 1 M/S
ECHO MV A VELOCITY: 0.81 M/S
ECHO MV E DECELERATION TIME (DT): 331 MS
ECHO MV E VELOCITY: 0.63 M/S
ECHO MV E/A RATIO: 0.78
ECHO MV E/E' LATERAL: 8.18
ECHO MV E/E' RATIO (AVERAGED): 10.65
ECHO MV E/E' SEPTAL: 13.13
ECHO PV MAX VELOCITY: 0.6 M/S
ECHO PV PEAK GRADIENT: 2 MMHG
ECHO RV INTERNAL DIMENSION: 3.2 CM
ECHO RV TAPSE: 1.8 CM (ref 1.7–?)
ECHO TV E WAVE: 0.3 M/S
EKG ATRIAL RATE: 76 BPM
EKG P AXIS: 53 DEGREES
EKG P-R INTERVAL: 152 MS
EKG Q-T INTERVAL: 332 MS
EKG QRS DURATION: 66 MS
EKG QTC CALCULATION (BAZETT): 373 MS
EKG R AXIS: 33 DEGREES
EKG T AXIS: 72 DEGREES
EKG VENTRICULAR RATE: 76 BPM
EOSINOPHIL NFR BLD AUTO: 2.8 %
EOSINOPHILS ABSOLUTE: 0.2 THOU/MM3 (ref 0–0.4)
ERYTHROCYTE [DISTWIDTH] IN BLOOD BY AUTOMATED COUNT: 13.9 % (ref 11.5–14.5)
ERYTHROCYTE [DISTWIDTH] IN BLOOD BY AUTOMATED COUNT: 14 % (ref 11.5–14.5)
GFR SERPL CREATININE-BSD FRML MDRD: > 90 ML/MIN/1.73M2
GFR SERPL CREATININE-BSD FRML MDRD: > 90 ML/MIN/1.73M2
GLUCOSE BLD STRIP.AUTO-MCNC: 104 MG/DL (ref 70–108)
GLUCOSE BLD STRIP.AUTO-MCNC: 114 MG/DL (ref 70–108)
GLUCOSE BLD STRIP.AUTO-MCNC: 134 MG/DL (ref 70–108)
GLUCOSE BLD STRIP.AUTO-MCNC: 93 MG/DL (ref 70–108)
GLUCOSE BLD STRIP.AUTO-MCNC: 99 MG/DL (ref 70–108)
GLUCOSE SERPL-MCNC: 110 MG/DL (ref 70–108)
GLUCOSE SERPL-MCNC: 88 MG/DL (ref 70–108)
HBA1C MFR BLD HPLC: 6.3 % (ref 4.4–6.4)
HCT VFR BLD AUTO: 42.9 % (ref 42–52)
HCT VFR BLD AUTO: 45.4 % (ref 42–52)
HEPARIN UNFRACTIONATED: 0.09 U/ML (ref 0.3–0.7)
HGB BLD-MCNC: 14.2 GM/DL (ref 14–18)
HGB BLD-MCNC: 15 GM/DL (ref 14–18)
IMM GRANULOCYTES # BLD AUTO: 0.04 THOU/MM3 (ref 0–0.07)
IMM GRANULOCYTES NFR BLD AUTO: 0.6 %
INR PPP: 0.97 (ref 0.85–1.13)
LYMPHOCYTES ABSOLUTE: 1.4 THOU/MM3 (ref 1–4.8)
LYMPHOCYTES NFR BLD AUTO: 21.9 %
MCH RBC QN AUTO: 30.4 PG (ref 26–33)
MCH RBC QN AUTO: 30.5 PG (ref 26–33)
MCHC RBC AUTO-ENTMCNC: 33 GM/DL (ref 32.2–35.5)
MCHC RBC AUTO-ENTMCNC: 33.1 GM/DL (ref 32.2–35.5)
MCV RBC AUTO: 91.9 FL (ref 80–94)
MCV RBC AUTO: 92.5 FL (ref 80–94)
MONOCYTES ABSOLUTE: 0.7 THOU/MM3 (ref 0.4–1.3)
MONOCYTES NFR BLD AUTO: 11.1 %
NEUTROPHILS ABSOLUTE: 4.1 THOU/MM3 (ref 1.8–7.7)
NEUTROPHILS NFR BLD AUTO: 63.3 %
NRBC BLD AUTO-RTO: 0 /100 WBC
PLATELET # BLD AUTO: 150 THOU/MM3 (ref 130–400)
PLATELET # BLD AUTO: 170 THOU/MM3 (ref 130–400)
PMV BLD AUTO: 12.5 FL (ref 9.4–12.4)
PMV BLD AUTO: 13 FL (ref 9.4–12.4)
POTASSIUM SERPL-SCNC: 4.4 MEQ/L (ref 3.5–5.2)
POTASSIUM SERPL-SCNC: 4.6 MEQ/L (ref 3.5–5.2)
RBC # BLD AUTO: 4.67 MILL/MM3 (ref 4.7–6.1)
RBC # BLD AUTO: 4.91 MILL/MM3 (ref 4.7–6.1)
SODIUM SERPL-SCNC: 138 MEQ/L (ref 135–145)
SODIUM SERPL-SCNC: 142 MEQ/L (ref 135–145)
WBC # BLD AUTO: 6 THOU/MM3 (ref 4.8–10.8)
WBC # BLD AUTO: 6.4 THOU/MM3 (ref 4.8–10.8)

## 2024-10-11 PROCEDURE — B2151ZZ FLUOROSCOPY OF LEFT HEART USING LOW OSMOLAR CONTRAST: ICD-10-PCS | Performed by: INTERNAL MEDICINE

## 2024-10-11 PROCEDURE — 6370000000 HC RX 637 (ALT 250 FOR IP): Performed by: STUDENT IN AN ORGANIZED HEALTH CARE EDUCATION/TRAINING PROGRAM

## 2024-10-11 PROCEDURE — 6360000002 HC RX W HCPCS: Performed by: HOSPITALIST

## 2024-10-11 PROCEDURE — 93010 ELECTROCARDIOGRAM REPORT: CPT | Performed by: INTERNAL MEDICINE

## 2024-10-11 PROCEDURE — 93458 L HRT ARTERY/VENTRICLE ANGIO: CPT | Performed by: INTERNAL MEDICINE

## 2024-10-11 PROCEDURE — 93306 TTE W/DOPPLER COMPLETE: CPT

## 2024-10-11 PROCEDURE — 85730 THROMBOPLASTIN TIME PARTIAL: CPT

## 2024-10-11 PROCEDURE — 99233 SBSQ HOSP IP/OBS HIGH 50: CPT | Performed by: HOSPITALIST

## 2024-10-11 PROCEDURE — 99152 MOD SED SAME PHYS/QHP 5/>YRS: CPT | Performed by: INTERNAL MEDICINE

## 2024-10-11 PROCEDURE — 99153 MOD SED SAME PHYS/QHP EA: CPT | Performed by: INTERNAL MEDICINE

## 2024-10-11 PROCEDURE — 2580000003 HC RX 258: Performed by: STUDENT IN AN ORGANIZED HEALTH CARE EDUCATION/TRAINING PROGRAM

## 2024-10-11 PROCEDURE — 33210 INSERT ELECTRD/PM CATH SNGL: CPT | Performed by: INTERNAL MEDICINE

## 2024-10-11 PROCEDURE — 6370000000 HC RX 637 (ALT 250 FOR IP): Performed by: HOSPITALIST

## 2024-10-11 PROCEDURE — 6360000002 HC RX W HCPCS: Performed by: STUDENT IN AN ORGANIZED HEALTH CARE EDUCATION/TRAINING PROGRAM

## 2024-10-11 PROCEDURE — 6360000004 HC RX CONTRAST MEDICATION: Performed by: INTERNAL MEDICINE

## 2024-10-11 PROCEDURE — B2111ZZ FLUOROSCOPY OF MULTIPLE CORONARY ARTERIES USING LOW OSMOLAR CONTRAST: ICD-10-PCS | Performed by: INTERNAL MEDICINE

## 2024-10-11 PROCEDURE — 93005 ELECTROCARDIOGRAM TRACING: CPT | Performed by: INTERNAL MEDICINE

## 2024-10-11 PROCEDURE — 2140000000 HC CCU INTERMEDIATE R&B

## 2024-10-11 PROCEDURE — 82948 REAGENT STRIP/BLOOD GLUCOSE: CPT

## 2024-10-11 PROCEDURE — 85520 HEPARIN ASSAY: CPT

## 2024-10-11 PROCEDURE — 85025 COMPLETE CBC W/AUTO DIFF WBC: CPT

## 2024-10-11 PROCEDURE — 6360000002 HC RX W HCPCS: Performed by: INTERNAL MEDICINE

## 2024-10-11 PROCEDURE — 85027 COMPLETE CBC AUTOMATED: CPT

## 2024-10-11 PROCEDURE — 99223 1ST HOSP IP/OBS HIGH 75: CPT | Performed by: INTERNAL MEDICINE

## 2024-10-11 PROCEDURE — 2500000003 HC RX 250 WO HCPCS: Performed by: INTERNAL MEDICINE

## 2024-10-11 PROCEDURE — C1769 GUIDE WIRE: HCPCS | Performed by: INTERNAL MEDICINE

## 2024-10-11 PROCEDURE — 2580000003 HC RX 258: Performed by: INTERNAL MEDICINE

## 2024-10-11 PROCEDURE — 93306 TTE W/DOPPLER COMPLETE: CPT | Performed by: INTERNAL MEDICINE

## 2024-10-11 PROCEDURE — 36415 COLL VENOUS BLD VENIPUNCTURE: CPT

## 2024-10-11 PROCEDURE — 4A023N7 MEASUREMENT OF CARDIAC SAMPLING AND PRESSURE, LEFT HEART, PERCUTANEOUS APPROACH: ICD-10-PCS | Performed by: INTERNAL MEDICINE

## 2024-10-11 PROCEDURE — 2709999900 HC NON-CHARGEABLE SUPPLY: Performed by: INTERNAL MEDICINE

## 2024-10-11 PROCEDURE — C1894 INTRO/SHEATH, NON-LASER: HCPCS | Performed by: INTERNAL MEDICINE

## 2024-10-11 PROCEDURE — 85610 PROTHROMBIN TIME: CPT

## 2024-10-11 PROCEDURE — 80048 BASIC METABOLIC PNL TOTAL CA: CPT

## 2024-10-11 RX ORDER — SODIUM CHLORIDE 9 MG/ML
INJECTION, SOLUTION INTRAVENOUS PRN
Status: DISCONTINUED | OUTPATIENT
Start: 2024-10-11 | End: 2024-10-11 | Stop reason: SDUPTHER

## 2024-10-11 RX ORDER — IOPAMIDOL 755 MG/ML
INJECTION, SOLUTION INTRAVASCULAR PRN
Status: DISCONTINUED | OUTPATIENT
Start: 2024-10-11 | End: 2024-10-11 | Stop reason: HOSPADM

## 2024-10-11 RX ORDER — SODIUM CHLORIDE 0.9 % (FLUSH) 0.9 %
5-40 SYRINGE (ML) INJECTION EVERY 12 HOURS SCHEDULED
Status: DISCONTINUED | OUTPATIENT
Start: 2024-10-11 | End: 2024-10-11 | Stop reason: SDUPTHER

## 2024-10-11 RX ORDER — DEXTROSE MONOHYDRATE 100 MG/ML
INJECTION, SOLUTION INTRAVENOUS CONTINUOUS PRN
Status: DISCONTINUED | OUTPATIENT
Start: 2024-10-11 | End: 2024-10-13 | Stop reason: HOSPADM

## 2024-10-11 RX ORDER — HEPARIN SODIUM 1000 [USP'U]/ML
4000 INJECTION, SOLUTION INTRAVENOUS; SUBCUTANEOUS PRN
Status: DISCONTINUED | OUTPATIENT
Start: 2024-10-11 | End: 2024-10-11

## 2024-10-11 RX ORDER — MIDAZOLAM HYDROCHLORIDE 1 MG/ML
INJECTION INTRAMUSCULAR; INTRAVENOUS PRN
Status: DISCONTINUED | OUTPATIENT
Start: 2024-10-11 | End: 2024-10-11 | Stop reason: HOSPADM

## 2024-10-11 RX ORDER — SODIUM CHLORIDE 9 MG/ML
INJECTION, SOLUTION INTRAVENOUS CONTINUOUS
Status: ACTIVE | OUTPATIENT
Start: 2024-10-11 | End: 2024-10-11

## 2024-10-11 RX ORDER — ACETAMINOPHEN 325 MG/1
650 TABLET ORAL EVERY 4 HOURS PRN
Status: DISCONTINUED | OUTPATIENT
Start: 2024-10-11 | End: 2024-10-11 | Stop reason: SDUPTHER

## 2024-10-11 RX ORDER — FENTANYL CITRATE 50 UG/ML
INJECTION, SOLUTION INTRAMUSCULAR; INTRAVENOUS PRN
Status: DISCONTINUED | OUTPATIENT
Start: 2024-10-11 | End: 2024-10-11 | Stop reason: HOSPADM

## 2024-10-11 RX ORDER — SODIUM CHLORIDE 0.9 % (FLUSH) 0.9 %
5-40 SYRINGE (ML) INJECTION PRN
Status: DISCONTINUED | OUTPATIENT
Start: 2024-10-11 | End: 2024-10-11 | Stop reason: SDUPTHER

## 2024-10-11 RX ORDER — NITROGLYCERIN 0.4 MG/1
0.4 TABLET SUBLINGUAL EVERY 5 MIN PRN
Status: DISCONTINUED | OUTPATIENT
Start: 2024-10-11 | End: 2024-10-13 | Stop reason: HOSPADM

## 2024-10-11 RX ORDER — LIDOCAINE HYDROCHLORIDE 20 MG/ML
INJECTION, SOLUTION EPIDURAL; INFILTRATION; INTRACAUDAL; PERINEURAL PRN
Status: DISCONTINUED | OUTPATIENT
Start: 2024-10-11 | End: 2024-10-11 | Stop reason: HOSPADM

## 2024-10-11 RX ORDER — DIPHENHYDRAMINE HYDROCHLORIDE 50 MG/ML
25 INJECTION INTRAMUSCULAR; INTRAVENOUS ONCE
Status: DISCONTINUED | OUTPATIENT
Start: 2024-10-11 | End: 2024-10-11 | Stop reason: SDUPTHER

## 2024-10-11 RX ORDER — ATROPINE SULFATE 0.4 MG/ML
0.5 INJECTION, SOLUTION INTRAVENOUS
Status: ACTIVE | OUTPATIENT
Start: 2024-10-11 | End: 2024-10-12

## 2024-10-11 RX ORDER — ASPIRIN 81 MG/1
81 TABLET, CHEWABLE ORAL DAILY
Status: DISCONTINUED | OUTPATIENT
Start: 2024-10-11 | End: 2024-10-13 | Stop reason: HOSPADM

## 2024-10-11 RX ORDER — INSULIN LISPRO 100 [IU]/ML
0-4 INJECTION, SOLUTION INTRAVENOUS; SUBCUTANEOUS
Status: DISCONTINUED | OUTPATIENT
Start: 2024-10-11 | End: 2024-10-13 | Stop reason: HOSPADM

## 2024-10-11 RX ORDER — ASPIRIN 81 MG/1
324 TABLET, CHEWABLE ORAL ONCE
Status: DISCONTINUED | OUTPATIENT
Start: 2024-10-11 | End: 2024-10-11

## 2024-10-11 RX ORDER — GLUCAGON 1 MG/ML
1 KIT INJECTION PRN
Status: DISCONTINUED | OUTPATIENT
Start: 2024-10-11 | End: 2024-10-13 | Stop reason: HOSPADM

## 2024-10-11 RX ORDER — HEPARIN SODIUM 1000 [USP'U]/ML
2000 INJECTION, SOLUTION INTRAVENOUS; SUBCUTANEOUS PRN
Status: DISCONTINUED | OUTPATIENT
Start: 2024-10-11 | End: 2024-10-11

## 2024-10-11 RX ORDER — HEPARIN SODIUM 1000 [USP'U]/ML
4000 INJECTION, SOLUTION INTRAVENOUS; SUBCUTANEOUS ONCE
Status: COMPLETED | OUTPATIENT
Start: 2024-10-11 | End: 2024-10-11

## 2024-10-11 RX ORDER — HEPARIN SODIUM 10000 [USP'U]/100ML
5-30 INJECTION, SOLUTION INTRAVENOUS CONTINUOUS
Status: DISCONTINUED | OUTPATIENT
Start: 2024-10-11 | End: 2024-10-11

## 2024-10-11 RX ORDER — NITROGLYCERIN 20 MG/100ML
5-200 INJECTION INTRAVENOUS CONTINUOUS
Status: DISCONTINUED | OUTPATIENT
Start: 2024-10-11 | End: 2024-10-12

## 2024-10-11 RX ADMIN — VORTIOXETINE 10 MG: 10 TABLET, FILM COATED ORAL at 08:28

## 2024-10-11 RX ADMIN — HEPARIN SODIUM 4000 UNITS: 1000 INJECTION INTRAVENOUS; SUBCUTANEOUS at 10:50

## 2024-10-11 RX ADMIN — PANTOPRAZOLE SODIUM 40 MG: 40 TABLET, DELAYED RELEASE ORAL at 06:06

## 2024-10-11 RX ADMIN — SODIUM CHLORIDE: 9 INJECTION, SOLUTION INTRAVENOUS at 22:04

## 2024-10-11 RX ADMIN — ENOXAPARIN SODIUM 30 MG: 100 INJECTION SUBCUTANEOUS at 09:35

## 2024-10-11 RX ADMIN — DIVALPROEX SODIUM 250 MG: 125 CAPSULE ORAL at 23:13

## 2024-10-11 RX ADMIN — LEVOTHYROXINE SODIUM 50 MCG: 0.05 TABLET ORAL at 06:06

## 2024-10-11 RX ADMIN — HEPARIN SODIUM 9 UNITS/KG/HR: 10000 INJECTION, SOLUTION INTRAVENOUS at 10:57

## 2024-10-11 RX ADMIN — ARIPIPRAZOLE 5 MG: 5 TABLET ORAL at 08:27

## 2024-10-11 RX ADMIN — SODIUM CHLORIDE, PRESERVATIVE FREE 10 ML: 5 INJECTION INTRAVENOUS at 22:04

## 2024-10-11 RX ADMIN — METOPROLOL SUCCINATE 50 MG: 50 TABLET, EXTENDED RELEASE ORAL at 08:28

## 2024-10-11 RX ADMIN — NITROGLYCERIN 5 MCG/MIN: 20 INJECTION INTRAVENOUS at 11:58

## 2024-10-11 RX ADMIN — SODIUM CHLORIDE, PRESERVATIVE FREE 10 ML: 5 INJECTION INTRAVENOUS at 08:28

## 2024-10-11 RX ADMIN — ATORVASTATIN CALCIUM 80 MG: 80 TABLET, FILM COATED ORAL at 08:27

## 2024-10-11 RX ADMIN — CETIRIZINE HYDROCHLORIDE 10 MG: 10 TABLET, FILM COATED ORAL at 08:28

## 2024-10-11 RX ADMIN — ASPIRIN 81 MG 81 MG: 81 TABLET ORAL at 12:45

## 2024-10-11 NOTE — CARE COORDINATION
10/11/24, 9:56 AM EDT  Discharge Planning Evaluation  Social work consult received, patient from [facility].   Patient/Family preference is to return to [facility].    Would patient be willing to go to a skilled facility if needed: NA.  Is there skilled care available at current facility: No.  Barriers to return to current living situation: None  Spoke with Thao at the facility.  Patient bed hold: Yes  Anticipated transport plan: Family versus [facility]  Patient's Healthcare Decision Maker: Patient Declined (Legal Next of Kin Remains as Decision Maker)  Attempted to call patient's siblings.  Hawk had a generic voicemial and  Johana alvarez is busy.  Will attempt later.     Readmission Risk Low 0-14, Mod 15-19), High > 20: Readmission Risk Score: 10    Current PCP: Tulio Hinton MD  PCP verified by CM? Yes    Patient Orientation: Alert and Oriented    Patient Cognition: Alert  History Provided by: Patient, Other (see comment) (Assisted Living Staff)    Advance Directives:      Code Status: Full Code   Patient's Primary Decision Maker is: Patient Declined (Legal Next of Kin Remains as Decision Maker)       Discharge Planning:    Patient lives with: Other (Comment) (Assisted Living) Type of Home: Assisted living  Primary Care Giver: Other (Comment) (Assisted Living Staff)  Patient Support Systems include: Family Members   Current Financial resources: Medicaid, Medicare  Current community resources: Assisted Living  Current services prior to admission: Other (Comment) (Assisted Living)            Current DME:              Type of Home Care services:  None    ADLS  Prior functional level: Assistance with the following:, Bathing, Dressing, Cooking, Housework, Shopping  Current functional level: Assistance with the following:, Bathing, Dressing, Cooking, Housework, Shopping    Family can provide assistance at DC: No  Would you like Case Management to discuss the discharge plan with any other family

## 2024-10-11 NOTE — PROCEDURES
PROCEDURE NOTE  Date: 10/11/2024   Name: Duane C Adams  YOB: 1960    Procedures  12 lead EKG completed. Results handed to Keerthi Francois CET

## 2024-10-11 NOTE — DISCHARGE INSTR - DIET

## 2024-10-11 NOTE — CARE COORDINATION
Case Management Assessment Initial Evaluation    Date/Time of Evaluation: 10/11/2024 8:03 AM  Assessment Completed by: Nicole Lambert RN    If patient is discharged prior to next notation, then this note serves as note for discharge by case management.    Patient Name: Duane C Adams                   YOB: 1960  Diagnosis: Chest pain [R07.9]  Chest pain, unspecified type [R07.9]                   Date / Time: 10/10/2024  3:32 PM  Location: [address] [place] [address]     Patient Admission Status: Inpatient   Readmission Risk Low 0-14, Mod 15-19), High > 20: Readmission Risk Score: 10    Current PCP: Tulio Hinton MD  Health Care Decision Makers:     Additional Case Management Notes: Admit from ER with chest pain. Recent fall from bed. NPO.     Procedures: none    Imaging: 10/10 CXR: 1. Cardiomegaly, worse than on previous study dated 8/9/2022.   2. Diffuse COPD and thickening of the interstitial lung markings.   3. Atherosclerotic calcification in the aortic arch..     Patient Goals/Plan/Treatment Preferences: From [facility]. SW consulted and will complete full assessment.   Email sent to  to review admission status this am.

## 2024-10-11 NOTE — ED NOTES
Pt resting on cot w/ eyes open upon entrance. Respirations even and unlabored. Provider at bedside.

## 2024-10-11 NOTE — PLAN OF CARE
Problem: Pain  Goal: Verbalizes/displays adequate comfort level or baseline comfort level  Outcome: Progressing  Flowsheets (Taken 10/11/2024 0233)  Verbalizes/displays adequate comfort level or baseline comfort level:   Encourage patient to monitor pain and request assistance   Assess pain using appropriate pain scale   Administer analgesics based on type and severity of pain and evaluate response     Problem: Safety - Adult  Goal: Free from fall injury  Outcome: Progressing  Flowsheets (Taken 10/11/2024 0233)  Free From Fall Injury: Instruct family/caregiver on patient safety

## 2024-10-11 NOTE — DISCHARGE INSTR - COC
information and transfer of Duane C Adams  is necessary for the continuing treatment of the diagnosis listed and that he requires {Admit to Appropriate Level of Care:52413} for {GREATER/LESS:056540604} 30 days.     Update Admission H&P: {CHP DME Changes in HandP:638825773}    PHYSICIAN SIGNATURE:  {Esignature:[id number]}

## 2024-10-11 NOTE — PRE SEDATION
[facility]  Sedation/Analgesia History & Physical    Pt Name: Duane C Adams  Account number: [id number]  MRN: [id number]  YOB: 1960  Provider Performing Procedure: Monroe Saldivar MD MD  Referring Provider: Orlando Downing MD   Primary Care Physician: Tulio Hinton MD  Date: 10/11/2024    PRE-PROCEDURE    Code Status: FULL CODE  Brief History/Pre-Procedure Diagnosis:   Unstable angina  Sinus pauses  Second degree AB   Consent: : I have discussed with the patient risks, benefits, and alternatives (and relevant risks, benefits, and side effects related to alternatives or not receiving care), and likelihood of the success.   The patient and/or representative appear to understand and agree to proceed.  The discussion encompasses risks, benefits, and side effects related to the alternatives and the risks related to not receiving the proposed care, treatment, and services.     The indication, risks and benefits of the procedure and possible therapeutic consequences and alternatives were discussed with the patient. The patient was given the opportunity to ask questions and to have them answered to his/her satisfaction. Risks of the procedure include but are not limited to the following: Bleeding, hematoma including retroperitoneal hemmorhage, infection, pain and discomfort, injury to the aorta and other blood vessels, rhythm disturbance, low blood pressure, myocardial infarction, stroke, kidney damage/failure, myocardial perforation, allergic reactions to sedatives/contrast material, loss of pulse/vascular compromise requiring surgery, aneurysm/pseudoaneurysm formation, possible loss of a limb/hand/leg, needing blood transfusion, requiring emergent open heart surgery or emergent coronary intervention, the need for intubation/respiratory support, the requirement for defibrillation/cardioversion, and death. Alternatives to and omission of the suggested procedure were discussed. The patient

## 2024-10-11 NOTE — CONSULTS
ask questions and to have them answered to his/her satisfaction. Risks of the procedure include but are not limited to the following: Bleeding, hematoma including retroperitoneal hematoma, infection, pain and discomfort, injury to the aorta and other blood vessels, rhythm disturbance, low blood pressure, myocardial infarction, need for bypass surgery, stroke, kidney damage/failure, myocardial perforation, allergic reactions to sedatives/contrast material, loss of pulse/vascular compromise requiring surgery, aneurysm/pseudoaneurysm formation, possible loss of a limb/hand/leg, death. Alternatives to and omission of the suggested procedure were discussed. The patient also understands the need for DAPT if PCI is necessary. The patient had no further questions and wished to proceed.    Of note, he was recommended EP follow-up after his event monitor 2022 but was lost to follow-up.     Further recommendations based on results and clinical course    Time spent reviewing notes, data, discussing with patient/family, and formulating plan with clinical documentation was approximately 80 minutes.      Electronically signed by Darnell Ramirez MD on 10/11/24 at 1:39 PM EDT    Interventional Cardiology - [facility]

## 2024-10-12 LAB
ANION GAP SERPL CALC-SCNC: 14 MEQ/L (ref 8–16)
BUN SERPL-MCNC: 14 MG/DL (ref 7–22)
CALCIUM SERPL-MCNC: 8.9 MG/DL (ref 8.5–10.5)
CHLORIDE SERPL-SCNC: 103 MEQ/L (ref 98–111)
CO2 SERPL-SCNC: 23 MEQ/L (ref 23–33)
CREAT SERPL-MCNC: 0.7 MG/DL (ref 0.4–1.2)
DEPRECATED RDW RBC AUTO: 47.6 FL (ref 35–45)
ERYTHROCYTE [DISTWIDTH] IN BLOOD BY AUTOMATED COUNT: 13.7 % (ref 11.5–14.5)
GFR SERPL CREATININE-BSD FRML MDRD: > 90 ML/MIN/1.73M2
GLUCOSE BLD STRIP.AUTO-MCNC: 142 MG/DL (ref 70–108)
GLUCOSE BLD STRIP.AUTO-MCNC: 146 MG/DL (ref 70–108)
GLUCOSE BLD STRIP.AUTO-MCNC: 182 MG/DL (ref 70–108)
GLUCOSE BLD STRIP.AUTO-MCNC: 91 MG/DL (ref 70–108)
GLUCOSE SERPL-MCNC: 124 MG/DL (ref 70–108)
HCT VFR BLD AUTO: 41.6 % (ref 42–52)
HGB BLD-MCNC: 13.4 GM/DL (ref 14–18)
MCH RBC QN AUTO: 30.5 PG (ref 26–33)
MCHC RBC AUTO-ENTMCNC: 32.2 GM/DL (ref 32.2–35.5)
MCV RBC AUTO: 94.5 FL (ref 80–94)
PLATELET # BLD AUTO: 157 THOU/MM3 (ref 130–400)
PMV BLD AUTO: 12.4 FL (ref 9.4–12.4)
POTASSIUM SERPL-SCNC: 4.1 MEQ/L (ref 3.5–5.2)
RBC # BLD AUTO: 4.4 MILL/MM3 (ref 4.7–6.1)
SODIUM SERPL-SCNC: 140 MEQ/L (ref 135–145)
WBC # BLD AUTO: 6 THOU/MM3 (ref 4.8–10.8)

## 2024-10-12 PROCEDURE — 6370000000 HC RX 637 (ALT 250 FOR IP): Performed by: NURSE PRACTITIONER

## 2024-10-12 PROCEDURE — 2140000000 HC CCU INTERMEDIATE R&B

## 2024-10-12 PROCEDURE — 2580000003 HC RX 258: Performed by: STUDENT IN AN ORGANIZED HEALTH CARE EDUCATION/TRAINING PROGRAM

## 2024-10-12 PROCEDURE — 85027 COMPLETE CBC AUTOMATED: CPT

## 2024-10-12 PROCEDURE — 82948 REAGENT STRIP/BLOOD GLUCOSE: CPT

## 2024-10-12 PROCEDURE — 6370000000 HC RX 637 (ALT 250 FOR IP): Performed by: HOSPITALIST

## 2024-10-12 PROCEDURE — 6370000000 HC RX 637 (ALT 250 FOR IP): Performed by: STUDENT IN AN ORGANIZED HEALTH CARE EDUCATION/TRAINING PROGRAM

## 2024-10-12 PROCEDURE — 99232 SBSQ HOSP IP/OBS MODERATE 35: CPT | Performed by: NURSE PRACTITIONER

## 2024-10-12 PROCEDURE — 80048 BASIC METABOLIC PNL TOTAL CA: CPT

## 2024-10-12 PROCEDURE — 36415 COLL VENOUS BLD VENIPUNCTURE: CPT

## 2024-10-12 RX ORDER — AMLODIPINE BESYLATE 5 MG/1
5 TABLET ORAL DAILY
Status: DISCONTINUED | OUTPATIENT
Start: 2024-10-12 | End: 2024-10-13 | Stop reason: HOSPADM

## 2024-10-12 RX ADMIN — LEVOTHYROXINE SODIUM 50 MCG: 0.05 TABLET ORAL at 06:31

## 2024-10-12 RX ADMIN — DIVALPROEX SODIUM 250 MG: 125 CAPSULE ORAL at 21:07

## 2024-10-12 RX ADMIN — SODIUM CHLORIDE, PRESERVATIVE FREE 10 ML: 5 INJECTION INTRAVENOUS at 21:08

## 2024-10-12 RX ADMIN — ARIPIPRAZOLE 5 MG: 5 TABLET ORAL at 08:07

## 2024-10-12 RX ADMIN — ASPIRIN 81 MG 81 MG: 81 TABLET ORAL at 08:07

## 2024-10-12 RX ADMIN — VORTIOXETINE 10 MG: 10 TABLET, FILM COATED ORAL at 08:07

## 2024-10-12 RX ADMIN — SODIUM CHLORIDE: 9 INJECTION, SOLUTION INTRAVENOUS at 01:38

## 2024-10-12 RX ADMIN — ANTACID TABLETS 500 MG: 500 TABLET, CHEWABLE ORAL at 08:07

## 2024-10-12 RX ADMIN — SODIUM CHLORIDE, PRESERVATIVE FREE 10 ML: 5 INJECTION INTRAVENOUS at 08:07

## 2024-10-12 RX ADMIN — AMLODIPINE BESYLATE 5 MG: 5 TABLET ORAL at 11:26

## 2024-10-12 RX ADMIN — CETIRIZINE HYDROCHLORIDE 10 MG: 10 TABLET, FILM COATED ORAL at 08:07

## 2024-10-12 RX ADMIN — ATORVASTATIN CALCIUM 80 MG: 80 TABLET, FILM COATED ORAL at 08:07

## 2024-10-12 RX ADMIN — PANTOPRAZOLE SODIUM 40 MG: 40 TABLET, DELAYED RELEASE ORAL at 06:31

## 2024-10-12 NOTE — PLAN OF CARE
Problem: Chronic Conditions and Co-morbidities  Goal: Patient's chronic conditions and co-morbidity symptoms are monitored and maintained or improved  Outcome: Progressing  Flowsheets (Taken 10/11/2024 2335)  Care Plan - Patient's Chronic Conditions and Co-Morbidity Symptoms are Monitored and Maintained or Improved:   Monitor and assess patient's chronic conditions and comorbid symptoms for stability, deterioration, or improvement   Collaborate with multidisciplinary team to address chronic and comorbid conditions and prevent exacerbation or deterioration   Update acute care plan with appropriate goals if chronic or comorbid symptoms are exacerbated and prevent overall improvement and discharge     Problem: Discharge Planning  Goal: Discharge to home or other facility with appropriate resources  Outcome: Progressing  Flowsheets (Taken 10/11/2024 2335)  Discharge to home or other facility with appropriate resources:   Identify barriers to discharge with patient and caregiver   Identify discharge learning needs (meds, wound care, etc)     Problem: Safety - Adult  Goal: Free from fall injury  Outcome: Progressing  Flowsheets (Taken 10/11/2024 2335)  Free From Fall Injury: Instruct family/caregiver on patient safety  Note: Bed locked & in low position, call light in reach, side-rails up x2, bed/chair alarm utilized, non-slip socks on when ambulating, reminded patient to use call light to call for assistance.       Problem: Pain  Goal: Verbalizes/displays adequate comfort level or baseline comfort level  Outcome: Progressing  Flowsheets (Taken 10/11/2024 2335)  Verbalizes/displays adequate comfort level or baseline comfort level:   Encourage patient to monitor pain and request assistance   Administer analgesics based on type and severity of pain and evaluate response   Assess pain using appropriate pain scale   Implement non-pharmacological measures as appropriate and evaluate response  Note: Ongoing assessment &

## 2024-10-13 ENCOUNTER — APPOINTMENT (OUTPATIENT)
Age: 64
DRG: 287 | End: 2024-10-13
Payer: MEDICARE

## 2024-10-13 VITALS
SYSTOLIC BLOOD PRESSURE: 119 MMHG | BODY MASS INDEX: 42.08 KG/M2 | HEIGHT: 64 IN | DIASTOLIC BLOOD PRESSURE: 84 MMHG | WEIGHT: 246.47 LBS | RESPIRATION RATE: 19 BRPM | TEMPERATURE: 98.4 F | HEART RATE: 86 BPM | OXYGEN SATURATION: 95 %

## 2024-10-13 PROBLEM — R00.1 BRADYCARDIA: Status: ACTIVE | Noted: 2024-10-13

## 2024-10-13 PROBLEM — I44.30 ATRIOVENTRICULAR BLOCK: Status: ACTIVE | Noted: 2024-10-13

## 2024-10-13 LAB
ANION GAP SERPL CALC-SCNC: 12 MEQ/L (ref 8–16)
BUN SERPL-MCNC: 15 MG/DL (ref 7–22)
CALCIUM SERPL-MCNC: 8.8 MG/DL (ref 8.5–10.5)
CHLORIDE SERPL-SCNC: 99 MEQ/L (ref 98–111)
CO2 SERPL-SCNC: 28 MEQ/L (ref 23–33)
CREAT SERPL-MCNC: 0.6 MG/DL (ref 0.4–1.2)
DEPRECATED RDW RBC AUTO: 45.2 FL (ref 35–45)
ECHO BSA: 2.23 M2
ERYTHROCYTE [DISTWIDTH] IN BLOOD BY AUTOMATED COUNT: 13.5 % (ref 11.5–14.5)
GFR SERPL CREATININE-BSD FRML MDRD: > 90 ML/MIN/1.73M2
GLUCOSE BLD STRIP.AUTO-MCNC: 115 MG/DL (ref 70–108)
GLUCOSE BLD STRIP.AUTO-MCNC: 127 MG/DL (ref 70–108)
GLUCOSE SERPL-MCNC: 118 MG/DL (ref 70–108)
HCT VFR BLD AUTO: 40.9 % (ref 42–52)
HGB BLD-MCNC: 13.4 GM/DL (ref 14–18)
MCH RBC QN AUTO: 30.1 PG (ref 26–33)
MCHC RBC AUTO-ENTMCNC: 32.8 GM/DL (ref 32.2–35.5)
MCV RBC AUTO: 91.9 FL (ref 80–94)
PLATELET # BLD AUTO: 167 THOU/MM3 (ref 130–400)
PMV BLD AUTO: 12.6 FL (ref 9.4–12.4)
POTASSIUM SERPL-SCNC: 4 MEQ/L (ref 3.5–5.2)
RBC # BLD AUTO: 4.45 MILL/MM3 (ref 4.7–6.1)
SODIUM SERPL-SCNC: 139 MEQ/L (ref 135–145)
WBC # BLD AUTO: 6.5 THOU/MM3 (ref 4.8–10.8)

## 2024-10-13 PROCEDURE — 2580000003 HC RX 258: Performed by: STUDENT IN AN ORGANIZED HEALTH CARE EDUCATION/TRAINING PROGRAM

## 2024-10-13 PROCEDURE — 99239 HOSP IP/OBS DSCHRG MGMT >30: CPT | Performed by: INTERNAL MEDICINE

## 2024-10-13 PROCEDURE — 80048 BASIC METABOLIC PNL TOTAL CA: CPT

## 2024-10-13 PROCEDURE — 82948 REAGENT STRIP/BLOOD GLUCOSE: CPT

## 2024-10-13 PROCEDURE — 93270 REMOTE 30 DAY ECG REV/REPORT: CPT

## 2024-10-13 PROCEDURE — 85027 COMPLETE CBC AUTOMATED: CPT

## 2024-10-13 PROCEDURE — 6370000000 HC RX 637 (ALT 250 FOR IP): Performed by: STUDENT IN AN ORGANIZED HEALTH CARE EDUCATION/TRAINING PROGRAM

## 2024-10-13 PROCEDURE — 6370000000 HC RX 637 (ALT 250 FOR IP): Performed by: NURSE PRACTITIONER

## 2024-10-13 PROCEDURE — 36415 COLL VENOUS BLD VENIPUNCTURE: CPT

## 2024-10-13 PROCEDURE — 6370000000 HC RX 637 (ALT 250 FOR IP): Performed by: HOSPITALIST

## 2024-10-13 PROCEDURE — 99232 SBSQ HOSP IP/OBS MODERATE 35: CPT | Performed by: NURSE PRACTITIONER

## 2024-10-13 RX ORDER — AMLODIPINE BESYLATE 5 MG/1
5 TABLET ORAL DAILY
Qty: 30 TABLET | Refills: 0
Start: 2024-10-14

## 2024-10-13 RX ADMIN — CETIRIZINE HYDROCHLORIDE 10 MG: 10 TABLET, FILM COATED ORAL at 08:58

## 2024-10-13 RX ADMIN — ARIPIPRAZOLE 5 MG: 5 TABLET ORAL at 08:58

## 2024-10-13 RX ADMIN — ANTACID TABLETS 500 MG: 500 TABLET, CHEWABLE ORAL at 08:58

## 2024-10-13 RX ADMIN — ATORVASTATIN CALCIUM 80 MG: 80 TABLET, FILM COATED ORAL at 08:58

## 2024-10-13 RX ADMIN — VORTIOXETINE 10 MG: 10 TABLET, FILM COATED ORAL at 08:58

## 2024-10-13 RX ADMIN — PANTOPRAZOLE SODIUM 40 MG: 40 TABLET, DELAYED RELEASE ORAL at 07:01

## 2024-10-13 RX ADMIN — SODIUM CHLORIDE, PRESERVATIVE FREE 10 ML: 5 INJECTION INTRAVENOUS at 08:58

## 2024-10-13 RX ADMIN — LEVOTHYROXINE SODIUM 50 MCG: 0.05 TABLET ORAL at 07:01

## 2024-10-13 RX ADMIN — ASPIRIN 81 MG 81 MG: 81 TABLET ORAL at 08:58

## 2024-10-13 RX ADMIN — AMLODIPINE BESYLATE 5 MG: 5 TABLET ORAL at 08:58

## 2024-10-13 NOTE — PLAN OF CARE
Problem: Chronic Conditions and Co-morbidities  Goal: Patient's chronic conditions and co-morbidity symptoms are monitored and maintained or improved  Outcome: Progressing  Flowsheets (Taken 10/12/2024 2206)  Care Plan - Patient's Chronic Conditions and Co-Morbidity Symptoms are Monitored and Maintained or Improved:   Monitor and assess patient's chronic conditions and comorbid symptoms for stability, deterioration, or improvement   Collaborate with multidisciplinary team to address chronic and comorbid conditions and prevent exacerbation or deterioration   Update acute care plan with appropriate goals if chronic or comorbid symptoms are exacerbated and prevent overall improvement and discharge     Problem: Discharge Planning  Goal: Discharge to home or other facility with appropriate resources  Outcome: Progressing  Flowsheets (Taken 10/12/2024 2206)  Discharge to home or other facility with appropriate resources:   Identify barriers to discharge with patient and caregiver   Identify discharge learning needs (meds, wound care, etc)     Problem: Safety - Adult  Goal: Free from fall injury  Outcome: Progressing  Flowsheets (Taken 10/12/2024 2206)  Free From Fall Injury: Instruct family/caregiver on patient safety  Note: Bed locked & in low position, call light in reach, side-rails up x2, bed/chair alarm utilized, non-slip socks on when ambulating, reminded patient to use call light to call for assistance.       Problem: Pain  Goal: Verbalizes/displays adequate comfort level or baseline comfort level  Outcome: Progressing  Flowsheets (Taken 10/12/2024 2206)  Verbalizes/displays adequate comfort level or baseline comfort level:   Encourage patient to monitor pain and request assistance   Administer analgesics based on type and severity of pain and evaluate response   Assess pain using appropriate pain scale   Implement non-pharmacological measures as appropriate and evaluate response  Note: Ongoing assessment &

## 2024-10-13 NOTE — DISCHARGE SUMMARY
HISTORY     He indicated that his mother is . He indicated that his father is . He indicated that his sister is alive. He indicated that all of his three brothers are alive. He indicated that his maternal aunt is . He indicated that his maternal uncle is .     family history includes COPD in his father; Dementia in his maternal aunt and mother; High Blood Pressure in his father and mother; High Cholesterol in his father; Seizures in his maternal uncle.          Recent Labs     10/11/24  1011   INR 0.97     Lab Results   Component Value Date    DDIMER 161 10/25/2016   No results found for: \"BNP\"  troponins  Lab Results   Component Value Date    CKTOTAL 114 10/06/2016    TROPONINI < 0.01 10/25/2016          Recent Labs     10/10/24  1604 10/11/24  0606 10/11/24  1720 10/12/24  0411 10/13/24  0617      < > 138 140 139   K 4.1   < > 4.6 4.1 4.0      < > 102 103 99   CO2 29   < > 26 23 28   BUN 14   < > 11 14 15   CREATININE 0.7   < > 0.6 0.7 0.6   GLUCOSE 113*   < > 88 124* 118*   CALCIUM 8.6   < > 8.4* 8.9 8.8   BILITOT 0.5  --   --   --   --    ALKPHOS 104  --   --   --   --    AST 24  --   --   --   --    ALT 22  --   --   --   --     < > = values in this interval not displayed.         Recent Labs     10/11/24  1011 10/12/24  0411 10/13/24  0617   WBC 6.0 6.0 6.5   RBC 4.91 4.40* 4.45*   HGB 15.0 13.4* 13.4*   HCT 45.4 41.6* 40.9*   MCV 92.5 94.5* 91.9   MCH 30.5 30.5 30.1   MCHC 33.0 32.2 32.8    157 167   MPV 13.0* 12.4 12.6*      Recent Labs     10/11/24  1011   INR 0.97         No results for input(s): \"OCCULTBLDFEC\" in the last 72 hours.    PT/INR:   Recent Labs     10/11/24  1011   INR 0.97     APTT:   Recent Labs     10/11/24  1011   APTT 29.8         ESR:   Lab Results   Component Value Date    SEDRATE 15 (H) 2021     CRP:   Lab Results   Component Value Date    CRP < 0.30 2021     Folate and B12:   Lab Results   Component Value Date

## 2024-10-13 NOTE — DISCHARGE INSTRUCTIONS
Follow-up with your primary care provider (PCP) in 1 to 2 weeks.   30-day event monitor at discharge.  Needs LEATHA workup.  Follow with cardiology 6-7 weeks -- AVB / LEATHA.

## 2024-10-13 NOTE — PROGRESS NOTES
Hospitalist Progress Note    Patient:  Duane C Adams      Unit/Bed:8A-15/015-A    YOB: 1960    MRN: [id number]       Acct: [id number]     PCP: Tulio Hinton MD    Date of Admission: 10/10/2024    Assessment/Plan:    Unstable angina: Patient has reproducible chest pain as well as tight heaviness associated with shortness of breath that now occurs at rest.  Patient is a daily smoker and has a significant 80-pack-year history.  Will start patient on heparin, nitro drip.  Keep n.p.o. for cardiology consult for possible cardiac cath.  Troponin x 2 negative.  EKG shows flattening of the T waves.  Hypertension: Continue with Cozaar, Toprol  Depression: Patient is on Abilify  Dyslipidemia: Continue with Lipitor  History of seizure disorder: Continue with Depakote  History of hypothyroidism: Patient is on Synthroid  LEATHA: Noncompliant with CPAP  Morbid obesity      Subjective (past 24 hours):   Patient seen and examined at bedside.  Reports that his pain is back again this morning while he is sitting in the chair he describes having episodes of tight squeezing pain associated with shortness of breath as well as reproducible left chest wall pain.      Medications:  Reviewed    Infusion Medications    dextrose      heparin (PORCINE) Infusion 9 Units/kg/hr (10/11/24 1057)    nitroGLYCERIN      sodium chloride       Scheduled Medications    insulin lispro  0-4 Units SubCUTAneous 4x Daily AC & HS    sodium chloride flush  5-40 mL IntraVENous 2 times per day    ARIPiprazole  5 mg Oral Daily    atorvastatin  80 mg Oral Daily    calcium carbonate  1 tablet Oral Daily    cetirizine  10 mg Oral Daily    divalproex  250 mg Oral Nightly    levothyroxine  50 mcg Oral Daily    pantoprazole  40 mg Oral QAM AC    VORTIoxetine  10 mg Oral Daily     PRN Meds: glucose, dextrose bolus **OR** dextrose bolus, glucagon (rDNA), dextrose, heparin (porcine), heparin (porcine), nitroGLYCERIN, sodium chloride flush, sodium 
6 [facility]--HOSPITALIST GROUP    Hospitalist PROGRESS NOTE dictated by Ana Deras MD on 10/12/2024    Patient ID: Duane C Adams is 64 y.o. and [facility]  : 1960 MRN: [id number]    Admit date: 10/10/2024  Primary Care Physician: Tulio Hinton MD   Patient Care Team:  Tulio Hinton MD as PCP - General (Internal Medicine)  Genoveva, Jay Ambriz MD as Consulting Physician ([facility])  Tel: [phone]  Admitting Physician: No admitting provider for patient encounter.   Code Status:  Full Code    Duane C Adams is a 64 y.o.  male who presented with Chest Pain    Admit date: 10/10/2024  Room: [address] [place] [address]-      10/12/2024: Patient today has some left-sided musculoskeletal chest wall discomfort that is reproducible.  Patient denies shortness of breath or cough.  Continues to have some dizziness but not presyncopal.  Vitals noted are stable and O2 sat is 96% on room air.    GFR greater than 90 with normal BUN and creatinine post left heart cath yesterday.    Had an unremarkable left heart cath yesterday and a transvenous pacer mccollum placed for Mobitz type II seen on telemetry.  Apparent plans for permanent pacemaker.      Assessment:    Principal Problem:    Chest pain  Active Problems:    Unstable angina (HCC)  Resolved Problems:    * No resolved hospital problems. *  LVEF 55 to 60% per echo report 10/11/2024.  G1DD.  HbA1c 6.3 on 10/10/2024.  CXR on 10/10/2024 revealed diffuse COPD and thickening of the interstitial lung markings.  Unremarkable C 10/11/2024  Transvenous pacemaker 10/11/2024.        Plan:    Monitor for AV block  LEATHA workup recommended by cardiology.  Cardiology following.  Continuous Infusions:    dextrose      sodium chloride 25 mL/hr at 10/12/24 0138     amLODIPine, 5 mg, Daily  insulin lispro, 0-4 Units, 4x Daily AC & HS  aspirin, 81 mg, Daily  sodium chloride flush, 5-40 mL, 2 times per 
Completed Echo, Dr. Ramirez to read.  
IV removed, telemetry removed and left in room for cleaning, discharge instructions printed for patient to take back to assisted living at [place], patient dressed, [facility].   
Patient leaving floor for heart cath / pacer placement.   
Patient with complaints of shortness of breath and chest pain. Patient states pain is worth with deep breaths. Patient states that it is the same type of pain that has been occurring on/off during admission. Vitals taken - unremarkable.   
Report given to Keerthi, clothes taken down to patients room   
Writer attempted to call report to [facility] ([phone]) and there was no answer after multiple rings. Patient departing now via [facility].   
Writer called [facility] to verify how patient obtains his medications (private pharmacy vs. Facility) and to inform them that patient will be returning today. There was no answer after 20+ rings and no answering machine. Writer will call back.   
  Right Atrium Right atrium size is normal.   Aortic Valve Valve structure is normal. No regurgitation. No stenosis.   Mitral Valve Valve structure is normal. No regurgitation. No stenosis noted.   Tricuspid Valve Valve structure is normal. No regurgitation. No stenosis noted.   Pulmonic Valve No regurgitation. No stenosis noted.   Aorta Ao ascending diameter is 3.2 cm.   IVC/Hepatic Veins IVC diameter is less than or equal to 21 mm and decreases greater than 50% during inspiration; therefore the estimated right atrial pressure is normal (~3 mmHg).   Pericardium The pericardium is normal. No pericardial effusion.       Stress:     Left Heart Cath:   No significant coronary artery disease     Diagnostic  Dominance: Right  Left Main   The vessel was visualized by angiography. Size of vessel >=2.0 mm. The vessel is angiographically normal.      Left Anterior Descending   Size of vessel >=2.0 mm. The vessel exhibits minimal luminal irregularities.      Left Circumflex   Size of vessel >=2.0 mm. The vessel exhibits minimal luminal irregularities.      Right Coronary Artery   Size of vessel >=2.0 mm. The vessel exhibits minimal luminal irregularities.      Intervention     No interventions have been documented.     Left Heart    Left Ventricle The estimated EF = 55 - 60%.     Coronary Diagram    Diagnostic  Dominance: Right        Lab Data:    Cardiac Enzymes:  No results for input(s): \"CKTOTAL\", \"CKMB\", \"CKMBINDEX\", \"TROPONINI\" in the last 72 hours.    CBC:   Lab Results   Component Value Date/Time    WBC 6.0 10/12/2024 04:11 AM    RBC 4.40 10/12/2024 04:11 AM    RBC 4.79 06/07/2023 08:06 AM    HGB 13.4 10/12/2024 04:11 AM    HCT 41.6 10/12/2024 04:11 AM     10/12/2024 04:11 AM       CMP:    Lab Results   Component Value Date/Time     10/12/2024 04:11 AM    K 4.1 10/12/2024 04:11 AM    K 4.1 10/10/2024 04:04 PM     10/12/2024 04:11 AM    CO2 23 10/12/2024 04:11 AM    BUN 14 10/12/2024 04:11 AM    
Normal wall motion. Diastolic dysfunction present with normal LV EF.   Left Atrium Left atrium size is normal.   Right Ventricle Right ventricle size is normal. Normal wall thickness. Normal systolic function.   Right Atrium Right atrium size is normal.   Aortic Valve Valve structure is normal. No regurgitation. No stenosis.   Mitral Valve Valve structure is normal. No regurgitation. No stenosis noted.   Tricuspid Valve Valve structure is normal. No regurgitation. No stenosis noted.   Pulmonic Valve No regurgitation. No stenosis noted.   Aorta Ao ascending diameter is 3.2 cm.   IVC/Hepatic Veins IVC diameter is less than or equal to 21 mm and decreases greater than 50% during inspiration; therefore the estimated right atrial pressure is normal (~3 mmHg).   Pericardium The pericardium is normal. No pericardial effusion.       Stress:     Left Heart Cath:   No significant coronary artery disease     Diagnostic  Dominance: Right  Left Main   The vessel was visualized by angiography. Size of vessel >=2.0 mm. The vessel is angiographically normal.      Left Anterior Descending   Size of vessel >=2.0 mm. The vessel exhibits minimal luminal irregularities.      Left Circumflex   Size of vessel >=2.0 mm. The vessel exhibits minimal luminal irregularities.      Right Coronary Artery   Size of vessel >=2.0 mm. The vessel exhibits minimal luminal irregularities.      Intervention     No interventions have been documented.     Left Heart    Left Ventricle The estimated EF = 55 - 60%.     Coronary Diagram    Diagnostic  Dominance: Right        Lab Data:    Cardiac Enzymes:  No results for input(s): \"CKTOTAL\", \"CKMB\", \"CKMBINDEX\", \"TROPONINI\" in the last 72 hours.    CBC:   Lab Results   Component Value Date/Time    WBC 6.5 10/13/2024 06:17 AM    RBC 4.45 10/13/2024 06:17 AM    RBC 4.79 06/07/2023 08:06 AM    HGB 13.4 10/13/2024 06:17 AM    HCT 40.9 10/13/2024 06:17 AM     10/13/2024 06:17 AM       CMP:    Lab Results 
\"BLOODCULT2\", \"ORG\" in the last 72 hours.    GRAM STAIN  No results for input(s): \"LABGRAM\", \"LABANAE\", \"ORG\", \"WNDABS\" in the last 72 hours.    Resp Culture Brief : No results found for: \"CULTRESP\"    Body Fluid : No results found for: \"BFCX\"    MRSA : No results found for: \"MRSAC\"    Urine Culture Brief :   Lab Results   Component Value Date/Time    LABURIN  07/14/2017 02:53 PM     Growth of Contaminants.  The mixture of organisms present  are not a common cause of urinary tract infections and  probably represent skin zac or distal urethral zac.          Organism Brief :   Lab Results   Component Value Date/Time    ORG Growth of Contaminants 09/27/2018 03:56 PM       Cardiac procedure    Result Date: 10/11/2024  No significant coronary artery disease     Echo (TTE) complete (PRN contrast/bubble/strain/3D)    Result Date: 10/11/2024    Left Ventricle: Normal left ventricular systolic function with a visually estimated EF of 55 - 60%. Left ventricle size is normal. Normal wall thickness. Normal wall motion. Diastolic dysfunction present with normal LV EF.   Aorta: Ao ascending diameter is 3.2 cm.   IVC/SVC: IVC diameter is less than or equal to 21 mm and decreases greater than 50% during inspiration; therefore the estimated right atrial pressure is normal (~3 mmHg).   Image quality is technically difficult. Technically difficult study and technically difficult study due to patient's body habitus.     XR CHEST PORTABLE    Result Date: 10/10/2024  PROCEDURE: XR CHEST PORTABLE CLINICAL INFORMATION: CHEST PAIN. COMPARISON: Chest x-ray dated 8/9/2022.. TECHNIQUE: AP upright view of the chest. FINDINGS: There is cardiomegaly, worse than on remote study dated 8/9/2022..There is atherosclerotic calcification in the aortic arch..  There  is diffuse COPD with no new pulmonary infiltrates or effusions. The pulmonary vascularity is normal. No suspicious osseous lesions are present.     1. Cardiomegaly, worse than on

## 2024-10-14 NOTE — CARE COORDINATION
10/14/24, 7:13 AM EDT    Patient goals/plan/ treatment preferences discussed by  and .  Patient goals/plan/ treatment preferences reviewed with patient/ family.  Patient/ family verbalize understanding of discharge plan and are in agreement with goal/plan/treatment preferences.  Understanding was demonstrated using the teach back method.  AVS provided by RN at time of discharge, which includes all necessary medical information pertaining to the patients current course of illness, treatment, post-discharge goals of care, and treatment preferences.     Services At/After Discharge: Assisted Living      Chart reviewed patient discharged over the weekend.

## 2024-10-28 ENCOUNTER — TELEPHONE (OUTPATIENT)
Dept: CARDIOLOGY CLINIC | Age: 64
End: 2024-10-28

## 2024-10-28 DIAGNOSIS — R00.1 BRADYCARDIA: Primary | ICD-10-CM

## 2024-10-28 NOTE — TELEPHONE ENCOUNTER
No sooner openings before patient's currently scheduled appointment with Dr. Tomas. I added his appointment to our waiting list.

## 2024-10-28 NOTE — TELEPHONE ENCOUNTER
Reviewed monitor strips and several episodes happening while patient is sleeping.   No future appts with cardiology, is he following else where?  I called [facility] and spoke to Betty who will have the nurse call us back.   No C-pap but does have an appt with pulmonary in January.   Appt scheduled on 10/30 at 1015am in [place].  Betty aware of appt date, time and location.     Pulmonary-are you able to see any sooner?  Pt is having multiple pausing on heart monitor while sleeping.  New patient appt not scheduled till 1/21/2025.

## 2024-10-29 ENCOUNTER — APPOINTMENT (OUTPATIENT)
Dept: GENERAL RADIOLOGY | Age: 64
End: 2024-10-29
Payer: MEDICARE

## 2024-10-29 ENCOUNTER — HOSPITAL ENCOUNTER (OUTPATIENT)
Age: 64
Setting detail: OBSERVATION
Discharge: NURSING, REHAB OR LONG-TERM CARE | End: 2024-10-31
Attending: EMERGENCY MEDICINE | Admitting: INTERNAL MEDICINE
Payer: MEDICARE

## 2024-10-29 ENCOUNTER — APPOINTMENT (OUTPATIENT)
Dept: CT IMAGING | Age: 64
End: 2024-10-29
Payer: MEDICARE

## 2024-10-29 DIAGNOSIS — R10.12 ABDOMINAL PAIN, LEFT UPPER QUADRANT: ICD-10-CM

## 2024-10-29 DIAGNOSIS — R07.9 ACUTE CHEST PAIN: Primary | ICD-10-CM

## 2024-10-29 DIAGNOSIS — R06.02 SHORTNESS OF BREATH: ICD-10-CM

## 2024-10-29 DIAGNOSIS — R00.1 BRADYCARDIA: ICD-10-CM

## 2024-10-29 PROBLEM — I20.89 ANGINA AT REST (HCC): Status: ACTIVE | Noted: 2024-10-29

## 2024-10-29 LAB
ANION GAP SERPL CALC-SCNC: 13 MEQ/L (ref 8–16)
BASOPHILS ABSOLUTE: 0 THOU/MM3 (ref 0–0.1)
BASOPHILS NFR BLD AUTO: 0.4 %
BUN SERPL-MCNC: 15 MG/DL (ref 7–22)
CALCIUM SERPL-MCNC: 8.9 MG/DL (ref 8.5–10.5)
CHLORIDE SERPL-SCNC: 98 MEQ/L (ref 98–111)
CO2 SERPL-SCNC: 27 MEQ/L (ref 23–33)
CREAT SERPL-MCNC: 0.7 MG/DL (ref 0.4–1.2)
D DIMER PPP IA.FEU-MCNC: 323 NG/ML FEU (ref 0–500)
DEPRECATED RDW RBC AUTO: 46 FL (ref 35–45)
EKG ATRIAL RATE: 87 BPM
EKG P AXIS: 47 DEGREES
EKG P-R INTERVAL: 136 MS
EKG Q-T INTERVAL: 348 MS
EKG QRS DURATION: 66 MS
EKG QTC CALCULATION (BAZETT): 418 MS
EKG R AXIS: 3 DEGREES
EKG T AXIS: 71 DEGREES
EKG VENTRICULAR RATE: 87 BPM
EOSINOPHIL NFR BLD AUTO: 2.7 %
EOSINOPHILS ABSOLUTE: 0.2 THOU/MM3 (ref 0–0.4)
ERYTHROCYTE [DISTWIDTH] IN BLOOD BY AUTOMATED COUNT: 13.7 % (ref 11.5–14.5)
GFR SERPL CREATININE-BSD FRML MDRD: > 90 ML/MIN/1.73M2
GLUCOSE BLD STRIP.AUTO-MCNC: 184 MG/DL (ref 70–108)
GLUCOSE SERPL-MCNC: 132 MG/DL (ref 70–108)
HCT VFR BLD AUTO: 44.5 % (ref 42–52)
HGB BLD-MCNC: 14.8 GM/DL (ref 14–18)
IMM GRANULOCYTES # BLD AUTO: 0.04 THOU/MM3 (ref 0–0.07)
IMM GRANULOCYTES NFR BLD AUTO: 0.6 %
LYMPHOCYTES ABSOLUTE: 1.7 THOU/MM3 (ref 1–4.8)
LYMPHOCYTES NFR BLD AUTO: 25.1 %
MCH RBC QN AUTO: 30.3 PG (ref 26–33)
MCHC RBC AUTO-ENTMCNC: 33.3 GM/DL (ref 32.2–35.5)
MCV RBC AUTO: 91.2 FL (ref 80–94)
MONOCYTES ABSOLUTE: 0.5 THOU/MM3 (ref 0.4–1.3)
MONOCYTES NFR BLD AUTO: 7.4 %
NEUTROPHILS ABSOLUTE: 4.3 THOU/MM3 (ref 1.8–7.7)
NEUTROPHILS NFR BLD AUTO: 63.8 %
NRBC BLD AUTO-RTO: 0 /100 WBC
NT-PROBNP SERPL IA-MCNC: < 36 PG/ML (ref 0–124)
OSMOLALITY SERPL CALC.SUM OF ELEC: 278.4 MOSMOL/KG (ref 275–300)
PLATELET # BLD AUTO: 196 THOU/MM3 (ref 130–400)
PMV BLD AUTO: 11.7 FL (ref 9.4–12.4)
POTASSIUM SERPL-SCNC: 4.3 MEQ/L (ref 3.5–5.2)
RBC # BLD AUTO: 4.88 MILL/MM3 (ref 4.7–6.1)
SODIUM SERPL-SCNC: 138 MEQ/L (ref 135–145)
TROPONIN, HIGH SENSITIVITY: 7 NG/L (ref 0–12)
TROPONIN, HIGH SENSITIVITY: 8 NG/L (ref 0–12)
WBC # BLD AUTO: 6.7 THOU/MM3 (ref 4.8–10.8)

## 2024-10-29 PROCEDURE — G0378 HOSPITAL OBSERVATION PER HR: HCPCS

## 2024-10-29 PROCEDURE — 71045 X-RAY EXAM CHEST 1 VIEW: CPT

## 2024-10-29 PROCEDURE — 71275 CT ANGIOGRAPHY CHEST: CPT

## 2024-10-29 PROCEDURE — 6360000002 HC RX W HCPCS

## 2024-10-29 PROCEDURE — 93005 ELECTROCARDIOGRAM TRACING: CPT | Performed by: EMERGENCY MEDICINE

## 2024-10-29 PROCEDURE — 96366 THER/PROPH/DIAG IV INF ADDON: CPT

## 2024-10-29 PROCEDURE — 6370000000 HC RX 637 (ALT 250 FOR IP)

## 2024-10-29 PROCEDURE — 36415 COLL VENOUS BLD VENIPUNCTURE: CPT

## 2024-10-29 PROCEDURE — 83880 ASSAY OF NATRIURETIC PEPTIDE: CPT

## 2024-10-29 PROCEDURE — 96365 THER/PROPH/DIAG IV INF INIT: CPT

## 2024-10-29 PROCEDURE — 80048 BASIC METABOLIC PNL TOTAL CA: CPT

## 2024-10-29 PROCEDURE — 84484 ASSAY OF TROPONIN QUANT: CPT

## 2024-10-29 PROCEDURE — 99285 EMERGENCY DEPT VISIT HI MDM: CPT

## 2024-10-29 PROCEDURE — 82948 REAGENT STRIP/BLOOD GLUCOSE: CPT

## 2024-10-29 PROCEDURE — 85025 COMPLETE CBC W/AUTO DIFF WBC: CPT

## 2024-10-29 PROCEDURE — 6360000004 HC RX CONTRAST MEDICATION

## 2024-10-29 PROCEDURE — 85379 FIBRIN DEGRADATION QUANT: CPT

## 2024-10-29 RX ORDER — SODIUM CHLORIDE 9 MG/ML
INJECTION, SOLUTION INTRAVENOUS PRN
Status: DISCONTINUED | OUTPATIENT
Start: 2024-10-29 | End: 2024-10-31 | Stop reason: HOSPADM

## 2024-10-29 RX ORDER — DIVALPROEX SODIUM 125 MG/1
250 CAPSULE, COATED PELLETS ORAL NIGHTLY
Status: DISCONTINUED | OUTPATIENT
Start: 2024-10-29 | End: 2024-10-31 | Stop reason: HOSPADM

## 2024-10-29 RX ORDER — ACETAMINOPHEN 650 MG/1
650 SUPPOSITORY RECTAL EVERY 6 HOURS PRN
Status: DISCONTINUED | OUTPATIENT
Start: 2024-10-29 | End: 2024-10-31 | Stop reason: HOSPADM

## 2024-10-29 RX ORDER — NITROGLYCERIN 20 MG/100ML
5-200 INJECTION INTRAVENOUS CONTINUOUS
Status: DISCONTINUED | OUTPATIENT
Start: 2024-10-29 | End: 2024-10-31 | Stop reason: HOSPADM

## 2024-10-29 RX ORDER — ATORVASTATIN CALCIUM 80 MG/1
80 TABLET, FILM COATED ORAL NIGHTLY
Status: DISCONTINUED | OUTPATIENT
Start: 2024-10-30 | End: 2024-10-31 | Stop reason: HOSPADM

## 2024-10-29 RX ORDER — ASPIRIN 81 MG/1
324 TABLET, CHEWABLE ORAL ONCE
Status: DISCONTINUED | OUTPATIENT
Start: 2024-10-29 | End: 2024-10-31 | Stop reason: HOSPADM

## 2024-10-29 RX ORDER — INSULIN LISPRO 100 [IU]/ML
0-4 INJECTION, SOLUTION INTRAVENOUS; SUBCUTANEOUS
Status: DISCONTINUED | OUTPATIENT
Start: 2024-10-29 | End: 2024-10-31 | Stop reason: HOSPADM

## 2024-10-29 RX ORDER — ACETAMINOPHEN 325 MG/1
650 TABLET ORAL EVERY 6 HOURS PRN
Status: DISCONTINUED | OUTPATIENT
Start: 2024-10-29 | End: 2024-10-31 | Stop reason: HOSPADM

## 2024-10-29 RX ORDER — ONDANSETRON 2 MG/ML
4 INJECTION INTRAMUSCULAR; INTRAVENOUS EVERY 6 HOURS PRN
Status: DISCONTINUED | OUTPATIENT
Start: 2024-10-29 | End: 2024-10-31 | Stop reason: HOSPADM

## 2024-10-29 RX ORDER — POTASSIUM CHLORIDE 1500 MG/1
40 TABLET, EXTENDED RELEASE ORAL PRN
Status: DISCONTINUED | OUTPATIENT
Start: 2024-10-29 | End: 2024-10-31 | Stop reason: HOSPADM

## 2024-10-29 RX ORDER — ARIPIPRAZOLE 5 MG/1
5 TABLET ORAL DAILY
Status: DISCONTINUED | OUTPATIENT
Start: 2024-10-30 | End: 2024-10-31 | Stop reason: HOSPADM

## 2024-10-29 RX ORDER — LEVOTHYROXINE SODIUM 50 UG/1
50 TABLET ORAL DAILY
Status: DISCONTINUED | OUTPATIENT
Start: 2024-10-30 | End: 2024-10-31 | Stop reason: HOSPADM

## 2024-10-29 RX ORDER — PANTOPRAZOLE SODIUM 40 MG/1
40 TABLET, DELAYED RELEASE ORAL
Status: DISCONTINUED | OUTPATIENT
Start: 2024-10-30 | End: 2024-10-31 | Stop reason: HOSPADM

## 2024-10-29 RX ORDER — SODIUM CHLORIDE 0.9 % (FLUSH) 0.9 %
5-40 SYRINGE (ML) INJECTION EVERY 12 HOURS SCHEDULED
Status: DISCONTINUED | OUTPATIENT
Start: 2024-10-29 | End: 2024-10-31 | Stop reason: HOSPADM

## 2024-10-29 RX ORDER — SODIUM CHLORIDE 0.9 % (FLUSH) 0.9 %
5-40 SYRINGE (ML) INJECTION PRN
Status: DISCONTINUED | OUTPATIENT
Start: 2024-10-29 | End: 2024-10-31 | Stop reason: HOSPADM

## 2024-10-29 RX ORDER — IOPAMIDOL 755 MG/ML
80 INJECTION, SOLUTION INTRAVASCULAR
Status: COMPLETED | OUTPATIENT
Start: 2024-10-29 | End: 2024-10-29

## 2024-10-29 RX ORDER — AMLODIPINE BESYLATE 5 MG/1
5 TABLET ORAL DAILY
Status: DISCONTINUED | OUTPATIENT
Start: 2024-10-30 | End: 2024-10-31 | Stop reason: HOSPADM

## 2024-10-29 RX ORDER — FLUTICASONE PROPIONATE 50 MCG
2 SPRAY, SUSPENSION (ML) NASAL DAILY
Status: DISCONTINUED | OUTPATIENT
Start: 2024-10-30 | End: 2024-10-31 | Stop reason: HOSPADM

## 2024-10-29 RX ORDER — PROMETHAZINE HYDROCHLORIDE 25 MG/1
12.5 TABLET ORAL EVERY 6 HOURS PRN
Status: DISCONTINUED | OUTPATIENT
Start: 2024-10-29 | End: 2024-10-31 | Stop reason: HOSPADM

## 2024-10-29 RX ORDER — NITROGLYCERIN 0.4 MG/1
0.4 TABLET SUBLINGUAL ONCE
Status: COMPLETED | OUTPATIENT
Start: 2024-10-29 | End: 2024-10-29

## 2024-10-29 RX ORDER — POTASSIUM CHLORIDE 7.45 MG/ML
10 INJECTION INTRAVENOUS PRN
Status: DISCONTINUED | OUTPATIENT
Start: 2024-10-29 | End: 2024-10-29 | Stop reason: RX

## 2024-10-29 RX ORDER — MAGNESIUM SULFATE IN WATER 40 MG/ML
2000 INJECTION, SOLUTION INTRAVENOUS PRN
Status: DISCONTINUED | OUTPATIENT
Start: 2024-10-29 | End: 2024-10-31 | Stop reason: HOSPADM

## 2024-10-29 RX ORDER — POLYETHYLENE GLYCOL 3350 17 G/17G
17 POWDER, FOR SOLUTION ORAL DAILY PRN
Status: DISCONTINUED | OUTPATIENT
Start: 2024-10-29 | End: 2024-10-31 | Stop reason: HOSPADM

## 2024-10-29 RX ADMIN — INSULIN LISPRO 1 UNITS: 100 INJECTION, SOLUTION INTRAVENOUS; SUBCUTANEOUS at 21:20

## 2024-10-29 RX ADMIN — NITROGLYCERIN 0.4 MG: 0.4 TABLET, ORALLY DISINTEGRATING SUBLINGUAL at 10:35

## 2024-10-29 RX ADMIN — NITROGLYCERIN 5 MCG/MIN: 20 INJECTION INTRAVENOUS at 13:02

## 2024-10-29 RX ADMIN — ALUMINUM HYDROXIDE, MAGNESIUM HYDROXIDE, AND SIMETHICONE: 1200; 120; 1200 SUSPENSION ORAL at 10:35

## 2024-10-29 RX ADMIN — DIVALPROEX SODIUM 250 MG: 125 CAPSULE ORAL at 21:20

## 2024-10-29 RX ADMIN — IOPAMIDOL 80 ML: 755 INJECTION, SOLUTION INTRAVENOUS at 14:06

## 2024-10-29 ASSESSMENT — HEART SCORE: ECG: NORMAL

## 2024-10-29 ASSESSMENT — PAIN - FUNCTIONAL ASSESSMENT: PAIN_FUNCTIONAL_ASSESSMENT: 0-10

## 2024-10-29 ASSESSMENT — PAIN SCALES - GENERAL
PAINLEVEL_OUTOF10: 7
PAINLEVEL_OUTOF10: 0

## 2024-10-29 ASSESSMENT — PAIN DESCRIPTION - DESCRIPTORS: DESCRIPTORS: SHARP

## 2024-10-29 ASSESSMENT — PAIN DESCRIPTION - ORIENTATION: ORIENTATION: MID

## 2024-10-29 ASSESSMENT — PAIN DESCRIPTION - LOCATION: LOCATION: CHEST

## 2024-10-29 NOTE — ED NOTES
ED to inpatient nurses report      Chief Complaint:  Chief Complaint   Patient presents with    Chest Pain     [facility]    MOA:     LOC: alert and orientated to name and place  Mobility: Requires assistance * 2  Oxygen Baseline: RA     Current needs required: RA     Code Status:   Full Code    What abnormal results were found and what did you give/do to treat them? Nitro   Any procedures or intervention occur? Ct, xray    Mental Status:  Level of Consciousness: Alert (0)    Psych Assessment:   Psychosocial  Psychosocial (WDL): Within Defined Limits    Vitals:  Patient Vitals for the past 24 hrs:   BP Temp Temp src Pulse Resp SpO2   10/29/24 1358 (!) 148/87 -- -- 89 22 --   10/29/24 1307 137/81 -- -- 87 -- --   10/29/24 1305 137/81 -- -- 87 17 --   10/29/24 1257 127/86 -- -- 86 17 --   10/29/24 1103 125/77 -- -- 81 18 --   10/29/24 1038 125/69 -- -- 98 18 --   10/29/24 1033 (!) 140/85 -- -- 80 19 --   10/29/24 1007 125/79 -- -- 83 20 --   10/29/24 0936 132/71 -- -- 83 15 --   10/29/24 0918 (!) 121/92 -- -- 88 -- --   10/29/24 0901 132/83 -- -- 88 14 --   10/29/24 0848 (!) 143/89 98.3 °F (36.8 °C) Oral 83 21 92 %        LDAs:   Peripheral IV 10/29/24 Left Forearm (Active)   Site Assessment Clean, dry & intact 10/29/24 0900       Ambulatory Status:  No data recorded    Diagnosis:  DISPOSITION Admitted 10/29/2024 01:44:49 PM   Final diagnoses:   Acute chest pain   Abdominal pain, left upper quadrant   Shortness of breath        Consults:  IP CONSULT TO CASE MANAGEMENT  IP CONSULT TO SPIRITUAL SERVICES     Pain Score:  Pain Assessment  Pain Assessment: 0-10  Pain Level: 7  Pain Location: Chest  Pain Orientation: Mid  Pain Descriptors: Sharp    C-SSRS:   Risk of Suicide: No Risk    Sepsis Screening:       Badger Fall Risk:       Swallow Screening        Preferred Language:   English      ALLERGIES     Patient has no known allergies.    SURGICAL HISTORY       Past Surgical History:   Procedure

## 2024-10-29 NOTE — TELEPHONE ENCOUNTER
I called and talked to pulmonary manager Suma Rice and she will call [facility] to get him scheduled ASAP in their office for evaluation.

## 2024-10-29 NOTE — ED PROVIDER NOTES
[facility] EMERGENCY DEPT  EMERGENCY DEPARTMENT ENCOUNTER          Pt Name: Duane C Adams  MRN: [id number]  Birthdate 1960  Date of evaluation: 10/29/2024  Physician: Ashwin Amador MD  Supervising Attending Physician: Caden Lewis MD       CHIEF COMPLAINT       Chief Complaint   Patient presents with    Chest Pain         HISTORY OF PRESENT ILLNESS    HPI  Duane C Adams is a current smoker 64 y.o. male with PMH significant for nonobstructive CAD, h/o sinus pauses s/p PM placement, HTN, HLD, DMT2, h/o DVT, LEATHA (noncompliant with CPAP), h/o TIA who presents to the emergency department from home for evaluation of left-sided chest pain that radiates down to upper abdomen.  Pain is described as sharp, heaviness, squeezing, rated 7/10.  Pain started around 0700 upon waking. Associated SOB at rest and diaphoresis as well. Smokes 2 PPD tobacco; denies EtOH or drug use. Given ASA-324 via EMS - mild improvement. L-sided abdominal pain x2w - worse 1-5 minutes after starting to eat.     Reports mild chronic lightheadedness.  Denies fever, chills, headache, dizziness, vision changes, tinnitus, cough, congestion, sore throat, neck pain/stiffness, abdominal pain, nausea, vomiting, constipation, diarrhea, dysuria, blood in the urine or stool, numbness/tingling/weakness in extremities.    The patient has no other acute complaints at this time.      PAST MEDICAL AND SURGICAL HISTORY     Past Medical History:   Diagnosis Date    Chronic leg pain     Diabetes (HCC) 05/2018    resolved with weight loss.    GERD (gastroesophageal reflux disease)     History of deep venous thrombosis (DVT) of distal vein of left lower extremity 2020    Hyperlipidemia     Hypertension     Hypothyroidism     Right homonymous hemianopsia     Sleep apnea     doesn't use CPAP    TIA (transient ischemic attack) 2016    Dr. Gong      Past Surgical History:   Procedure Laterality Date    ANKLE SURGERY Right 01/01/2000    CARDIAC

## 2024-10-29 NOTE — ED PROVIDER NOTES
[place]  EMERGENCY MEDICINE ATTENDING ATTESTATION      Evaluation of Duane C Adams.   Case discussed and care plan developed with resident physician.   I agree with the resident physician documentation and plan as documented by him, except if my documentation differs.   Patient seen, interviewed and examined by me.  I reviewed the medical, surgical, family and social history, medications and allergies.   I have reviewed and interpreted all available lab, radiology and ekg results available at the moment.  I have reviewed the nursing documentation.     Please see the resident physician completed note for final disposition except as documented on this attestation.   I have reviewed and interpreted all available lab, radiology and ekg results available at the moment.  Diagnosis, treatment and disposition plans were discussed and agreed upon by patient.   This transcription was electronically signed. It was dictated by use of voice recognition software and electronically transcribed. The transcription may contain errors not detected in proofreading.     I performed direct supervision and was present for the critical portion following procedures: None  Critical care time on this case: None    Electronically signed by Caden Lewis MD on 10/29/24 at 10:29 AM Caden Castano MD  10/29/24 [id number]

## 2024-10-29 NOTE — TELEPHONE ENCOUNTER
Pt in ER and I called down and spoke to Ashley, charge nurse and she is aware that patient has abnormal monitor strips where he is pausing for long periods of time.

## 2024-10-29 NOTE — TELEPHONE ENCOUNTER
Suma reached out to [facility] and states that she called [facility] and they told her that patient is on his way to the hospital here by sunday.  Will follow this.

## 2024-10-29 NOTE — ED NOTES
Pt rprts chest pain on going; rates 5/10. No visible signs of distress noted. Vitals updated. Pt updated on admission process. Call light remains within reach.

## 2024-10-29 NOTE — H&P
History & Physical  Internal Medicine Resident         Patient: Duane C Adams 64 y.o. male      : 1960  Date of Admission: 10/29/2024  Date of Service: Pt seen/examined on 10/29/24 and Admitted to Observation with expected LOS less than two midnights due to medical therapy.       ASSESSMENT AND PLAN  Chest pain: Upon admission to ED, patient reported chest pain on left side that radiates to the left arm and upper abdomen.  Described as sharp and squeezing rated as 7/10 on admission.  Decreased down to 3/10 on floors.  Patient received nitroglycerin drip and will plan to discontinue as chest pain is resolved.  Patient wears heart monitor placed 10/13/2024 upon discharge last hospitalization.  LHC on 10/11/2024 showed EF of 55-60%, no obstruction.  EMS from [facility] stated heart monitor company called [facility] to have patient evaluated due to readings on monitor.  Holter monitoring was called and replied that any critical findings would be uploaded.  History of sinus pauses and Mobitz type II last hospitalization.  Vitals stable.  Avoid beta-blockers  Follow-up Holter monitor findings  EKG found no acute ischemic changes, NSR  CTA chest found no aneurysm or dissection of the thoracic aorta and no acute intrathoracic findings  CXR showed cardiomegaly within no acute intrathoracic findings  Nitroglycerin drip discontinued, resume if chest pain returns  Continue to monitor vitals    Chronic Conditions (reviewed and stable unless otherwise stated)   NIDDM 2: Last A1c 10/11/2024 6.3.  Patient does not check blood sugars.  Low-dose SSI.  HTN: BP stable.  Continue Home meds amlodipine 5 mg daily  Depression: Stable.  Continue home Trintellix, Abilify  HLD: Continue home Lipitor  Morbid obesity: BMI 42.31, will recommend lifestyle modifications of diet and exercise.  History of seizure: Continue Depakote  Hypothyroidism: Last TSH 4.25 T4 1.16. Continue home medication Synthroid.  LEATHA: Patient does not use CPAP    fluticasone (FLONASE) 50 MCG/ACT nasal spray   No No   Si sprays by Each Nostril route daily   levothyroxine (SYNTHROID) 50 MCG tablet   No No   Sig: Take 1 tablet by mouth daily   meclizine (ANTIVERT) 12.5 MG tablet   Yes No   Sig: Take 1 tablet by mouth 3 times daily as needed   omeprazole (PRILOSEC) 20 MG delayed release capsule   No No   Sig: Take 1 capsule by mouth every morning (before breakfast)   traZODone (DESYREL) 50 MG tablet   Yes No   Sig: Take 1 tablet by mouth nightly      Facility-Administered Medications: None     Allergies:  Patient has no known allergies.    Past Medical, Family, Social, Surgical Hx      Diagnosis Date    Chronic leg pain     Diabetes (HCC) 2018    resolved with weight loss.    GERD (gastroesophageal reflux disease)     History of deep venous thrombosis (DVT) of distal vein of left lower extremity     Hyperlipidemia     Hypertension     Hypothyroidism     Right homonymous hemianopsia     Sleep apnea     doesn't use CPAP    TIA (transient ischemic attack)     Dr. Gong          Procedure Laterality Date    ANKLE SURGERY Right 2000    CARDIAC PROCEDURE N/A 10/11/2024    Left heart cath / coronary angiography performed by Monroe Saldivar MD at [facility] CARDIAC CATH LAB    CARDIAC PROCEDURE N/A 10/11/2024    Insert temporary pacemaker performed by Monroe Saldivra MD at [facility] CARDIAC CATH LAB    CATARACT REMOVAL Left 2022    ELBOW SURGERY Left 2022    ulnar nerve decompression    KNEE ARTHROSCOPY Left 2015    Torn cartliage    LEG SURGERY Right 2000    WI COLSC FLX W/RMVL OF TUMOR POLYP LESION SNARE TQ  2017    COLONOSCOPY POLYPECTOMY SNARE/COLD BIOPSY performed by Ryan Perales MD at [facility] Endoscopy    WI EGD TRANSORAL BIOPSY SINGLE/MULTIPLE N/A 2017    EGD BIOPSY performed by Glendy Morrison MD at [facility] Endoscopy    UPPER GASTROINTESTINAL ENDOSCOPY  2017    EGD DIAGNOSTIC ONLY performed by Ryan Perales MD at [facility]

## 2024-10-29 NOTE — PROGRESS NOTES
Pt admitted to  8AB24 via stretcher from ED. Pt is from nursing home.  Pt is alert and oriented x4.  Pt admitted for chest pain, pt has IV to L.forearm that has infiltrated on arrival.  New IV placed to R. Wrist- Nitro gtt continued at 20mcg/min.  Pt has no complaints of chest pain on admission.  Pt VS and assessment as charted.   Pt oriented to room. Policies and procedures explained. All questions answered with no further questions at this time.  Bed alarm on. Call light in reach.Oriented to room.   Jamil Thorpe, RN, RN 10/29/2024 5:22 PM

## 2024-10-29 NOTE — ED NOTES
Patient to be transferred to [facility]. Patient is in stable condition at this time. Staff notified prior to transfer.

## 2024-10-29 NOTE — ED TRIAGE NOTES
Presents to ED by EMS from group home with c/o chest pain. EMS states that the patient has a heart monitor on and the company called the group home to have patient evaluated due to readings on monitor. At that time patient was found to be having chest pain. EMS gave 324mg aspirin. Patient still complains of 7/10 chest pain. EKG completed in triage. Call light in reach.

## 2024-10-29 NOTE — TELEPHONE ENCOUNTER
Strips received showing HR 10 bpm at 12:16am. Call to [facility]. First lady answered the phone and advised she could not help and transferred to the DON's phone. No answer.     Call back and Eduard answered the phone, explained what was going to on. He stated oh okay, I will have the nurse check on him at 8am when she gets in. I told Eduard I don't think he understand the importance of going and checking on the patient NOW with what strips were showing. He states he will go now and check on him and let us know. Phone number provided. Strips scanned to encounter.

## 2024-10-30 LAB
ANION GAP SERPL CALC-SCNC: 19 MEQ/L (ref 8–16)
BUN SERPL-MCNC: 19 MG/DL (ref 7–22)
CALCIUM SERPL-MCNC: 9 MG/DL (ref 8.5–10.5)
CHLORIDE SERPL-SCNC: 100 MEQ/L (ref 98–111)
CO2 SERPL-SCNC: 23 MEQ/L (ref 23–33)
CREAT SERPL-MCNC: 0.7 MG/DL (ref 0.4–1.2)
DEPRECATED RDW RBC AUTO: 46.6 FL (ref 35–45)
EKG ATRIAL RATE: 92 BPM
EKG P AXIS: 7 DEGREES
EKG P-R INTERVAL: 110 MS
EKG Q-T INTERVAL: 298 MS
EKG QRS DURATION: 70 MS
EKG QTC CALCULATION (BAZETT): 368 MS
EKG R AXIS: 21 DEGREES
EKG T AXIS: 90 DEGREES
EKG VENTRICULAR RATE: 92 BPM
ERYTHROCYTE [DISTWIDTH] IN BLOOD BY AUTOMATED COUNT: 13.8 % (ref 11.5–14.5)
GFR SERPL CREATININE-BSD FRML MDRD: > 90 ML/MIN/1.73M2
GLUCOSE BLD STRIP.AUTO-MCNC: 135 MG/DL (ref 70–108)
GLUCOSE BLD STRIP.AUTO-MCNC: 150 MG/DL (ref 70–108)
GLUCOSE BLD STRIP.AUTO-MCNC: 158 MG/DL (ref 70–108)
GLUCOSE BLD STRIP.AUTO-MCNC: 167 MG/DL (ref 70–108)
GLUCOSE SERPL-MCNC: 145 MG/DL (ref 70–108)
HCT VFR BLD AUTO: 42.8 % (ref 42–52)
HGB BLD-MCNC: 14.2 GM/DL (ref 14–18)
MCH RBC QN AUTO: 30.7 PG (ref 26–33)
MCHC RBC AUTO-ENTMCNC: 33.2 GM/DL (ref 32.2–35.5)
MCV RBC AUTO: 92.6 FL (ref 80–94)
PLATELET # BLD AUTO: 207 THOU/MM3 (ref 130–400)
PMV BLD AUTO: 12.1 FL (ref 9.4–12.4)
POTASSIUM SERPL-SCNC: 4.4 MEQ/L (ref 3.5–5.2)
RBC # BLD AUTO: 4.62 MILL/MM3 (ref 4.7–6.1)
SODIUM SERPL-SCNC: 142 MEQ/L (ref 135–145)
WBC # BLD AUTO: 6.6 THOU/MM3 (ref 4.8–10.8)

## 2024-10-30 PROCEDURE — 99223 1ST HOSP IP/OBS HIGH 75: CPT | Performed by: INTERNAL MEDICINE

## 2024-10-30 PROCEDURE — 36415 COLL VENOUS BLD VENIPUNCTURE: CPT

## 2024-10-30 PROCEDURE — 2580000003 HC RX 258

## 2024-10-30 PROCEDURE — 96366 THER/PROPH/DIAG IV INF ADDON: CPT

## 2024-10-30 PROCEDURE — 82948 REAGENT STRIP/BLOOD GLUCOSE: CPT

## 2024-10-30 PROCEDURE — 80048 BASIC METABOLIC PNL TOTAL CA: CPT

## 2024-10-30 PROCEDURE — 85027 COMPLETE CBC AUTOMATED: CPT

## 2024-10-30 PROCEDURE — 99232 SBSQ HOSP IP/OBS MODERATE 35: CPT

## 2024-10-30 PROCEDURE — 6370000000 HC RX 637 (ALT 250 FOR IP)

## 2024-10-30 PROCEDURE — 93005 ELECTROCARDIOGRAM TRACING: CPT

## 2024-10-30 PROCEDURE — G0378 HOSPITAL OBSERVATION PER HR: HCPCS

## 2024-10-30 RX ORDER — DEXTROSE MONOHYDRATE 100 MG/ML
INJECTION, SOLUTION INTRAVENOUS CONTINUOUS PRN
Status: DISCONTINUED | OUTPATIENT
Start: 2024-10-30 | End: 2024-10-31 | Stop reason: HOSPADM

## 2024-10-30 RX ORDER — ENOXAPARIN SODIUM 100 MG/ML
30 INJECTION SUBCUTANEOUS 2 TIMES DAILY
Status: DISCONTINUED | OUTPATIENT
Start: 2024-10-31 | End: 2024-10-31 | Stop reason: HOSPADM

## 2024-10-30 RX ORDER — GLUCAGON 1 MG/ML
1 KIT INJECTION PRN
Status: DISCONTINUED | OUTPATIENT
Start: 2024-10-30 | End: 2024-10-31 | Stop reason: HOSPADM

## 2024-10-30 RX ADMIN — ATORVASTATIN CALCIUM 80 MG: 80 TABLET, FILM COATED ORAL at 20:00

## 2024-10-30 RX ADMIN — VORTIOXETINE 10 MG: 10 TABLET, FILM COATED ORAL at 09:41

## 2024-10-30 RX ADMIN — SODIUM CHLORIDE, PRESERVATIVE FREE 10 ML: 5 INJECTION INTRAVENOUS at 20:00

## 2024-10-30 RX ADMIN — AMLODIPINE BESYLATE 5 MG: 5 TABLET ORAL at 09:42

## 2024-10-30 RX ADMIN — ACETAMINOPHEN 650 MG: 325 TABLET ORAL at 16:43

## 2024-10-30 RX ADMIN — ARIPIPRAZOLE 5 MG: 5 TABLET ORAL at 09:42

## 2024-10-30 RX ADMIN — LEVOTHYROXINE SODIUM 50 MCG: 0.05 TABLET ORAL at 05:34

## 2024-10-30 RX ADMIN — PANTOPRAZOLE SODIUM 40 MG: 40 TABLET, DELAYED RELEASE ORAL at 05:34

## 2024-10-30 RX ADMIN — DIVALPROEX SODIUM 250 MG: 125 CAPSULE ORAL at 20:00

## 2024-10-30 ASSESSMENT — PAIN DESCRIPTION - ORIENTATION
ORIENTATION: LEFT
ORIENTATION: LEFT

## 2024-10-30 ASSESSMENT — PAIN DESCRIPTION - DESCRIPTORS: DESCRIPTORS: SHARP

## 2024-10-30 ASSESSMENT — PAIN DESCRIPTION - FREQUENCY: FREQUENCY: CONTINUOUS

## 2024-10-30 ASSESSMENT — PAIN SCALES - GENERAL
PAINLEVEL_OUTOF10: 5
PAINLEVEL_OUTOF10: 5
PAINLEVEL_OUTOF10: 0
PAINLEVEL_OUTOF10: 5

## 2024-10-30 ASSESSMENT — PAIN DESCRIPTION - LOCATION
LOCATION: CHEST
LOCATION: CHEST

## 2024-10-30 ASSESSMENT — PAIN DESCRIPTION - PAIN TYPE: TYPE: ACUTE PAIN

## 2024-10-30 ASSESSMENT — PAIN DESCRIPTION - ONSET: ONSET: ON-GOING

## 2024-10-30 ASSESSMENT — PAIN - FUNCTIONAL ASSESSMENT: PAIN_FUNCTIONAL_ASSESSMENT: ACTIVITIES ARE NOT PREVENTED

## 2024-10-30 NOTE — PROCEDURES
PROCEDURE NOTE  Date: 10/30/2024   Name: Duane C Adams  YOB: 1960    Procedures  EKG completed

## 2024-10-30 NOTE — PLAN OF CARE
Problem: Chronic Conditions and Co-morbidities  Goal: Patient's chronic conditions and co-morbidity symptoms are monitored and maintained or improved  Outcome: Progressing     Problem: Discharge Planning  Goal: Discharge to home or other facility with appropriate resources  Outcome: Progressing     Problem: Pain  Goal: Verbalizes/displays adequate comfort level or baseline comfort level  Outcome: Progressing     Problem: Safety - Adult  Goal: Free from fall injury  Outcome: Progressing  Flowsheets (Taken 10/29/2024 1735 by Jamil Thorpe RN)  Free From Fall Injury: Instruct family/caregiver on patient safety     Problem: ABCDS Injury Assessment  Goal: Absence of physical injury  Outcome: Progressing

## 2024-10-30 NOTE — PROGRESS NOTES
Pt is a 64y.o. male in 8B-24. Please refer to ACP note for context, re: AD's.     10/30/24 1319   Encounter Summary   Encounter Overview/Reason Advance Care Planning   Service Provided For Patient   Referral/Consult From Multi-disciplinary team   Last Encounter  10/30/24   Complexity of Encounter Low   Begin Time 1319   End Time  1324   Total Time Calculated 5 min   Advance Care Planning   Type ACP conversation  (Left full, blank set of AD's, contact card and brochure for pt review and study.)   Assessment/Intervention/Outcome   Assessment Compromised coping;Calm;Peaceful   Intervention Explored/Affirmed feelings, thoughts, concerns   Outcome Engaged in conversation;Expressed Gratitude

## 2024-10-30 NOTE — ACP (ADVANCE CARE PLANNING)
Advance Care Planning     Advance Care Planning Inpatient Note  Spiritual Care Department    Today's Date: 10/30/2024  Unit: [facility] 8AB    Received request from IDT Member.  Upon review of chart and communication with care team, patient's decision making abilities are not in question. and Spiritual Care will defer advance care planning with patient at this time.. Patient was/were present in the room during visit.    Goals of ACP Conversation:  Discuss advance care planning documents    Health Care Decision Makers:       Primary Decision Maker: kavita iniguez - Brother/Sister - [phone]    Primary Decision Maker: KwasiHawk - Brother/Sister - [phone]    Supplemental (Other) Decision Maker: SchmidMars - Other - [phone]  Summary:  Documented Next of Kin, per patient report    Advance Care Planning Documents (Patient Wishes):  Healthcare Power of /Advance Directive Appointment of Health Care Agent  Living Will/Advance Directive     Assessment:  Pt is a 64y.o. male, sitting up in easychair watching television, in 8B-24.  left full, blank set of AD's, brochure and contact card for patient review and study.    Interventions:  Left full, blank set of AD's, contact card and brochure, for pt review and study.    Care Preferences Communicated:   No    Outcomes/Plan:  Pt will notify spiritual health if/when ready to complete.    Electronically signed by Chaplain Alec on 10/30/2024 at 3:24 PM

## 2024-10-30 NOTE — CONSULTS
[facility]  Cardiology Consult        Patient:  Duane C Adams  YOB: 1960  MRN: [id number]     Acct: [id number]    PCP: Tulio Hinton MD    Date of Admission: 10/29/2024      Reason for Consultation:  Chest pain      History Of Present Illness:    64 y.o. pleasant male c hx of DM, HTN, HLD, Depression, BMI 42, LEATHA not on CPAP, who presented to the hospital with complaints of chest pains.  As per patient, the chest pains over the entire chest, worsens with deep breathing.  He was recently in the hospital for similar reasons, underwent LHC which showed insignificant CAD.  He does have intermittent AV block, was previously lost to follow up.  Was recommended to follow up with EP as outpatient.  He was recently discharged with an event monitor.  On telemetry there is 2:1 AV block, mostly when he is sleeping.  Likely due to uncontrolled LEATHA.  Cardiology was consulted for further management.      All labs, EKG's, diagnostic testing and images as well as cardiac cath, stress testing were reviewed during this encounter.    Past Medical History:          Diagnosis Date    Chronic leg pain     Diabetes (HCC) 05/2018    resolved with weight loss.    GERD (gastroesophageal reflux disease)     History of deep venous thrombosis (DVT) of distal vein of left lower extremity 2020    Hyperlipidemia     Hypertension     Hypothyroidism     Right homonymous hemianopsia     Sleep apnea     doesn't use CPAP    TIA (transient ischemic attack) 2016    Dr. Gong        Past Surgical History:          Procedure Laterality Date    ANKLE SURGERY Right 01/01/2000    CARDIAC PROCEDURE N/A 10/11/2024    Left heart cath / coronary angiography performed by Monroe Saldivar MD at [facility] CARDIAC CATH LAB    CARDIAC PROCEDURE N/A 10/11/2024    Insert temporary pacemaker performed by Monroe Saldivar MD at [facility] CARDIAC CATH LAB    CATARACT REMOVAL Left 02/09/2022    ELBOW SURGERY Left 02/01/2022    ulnar

## 2024-10-30 NOTE — PROGRESS NOTES
Hospitalist Progress Note    Patient:  Duane C Adams    Unit/Bed:8B-24/024-A  YOB: 1960  MRN: [id number]   Acct: [id number]   PCP: Tulio Hinton MD  Date of Admission: 10/29/2024    ASSESSMENT AND PLAN  Active Problems  Chest pain, concern for unstable angina: pt endorsing left sided CP  illicited with activity, then relieved with rest. Also noted to occur at rest as well. Trop 8-> 7 upon admission. EKG without acute changes today. Started on nitro gtt. Pt reports improvement of pain with nitro. Cardiology consulted.    Intermittent AV block: EKG at this time unremarkable for block. Possible noted on telemetry. Has current cardiac monitor in place. Follow up with Cardiology OP.      Resolved Problems    Chronic Conditions (reviewed and stable unless otherwise stated):   Obesity: BMI  42.3. Encourage lifestyle changes.   Concern for LEATHA: follow up with PCP for pulm referral  NIDDM 2: Last A1c 10/11/2024 6.3.  Patient does not check blood sugars.  Low-dose SSI.  Essential HTN: BP stable.  Continue Home meds amlodipine 5 mg daily  Depression: Stable.  Continue home Trintellix, Abilify  HLD: Continue home Lipitor  History of seizure: Continue Depakote  Hypothyroidism: Last TSH 4.25 T4 1.16. Continue home medication Synthroid.  Right homonymous hemianopsia: noted, followed by Dr. Navarrete.  History of DVT: Patient is no longer on anticoagulation.  Continue SCDs.        DVT Prophylaxis: [x] Lovenox / [] Heparin / [] SCDs / [] Already on Systemic Anticoagulation / [] None  LDA: []CVC / []PICC / []Midline / []Romero / []Drains / []Mediport / [x]None  Antibiotics: none   Steroids: none  Labs (still needed?): [x]Yes / []No  IVF (still needed?): []Yes / [x]No    Level of care: []Step Down / [x]Med-Surg  Bed Status: []Inpatient / [x]Observation  Telemetry: yesYes / []No  PT/OT: []Yes / [x]No  Code status: Full Code  found for: \"LABAERO\"  No results found for: \"LABANAE\"    Urinalysis:      Lab Results   Component Value Date/Time    NITRU NEGATIVE 09/27/2018 03:56 PM    WBCUA 2-4 09/27/2018 03:56 PM    WBCUA 0-5 10/06/2016 08:02 PM    BACTERIA NONE 09/27/2018 03:56 PM    RBCUA 0-2 09/27/2018 03:56 PM    BLOODU NEGATIVE 09/27/2018 03:56 PM    GLUCOSEU NEGATIVE 09/27/2018 03:56 PM       Radiology:  CTA Chest W W/O Aortic Dissection   Final Result   1. No aneurysm or dissection of the thoracic aorta.   2. No acute intrathoracic findings.            **This report has been created using voice recognition software. It may contain   minor errors which are inherent in voice recognition technology.**      Electronically signed by Dr. Boom Cartwright      XR CHEST PORTABLE   Final Result   Cardiomegaly with no acute intrathoracic process.               **This report has been created using voice recognition software. It may contain   minor errors which are inherent in voice recognition technology.**      Electronically signed by Dr Neal Seth        CTA Chest W W/O Aortic Dissection    Result Date: 10/29/2024  PROCEDURE: CTA CHEST W WO CONTRAST CLINICAL INFORMATION: Indication provided by the ordering physician as \"aortic dissection.\" TECHNIQUE: CTA of the chest was performed before and following administration of 80 mL Isovue-370 intravenous contrast. Axial images as well as coronal, sagittal and 3D MIP reconstructions were obtained. All CT scans at this facility use dose modulation, iterative reconstruction, and/or weight-based dosing when appropriate to reduce radiation dose to as low as reasonably achievable. COMPARISON: CT chest 1/24/2024 FINDINGS: Atherosclerotic calcifications are present in the thoracic aorta without evidence of aneurysm, dissection or intramural hematoma. No filling defects are identified in the visualized pulmonary arteries. Cardiac size is normal. There is no pleural or pericardial effusion. There is a

## 2024-10-31 VITALS
DIASTOLIC BLOOD PRESSURE: 80 MMHG | HEART RATE: 87 BPM | TEMPERATURE: 97.8 F | HEIGHT: 64 IN | WEIGHT: 248 LBS | SYSTOLIC BLOOD PRESSURE: 144 MMHG | OXYGEN SATURATION: 91 % | RESPIRATION RATE: 17 BRPM | BODY MASS INDEX: 42.34 KG/M2

## 2024-10-31 LAB
ALBUMIN SERPL BCG-MCNC: 4 G/DL (ref 3.5–5.1)
ALP SERPL-CCNC: 104 U/L (ref 38–126)
ALT SERPL W/O P-5'-P-CCNC: 31 U/L (ref 11–66)
ANION GAP SERPL CALC-SCNC: 14 MEQ/L (ref 8–16)
AST SERPL-CCNC: 28 U/L (ref 5–40)
BASOPHILS ABSOLUTE: 0 THOU/MM3 (ref 0–0.1)
BASOPHILS NFR BLD AUTO: 0.5 %
BILIRUB SERPL-MCNC: 0.6 MG/DL (ref 0.3–1.2)
BUN SERPL-MCNC: 14 MG/DL (ref 7–22)
CALCIUM SERPL-MCNC: 8.7 MG/DL (ref 8.5–10.5)
CHLORIDE SERPL-SCNC: 100 MEQ/L (ref 98–111)
CO2 SERPL-SCNC: 26 MEQ/L (ref 23–33)
CREAT SERPL-MCNC: 0.6 MG/DL (ref 0.4–1.2)
DEPRECATED RDW RBC AUTO: 46.7 FL (ref 35–45)
EOSINOPHIL NFR BLD AUTO: 3.1 %
EOSINOPHILS ABSOLUTE: 0.2 THOU/MM3 (ref 0–0.4)
ERYTHROCYTE [DISTWIDTH] IN BLOOD BY AUTOMATED COUNT: 13.8 % (ref 11.5–14.5)
GFR SERPL CREATININE-BSD FRML MDRD: > 90 ML/MIN/1.73M2
GLUCOSE BLD STRIP.AUTO-MCNC: 135 MG/DL (ref 70–108)
GLUCOSE BLD STRIP.AUTO-MCNC: 161 MG/DL (ref 70–108)
GLUCOSE SERPL-MCNC: 128 MG/DL (ref 70–108)
HCT VFR BLD AUTO: 43.7 % (ref 42–52)
HGB BLD-MCNC: 14.2 GM/DL (ref 14–18)
IMM GRANULOCYTES # BLD AUTO: 0.05 THOU/MM3 (ref 0–0.07)
IMM GRANULOCYTES NFR BLD AUTO: 0.8 %
LYMPHOCYTES ABSOLUTE: 1.4 THOU/MM3 (ref 1–4.8)
LYMPHOCYTES NFR BLD AUTO: 21 %
MCH RBC QN AUTO: 30.3 PG (ref 26–33)
MCHC RBC AUTO-ENTMCNC: 32.5 GM/DL (ref 32.2–35.5)
MCV RBC AUTO: 93.2 FL (ref 80–94)
MONOCYTES ABSOLUTE: 0.6 THOU/MM3 (ref 0.4–1.3)
MONOCYTES NFR BLD AUTO: 8.8 %
NEUTROPHILS ABSOLUTE: 4.3 THOU/MM3 (ref 1.8–7.7)
NEUTROPHILS NFR BLD AUTO: 65.8 %
NRBC BLD AUTO-RTO: 0 /100 WBC
PLATELET # BLD AUTO: 174 THOU/MM3 (ref 130–400)
PMV BLD AUTO: 12.3 FL (ref 9.4–12.4)
POTASSIUM SERPL-SCNC: 4.5 MEQ/L (ref 3.5–5.2)
PROT SERPL-MCNC: 6.4 G/DL (ref 6.1–8)
RBC # BLD AUTO: 4.69 MILL/MM3 (ref 4.7–6.1)
SODIUM SERPL-SCNC: 140 MEQ/L (ref 135–145)
WBC # BLD AUTO: 6.5 THOU/MM3 (ref 4.8–10.8)

## 2024-10-31 PROCEDURE — 6370000000 HC RX 637 (ALT 250 FOR IP)

## 2024-10-31 PROCEDURE — 36415 COLL VENOUS BLD VENIPUNCTURE: CPT

## 2024-10-31 PROCEDURE — 99232 SBSQ HOSP IP/OBS MODERATE 35: CPT | Performed by: PHYSICIAN ASSISTANT

## 2024-10-31 PROCEDURE — 85025 COMPLETE CBC W/AUTO DIFF WBC: CPT

## 2024-10-31 PROCEDURE — 6360000002 HC RX W HCPCS

## 2024-10-31 PROCEDURE — 80053 COMPREHEN METABOLIC PANEL: CPT

## 2024-10-31 PROCEDURE — 82948 REAGENT STRIP/BLOOD GLUCOSE: CPT

## 2024-10-31 PROCEDURE — G0378 HOSPITAL OBSERVATION PER HR: HCPCS

## 2024-10-31 PROCEDURE — 99239 HOSP IP/OBS DSCHRG MGMT >30: CPT

## 2024-10-31 PROCEDURE — 2580000003 HC RX 258

## 2024-10-31 PROCEDURE — 96372 THER/PROPH/DIAG INJ SC/IM: CPT

## 2024-10-31 RX ADMIN — AMLODIPINE BESYLATE 5 MG: 5 TABLET ORAL at 08:09

## 2024-10-31 RX ADMIN — ENOXAPARIN SODIUM 30 MG: 100 INJECTION SUBCUTANEOUS at 08:09

## 2024-10-31 RX ADMIN — FLUTICASONE PROPIONATE 2 SPRAY: 50 SPRAY, METERED NASAL at 08:08

## 2024-10-31 RX ADMIN — VORTIOXETINE 10 MG: 10 TABLET, FILM COATED ORAL at 08:09

## 2024-10-31 RX ADMIN — LEVOTHYROXINE SODIUM 50 MCG: 0.05 TABLET ORAL at 05:41

## 2024-10-31 RX ADMIN — LIDOCAINE HYDROCHLORIDE: 20 SOLUTION ORAL at 11:07

## 2024-10-31 RX ADMIN — ARIPIPRAZOLE 5 MG: 5 TABLET ORAL at 08:09

## 2024-10-31 RX ADMIN — PANTOPRAZOLE SODIUM 40 MG: 40 TABLET, DELAYED RELEASE ORAL at 05:41

## 2024-10-31 RX ADMIN — SODIUM CHLORIDE, PRESERVATIVE FREE 10 ML: 5 INJECTION INTRAVENOUS at 08:09

## 2024-10-31 ASSESSMENT — PAIN DESCRIPTION - ORIENTATION: ORIENTATION: LEFT

## 2024-10-31 ASSESSMENT — PAIN DESCRIPTION - FREQUENCY: FREQUENCY: INTERMITTENT

## 2024-10-31 ASSESSMENT — PAIN DESCRIPTION - LOCATION: LOCATION: CHEST

## 2024-10-31 ASSESSMENT — PAIN DESCRIPTION - DESCRIPTORS: DESCRIPTORS: SQUEEZING

## 2024-10-31 ASSESSMENT — PAIN DESCRIPTION - ONSET: ONSET: ON-GOING

## 2024-10-31 ASSESSMENT — PAIN - FUNCTIONAL ASSESSMENT: PAIN_FUNCTIONAL_ASSESSMENT: ACTIVITIES ARE NOT PREVENTED

## 2024-10-31 ASSESSMENT — PAIN SCALES - GENERAL: PAINLEVEL_OUTOF10: 4

## 2024-10-31 ASSESSMENT — PAIN DESCRIPTION - PAIN TYPE: TYPE: ACUTE PAIN

## 2024-10-31 NOTE — DISCHARGE INSTR - COC
he requires {Admit to Appropriate Level of Care:48603} for {GREATER/LESS:553444030} 30 days.     Update Admission H&P: {CHP DME Changes in HandP:343569338}    PHYSICIAN SIGNATURE:  {Esignature:514497030}

## 2024-10-31 NOTE — CARE COORDINATION
10/30/24 1256   Readmission Assessment   Number of Days since last admission? 8-30 days   Previous Disposition Other (comment)  ([facility]   Who is being Interviewed Unable to Complete  (Medical record.)   What was the patient's/caregiver's perception as to why they think they needed to return back to the hospital? Other (Comment)  (Pt had chest pain but was also instructed to come in due to abnormalities on his event monitor.)   Did you visit your Primary Care Physician after you left the hospital, before you returned this time? No  (Uncertain.  Pt went to [facility].)   Why weren't you able to visit your PCP? Other (Comment)  (Pt at facility.)   Did you see a specialist, such as Cardiac, Pulmonary, Orthopedic Physician, etc. after you left the hospital? Other (Comment)  (Uncertain.  At facility.)   Who advised the patient to return to the hospital? Other (Comment)  (Event monitor company.)   Does the patient report anything that got in the way of taking their medications? No   In our efforts to provide the best possible care to you and others like you, can you think of anything that we could have done to help you after you left the hospital the first time, so that you might not have needed to return so soon? Other (Comment)  (No. Pt went to [facility] facility.)       
10/31/24, 3:02 PM EDT    Patient goals/plan/ treatment preferences discussed by  and .  Patient goals/plan/ treatment preferences reviewed with patient/ family.  Patient/ family verbalize understanding of discharge plan and are in agreement with goal/plan/treatment preferences.  Understanding was demonstrated using the teach back method.  AVS provided by RN at time of discharge, which includes all necessary medical information pertaining to the patients current course of illness, treatment, post-discharge goals of care, and treatment preferences.     Services At/After Discharge: Assisted Living and In ambulette     Patient discharged to return to [facility].  Spoke with [place], informed of discharge today and 3:00 transport.  Faxed AVS and MAR, RN aware to call report.  Spoke with patient informed of 3:00 transport.  Spoke with patients brother Hawk, informed of discharge and 3:00 transport.    
Advance Care Planning     General Advance Care Planning (ACP) Conversation    Date of Conversation: 10/29/2024  Conducted with: Patient with Decision Making Capacity  Other persons present: None    Healthcare Decision Maker:   The identified healthcare power of /surrogate decision makers are as follows:   Primary Decision Maker: hirakavita - Brother/Sister - [phone]    Primary Decision Maker: KwasiHawk - Brother/Sister - [phone]    Supplemental (Other) Decision Maker: Mars Schmid - Other - [phone]     Content/Action Overview:  Patient does not have any advanced directives and desires to have them completed and attached to the chart during this admission.  Referral to spiritual health made to complete ACP documents.    Treatment limitations and CODE STATUS to be reviewed by the provider.    Length of Voluntary ACP Conversation in minutes:  <16 minutes (Non-Billable)    KIRSTIN JAMES RN        
Case Management Assessment Initial Evaluation    Date/Time of Evaluation: 10/30/2024 12:51 PM  Assessment Completed by: Albania Pope RN    If patient is discharged prior to next notation, then this note serves as note for discharge by case management.    Patient Name: Duane C Adams                   YOB: 1960  Diagnosis: Shortness of breath [R06.02]  Abdominal pain, left upper quadrant [R10.12]  Angina at rest (HCC) [I20.89]  Acute chest pain [R07.9]                   Date / Time: 10/29/2024  8:43 AM  Location: [address] [place] [address]     Patient Admission Status: Observation   Readmission Risk Low 0-14, Mod 15-19), High > 20: Readmission Risk Score: 8.2    Current PCP: Tulio Hinton MD  Health Care Decision Makers:   Primary Decision Maker: kavita iniguez - Brother/Sister - [phone]    Primary Decision Maker: Hawk Villalpando - Brother/Sister - [phone]    Supplemental (Other) Decision Maker: Mars Schmid - Other - [phone]    Additional Case Management Notes: Admitted with CP. Apparently pt was wearing event monitor and was notified that he needed to be evaluated for abnormality. Norvasc. ASA. Ntg gtt.    Procedures: No    Imaging: No acute findings.     Patient Goals/Plan/Treatment Preferences: CM visit deferred to SW as pt is from [facility]           
and if so, who? No  Plans to Return to Present Housing: Yes  Other Identified Issues/Barriers to RETURNING to current housing: none  Potential Assistance needed at discharge: Other (Comment) ([facility])            Potential DME:    Patient expects to discharge to: Assisted living  Plan for transportation at discharge:  ambulette    Financial  Payor: [employer] MEDICARE / Plan: [employer] MEDICARE ADVANTAGE HMO / Product Type: Medicare /     Potential assistance Purchasing Medications:  no

## 2024-10-31 NOTE — PLAN OF CARE
Problem: Chronic Conditions and Co-morbidities  Goal: Patient's chronic conditions and co-morbidity symptoms are monitored and maintained or improved  Outcome: Progressing     Problem: Discharge Planning  Goal: Discharge to home or other facility with appropriate resources  10/30/2024 2059 by Jesica Yap RN  Outcome: Progressing  10/30/2024 1250 by Xin Dc LSW  Outcome: Progressing     Problem: Pain  Goal: Verbalizes/displays adequate comfort level or baseline comfort level  Outcome: Progressing     Problem: Safety - Adult  Goal: Free from fall injury  Outcome: Progressing     Problem: ABCDS Injury Assessment  Goal: Absence of physical injury  Outcome: Progressing      Name band;

## 2024-10-31 NOTE — DISCHARGE SUMMARY
Hospitalist Discharge Summary    Patient: Duane C Adams  YOB: 1960  MRN: [id number]   Acct: [id number]    Primary Care Physician: Tulio Hinton MD    Admit date  10/29/2024    Discharge date: 10/31/2024     Chief Complaint: \"Chest pain\"  Initial HPI: History obtained per patient and chart review.   \"Duane C Adams is a 64 y.o. male with PMHx of nonobstructive CAD, history of sinus pauses, prior PPM placement, HTN, HLD, DMT2, history of DVT, LEATHA noncompliant with CPAP, history of TIA who presents to [facility] with chest pain.  Patient describes pain on left side of chest that radiates to the left arm and upper abdomen.  Pain described as sharp and squeezing rated as 7 out of 10 on admission.  Patient reports pain started at 07 100 and upon waking on 10/29.  Patient reports associated shortness of breath at rest and diaphoresis as well.  Patient smokes cigarettes 2 PPD.  He has an event monitor that was placed upon discharge last hospitalization 10/13/2024.  Cardiology recommended avoiding beta-blockers after last hospitalization.  Was to follow-up outpatient with cardiology.  Upon examination, patient has tenderness to palpation parasternally.     ED course: GI cocktail, aspirin 324 mg, nitroglycerin drip, Nitrostat\"     10/30: Pt resting in bed. No acute events overnight. Reports continued chest pain, intermittent. Discussed plan of care.    Subjective (day of discharge):   10/31: Pt resting in recliner. No acute events noted overnight. Noted some chest pain this AM, typical of the type of chest pain he has been having intermittently. Discussed cardiology consultation, and emphasized the need for evaluation for sleep study. Discussed plan to further discuss with attending today prior to discharge.   Update: spoke with Dr. Bahena about pt case and discharge plan and he agreed with management and plan for discharge.    Patient responded well to medical management. Consultants

## 2024-10-31 NOTE — PROGRESS NOTES
Sent secure message to PA regarding c/o intermittent 4 on 0-10 scale pt states this is not new. New order for GI  cocktail. Pt up in recliner no signs of distress noted.

## 2024-10-31 NOTE — PROGRESS NOTES
Pharmacist Review and Automatic Dose Adjustment of Prophylactic Enoxaparin or Heparin    Reviewed reason for admission/hospital problem list    The reviewing pharmacist has made an adjustment to the ordered enoxaparin or heparin dose or converted to heparin per the approved [facility] protocol and table as identified below.      Recent Labs     10/29/24  0845 10/30/24  0558   CREATININE 0.7 0.7     Estimated Creatinine Clearance: 120 mL/min (based on SCr of 0.7 mg/dL).    Recent Labs     10/29/24  0845 10/30/24  0558   HGB 14.8 14.2   HCT 44.5 42.8    207     No results for input(s): \"INR\" in the last 72 hours.    Height:   Ht Readings from Last 1 Encounters:   10/30/24 1.626 m (5' 4\")     Weight:  Wt Readings from Last 1 Encounters:   10/30/24 112.5 kg (248 lb)       *Do not exceed enoxaparin 40mg daily or UFH 5000 units SUBQ TID in patients with epidurals,  lumbar drains, or external ventricular drains.    Plan:   Changed enoxaparin 40 mg subq daily to enoxaparin 30 mg subq BID.     Thank you,  Carin Carreon RP, BCPS, BCGP  10/30/2024     11:51 PM

## 2024-10-31 NOTE — PROGRESS NOTES
Cardiology Progress Note      Patient:  Duane C Adams  YOB: 1960  MRN: [id number]   Acct: [id number]  Admit Date:  10/29/2024  Primary Cardiologist: Darnell Ramirez MD    Note per dr fierro \"Reason for Consultation:  Chest pain        History Of Present Illness:    64 y.o. pleasant male c hx of DM, HTN, HLD, Depression, BMI 42, LEATHA not on CPAP, who presented to the hospital with complaints of chest pains.  As per patient, the chest pains over the entire chest, worsens with deep breathing.  He was recently in the hospital for similar reasons, underwent LHC which showed insignificant CAD.  He does have intermittent AV block, was previously lost to follow up.  Was recommended to follow up with EP as outpatient.  He was recently discharged with an event monitor.  On telemetry there is 2:1 AV block, mostly when he is sleeping.  Likely due to uncontrolled LEATHA.  Cardiology was consulted for further management.\"    Subjective (Events in last 24 hours): pt awake and alert.  NAD. Episode of left side chest pain today, sharp, lasted 5 min and self-resolved.   Reproducible with deep inspiration. No sob.  No edema or orthopnea  No lightheadedness or syncope  On RA      Objective:   BP (!) 149/88   Pulse 82   Temp 97.7 °F (36.5 °C) (Oral)   Resp 16   Ht 1.626 m (5' 4\")   Wt 112.5 kg (248 lb)   SpO2 95%   BMI 42.57 kg/m²        TELEMETRY: NSR 80-90s while awake, some bradycardia and pauses overnight up to 4.4 sec - patient was sleeping and asymptomatic during episodes    Physical Exam:  General Appearance: alert and oriented to person, place and time, in no acute distress  Cardiovascular: normal rate, regular rhythm, normal S1 and S2, no murmurs, rubs, clicks, or gallops, distal pulses intact, no carotid bruits, no JVD  Pulmonary/Chest: clear to auscultation bilaterally- no wheezes, rales or rhonchi, normal air movement, no respiratory distress  Abdomen: soft, non-tender, non-distended, normal bowel sounds, no  08:37 AM     10/31/2024 08:37 AM       CMP:    Lab Results   Component Value Date/Time     10/31/2024 08:37 AM    K 4.5 10/31/2024 08:37 AM     10/31/2024 08:37 AM    CO2 26 10/31/2024 08:37 AM    BUN 14 10/31/2024 08:37 AM    CREATININE 0.6 10/31/2024 08:37 AM    AGRATIO 2.4 10/06/2016 06:33 PM    LABGLOM > 90 10/31/2024 08:37 AM    LABGLOM >60 01/24/2024 06:41 PM    GLUCOSE 128 10/31/2024 08:37 AM    GLUCOSE 115 06/07/2023 08:06 AM    CALCIUM 8.7 10/31/2024 08:37 AM       Hepatic Function Panel:    Lab Results   Component Value Date/Time    ALKPHOS 104 10/31/2024 08:37 AM    ALT 31 10/31/2024 08:37 AM    AST 28 10/31/2024 08:37 AM    BILITOT 0.6 10/31/2024 08:37 AM    BILIDIR 0.2 10/10/2024 04:04 PM       Magnesium:    Lab Results   Component Value Date/Time    MG 1.7 01/24/2024 06:41 PM       PT/INR:    Lab Results   Component Value Date/Time    PROTIME 10.5 10/06/2016 06:33 PM    INR 0.97 10/11/2024 10:11 AM       HgBA1c:    Lab Results   Component Value Date/Time    LABA1C 6.3 10/10/2024 04:04 PM       FLP:    Lab Results   Component Value Date/Time    TRIG 204 06/07/2023 08:06 AM    HDL 37 06/07/2023 08:06 AM    LDLDIRECT 193.30 01/27/2022 03:58 PM       TSH:    Lab Results   Component Value Date/Time    TSH 4.25 06/07/2023 08:06 AM         Assessment:    Atypical chest pain/pleuritic - noncardiac  Intermittent AVB during sleep with pauses up to 5.2 sec - asymptomatic  Ef 55-60 per TTE 10/11/24  No significant CAD per cath 10/11/24  Obesity  Likely LEATHA - not treated  HTN  HLD    Discussed with dr fierro  Plan:    Cont with event monitor   Referral to EP as OP  Avoid AVN blocking meds  Pt is scheduled 11/18 with sleep clinic- advised to keep appointment   Discussed with dr fierro, no further w/up needed at this time  Will follow prn  Follow up dr mishra 3 weeks       Electronically signed by Esme Mancia PA-C on 10/31/2024 at 10:46 AM

## 2024-11-04 ENCOUNTER — TELEPHONE (OUTPATIENT)
Dept: CARDIOLOGY CLINIC | Age: 64
End: 2024-11-04

## 2024-11-04 NOTE — TELEPHONE ENCOUNTER
Pt was admitted to the hospital  10/29/2024-10/31/2024.  Pt is having this pausin/2 at 11:08pm,  at 9:58pm,  at 8:58pm, 11/3 at 1:52am and 11/3 at 1:18am.     Pt has a sleep study scheduled on .  Any earlier availability to get patient in?  Pt is having anywhere from a 6-8 second pauses when he is sleeping.

## 2024-11-04 NOTE — TELEPHONE ENCOUNTER
21 and 22 day of event monitor. Venkata with Pause 6.9 seconds ,sinus tach with ventricular asystole 8.1 and a 5.9 second, more pauses of venkata 3.3 and 5.9, 7.4

## 2024-11-05 NOTE — TELEPHONE ENCOUNTER
Please see Dr. Tomas's message:  Jay Tomas MD   to Me       11/4/24 12:51 PM  Stan,  This gentleman is not a patient of our clinic  I will be lad to see ASAP.    Is patient able to be seen before appt on 11/18?

## 2024-11-06 NOTE — TELEPHONE ENCOUNTER
I called Suma at Pulmonary office asking for a call back.   Pt has an appt on 11/13 with Cardiology and 11/18 with Dr. Tomas.

## 2024-11-07 NOTE — TELEPHONE ENCOUNTER
Called cardiology and spoke with Nancy. She just wanted to check on status of patient getting a sooner appt, I verified patients consult for 11.8.24.

## 2024-11-08 ENCOUNTER — OFFICE VISIT (OUTPATIENT)
Dept: PULMONOLOGY | Age: 64
End: 2024-11-08
Payer: MEDICARE

## 2024-11-08 VITALS
BODY MASS INDEX: 41.45 KG/M2 | HEIGHT: 64 IN | TEMPERATURE: 99 F | OXYGEN SATURATION: 94 % | SYSTOLIC BLOOD PRESSURE: 136 MMHG | DIASTOLIC BLOOD PRESSURE: 78 MMHG | WEIGHT: 242.8 LBS | HEART RATE: 108 BPM

## 2024-11-08 DIAGNOSIS — G47.33 OSA (OBSTRUCTIVE SLEEP APNEA): Primary | ICD-10-CM

## 2024-11-08 DIAGNOSIS — I10 PRIMARY HYPERTENSION: ICD-10-CM

## 2024-11-08 DIAGNOSIS — E66.01 MORBID OBESITY WITH BMI OF 40.0-44.9, ADULT: ICD-10-CM

## 2024-11-08 DIAGNOSIS — I44.30 AV BLOCK: ICD-10-CM

## 2024-11-08 DIAGNOSIS — I48.0 PAF (PAROXYSMAL ATRIAL FIBRILLATION) (HCC): ICD-10-CM

## 2024-11-08 PROCEDURE — 99204 OFFICE O/P NEW MOD 45 MIN: CPT | Performed by: INTERNAL MEDICINE

## 2024-11-08 PROCEDURE — 1124F ACP DISCUSS-NO DSCNMKR DOCD: CPT | Performed by: INTERNAL MEDICINE

## 2024-11-08 PROCEDURE — 3078F DIAST BP <80 MM HG: CPT | Performed by: INTERNAL MEDICINE

## 2024-11-08 PROCEDURE — 3075F SYST BP GE 130 - 139MM HG: CPT | Performed by: INTERNAL MEDICINE

## 2024-11-08 NOTE — TELEPHONE ENCOUNTER
Okay. His sleep apnea is likely precipitation both pauses and new afib now. He ultimately needs sleep apnea treated but also probably seen by EP… which I’m sure can’t be done too soon so just keep his appointment and monitor his HR for now. All his AVN blockers were stopped within the last month

## 2024-11-08 NOTE — TELEPHONE ENCOUNTER
FYI:  Strips today show Afib  from 0706 this morning. Called and spoke with RN at [facility]. Pt did not have any symptoms or complaints. He went to pulmonary appt as scheduled today.   Do not seen history of Afib documented.   No OAC.   Pt has appt with Sacha 11/13.  Previous strips with pauses.  Sleep study scheduled next week.    Honestly, not sure who this patient's cardiologist is. He has never followed up after his hospitalizations. Last seen in house by Dr Silva.

## 2024-11-08 NOTE — PROGRESS NOTES
New Sleep Patient H/P    Presentation:  Duane is referred by the hospitalist team for obstructive sleep apnea.    Patient is a 64-year-old gentleman with developmental delay known to us from previous interactions, with patient in the office in 2016 diagnosed with obstructive sleep apnea and is started on CPAP, he was lost from follow-up last time we saw him was about 6 years ago.    Patient reports difficulty falling asleep, difficulty staying asleep, difficulty waking in the morning, unrefreshing nonrestorative sleep, excessive daytime sleepiness, inappropriate sleep at inappropriate times, daytime fatigue and tiredness, loud snoring, nocturia    Multiple medical issues, recent admission to the hospital with chest pain during workup he was noted that he presented frequent sinus pauses during his sleep diagnosed with intermittent AV block with pauses up to 5.2 seconds, PAF, scheduled to follow-up with cardiology, other medical issues include morbid obesity, non-insulin-dependent diabetes mellitus, hyperlipidemia, hypothyroidism, hypertension, seizures, obstructive sleep apnea    Significant cognitive impairments currently residing in [facility]      Time in Bed:   Bedtime: could not answer  Awakens  could not answer \"depends\"         He denies any history of sleep walking or sleep talking. No history of seizures activity. No history suggestive of restless legs syndrome. No history of bruxism. No history of head injury.    Naps:  Any naps? Yes and are they helpful Yes    Snoring and Apneas:  Do you snore or been told you a snore? No  How long have known about your snoring? years  Any witnessed apneas? Yes  Any awakenings with choking or gasping? Yes      Weight:  Any change in weight over the past year? Yes   How about past 5 years? Yes  How much? 20        Past Medical History:   Diagnosis Date    Chronic leg pain     Diabetes (HCC) 05/2018

## 2024-11-12 NOTE — PROGRESS NOTES
of 3) 02/09/2018    Pneumococcal 65+ years Vaccine (2 of 2 - PCV) 10/30/2018    Diabetic retinal exam  07/03/2020    Diabetic Alb to Cr ratio (uACR) test  06/14/2023    Annual Wellness Visit (Medicare)  11/27/2023    Diabetic foot exam  06/05/2024    Depression Monitoring  06/05/2024    Lipids  06/07/2024    COVID-19 Vaccine (3 - 2023-24 season) 09/01/2024    A1C test (Diabetic or Prediabetic)  10/10/2025    Lung Cancer Screening &/or Counseling  10/29/2025    GFR test (Diabetes, CKD 3-4, OR last GFR 15-59)  10/31/2025    Colorectal Cancer Screen  07/16/2027    Flu vaccine  Completed    Respiratory Syncytial Virus (RSV) Pregnant or age 60 yrs+  Completed    AAA screen  Completed    HIV screen  Completed    Hepatitis A vaccine  Aged Out    Hepatitis B vaccine  Aged Out    Hib vaccine  Aged Out    Polio vaccine  Aged Out    Meningococcal (ACWY) vaccine  Aged Out    Pneumococcal 0-64 years Vaccine  Discontinued        Objective:  General:   Well developed, well nourished  Lungs:   Clear to auscultation  Heart:    Normal S1 S2, no murmur. no rubs, no gallops  Abdomen:   Soft, non tender, no organomegalies, positive bowel sounds  Extremities:   No edema, no cyanosis, good peripheral pulses  Neurological:   Awake, alert, oriented. No obvious focal deficits  Musculoskelatal:  No obvious deformities   /85   Pulse 100   Ht 1.626 m (5' 4\")   Wt 109 kg (240 lb 3.2 oz)   BMI 41.23 kg/m²     Assessment:  Assessment & Plan    Diagnosis Orders   1. Atrioventricular block        2. Primary hypertension        3. Sinus pause        4. PAF (paroxysmal atrial fibrillation) (HCC)        5. Essential hypertension        6. Dyslipidemia            Plan:  AVB block/sinus pause, PAF- continue to feel his symptoms are primarily sleep apnea related. He has sleep study tonight. Did discuss OAC r/b/I/a and he is agreeable. Will start eliquis 5 mg BID. No AVN blockers for now given pauses. May need to consider repeat monitor after

## 2024-11-13 ENCOUNTER — HOSPITAL ENCOUNTER (OUTPATIENT)
Dept: SLEEP CENTER | Age: 64
Discharge: HOME | End: 2024-11-15
Payer: MEDICARE

## 2024-11-13 ENCOUNTER — OFFICE VISIT (OUTPATIENT)
Dept: CARDIOLOGY CLINIC | Age: 64
End: 2024-11-13
Payer: MEDICARE

## 2024-11-13 VITALS
WEIGHT: 240.2 LBS | SYSTOLIC BLOOD PRESSURE: 129 MMHG | DIASTOLIC BLOOD PRESSURE: 85 MMHG | BODY MASS INDEX: 41.01 KG/M2 | HEIGHT: 64 IN | HEART RATE: 100 BPM

## 2024-11-13 DIAGNOSIS — E66.01 MORBID OBESITY WITH BMI OF 40.0-44.9, ADULT: ICD-10-CM

## 2024-11-13 DIAGNOSIS — I10 PRIMARY HYPERTENSION: ICD-10-CM

## 2024-11-13 DIAGNOSIS — I48.0 PAF (PAROXYSMAL ATRIAL FIBRILLATION) (HCC): ICD-10-CM

## 2024-11-13 DIAGNOSIS — G47.33 OSA (OBSTRUCTIVE SLEEP APNEA): ICD-10-CM

## 2024-11-13 DIAGNOSIS — I44.30 ATRIOVENTRICULAR BLOCK: Primary | ICD-10-CM

## 2024-11-13 DIAGNOSIS — E78.5 DYSLIPIDEMIA: ICD-10-CM

## 2024-11-13 DIAGNOSIS — I45.5 SINUS PAUSE: ICD-10-CM

## 2024-11-13 DIAGNOSIS — I10 ESSENTIAL HYPERTENSION: ICD-10-CM

## 2024-11-13 PROCEDURE — 3079F DIAST BP 80-89 MM HG: CPT | Performed by: STUDENT IN AN ORGANIZED HEALTH CARE EDUCATION/TRAINING PROGRAM

## 2024-11-13 PROCEDURE — 99214 OFFICE O/P EST MOD 30 MIN: CPT | Performed by: STUDENT IN AN ORGANIZED HEALTH CARE EDUCATION/TRAINING PROGRAM

## 2024-11-13 PROCEDURE — 1124F ACP DISCUSS-NO DSCNMKR DOCD: CPT | Performed by: STUDENT IN AN ORGANIZED HEALTH CARE EDUCATION/TRAINING PROGRAM

## 2024-11-13 PROCEDURE — 3074F SYST BP LT 130 MM HG: CPT | Performed by: STUDENT IN AN ORGANIZED HEALTH CARE EDUCATION/TRAINING PROGRAM

## 2024-11-13 PROCEDURE — 95811 POLYSOM 6/>YRS CPAP 4/> PARM: CPT

## 2024-11-14 LAB — ECHO BSA: 2.23 M2

## 2024-11-17 DIAGNOSIS — G47.39 TREATMENT-EMERGENT CENTRAL SLEEP APNEA: Primary | ICD-10-CM

## 2024-11-17 DIAGNOSIS — G47.30 SLEEP APNEA, UNSPECIFIED TYPE: ICD-10-CM

## 2024-12-02 ENCOUNTER — HOSPITAL ENCOUNTER (OUTPATIENT)
Dept: AUDIOLOGY | Age: 64
Discharge: HOME | End: 2024-12-02

## 2024-12-02 PROCEDURE — 9990000010 HC NO CHARGE VISIT: Performed by: AUDIOLOGIST

## 2024-12-02 NOTE — PROGRESS NOTES
ACCOUNT #: [id number]      DIAGNOSIS: H90.3    ANNUAL HEARING AID CHECK: (Jose Cruz Myers L30-R x2 2024- warranty 05/22/2027) Patient states that he cannot hear from either hearing aid. Otoscopy revealed non occluding cerumen in the right ear and a clear canal in the left ear. The hearing aid domes were covered in debris and both cerustop filters were completely occluded. NO output noted with listening check. The hearing aids were cleaned- mics vacuumed, cerustop filters were removed and the receivers were vacuumed. New cerustop filter, retention wire and large vented dome installed on each hearing aid. Listening check of hearing aids revealed clear asif response and good hearing aid output. Given the poor history of hearing aid maintenance- the patient stated the staff is to maintain his hearing aids ( routine cleaning and filter/dome change) a 3 month hearing aid check in recommended. The patient's next hearing aid check is scheduled for 03/03/2025.

## 2024-12-03 ENCOUNTER — HOSPITAL ENCOUNTER (OUTPATIENT)
Dept: SLEEP CENTER | Age: 64
Discharge: HOME | End: 2024-12-05
Payer: MEDICARE

## 2024-12-03 DIAGNOSIS — G47.39 TREATMENT-EMERGENT CENTRAL SLEEP APNEA: ICD-10-CM

## 2024-12-03 DIAGNOSIS — G47.30 SLEEP APNEA, UNSPECIFIED TYPE: ICD-10-CM

## 2024-12-03 PROCEDURE — 95811 POLYSOM 6/>YRS CPAP 4/> PARM: CPT

## 2024-12-08 DIAGNOSIS — G47.39 COMPLEX SLEEP APNEA SYNDROME: Primary | ICD-10-CM

## 2024-12-09 NOTE — PROGRESS NOTES
Called [phone] to inform pt of the PAP order.  Pt is a resident at [facility] in [place].  Spoke w/the DON and she gave me the fax number.      Order faxed to [facility] at [phone] with message to let us know when pt is set up and will need downloads for all future appointment to accompany pt.

## 2025-01-30 ENCOUNTER — OFFICE VISIT (OUTPATIENT)
Dept: ENT CLINIC | Age: 65
End: 2025-01-30
Payer: MEDICARE

## 2025-01-30 VITALS
WEIGHT: 233.6 LBS | HEART RATE: 97 BPM | OXYGEN SATURATION: 91 % | BODY MASS INDEX: 39.88 KG/M2 | HEIGHT: 64 IN | TEMPERATURE: 98.4 F

## 2025-01-30 DIAGNOSIS — Z97.4 WEARS HEARING AID IN BOTH EARS: ICD-10-CM

## 2025-01-30 DIAGNOSIS — H61.23 BILATERAL IMPACTED CERUMEN: Primary | ICD-10-CM

## 2025-01-30 PROCEDURE — 69210 REMOVE IMPACTED EAR WAX UNI: CPT | Performed by: REGISTERED NURSE

## 2025-01-30 NOTE — PROGRESS NOTES
Procedure note:  DATE AND TIME OF PROCEDURE: 1/30/2025 11:12 AM  PATIENT NAME: Duane C Adams  MRN [id number]  PROVIDER: SITA Ojeda CNP   PRE-PROCEDURE DIAGNOSIS:  Bilateral cerumen impaction  POST-PROCEDURE DIAGNOSIS:   Same     INDICATION: Cerumenosis causing an inability to visualize the tympanic membrane, middle ear space and/or external auditory canal.     TEACHING: Procedure, benefits, and risks were explained to patient/family. Verbal informed consent was obtained.    TIME OUT: A time out was conducted immediately before starting the procedure that confirmed a final verification of the correct patient, correct procedure, correct patient position, correct site and availability of special equipment.    DESCRIPTION OF PROCEDURE:  PROCEDURE: Cerumenectomy  SITE: Bilateral ear(s)    ANESTHESIA:  NONE  COMPLICATIONS: NONE  EBL: NONE    PROCEDURE: Handheld otoscope used to visualize EAC. Bilateral cerumen in place, obstructing visualization of tympanic membranes.  Cerumen removed with curette and right angle ball tip until tympanic membranes could be visualized as documented above.  Normal exam. The patient tolerated the procedure well, with no complications.

## 2025-02-18 NOTE — PROGRESS NOTES
[facility] [place] [facility]  [facility] CARDIOLOGY  [address]  [address]  [facility] [facility] [address]  Dept: [phone]  Dept Fax: [phone]  Loc: [phone]    Visit Date: 2/19/2025    Duane C Adams is a 64 y.o. male who presents todayfor:  Chief Complaint   Patient presents with    Follow-up     Cardiologist: James Interiano of preserved EF, LEATHA workup soon, no  CAD, intermittent AVB during sleep, HTN, HLD    HPI: 3 month f/u  Still sob, can't catch his breath a lot. BP and HR are okay. Still not treated yet for sleep apnea? Discussed reaching out to see when supplies might be available. Denies cp, no significant swelling or weight changes. Not a great appetite, ? Whether thyroid tests recently?     Past Surgical History:   Procedure Laterality Date    ANKLE SURGERY Right 01/01/2000    CARDIAC PROCEDURE N/A 10/11/2024    Left heart cath / coronary angiography performed by Monroe Saldivar MD at [facility] CARDIAC CATH LAB    CARDIAC PROCEDURE N/A 10/11/2024    Insert temporary pacemaker performed by Monroe Saldivar MD at [facility] CARDIAC CATH LAB    CATARACT REMOVAL Left 02/09/2022    ELBOW SURGERY Left 02/01/2022    ulnar nerve decompression    KNEE ARTHROSCOPY Left 09/11/2015    Torn cartliage    LEG SURGERY Right 01/01/2000    AR COLSC FLX W/RMVL OF TUMOR POLYP LESION SNARE TQ  07/16/2017    COLONOSCOPY POLYPECTOMY SNARE/COLD BIOPSY performed by Ryan Perales MD at [facility] Endoscopy    AR EGD TRANSORAL BIOPSY SINGLE/MULTIPLE N/A 09/27/2017    EGD BIOPSY performed by Glendy Morrison MD at [facility] Endoscopy    UPPER GASTROINTESTINAL ENDOSCOPY  07/16/2017    EGD DIAGNOSTIC ONLY performed by Ryan Perales MD at [facility] Endoscopy     Family History   Problem Relation Age of Onset    Dementia Mother     High Blood Pressure Mother     COPD Father     High Cholesterol Father     High Blood Pressure Father     Dementia Maternal Aunt     Seizures Maternal Uncle      Social History     Tobacco Use    Smoking

## 2025-02-19 ENCOUNTER — OFFICE VISIT (OUTPATIENT)
Dept: CARDIOLOGY CLINIC | Age: 65
End: 2025-02-19
Payer: MEDICARE

## 2025-02-19 VITALS
HEIGHT: 64 IN | SYSTOLIC BLOOD PRESSURE: 123 MMHG | HEART RATE: 95 BPM | DIASTOLIC BLOOD PRESSURE: 87 MMHG | WEIGHT: 233 LBS | BODY MASS INDEX: 39.78 KG/M2

## 2025-02-19 DIAGNOSIS — I44.30 ATRIOVENTRICULAR BLOCK: Primary | ICD-10-CM

## 2025-02-19 DIAGNOSIS — I10 PRIMARY HYPERTENSION: ICD-10-CM

## 2025-02-19 PROCEDURE — 1124F ACP DISCUSS-NO DSCNMKR DOCD: CPT | Performed by: STUDENT IN AN ORGANIZED HEALTH CARE EDUCATION/TRAINING PROGRAM

## 2025-02-19 PROCEDURE — 99214 OFFICE O/P EST MOD 30 MIN: CPT | Performed by: STUDENT IN AN ORGANIZED HEALTH CARE EDUCATION/TRAINING PROGRAM

## 2025-02-19 PROCEDURE — 3079F DIAST BP 80-89 MM HG: CPT | Performed by: STUDENT IN AN ORGANIZED HEALTH CARE EDUCATION/TRAINING PROGRAM

## 2025-02-19 PROCEDURE — 3074F SYST BP LT 130 MM HG: CPT | Performed by: STUDENT IN AN ORGANIZED HEALTH CARE EDUCATION/TRAINING PROGRAM

## 2025-02-20 ENCOUNTER — TELEPHONE (OUTPATIENT)
Dept: PULMONOLOGY | Age: 65
End: 2025-02-20

## 2025-02-21 ENCOUNTER — APPOINTMENT (OUTPATIENT)
Dept: GENERAL RADIOLOGY | Age: 65
End: 2025-02-21
Payer: MEDICARE

## 2025-02-21 ENCOUNTER — HOSPITAL ENCOUNTER (EMERGENCY)
Age: 65
Discharge: HOME | End: 2025-02-21
Attending: EMERGENCY MEDICINE
Payer: MEDICARE

## 2025-02-21 VITALS
TEMPERATURE: 98 F | DIASTOLIC BLOOD PRESSURE: 76 MMHG | OXYGEN SATURATION: 93 % | SYSTOLIC BLOOD PRESSURE: 152 MMHG | HEART RATE: 93 BPM | RESPIRATION RATE: 22 BRPM

## 2025-02-21 DIAGNOSIS — R07.9 CHEST PAIN, UNSPECIFIED TYPE: Primary | ICD-10-CM

## 2025-02-21 LAB
ALBUMIN SERPL BCG-MCNC: 4.1 G/DL (ref 3.5–5.1)
ALP SERPL-CCNC: 96 U/L (ref 38–126)
ALT SERPL W/O P-5'-P-CCNC: 27 U/L (ref 11–66)
ANION GAP SERPL CALC-SCNC: 15 MEQ/L (ref 8–16)
AST SERPL-CCNC: 35 U/L (ref 5–40)
BASOPHILS ABSOLUTE: 0 THOU/MM3 (ref 0–0.1)
BASOPHILS NFR BLD AUTO: 0.4 %
BILIRUB CONJ SERPL-MCNC: < 0.1 MG/DL (ref 0–0.2)
BILIRUB SERPL-MCNC: 0.4 MG/DL (ref 0.3–1.2)
BUN SERPL-MCNC: 11 MG/DL (ref 7–22)
CALCIUM SERPL-MCNC: 8.7 MG/DL (ref 8.2–9.6)
CHLORIDE SERPL-SCNC: 100 MEQ/L (ref 98–111)
CO2 SERPL-SCNC: 26 MEQ/L (ref 23–33)
CREAT SERPL-MCNC: 0.7 MG/DL (ref 0.4–1.2)
D DIMER PPP IA.FEU-MCNC: 254 NG/ML FEU (ref 0–500)
DEPRECATED RDW RBC AUTO: 44.4 FL (ref 35–45)
EOSINOPHIL NFR BLD AUTO: 2 %
EOSINOPHILS ABSOLUTE: 0.1 THOU/MM3 (ref 0–0.4)
ERYTHROCYTE [DISTWIDTH] IN BLOOD BY AUTOMATED COUNT: 13.4 % (ref 11.5–14.5)
GFR SERPL CREATININE-BSD FRML MDRD: > 90 ML/MIN/1.73M2
GLUCOSE SERPL-MCNC: 100 MG/DL (ref 70–108)
HCT VFR BLD AUTO: 42.2 % (ref 42–52)
HGB BLD-MCNC: 13.9 GM/DL (ref 14–18)
IMM GRANULOCYTES # BLD AUTO: 0.03 THOU/MM3 (ref 0–0.07)
IMM GRANULOCYTES NFR BLD AUTO: 0.4 %
LIPASE SERPL-CCNC: 24.8 U/L (ref 5.6–51.3)
LYMPHOCYTES ABSOLUTE: 1.7 THOU/MM3 (ref 1–4.8)
LYMPHOCYTES NFR BLD AUTO: 24.2 %
MCH RBC QN AUTO: 29.9 PG (ref 26–33)
MCHC RBC AUTO-ENTMCNC: 32.9 GM/DL (ref 32.2–35.5)
MCV RBC AUTO: 90.8 FL (ref 80–94)
MONOCYTES ABSOLUTE: 0.6 THOU/MM3 (ref 0.4–1.3)
MONOCYTES NFR BLD AUTO: 8.5 %
NEUTROPHILS ABSOLUTE: 4.5 THOU/MM3 (ref 1.8–7.7)
NEUTROPHILS NFR BLD AUTO: 64.5 %
NRBC BLD AUTO-RTO: 0 /100 WBC
NT-PROBNP SERPL IA-MCNC: < 36 PG/ML (ref 0–124)
OSMOLALITY SERPL CALC.SUM OF ELEC: 280.7 MOSMOL/KG (ref 275–300)
PLATELET # BLD AUTO: 222 THOU/MM3 (ref 130–400)
PMV BLD AUTO: 11.8 FL (ref 9.4–12.4)
POTASSIUM SERPL-SCNC: 4 MEQ/L (ref 3.5–5.2)
PROT SERPL-MCNC: 6.1 G/DL (ref 6.1–8)
RBC # BLD AUTO: 4.65 MILL/MM3 (ref 4.7–6.1)
SODIUM SERPL-SCNC: 141 MEQ/L (ref 135–145)
TROPONIN, HIGH SENSITIVITY: < 6 NG/L (ref 0–12)
WBC # BLD AUTO: 6.9 THOU/MM3 (ref 4.8–10.8)

## 2025-02-21 PROCEDURE — 85025 COMPLETE CBC W/AUTO DIFF WBC: CPT

## 2025-02-21 PROCEDURE — 84484 ASSAY OF TROPONIN QUANT: CPT

## 2025-02-21 PROCEDURE — 93005 ELECTROCARDIOGRAM TRACING: CPT | Performed by: EMERGENCY MEDICINE

## 2025-02-21 PROCEDURE — 80076 HEPATIC FUNCTION PANEL: CPT

## 2025-02-21 PROCEDURE — 83690 ASSAY OF LIPASE: CPT

## 2025-02-21 PROCEDURE — 99285 EMERGENCY DEPT VISIT HI MDM: CPT

## 2025-02-21 PROCEDURE — 71046 X-RAY EXAM CHEST 2 VIEWS: CPT

## 2025-02-21 PROCEDURE — 83880 ASSAY OF NATRIURETIC PEPTIDE: CPT

## 2025-02-21 PROCEDURE — 80048 BASIC METABOLIC PNL TOTAL CA: CPT

## 2025-02-21 PROCEDURE — 36415 COLL VENOUS BLD VENIPUNCTURE: CPT

## 2025-02-21 PROCEDURE — 85379 FIBRIN DEGRADATION QUANT: CPT

## 2025-02-21 ASSESSMENT — HEART SCORE: ECG: NORMAL

## 2025-02-21 ASSESSMENT — PAIN - FUNCTIONAL ASSESSMENT: PAIN_FUNCTIONAL_ASSESSMENT: 0-10

## 2025-02-21 ASSESSMENT — PAIN SCALES - GENERAL: PAINLEVEL_OUTOF10: 3

## 2025-02-22 LAB
EKG ATRIAL RATE: 91 BPM
EKG P AXIS: 50 DEGREES
EKG P-R INTERVAL: 148 MS
EKG Q-T INTERVAL: 326 MS
EKG QRS DURATION: 70 MS
EKG QTC CALCULATION (BAZETT): 400 MS
EKG R AXIS: 37 DEGREES
EKG T AXIS: 81 DEGREES
EKG VENTRICULAR RATE: 91 BPM

## 2025-02-22 PROCEDURE — 93010 ELECTROCARDIOGRAM REPORT: CPT | Performed by: INTERNAL MEDICINE

## 2025-02-22 NOTE — DISCHARGE INSTRUCTIONS
You were seen and evaluated emergency department today for chest pain.  Your lab work EKG and chest x-ray were reassuring.    Follow-up with cardiology here at [facility] for further evaluation of your chest pain as scheduled on 2/25/2025 at 9:30 AM.    Return to emergency department anytime for acute or worrisome changes occluding significant chest pain, shortness of breath, loss consciousness, any other worrisome changes or concerns

## 2025-02-22 NOTE — ED TRIAGE NOTES
Pt comes to ED with c/o CP and SOB that started about 4 days ago. Pt states he is from nursing home. Pt states he didn't want to bother anybody by telling them. PT states the pain comes and goes but today it was more consistent. Pt states his pain was a 9 out of 10. EMS report giving pt 4 ASA and 2 nitro en route. EMS states pt pain went from 9 to a 3 out of 10. EKG completed. Pt states he was also having numbness and tingling that goes down his left arm that has been going on for 4 days as well.

## 2025-02-22 NOTE — ED PROVIDER NOTES
[facility] EMERGENCY DEPARTMENT  EMERGENCY DEPARTMENT ENCOUNTER          Pt Name: Duane C Adams  MRN: [id number]  Birthdate 1960  Date of evaluation: 2/21/2025  Resident Physician: David Bautista MD EM Resident PGY-3  Attending Physician: Gallo Whiteside DO      CHIEF COMPLAINT       Chief Complaint   Patient presents with    Chest Pain         HISTORY OF PRESENT ILLNESS    HPI  Duane C Adams is a 64 y.o. male who presents to the emergency department from nursing home, brought in by EMS for evaluation of chest pain.    Per report from nursing home patient states that he has been having chest pain shortness of breath for 4 days.  Patient states abdominal bothering body by telling them.  States that the pain comes and goes but is more constant today.  States that it is left-sided rating down his arm endorses 9/10 severity.  Patient gave patient loading dose oral aspirin and 2 nitro and route.  Patient has prior history of DVT was previously on Eliquis states that he is not taking it currently.      The patient has no other acute complaints at this time.    ROS negative except as stated above.    PAST MEDICAL AND SURGICAL HISTORY     Past Medical History:   Diagnosis Date    Chronic leg pain     Diabetes (HCC) 05/2018    resolved with weight loss.    GERD (gastroesophageal reflux disease)     History of deep venous thrombosis (DVT) of distal vein of left lower extremity 2020    Hyperlipidemia     Hypertension     Hypothyroidism     Right homonymous hemianopsia     Sleep apnea     doesn't use CPAP    TIA (transient ischemic attack) 2016    Dr. Gong      Past Surgical History:   Procedure Laterality Date    ANKLE SURGERY Right 01/01/2000    CARDIAC PROCEDURE N/A 10/11/2024    Left heart cath / coronary angiography performed by Monroe Saldivar MD at [facility] CARDIAC CATH LAB    CARDIAC PROCEDURE N/A 10/11/2024    Insert temporary pacemaker performed by Monroe Saldivar MD at [facility] CARDIAC CATH LAB

## 2025-03-03 ENCOUNTER — HOSPITAL ENCOUNTER (OUTPATIENT)
Dept: AUDIOLOGY | Age: 65
Discharge: HOME | End: 2025-03-03

## 2025-03-03 PROCEDURE — 9990000010 HC NO CHARGE VISIT: Performed by: AUDIOLOGIST

## 2025-03-03 NOTE — PROGRESS NOTES
Patient here for a  3 month hearing aid clean and check.  Patient doesn't have his hearing aids with him today. He took them out to shower this morning and did not put them back in. Contacted [facility] and appointment was rescheduled per Julita.  Patient rescheduled for 03/20/25. He was reminded to consistently wear his hearing aids during waking hours, particular when he is going to appointments to facilitate communication with his providers.

## 2025-03-11 ENCOUNTER — OFFICE VISIT (OUTPATIENT)
Dept: CARDIOLOGY CLINIC | Age: 65
End: 2025-03-11
Payer: MEDICARE

## 2025-03-11 VITALS
SYSTOLIC BLOOD PRESSURE: 118 MMHG | HEIGHT: 64 IN | BODY MASS INDEX: 39.13 KG/M2 | DIASTOLIC BLOOD PRESSURE: 68 MMHG | HEART RATE: 88 BPM | WEIGHT: 229.2 LBS

## 2025-03-11 DIAGNOSIS — E78.5 DYSLIPIDEMIA: ICD-10-CM

## 2025-03-11 DIAGNOSIS — I10 PRIMARY HYPERTENSION: ICD-10-CM

## 2025-03-11 DIAGNOSIS — I44.30 ATRIOVENTRICULAR BLOCK: Primary | ICD-10-CM

## 2025-03-11 PROCEDURE — 3074F SYST BP LT 130 MM HG: CPT | Performed by: PHYSICIAN ASSISTANT

## 2025-03-11 PROCEDURE — 1124F ACP DISCUSS-NO DSCNMKR DOCD: CPT | Performed by: PHYSICIAN ASSISTANT

## 2025-03-11 PROCEDURE — 99214 OFFICE O/P EST MOD 30 MIN: CPT | Performed by: PHYSICIAN ASSISTANT

## 2025-03-11 PROCEDURE — 3078F DIAST BP <80 MM HG: CPT | Performed by: PHYSICIAN ASSISTANT

## 2025-03-11 RX ORDER — UMECLIDINIUM BROMIDE AND VILANTEROL TRIFENATATE 62.5; 25 UG/1; UG/1
1 POWDER RESPIRATORY (INHALATION) DAILY
COMMUNITY

## 2025-03-11 RX ORDER — ASPIRIN 81 MG/1
81 TABLET ORAL DAILY
COMMUNITY

## 2025-03-11 NOTE — PROGRESS NOTES
[facility] [place] [facility]  [facility] CARDIOLOGY  [address]  [address]  [place] [address]  Dept: [phone]  Dept Fax: [phone]  Loc: [phone]    Chief Complaint   Patient presents with    Follow-Up from [facility]     Ramirez pt was in er for chest pain       History of Present Illness  The patient is a 64-year-old gentleman with a history of intermittent AV block during sleep, hypertension, and hyperlipidemia who presents for follow-up.    He was recently admitted to the emergency room due to chest pain, which has since subsided. He reports experiencing intermittent pain, not associated with physical activity such as walking, but rather when he is at rest or lying down. He has some mild intermittent sob.     He is currently on amlodipine 5 mg twice daily for hypertension management.    He was anticoagulation therapy for a previous blood clot in his leg.    MEDICATIONS  Amlodipine 5 mg twice a day, Eliquis (discontinued).       Cardiologist:  Dr. Ramirez        General:   No fever, no chills, No fatigue or weight loss  Pulmonary:   Intermittent shortness of breath   cardiac:   Occasional nonspecific chest discomfort   GI:     No nausea or vomiting, no abdominal pain  Neuro:    No dizziness or light headedness  Musculoskeletal:  No recent active issues  Extremities:   No edema, good peripheral pulses      Past Medical History:   Diagnosis Date    Chronic leg pain     Diabetes (HCC) 05/2018    resolved with weight loss.    GERD (gastroesophageal reflux disease)     History of deep venous thrombosis (DVT) of distal vein of left lower extremity 2020    Hyperlipidemia     Hypertension     Hypothyroidism     Right homonymous hemianopsia     Sleep apnea     doesn't use CPAP    TIA (transient ischemic attack) 2016    Dr. Gong        No Known Allergies    Current Outpatient Medications   Medication Sig Dispense Refill    umeclidinium-vilanterol (ANORO ELLIPTA) 62.5-25 MCG/ACT inhaler

## 2025-03-20 ENCOUNTER — HOSPITAL ENCOUNTER (OUTPATIENT)
Dept: AUDIOLOGY | Age: 65
Discharge: HOME | End: 2025-03-20

## 2025-03-20 PROCEDURE — 9990000010 HC NO CHARGE VISIT: Performed by: AUDIOLOGIST

## 2025-03-20 NOTE — PROGRESS NOTES
ACCOUNT #: [id number]      DIAGNOSIS: H90.3    3 MONTH HEARING AID CHECK:  (Jose Cruz Myers L30-R x2 2024- warranty 05/22/2027) Patient here for 3 month hearing aid check. He is not having any issues. Otoscopy was clear in both ears. The hearing aid domes were covered in debris and the cerustop filter in the right hearing aid was completely occluded. NO output noted from right hearing aid with listening check. The hearing aids were cleaned- mics vacuumed, cerustop filters were removed and the receivers were vacuumed. New cerustop filter, retention wire and large vented dome installed on each hearing aid. Listening check of hearing aids revealed clear asif response and good hearing aid output. Verified hearing aid firmware is current. Given the poor history of hearing aid maintenance- the patient stated the staff is to maintain his hearing aids (routine cleaning and filter/dome change) a 3 month hearing aid check in recommended. The patient's next hearing aid check is scheduled for 06/19/2025

## 2025-03-31 ENCOUNTER — TELEPHONE (OUTPATIENT)
Dept: PULMONOLOGY | Age: 65
End: 2025-03-31

## 2025-03-31 NOTE — TELEPHONE ENCOUNTER
Sherrell from [facility] called in asking if we had received any communication from [facility] regarding patients pap machine, as he has not yet been set up due to complications with the script. Her call back is [phone].    Phoned [facility] and spoke to Brittany. She stated that the RT had noted in the patients chart that the machine the patient has does not support the pressures written on the script for set up. I asked her to fax us this documentation to [phone] fax number.

## 2025-04-02 DIAGNOSIS — G47.39 COMPLEX SLEEP APNEA SYNDROME: Primary | ICD-10-CM

## 2025-04-02 NOTE — TELEPHONE ENCOUNTER
Sent new order to [facility]. I called and spoke to marek to let her know that we sent a new order to DME company.

## 2025-06-19 ENCOUNTER — HOSPITAL ENCOUNTER (OUTPATIENT)
Dept: AUDIOLOGY | Age: 65
Discharge: HOME | End: 2025-06-19

## 2025-06-19 PROCEDURE — 9990000010 HC NO CHARGE VISIT: Performed by: AUDIOLOGIST

## 2025-06-19 NOTE — PROGRESS NOTES
ACCOUNT #: [id number]      DIAGNOSIS: H90.3     3 MONTH HEARING AID CHECK:  (Jose Cruz Myers L30-R x2 2024- warranty 05/22/2027) Patient here for 3 month hearing aid check. He is not having any issues. Otoscopy revealed excessive cerumen, bilaterally. Some cerumen removed by lighted curette without issue. Otoscopy revealed non-occluding cerumen with normal appearing tympanic membrane- bilaterally  The hearing aids were cleaned- mics vacuumed, cerustop filters were removed and the receivers were vacuumed. New cerustop filter, retention wire and large vented dome installed on each hearing aid. Listening check of hearing aids revealed clear asif response and good hearing aid output. Verified hearing aid firmware is current. Given the poor history of hearing aid maintenance- the patient stated the staff is to maintain his hearing aids (routine cleaning and filter/dome change) a 3 month hearing aid check in recommended. The patient's next hearing aid check is scheduled for 096/18/2025. The patient should follow up with ENT for bilateral ear cleaning as scheduled 07/30/2025.

## 2025-07-08 NOTE — PROGRESS NOTES
Endoscopy     SOCIAL HISTORY:  Social History     Tobacco Use    Smoking status: Former     Current packs/day: 0.00     Average packs/day: 2.0 packs/day for 40.0 years (80.0 ttl pk-yrs)     Types: Cigarettes, Cigars     Start date: 12/27/1981     Quit date: 12/27/2021     Years since quitting: 3.5     Passive exposure: Current    Smokeless tobacco: Never    Tobacco comments:     7/9/25 Per pt he still smokes just not that much.    Vaping Use    Vaping status: Never Used   Substance Use Topics    Alcohol use: No     Alcohol/week: 0.0 standard drinks of alcohol    Drug use: No     ALLERGIES:No Known Allergies  FAMILY HISTORY:  Family History   Problem Relation Age of Onset    Dementia Mother     High Blood Pressure Mother     COPD Father     High Cholesterol Father     High Blood Pressure Father     Dementia Maternal Aunt     Seizures Maternal Uncle      CURRENT MEDICATIONS:  Current Outpatient Medications   Medication Sig Dispense Refill    umeclidinium-vilanterol (ANORO ELLIPTA) 62.5-25 MCG/ACT inhaler Inhale 1 puff into the lungs daily      aspirin 81 MG EC tablet Take 1 tablet by mouth daily      amLODIPine (NORVASC) 5 MG tablet Take 1 tablet by mouth daily 30 tablet 0    calcium carbonate (TUMS) 500 MG chewable tablet Take 1 tablet by mouth daily      meclizine (ANTIVERT) 12.5 MG tablet Take 1 tablet by mouth 3 times daily as needed      traZODone (DESYREL) 50 MG tablet Take 1 tablet by mouth as needed for Sleep      divalproex (DEPAKOTE SPRINKLE) 125 MG DR capsule Take 2 capsules by mouth at bedtime      omeprazole (PRILOSEC) 20 MG delayed release capsule Take 1 capsule by mouth every morning (before breakfast) 30 capsule 2    levothyroxine (SYNTHROID) 50 MCG tablet Take 1 tablet by mouth daily 30 tablet 2    atorvastatin (LIPITOR) 80 MG tablet Take 1 tablet by mouth daily 30 tablet 2    fluticasone (FLONASE) 50 MCG/ACT nasal spray 2 sprays by Each Nostril route daily 16 g 2    acetaminophen (TYLENOL) 500 MG

## 2025-07-09 ENCOUNTER — TELEPHONE (OUTPATIENT)
Age: 65
End: 2025-07-09

## 2025-07-09 ENCOUNTER — OFFICE VISIT (OUTPATIENT)
Age: 65
End: 2025-07-09
Payer: MEDICARE

## 2025-07-09 VITALS
HEIGHT: 64 IN | DIASTOLIC BLOOD PRESSURE: 60 MMHG | BODY MASS INDEX: 41.57 KG/M2 | HEART RATE: 97 BPM | WEIGHT: 243.5 LBS | OXYGEN SATURATION: 92 % | TEMPERATURE: 99 F | SYSTOLIC BLOOD PRESSURE: 124 MMHG

## 2025-07-09 DIAGNOSIS — E66.813 CLASS 3 SEVERE OBESITY WITH BODY MASS INDEX (BMI) OF 40.0 TO 44.9 IN ADULT, UNSPECIFIED OBESITY TYPE, UNSPECIFIED WHETHER SERIOUS COMORBIDITY PRESENT (HCC): ICD-10-CM

## 2025-07-09 DIAGNOSIS — G47.39 COMPLEX SLEEP APNEA SYNDROME: Primary | ICD-10-CM

## 2025-07-09 PROCEDURE — 3078F DIAST BP <80 MM HG: CPT

## 2025-07-09 PROCEDURE — 1124F ACP DISCUSS-NO DSCNMKR DOCD: CPT

## 2025-07-09 PROCEDURE — 3074F SYST BP LT 130 MM HG: CPT

## 2025-07-09 PROCEDURE — 99214 OFFICE O/P EST MOD 30 MIN: CPT

## 2025-07-09 NOTE — TELEPHONE ENCOUNTER
Gave Pt orders from [facility] and Sleep studies and original consult so pt can get needed supplies for his PAP.  He will call with issues.

## 2025-07-30 ENCOUNTER — OFFICE VISIT (OUTPATIENT)
Dept: ENT CLINIC | Age: 65
End: 2025-07-30
Payer: MEDICARE

## 2025-07-30 VITALS
OXYGEN SATURATION: 94 % | HEART RATE: 93 BPM | WEIGHT: 246 LBS | TEMPERATURE: 98.2 F | DIASTOLIC BLOOD PRESSURE: 78 MMHG | HEIGHT: 64 IN | SYSTOLIC BLOOD PRESSURE: 130 MMHG | BODY MASS INDEX: 42 KG/M2

## 2025-07-30 DIAGNOSIS — Z97.4 WEARS HEARING AID IN BOTH EARS: ICD-10-CM

## 2025-07-30 DIAGNOSIS — H61.23 BILATERAL HEARING LOSS DUE TO CERUMEN IMPACTION: Primary | ICD-10-CM

## 2025-07-30 PROCEDURE — 69210 REMOVE IMPACTED EAR WAX UNI: CPT | Performed by: REGISTERED NURSE

## 2025-07-30 RX ORDER — CHOLECALCIFEROL (VITAMIN D3) 25 MCG
1000 CAPSULE ORAL DAILY
COMMUNITY

## 2025-07-30 NOTE — PROGRESS NOTES
Procedure note:  DATE AND TIME OF PROCEDURE: 7/30/2025 1:49 PM  PATIENT NAME: Duane C Adams  MRN [id number]  PROVIDER: SITA Ojeda CNP   PRE-PROCEDURE DIAGNOSIS:  Bilateral cerumen impaction  POST-PROCEDURE DIAGNOSIS:   Same     INDICATION: Cerumenosis causing an inability to visualize the tympanic membrane, middle ear space and/or external auditory canal.     TEACHING: Procedure, benefits, and risks were explained to patient/family. Verbal informed consent was obtained.    TIME OUT: A time out was conducted immediately before starting the procedure that confirmed a final verification of the correct patient, correct procedure, correct patient position, correct site and availability of special equipment.    DESCRIPTION OF PROCEDURE:  PROCEDURE: Cerumenectomy  SITE: Bilateral ear(s)    ANESTHESIA:  NONE  COMPLICATIONS: NONE  EBL: NONE    PROCEDURE: Handheld otoscope used to visualize EAC. Bilateral cerumen in place, obstructing visualization of tympanic membranes.  Cerumen removed with wire loop and right angle ball tip until tympanic membranes could be visualized as documented above.  Normal exam. The patient tolerated the procedure well, with no complications.

## (undated) DEVICE — MEDI-VAC YANKAUER SUCTION HANDLE W/BULBOUS TIP: Brand: CARDINAL HEALTH

## (undated) DEVICE — SOLUTION IV 1000ML 0.45% SOD CHL PH 5 INJ USP VIAFLX PLAS

## (undated) DEVICE — MEDI-VAC NON-CONDUCTIVE SUCTION TUBING 6MM X 6.1M (20 FT.) L: Brand: CARDINAL HEALTH

## (undated) DEVICE — GENERAL LAPAROSCOPY PACK-LF: Brand: MEDLINE INDUSTRIES, INC.

## (undated) DEVICE — ENDO KIT: Brand: MEDLINE INDUSTRIES, INC.

## (undated) DEVICE — TUBING IV STOPCOCK 48 CM 3 W

## (undated) DEVICE — SET ADMIN 25ML L117IN PMP MOD CK VLV RLER CLMP 2 SMRTSITE

## (undated) DEVICE — CONMED SCOPE SAVER BITE BLOCK, 20X27 MM: Brand: SCOPE SAVER

## (undated) DEVICE — DEFENDO AIR WATER SUCTION AND BIOPSY VALVE KIT FOR  OLYMPUS: Brand: DEFENDO AIR/WATER/SUCTION AND BIOPSY VALVE

## (undated) DEVICE — Device: Brand: JELCO

## (undated) DEVICE — IV START KIT: Brand: MEDLINE INDUSTRIES, INC.

## (undated) DEVICE — 2000CC GUARDIAN II: Brand: GUARDIAN